# Patient Record
Sex: FEMALE | Race: WHITE | NOT HISPANIC OR LATINO | Employment: UNEMPLOYED | URBAN - METROPOLITAN AREA
[De-identification: names, ages, dates, MRNs, and addresses within clinical notes are randomized per-mention and may not be internally consistent; named-entity substitution may affect disease eponyms.]

---

## 2019-07-05 ENCOUNTER — APPOINTMENT (OUTPATIENT)
Dept: GENERAL RADIOLOGY | Facility: HOSPITAL | Age: 54
End: 2019-07-05

## 2019-07-05 ENCOUNTER — HOSPITAL ENCOUNTER (EMERGENCY)
Facility: HOSPITAL | Age: 54
Discharge: HOME | End: 2019-07-05
Attending: EMERGENCY MEDICINE | Admitting: EMERGENCY MEDICINE

## 2019-07-05 VITALS
RESPIRATION RATE: 16 BRPM | OXYGEN SATURATION: 100 % | TEMPERATURE: 97.5 F | SYSTOLIC BLOOD PRESSURE: 152 MMHG | WEIGHT: 120 LBS | HEIGHT: 65 IN | HEART RATE: 85 BPM | BODY MASS INDEX: 19.99 KG/M2 | DIASTOLIC BLOOD PRESSURE: 117 MMHG

## 2019-07-05 DIAGNOSIS — F41.9 ANXIETY: ICD-10-CM

## 2019-07-05 DIAGNOSIS — R53.83 OTHER FATIGUE: Primary | ICD-10-CM

## 2019-07-05 DIAGNOSIS — Z59.00 HOMELESSNESS: ICD-10-CM

## 2019-07-05 LAB
ALBUMIN SERPL-MCNC: 3.7 G/DL (ref 3.5–5.2)
ALBUMIN/GLOB SERPL: 1.4 G/DL
ALP SERPL-CCNC: 76 U/L (ref 39–117)
ALT SERPL W P-5'-P-CCNC: 16 U/L (ref 1–33)
ANION GAP SERPL CALCULATED.3IONS-SCNC: 8 MMOL/L (ref 5–15)
AST SERPL-CCNC: 18 U/L (ref 1–32)
B-HCG UR QL: NEGATIVE
BASOPHILS # BLD AUTO: 0.03 10*3/MM3 (ref 0–0.2)
BASOPHILS NFR BLD AUTO: 0.5 % (ref 0–1.5)
BILIRUB SERPL-MCNC: <0.2 MG/DL (ref 0.2–1.2)
BILIRUB UR QL STRIP: NEGATIVE
BUN BLD-MCNC: 15 MG/DL (ref 6–20)
BUN/CREAT SERPL: 25.4 (ref 7–25)
CALCIUM SPEC-SCNC: 8.9 MG/DL (ref 8.6–10.5)
CHLORIDE SERPL-SCNC: 105 MMOL/L (ref 98–107)
CLARITY UR: CLEAR
CO2 SERPL-SCNC: 29 MMOL/L (ref 22–29)
COLOR UR: YELLOW
CREAT BLD-MCNC: 0.59 MG/DL (ref 0.57–1)
DEPRECATED RDW RBC AUTO: 43.8 FL (ref 37–54)
EOSINOPHIL # BLD AUTO: 0.18 10*3/MM3 (ref 0–0.4)
EOSINOPHIL NFR BLD AUTO: 2.9 % (ref 0.3–6.2)
ERYTHROCYTE [DISTWIDTH] IN BLOOD BY AUTOMATED COUNT: 12.9 % (ref 12.3–15.4)
GFR SERPL CREATININE-BSD FRML MDRD: 107 ML/MIN/1.73
GLOBULIN UR ELPH-MCNC: 2.6 GM/DL
GLUCOSE BLD-MCNC: 85 MG/DL (ref 65–99)
GLUCOSE BLDC GLUCOMTR-MCNC: 78 MG/DL (ref 70–130)
GLUCOSE UR STRIP-MCNC: NEGATIVE MG/DL
HCT VFR BLD AUTO: 42.6 % (ref 34–46.6)
HGB BLD-MCNC: 13.2 G/DL (ref 12–15.9)
HGB UR QL STRIP.AUTO: NEGATIVE
HOLD SPECIMEN: NORMAL
HOLD SPECIMEN: NORMAL
IMM GRANULOCYTES # BLD AUTO: 0.01 10*3/MM3 (ref 0–0.05)
IMM GRANULOCYTES NFR BLD AUTO: 0.2 % (ref 0–0.5)
INTERNAL NEGATIVE CONTROL: NEGATIVE
INTERNAL POSITIVE CONTROL: POSITIVE
KETONES UR QL STRIP: NEGATIVE
LEUKOCYTE ESTERASE UR QL STRIP.AUTO: NEGATIVE
LYMPHOCYTES # BLD AUTO: 1.64 10*3/MM3 (ref 0.7–3.1)
LYMPHOCYTES NFR BLD AUTO: 26 % (ref 19.6–45.3)
Lab: NORMAL
MAGNESIUM SERPL-MCNC: 2.1 MG/DL (ref 1.6–2.6)
MCH RBC QN AUTO: 28.7 PG (ref 26.6–33)
MCHC RBC AUTO-ENTMCNC: 31 G/DL (ref 31.5–35.7)
MCV RBC AUTO: 92.6 FL (ref 79–97)
MONOCYTES # BLD AUTO: 0.38 10*3/MM3 (ref 0.1–0.9)
MONOCYTES NFR BLD AUTO: 6 % (ref 5–12)
NEUTROPHILS # BLD AUTO: 4.06 10*3/MM3 (ref 1.7–7)
NEUTROPHILS NFR BLD AUTO: 64.4 % (ref 42.7–76)
NITRITE UR QL STRIP: NEGATIVE
NRBC BLD AUTO-RTO: 0 /100 WBC (ref 0–0.2)
PH UR STRIP.AUTO: 6 [PH] (ref 5–8)
PLATELET # BLD AUTO: 344 10*3/MM3 (ref 140–450)
PMV BLD AUTO: 9.3 FL (ref 6–12)
POTASSIUM BLD-SCNC: 4.1 MMOL/L (ref 3.5–5.2)
PROT SERPL-MCNC: 6.3 G/DL (ref 6–8.5)
PROT UR QL STRIP: NEGATIVE
RBC # BLD AUTO: 4.6 10*6/MM3 (ref 3.77–5.28)
SODIUM BLD-SCNC: 142 MMOL/L (ref 136–145)
SP GR UR STRIP: 1.02 (ref 1–1.03)
TROPONIN T SERPL-MCNC: <0.01 NG/ML (ref 0–0.03)
UROBILINOGEN UR QL STRIP: NORMAL
WBC NRBC COR # BLD: 6.3 10*3/MM3 (ref 3.4–10.8)
WHOLE BLOOD HOLD SPECIMEN: NORMAL
WHOLE BLOOD HOLD SPECIMEN: NORMAL

## 2019-07-05 PROCEDURE — 82962 GLUCOSE BLOOD TEST: CPT

## 2019-07-05 PROCEDURE — 86618 LYME DISEASE ANTIBODY: CPT | Performed by: NURSE PRACTITIONER

## 2019-07-05 PROCEDURE — 93005 ELECTROCARDIOGRAM TRACING: CPT | Performed by: EMERGENCY MEDICINE

## 2019-07-05 PROCEDURE — 81025 URINE PREGNANCY TEST: CPT

## 2019-07-05 PROCEDURE — 86757 RICKETTSIA ANTIBODY: CPT | Performed by: NURSE PRACTITIONER

## 2019-07-05 PROCEDURE — 99283 EMERGENCY DEPT VISIT LOW MDM: CPT

## 2019-07-05 PROCEDURE — 81025 URINE PREGNANCY TEST: CPT | Performed by: EMERGENCY MEDICINE

## 2019-07-05 PROCEDURE — 84484 ASSAY OF TROPONIN QUANT: CPT

## 2019-07-05 PROCEDURE — 80053 COMPREHEN METABOLIC PANEL: CPT

## 2019-07-05 PROCEDURE — 93005 ELECTROCARDIOGRAM TRACING: CPT

## 2019-07-05 PROCEDURE — 81003 URINALYSIS AUTO W/O SCOPE: CPT | Performed by: EMERGENCY MEDICINE

## 2019-07-05 PROCEDURE — 71045 X-RAY EXAM CHEST 1 VIEW: CPT

## 2019-07-05 PROCEDURE — 86753 PROTOZOA ANTIBODY NOS: CPT | Performed by: NURSE PRACTITIONER

## 2019-07-05 PROCEDURE — 86666 EHRLICHIA ANTIBODY: CPT | Performed by: NURSE PRACTITIONER

## 2019-07-05 PROCEDURE — 85025 COMPLETE CBC W/AUTO DIFF WBC: CPT

## 2019-07-05 PROCEDURE — 83735 ASSAY OF MAGNESIUM: CPT

## 2019-07-05 PROCEDURE — 87476 LYME DIS DNA AMP PROBE: CPT | Performed by: NURSE PRACTITIONER

## 2019-07-05 RX ORDER — SODIUM CHLORIDE 0.9 % (FLUSH) 0.9 %
10 SYRINGE (ML) INJECTION AS NEEDED
Status: DISCONTINUED | OUTPATIENT
Start: 2019-07-05 | End: 2019-07-05 | Stop reason: HOSPADM

## 2019-07-05 RX ADMIN — SODIUM CHLORIDE 1000 ML: 9 INJECTION, SOLUTION INTRAVENOUS at 20:27

## 2019-07-05 SDOH — ECONOMIC STABILITY - HOUSING INSECURITY: HOMELESSNESS UNSPECIFIED: Z59.00

## 2019-07-06 NOTE — ED PROVIDER NOTES
"Subjective   Ms. Leah Rushing is a 54 y.o. female who presents to the ED with complaints of fatigue. She reports that for the past 3 days she has had severe weakness and fatigue, which prompted presentation to the ED. She was seen for the same symptoms at the [facility] ED this morning and given 2 liters of fluids before being discharged with a diagnosis of dehydration. She also tells us that she \"knows it's crazy\" but she believes that the [employer] is chloroforming her at night and moving her body. However, she denies any vomiting, rashes, vaginal discharge, or hallucinations. She notes that she pulled 3 ticks off of her earlier this week. Her primary care provider is TEX Jackson. She is currently homeless and meeting with [facility] in 3 days. No other acute complaints at this time.         History provided by:  Patient  Fatigue   Severity:  Moderate  Duration:  3 days  Timing:  Constant  Chronicity:  New  Associated symptoms: fatigue    Associated symptoms: no abdominal pain        Review of Systems   Constitutional: Positive for fatigue.   Gastrointestinal: Negative for abdominal pain.   Neurological: Positive for weakness.   Psychiatric/Behavioral: Negative for hallucinations.   All other systems reviewed and are negative.      History reviewed. No pertinent past medical history.    Allergies   Allergen Reactions   • Percocet [Oxycodone-Acetaminophen] GI Intolerance       History reviewed. No pertinent surgical history.    No family history on file.    Social History     Socioeconomic History   • Marital status:      Spouse name: Not on file   • Number of children: Not on file   • Years of education: Not on file   • Highest education level: Not on file         Objective   Physical Exam   Constitutional: She is oriented to person, place, and time. She appears well-developed and well-nourished. No distress.   HENT:   Head: Normocephalic and atraumatic.   Nose: Nose normal.   Eyes: " Conjunctivae are normal. No scleral icterus.   Neck: Normal range of motion. Neck supple.   Cardiovascular: Normal rate, regular rhythm and normal heart sounds.   No murmur heard.  Pulmonary/Chest: Effort normal and breath sounds normal. No respiratory distress.   Abdominal: Soft. There is no tenderness.   Musculoskeletal: Normal range of motion.   Neurological: She is alert and oriented to person, place, and time.   Skin: Skin is warm and dry. She is not diaphoretic.   Psychiatric: She has a normal mood and affect. Her behavior is normal.   Nursing note and vitals reviewed.      Procedures         ED Course       Recent Results (from the past 24 hour(s))   Comprehensive Metabolic Panel    Collection Time: 07/05/19  6:06 PM   Result Value Ref Range    Glucose 85 65 - 99 mg/dL    BUN 15 6 - 20 mg/dL    Creatinine 0.59 0.57 - 1.00 mg/dL    Sodium 142 136 - 145 mmol/L    Potassium 4.1 3.5 - 5.2 mmol/L    Chloride 105 98 - 107 mmol/L    CO2 29.0 22.0 - 29.0 mmol/L    Calcium 8.9 8.6 - 10.5 mg/dL    Total Protein 6.3 6.0 - 8.5 g/dL    Albumin 3.70 3.50 - 5.20 g/dL    ALT (SGPT) 16 1 - 33 U/L    AST (SGOT) 18 1 - 32 U/L    Alkaline Phosphatase 76 39 - 117 U/L    Total Bilirubin <0.2 (L) 0.2 - 1.2 mg/dL    eGFR Non African Amer 107 >60 mL/min/1.73    Globulin 2.6 gm/dL    A/G Ratio 1.4 g/dL    BUN/Creatinine Ratio 25.4 (H) 7.0 - 25.0    Anion Gap 8.0 5.0 - 15.0 mmol/L   Troponin    Collection Time: 07/05/19  6:06 PM   Result Value Ref Range    Troponin T <0.010 0.000 - 0.030 ng/mL   Magnesium    Collection Time: 07/05/19  6:06 PM   Result Value Ref Range    Magnesium 2.1 1.6 - 2.6 mg/dL   Light Blue Top    Collection Time: 07/05/19  6:06 PM   Result Value Ref Range    Extra Tube hold for add-on    Green Top (Gel)    Collection Time: 07/05/19  6:06 PM   Result Value Ref Range    Extra Tube Hold for add-ons.    Lavender Top    Collection Time: 07/05/19  6:06 PM   Result Value Ref Range    Extra Tube hold for add-on    Gold  Top - SST    Collection Time: 07/05/19  6:06 PM   Result Value Ref Range    Extra Tube Hold for add-ons.    CBC Auto Differential    Collection Time: 07/05/19  6:06 PM   Result Value Ref Range    WBC 6.30 3.40 - 10.80 10*3/mm3    RBC 4.60 3.77 - 5.28 10*6/mm3    Hemoglobin 13.2 12.0 - 15.9 g/dL    Hematocrit 42.6 34.0 - 46.6 %    MCV 92.6 79.0 - 97.0 fL    MCH 28.7 26.6 - 33.0 pg    MCHC 31.0 (L) 31.5 - 35.7 g/dL    RDW 12.9 12.3 - 15.4 %    RDW-SD 43.8 37.0 - 54.0 fl    MPV 9.3 6.0 - 12.0 fL    Platelets 344 140 - 450 10*3/mm3    Neutrophil % 64.4 42.7 - 76.0 %    Lymphocyte % 26.0 19.6 - 45.3 %    Monocyte % 6.0 5.0 - 12.0 %    Eosinophil % 2.9 0.3 - 6.2 %    Basophil % 0.5 0.0 - 1.5 %    Immature Grans % 0.2 0.0 - 0.5 %    Neutrophils, Absolute 4.06 1.70 - 7.00 10*3/mm3    Lymphocytes, Absolute 1.64 0.70 - 3.10 10*3/mm3    Monocytes, Absolute 0.38 0.10 - 0.90 10*3/mm3    Eosinophils, Absolute 0.18 0.00 - 0.40 10*3/mm3    Basophils, Absolute 0.03 0.00 - 0.20 10*3/mm3    Immature Grans, Absolute 0.01 0.00 - 0.05 10*3/mm3    nRBC 0.0 0.0 - 0.2 /100 WBC   Urinalysis With Microscopic If Indicated (No Culture) - Urine, Clean Catch    Collection Time: 07/05/19  7:01 PM   Result Value Ref Range    Color, UA Yellow Yellow, Straw    Appearance, UA Clear Clear    pH, UA 6.0 5.0 - 8.0    Specific Gravity, UA 1.016 1.001 - 1.030    Glucose, UA Negative Negative    Ketones, UA Negative Negative    Bilirubin, UA Negative Negative    Blood, UA Negative Negative    Protein, UA Negative Negative    Leuk Esterase, UA Negative Negative    Nitrite, UA Negative Negative    Urobilinogen, UA 0.2 E.U./dL 0.2 - 1.0 E.U./dL   POCT pregnancy, urine    Collection Time: 07/05/19  7:03 PM   Result Value Ref Range    HCG, Urine, QL Negative Negative    Lot Number [id number]     Internal Positive Control Positive     Internal Negative Control Negative    POC Glucose Once    Collection Time: 07/05/19  8:04 PM   Result Value Ref Range    Glucose  "78 70 - 130 mg/dL     Note: In addition to lab results from this visit, the labs listed above may include labs taken at another facility or during a different encounter within the last 24 hours. Please correlate lab times with ED admission and discharge times for further clarification of the services performed during this visit.    XR Chest 1 View   Final Result   Nodular density seen overlying the right lower lung field   suggesting a  nipple shadow. No acute parenchymal disease.       DICTATED:   07/05/2019   EDITED/ls :   07/05/2019        This report was finalized on 7/5/2019 6:53 PM by Dr. Allison Carranza MD.            Vitals:    07/05/19 1643   BP: 157/88   BP Location: Left arm   Patient Position: Sitting   Pulse: 78   Resp: 16   Temp: 97.5 °F (36.4 °C)   TempSrc: Oral   SpO2: 98%   Weight: 54.4 kg (120 lb)   Height: 165.1 cm (65\")     Medications   sodium chloride 0.9 % flush 10 mL (not administered)   sodium chloride 0.9 % bolus 1,000 mL (1,000 mL Intravenous New Bag 7/5/19 2027)     ECG/EMG Results (last 24 hours)     Procedure Component Value Units Date/Time    ECG 12 Lead [[id number]] Collected:  07/05/19 1800     Updated:  07/05/19 1800        ECG 12 Lead                           MDM    Final diagnoses:   Other fatigue   Homelessness   Anxiety       Documentation assistance provided by logan Frias.  Information recorded by the logan was done at my direction and has been verified and validated by me.     Pauline Frias  07/05/19 2022       Pauline Frias  07/05/19 2132       Lela Fragoso APRN  07/05/19 2137    "

## 2019-07-06 NOTE — DISCHARGE INSTRUCTIONS
Follow up with your primary care provider at next available.  Please drink ample clear fluids while you are outdoors and in this heat.      Return to the ER if you develop any acute or worsening symptoms.

## 2019-07-08 LAB
B BURGDOR IGG+IGM SER-ACNC: <0.91 ISR (ref 0–0.9)
B BURGDOR IGM SER-ACNC: <0.8 INDEX (ref 0–0.79)

## 2019-07-09 LAB
A PHAGOCYTOPH IGM TITR SER IF: NEGATIVE {TITER}
B MICROTI IGG TITR SER: NORMAL {TITER}
B MICROTI IGM TITR SER: NORMAL {TITER}
CONV HGE IGG TITER: NEGATIVE
E CHAFFEENSIS IGG TITR SER IF: NEGATIVE {TITER}
E. CHAFFEENSIS (HME) IGM TITER: NEGATIVE
R RICKETTSI IGG SER QL IA: NEGATIVE
R RICKETTSI IGM TITR SER: 0.43 INDEX (ref 0–0.89)

## 2019-07-10 LAB — B BURGDOR DNA SPEC QL NAA+PROBE: NEGATIVE

## 2019-10-30 ENCOUNTER — APPOINTMENT (OUTPATIENT)
Dept: GENERAL RADIOLOGY | Facility: HOSPITAL | Age: 54
End: 2019-10-30

## 2019-10-30 ENCOUNTER — HOSPITAL ENCOUNTER (EMERGENCY)
Facility: HOSPITAL | Age: 54
Discharge: HOME | End: 2019-10-30
Attending: EMERGENCY MEDICINE | Admitting: EMERGENCY MEDICINE

## 2019-10-30 VITALS
HEIGHT: 65 IN | HEART RATE: 80 BPM | DIASTOLIC BLOOD PRESSURE: 109 MMHG | WEIGHT: 120 LBS | TEMPERATURE: 97.6 F | BODY MASS INDEX: 19.99 KG/M2 | SYSTOLIC BLOOD PRESSURE: 142 MMHG | RESPIRATION RATE: 18 BRPM | OXYGEN SATURATION: 98 %

## 2019-10-30 DIAGNOSIS — R53.1 GENERALIZED WEAKNESS: ICD-10-CM

## 2019-10-30 DIAGNOSIS — E87.6 HYPOKALEMIA: ICD-10-CM

## 2019-10-30 DIAGNOSIS — Z59.00 HOMELESSNESS: ICD-10-CM

## 2019-10-30 DIAGNOSIS — F19.10 POLYSUBSTANCE ABUSE (HCC): ICD-10-CM

## 2019-10-30 DIAGNOSIS — T07.XXXA MULTIPLE ABRASIONS: Primary | ICD-10-CM

## 2019-10-30 LAB
ALBUMIN SERPL-MCNC: 3.9 G/DL (ref 3.5–5.2)
ALBUMIN/GLOB SERPL: 1.4 G/DL
ALP SERPL-CCNC: 80 U/L (ref 39–117)
ALT SERPL W P-5'-P-CCNC: 13 U/L (ref 1–33)
AMPHET+METHAMPHET UR QL: POSITIVE
AMPHETAMINES UR QL: POSITIVE
ANION GAP SERPL CALCULATED.3IONS-SCNC: 10 MMOL/L (ref 5–15)
APAP SERPL-MCNC: <5 MCG/ML (ref 10–30)
AST SERPL-CCNC: 15 U/L (ref 1–32)
B-HCG UR QL: NEGATIVE
BACTERIA UR QL AUTO: ABNORMAL /HPF
BARBITURATES UR QL SCN: NEGATIVE
BASOPHILS # BLD AUTO: 0.04 10*3/MM3 (ref 0–0.2)
BASOPHILS NFR BLD AUTO: 0.7 % (ref 0–1.5)
BENZODIAZ UR QL SCN: POSITIVE
BILIRUB SERPL-MCNC: 0.2 MG/DL (ref 0.2–1.2)
BILIRUB UR QL STRIP: NEGATIVE
BUN BLD-MCNC: 16 MG/DL (ref 6–20)
BUN/CREAT SERPL: 32 (ref 7–25)
BUPRENORPHINE SERPL-MCNC: NEGATIVE NG/ML
CALCIUM SPEC-SCNC: 8.7 MG/DL (ref 8.6–10.5)
CANNABINOIDS SERPL QL: NEGATIVE
CHLORIDE SERPL-SCNC: 103 MMOL/L (ref 98–107)
CLARITY UR: ABNORMAL
CO2 SERPL-SCNC: 26 MMOL/L (ref 22–29)
COCAINE UR QL: NEGATIVE
COLOR UR: YELLOW
CREAT BLD-MCNC: 0.5 MG/DL (ref 0.57–1)
DEPRECATED RDW RBC AUTO: 43.4 FL (ref 37–54)
EOSINOPHIL # BLD AUTO: 0.22 10*3/MM3 (ref 0–0.4)
EOSINOPHIL NFR BLD AUTO: 3.9 % (ref 0.3–6.2)
ERYTHROCYTE [DISTWIDTH] IN BLOOD BY AUTOMATED COUNT: 13.1 % (ref 12.3–15.4)
ETHANOL BLD-MCNC: <10 MG/DL (ref 0–10)
FLUAV AG NPH QL: NEGATIVE
FLUBV AG NPH QL IA: NEGATIVE
GFR SERPL CREATININE-BSD FRML MDRD: 129 ML/MIN/1.73
GLOBULIN UR ELPH-MCNC: 2.8 GM/DL
GLUCOSE BLD-MCNC: 89 MG/DL (ref 65–99)
GLUCOSE UR STRIP-MCNC: NEGATIVE MG/DL
HCT VFR BLD AUTO: 42.5 % (ref 34–46.6)
HETEROPH AB SER QL LA: NEGATIVE
HGB BLD-MCNC: 13.5 G/DL (ref 12–15.9)
HGB UR QL STRIP.AUTO: NEGATIVE
HYALINE CASTS UR QL AUTO: ABNORMAL /LPF
IMM GRANULOCYTES # BLD AUTO: 0.01 10*3/MM3 (ref 0–0.05)
IMM GRANULOCYTES NFR BLD AUTO: 0.2 % (ref 0–0.5)
KETONES UR QL STRIP: NEGATIVE
LEUKOCYTE ESTERASE UR QL STRIP.AUTO: NEGATIVE
LIPASE SERPL-CCNC: 19 U/L (ref 13–60)
LYMPHOCYTES # BLD AUTO: 2.12 10*3/MM3 (ref 0.7–3.1)
LYMPHOCYTES NFR BLD AUTO: 38 % (ref 19.6–45.3)
MAGNESIUM SERPL-MCNC: 2.1 MG/DL (ref 1.6–2.6)
MCH RBC QN AUTO: 28.5 PG (ref 26.6–33)
MCHC RBC AUTO-ENTMCNC: 31.8 G/DL (ref 31.5–35.7)
MCV RBC AUTO: 89.7 FL (ref 79–97)
METHADONE UR QL SCN: NEGATIVE
MONOCYTES # BLD AUTO: 0.43 10*3/MM3 (ref 0.1–0.9)
MONOCYTES NFR BLD AUTO: 7.7 % (ref 5–12)
NEUTROPHILS # BLD AUTO: 2.76 10*3/MM3 (ref 1.7–7)
NEUTROPHILS NFR BLD AUTO: 49.5 % (ref 42.7–76)
NITRITE UR QL STRIP: NEGATIVE
NRBC BLD AUTO-RTO: 0 /100 WBC (ref 0–0.2)
NT-PROBNP SERPL-MCNC: 112.6 PG/ML (ref 5–900)
OPIATES UR QL: NEGATIVE
OXYCODONE UR QL SCN: NEGATIVE
PCP UR QL SCN: NEGATIVE
PH UR STRIP.AUTO: 6.5 [PH] (ref 5–8)
PLATELET # BLD AUTO: 368 10*3/MM3 (ref 140–450)
PMV BLD AUTO: 9.4 FL (ref 6–12)
POTASSIUM BLD-SCNC: 3.1 MMOL/L (ref 3.5–5.2)
PROPOXYPH UR QL: NEGATIVE
PROT SERPL-MCNC: 6.7 G/DL (ref 6–8.5)
PROT UR QL STRIP: NEGATIVE
RBC # BLD AUTO: 4.74 10*6/MM3 (ref 3.77–5.28)
RBC # UR: ABNORMAL /HPF
REF LAB TEST METHOD: ABNORMAL
SALICYLATES SERPL-MCNC: <0.3 MG/DL
SODIUM BLD-SCNC: 139 MMOL/L (ref 136–145)
SP GR UR STRIP: 1.02 (ref 1–1.03)
SQUAMOUS #/AREA URNS HPF: ABNORMAL /HPF
T4 FREE SERPL-MCNC: 1.21 NG/DL (ref 0.93–1.7)
TRICYCLICS UR QL SCN: NEGATIVE
TROPONIN T SERPL-MCNC: <0.01 NG/ML (ref 0–0.03)
TSH SERPL DL<=0.05 MIU/L-ACNC: 4.03 UIU/ML (ref 0.27–4.2)
UROBILINOGEN UR QL STRIP: ABNORMAL
WBC NRBC COR # BLD: 5.58 10*3/MM3 (ref 3.4–10.8)
WBC UR QL AUTO: ABNORMAL /HPF

## 2019-10-30 PROCEDURE — 99284 EMERGENCY DEPT VISIT MOD MDM: CPT

## 2019-10-30 PROCEDURE — 83880 ASSAY OF NATRIURETIC PEPTIDE: CPT | Performed by: EMERGENCY MEDICINE

## 2019-10-30 PROCEDURE — 81025 URINE PREGNANCY TEST: CPT | Performed by: EMERGENCY MEDICINE

## 2019-10-30 PROCEDURE — 83690 ASSAY OF LIPASE: CPT | Performed by: EMERGENCY MEDICINE

## 2019-10-30 PROCEDURE — 81001 URINALYSIS AUTO W/SCOPE: CPT | Performed by: EMERGENCY MEDICINE

## 2019-10-30 PROCEDURE — 80307 DRUG TEST PRSMV CHEM ANLYZR: CPT | Performed by: EMERGENCY MEDICINE

## 2019-10-30 PROCEDURE — 84439 ASSAY OF FREE THYROXINE: CPT | Performed by: EMERGENCY MEDICINE

## 2019-10-30 PROCEDURE — 87804 INFLUENZA ASSAY W/OPTIC: CPT | Performed by: EMERGENCY MEDICINE

## 2019-10-30 PROCEDURE — 83735 ASSAY OF MAGNESIUM: CPT | Performed by: EMERGENCY MEDICINE

## 2019-10-30 PROCEDURE — 86308 HETEROPHILE ANTIBODY SCREEN: CPT | Performed by: EMERGENCY MEDICINE

## 2019-10-30 PROCEDURE — 85025 COMPLETE CBC W/AUTO DIFF WBC: CPT | Performed by: EMERGENCY MEDICINE

## 2019-10-30 PROCEDURE — 84484 ASSAY OF TROPONIN QUANT: CPT | Performed by: EMERGENCY MEDICINE

## 2019-10-30 PROCEDURE — 84443 ASSAY THYROID STIM HORMONE: CPT | Performed by: EMERGENCY MEDICINE

## 2019-10-30 PROCEDURE — 80053 COMPREHEN METABOLIC PANEL: CPT | Performed by: EMERGENCY MEDICINE

## 2019-10-30 PROCEDURE — 71045 X-RAY EXAM CHEST 1 VIEW: CPT

## 2019-10-30 PROCEDURE — 93005 ELECTROCARDIOGRAM TRACING: CPT | Performed by: EMERGENCY MEDICINE

## 2019-10-30 RX ORDER — LOSARTAN POTASSIUM 50 MG/1
50 TABLET ORAL DAILY
COMMUNITY

## 2019-10-30 RX ORDER — POTASSIUM CHLORIDE 750 MG/1
20 CAPSULE, EXTENDED RELEASE ORAL ONCE
Status: COMPLETED | OUTPATIENT
Start: 2019-10-30 | End: 2019-10-30

## 2019-10-30 RX ORDER — POTASSIUM CHLORIDE 20 MEQ/1
20 TABLET, EXTENDED RELEASE ORAL DAILY
Qty: 4 TABLET | Refills: 0 | Status: SHIPPED | OUTPATIENT
Start: 2019-10-30

## 2019-10-30 RX ORDER — SPIRONOLACTONE 50 MG/1
50 TABLET, FILM COATED ORAL DAILY
COMMUNITY

## 2019-10-30 RX ORDER — HYDROCHLOROTHIAZIDE 12.5 MG/1
12.5 TABLET ORAL DAILY
COMMUNITY

## 2019-10-30 RX ORDER — SODIUM CHLORIDE 0.9 % (FLUSH) 0.9 %
10 SYRINGE (ML) INJECTION AS NEEDED
Status: DISCONTINUED | OUTPATIENT
Start: 2019-10-30 | End: 2019-10-30 | Stop reason: HOSPADM

## 2019-10-30 RX ADMIN — SODIUM CHLORIDE 1000 ML: 9 INJECTION, SOLUTION INTRAVENOUS at 07:04

## 2019-10-30 RX ADMIN — POTASSIUM CHLORIDE 20 MEQ: 750 CAPSULE, EXTENDED RELEASE ORAL at 08:19

## 2019-10-30 SDOH — ECONOMIC STABILITY - HOUSING INSECURITY: HOMELESSNESS UNSPECIFIED: Z59.00

## 2019-10-31 ENCOUNTER — HOSPITAL ENCOUNTER (EMERGENCY)
Facility: HOSPITAL | Age: 54
Discharge: LEFT BEFORE DISCHARGE | End: 2019-10-31

## 2019-10-31 VITALS
HEART RATE: 87 BPM | OXYGEN SATURATION: 98 % | WEIGHT: 124 LBS | BODY MASS INDEX: 20.66 KG/M2 | SYSTOLIC BLOOD PRESSURE: 200 MMHG | DIASTOLIC BLOOD PRESSURE: 91 MMHG | RESPIRATION RATE: 18 BRPM | TEMPERATURE: 97.7 F | HEIGHT: 65 IN

## 2022-11-24 ENCOUNTER — HOSPITAL ENCOUNTER (EMERGENCY)
Facility: HOSPITAL | Age: 57
Discharge: HOME | End: 2022-11-24
Attending: EMERGENCY MEDICINE | Admitting: EMERGENCY MEDICINE

## 2022-11-24 VITALS
SYSTOLIC BLOOD PRESSURE: 170 MMHG | BODY MASS INDEX: 20.83 KG/M2 | TEMPERATURE: 100.2 F | DIASTOLIC BLOOD PRESSURE: 100 MMHG | HEART RATE: 99 BPM | WEIGHT: 125 LBS | OXYGEN SATURATION: 98 % | RESPIRATION RATE: 22 BRPM | HEIGHT: 65 IN

## 2022-11-24 DIAGNOSIS — J10.1 INFLUENZA A: Primary | ICD-10-CM

## 2022-11-24 PROCEDURE — 99283 EMERGENCY DEPT VISIT LOW MDM: CPT

## 2022-11-24 PROCEDURE — 94640 AIRWAY INHALATION TREATMENT: CPT

## 2022-11-24 RX ORDER — IBUPROFEN 800 MG/1
800 TABLET ORAL EVERY 8 HOURS PRN
Qty: 30 TABLET | Refills: 0 | Status: SHIPPED | OUTPATIENT
Start: 2022-11-24

## 2022-11-24 RX ORDER — ACETAMINOPHEN 500 MG
1000 TABLET ORAL 3 TIMES DAILY PRN
Qty: 30 TABLET | Refills: 0 | Status: SHIPPED | OUTPATIENT
Start: 2022-11-24

## 2022-11-24 RX ORDER — ALBUTEROL SULFATE 90 UG/1
2 AEROSOL, METERED RESPIRATORY (INHALATION) ONCE
Status: COMPLETED | OUTPATIENT
Start: 2022-11-24 | End: 2022-11-24

## 2022-11-24 RX ORDER — IBUPROFEN 800 MG/1
800 TABLET ORAL ONCE
Status: COMPLETED | OUTPATIENT
Start: 2022-11-24 | End: 2022-11-24

## 2022-11-24 RX ORDER — BENZONATATE 100 MG/1
100 CAPSULE ORAL ONCE
Status: COMPLETED | OUTPATIENT
Start: 2022-11-24 | End: 2022-11-24

## 2022-11-24 RX ADMIN — BENZONATATE 100 MG: 100 CAPSULE ORAL at 18:37

## 2022-11-24 RX ADMIN — ALBUTEROL SULFATE 2 PUFF: 90 AEROSOL, METERED RESPIRATORY (INHALATION) at 18:35

## 2022-11-24 RX ADMIN — IBUPROFEN 800 MG: 800 TABLET, FILM COATED ORAL at 18:37

## 2022-11-24 NOTE — ED PROVIDER NOTES
"Subjective   History of Present Illness  Pt is a 58 yo female presenting to ED with complaints of the Flu. PMHx significant for HTN. Pt reports developing symptoms of body aches, fevers, congestion and cough yesterday. She went to  ED and was diagnosed Flu A. She reports last dose of Tylenol and Motrin was at 0430. She has not take any other medications today. She is not on Tamiflu. She is unvaccinated for the Flu. She denies confusion, dizziness, vision changes, neck pain / stiffness, CP, SOB, N/V/D, abdominal pain or urinary sx. She is homeless but currently staying with a friend. She denies tobacco, drug or ETOH use.     History provided by:  Patient and friend      Review of Systems   Constitutional: Positive for appetite change, chills, fatigue and fever.   HENT: Positive for congestion and sore throat. Negative for ear pain, trouble swallowing and voice change.    Eyes: Negative for visual disturbance.   Respiratory: Positive for cough. Negative for shortness of breath.    Cardiovascular: Negative for chest pain.   Gastrointestinal: Negative for abdominal pain, diarrhea, nausea and vomiting.   Genitourinary: Negative for difficulty urinating, dysuria and flank pain.   Musculoskeletal: Positive for myalgias. Negative for arthralgias, back pain, neck pain and neck stiffness.   Skin: Negative for rash.   Neurological: Positive for headaches. Negative for dizziness, syncope, speech difficulty, weakness and numbness.   Psychiatric/Behavioral: Negative for confusion.       Past Medical History:   Diagnosis Date   • Hypertension        Allergies   Allergen Reactions   • Other Nausea And Vomiting     Pt states \"all opiods make me throw up instantly\"   • Percocet [Oxycodone-Acetaminophen] GI Intolerance   • Sulfa Antibiotics Rash       Past Surgical History:   Procedure Laterality Date   • KNEE SURGERY     • RHINOPLASTY         No family history on file.    Social History     Socioeconomic History   • Marital status: "    Tobacco Use   • Smoking status: Never   • Smokeless tobacco: Never   Substance and Sexual Activity   • Alcohol use: No   • Drug use: No   • Sexual activity: Defer           Objective   Physical Exam  Vitals and nursing note reviewed.   Constitutional:       Appearance: She is well-developed.   HENT:      Head: Atraumatic.      Nose: Nose normal.   Eyes:      General: Lids are normal.      Conjunctiva/sclera: Conjunctivae normal.      Pupils: Pupils are equal, round, and reactive to light.   Cardiovascular:      Rate and Rhythm: Normal rate and regular rhythm.      Heart sounds: Normal heart sounds.   Pulmonary:      Effort: Pulmonary effort is normal.      Breath sounds: Normal breath sounds. No wheezing.   Abdominal:      General: There is no distension.      Palpations: Abdomen is soft.      Tenderness: There is no abdominal tenderness. There is no guarding or rebound.   Musculoskeletal:         General: No tenderness. Normal range of motion.      Cervical back: Normal range of motion and neck supple.   Skin:     General: Skin is warm and dry.      Findings: No erythema or rash.   Neurological:      Mental Status: She is alert and oriented to person, place, and time.      Sensory: No sensory deficit.   Psychiatric:         Speech: Speech normal.         Behavior: Behavior normal.         Procedures           ED Course  ED Course as of 11/24/22 1821   Thu Nov 24, 2022   1801 SpO2: 98 %  RA [RT]      ED Course User Index  [RT] Cecille Jacobson PA      Discussed symptomatic treatment for Flu symptoms. Patient given Motrin in ED as well as Albuterol inhaler and tessalon pearles for cough. Rx's for Tylenol and Motrin sent to pharmacy. Patient's friend in ED reports he can help her fill Rx's. Went over her vitals signs in ED and to return to ED if new / worse sx.     No results found for this or any previous visit (from the past 24 hour(s)).  Note: In addition to lab results from this visit, the labs listed  "above may include labs taken at another facility or during a different encounter within the last 24 hours. Please correlate lab times with ED admission and discharge times for further clarification of the services performed during this visit.    No orders to display     Vitals:    11/24/22 1714   BP: (!) 167/109   BP Location: Left arm   Patient Position: Sitting   Pulse: 102   Resp: 20   Temp: 100.2 °F (37.9 °C)   TempSrc: Oral   SpO2: 98%   Weight: 56.7 kg (125 lb)   Height: 165.1 cm (65\")     Medications   ibuprofen (ADVIL,MOTRIN) tablet 800 mg (has no administration in time range)   albuterol sulfate HFA (PROVENTIL HFA;VENTOLIN HFA;PROAIR HFA) inhaler 2 puff (has no administration in time range)   benzonatate (TESSALON) capsule 100 mg (has no administration in time range)     ECG/EMG Results (last 24 hours)     ** No results found for the last 24 hours. **        No orders to display       DISCHARGE    Patient discharged in stable condition.    Reviewed implications of results, diagnosis, meds, responsibility to follow up, warning signs and symptoms of possible worsening, potential complications and reasons to return to ER.    Patient/Family voiced understanding of above instructions.    Discussed plan for discharge, as there is no emergent indication for admission.  Pt/family is agreeable and understands need for follow up and possible repeat testing.  Pt/family is aware that discharge does not mean that nothing is wrong but that it indicates no emergency is currently present that requires admission and they must continue care with follow-up as given below or with a physician of their choice.     FOLLOW-UP  Sigrid Byers PA  [address]   [address]  [place] [address]  [phone]    Schedule an appointment as soon as possible for a visit       [facility] Emergency Department  [address] [address] [phone]    If symptoms worsen       "   Medication List      New Prescriptions    acetaminophen 500 MG tablet  Commonly known as: TYLENOL  Take 2 tablets by mouth 3 (Three) Times a Day As Needed for Mild Pain.     ibuprofen 800 MG tablet  Commonly known as: ADVIL,MOTRIN  Take 1 tablet by mouth Every 8 (Eight) Hours As Needed for Mild Pain or Moderate Pain for up to 30 doses.           Where to Get Your Medications      These medications were sent to [facility]/pharmacy #[id number] - [place], [place] - [address] - [phone] [place] [phone]   [address] [place] [address]    Hours: 24-hours Phone: [phone]   · acetaminophen 500 MG tablet  · ibuprofen 800 MG tablet                                            MDM    Final diagnoses:   Influenza A       ED Disposition  ED Disposition     ED Disposition   Discharge    Condition   Stable    Comment   --             Sigrid Byers PA  [address]  [address]  [place] [id number] [phone]    Schedule an appointment as soon as possible for a visit       [facility] Emergency Department  [address] [place] [id number] [phone]    If symptoms worsen         Medication List      New Prescriptions    acetaminophen 500 MG tablet  Commonly known as: TYLENOL  Take 2 tablets by mouth 3 (Three) Times a Day As Needed for Mild Pain.     ibuprofen 800 MG tablet  Commonly known as: ADVIL,MOTRIN  Take 1 tablet by mouth Every 8 (Eight) Hours As Needed for Mild Pain or Moderate Pain for up to 30 doses.           Where to Get Your Medications      These medications were sent to [facility]/pharmacy #[id number] - [place], [place] - [address] - [phone] [place] [phone]  [address] [place] [address]    Hours: 24-hours Phone: [phone]   · acetaminophen 500 MG tablet  · ibuprofen 800 MG tablet          Cecille Jacobson PA  11/24/22 4529

## 2023-02-05 ENCOUNTER — HOSPITAL ENCOUNTER (EMERGENCY)
Facility: HOSPITAL | Age: 58
Discharge: HOME | End: 2023-02-05
Attending: EMERGENCY MEDICINE | Admitting: EMERGENCY MEDICINE
Payer: MEDICAID

## 2023-02-05 VITALS
HEIGHT: 65 IN | DIASTOLIC BLOOD PRESSURE: 95 MMHG | HEART RATE: 92 BPM | RESPIRATION RATE: 14 BRPM | SYSTOLIC BLOOD PRESSURE: 147 MMHG | TEMPERATURE: 97.7 F | OXYGEN SATURATION: 96 % | BODY MASS INDEX: 20.83 KG/M2 | WEIGHT: 125 LBS

## 2023-02-05 DIAGNOSIS — F41.1 ANXIETY REACTION: ICD-10-CM

## 2023-02-05 DIAGNOSIS — M79.605 ACUTE LEG PAIN, LEFT: Primary | ICD-10-CM

## 2023-02-05 DIAGNOSIS — I82.4Z2 DVT, LOWER EXTREMITY, DISTAL, ACUTE, LEFT: ICD-10-CM

## 2023-02-05 PROCEDURE — 25010000002 LORAZEPAM PER 2 MG: Performed by: EMERGENCY MEDICINE

## 2023-02-05 PROCEDURE — 96374 THER/PROPH/DIAG INJ IV PUSH: CPT

## 2023-02-05 PROCEDURE — 99283 EMERGENCY DEPT VISIT LOW MDM: CPT

## 2023-02-05 RX ORDER — HYDROCODONE BITARTRATE AND ACETAMINOPHEN 5; 325 MG/1; MG/1
1 TABLET ORAL
COMMUNITY
Start: 2023-02-03 | End: 2023-02-07

## 2023-02-05 RX ORDER — METHOCARBAMOL 500 MG/1
TABLET, FILM COATED ORAL
COMMUNITY
Start: 2023-02-02

## 2023-02-05 RX ORDER — LIDOCAINE 50 MG/G
1 PATCH TOPICAL
Status: DISCONTINUED | OUTPATIENT
Start: 2023-02-05 | End: 2023-02-05 | Stop reason: HOSPADM

## 2023-02-05 RX ORDER — LORAZEPAM 2 MG/ML
1 INJECTION INTRAMUSCULAR ONCE
Status: COMPLETED | OUTPATIENT
Start: 2023-02-05 | End: 2023-02-05

## 2023-02-05 RX ORDER — APIXABAN 5 MG (74)
KIT ORAL
COMMUNITY
Start: 2023-02-02

## 2023-02-05 RX ORDER — HYDROXYZINE PAMOATE 50 MG/1
50 CAPSULE ORAL 3 TIMES DAILY PRN
Qty: 20 CAPSULE | Refills: 0 | Status: SHIPPED | OUTPATIENT
Start: 2023-02-05

## 2023-02-05 RX ORDER — HYDROCODONE BITARTRATE AND ACETAMINOPHEN 5; 325 MG/1; MG/1
1 TABLET ORAL ONCE
Status: COMPLETED | OUTPATIENT
Start: 2023-02-05 | End: 2023-02-05

## 2023-02-05 RX ADMIN — LIDOCAINE 1 PATCH: 50 PATCH TOPICAL at 17:58

## 2023-02-05 RX ADMIN — HYDROCODONE BITARTRATE AND ACETAMINOPHEN 1 TABLET: 5; 325 TABLET ORAL at 21:40

## 2023-02-05 RX ADMIN — LORAZEPAM 1 MG: 2 INJECTION INTRAMUSCULAR; INTRAVENOUS at 17:54

## 2023-02-05 NOTE — ED PROVIDER NOTES
EMERGENCY DEPARTMENT ENCOUNTER    Pt Name: Jill M Paladino  MRN: [id number]  Pt :   1965  Room Number:  HAL1/HA  Date of encounter:  2023  PCP: Sigrid Byers PA  ED Provider: Davion Ramirez MD    Historian: Patient      HPI:  Chief Complaint: Left leg pain        Context: Jill M Paladino is a 57 y.o. female who presents to the ED c/o left leg pain which the patient states is secondary to DVT.  The patient has been complaining of pain for the last several days.  She was seen both at [facility] as well as [facility] per her report.  She was diagnosed with a DVT in her left leg and was started on Eliquis which she reports having been taking as prescribed.  She complains of severe pain all the way from her left buttock/low back through her anterior left leg.      PAST MEDICAL HISTORY  Past Medical History:   Diagnosis Date   • Hypertension          PAST SURGICAL HISTORY  Past Surgical History:   Procedure Laterality Date   • KNEE SURGERY     • RHINOPLASTY           FAMILY HISTORY  History reviewed. No pertinent family history.      SOCIAL HISTORY  Social History     Socioeconomic History   • Marital status:    Tobacco Use   • Smoking status: Never   • Smokeless tobacco: Never   Substance and Sexual Activity   • Alcohol use: No   • Drug use: No   • Sexual activity: Defer         ALLERGIES  Other, Percocet [oxycodone-acetaminophen], and Sulfa antibiotics        REVIEW OF SYSTEMS  Review of Systems       All systems reviewed and negative except for those discussed in HPI.       PHYSICAL EXAM    I have reviewed the triage vital signs and nursing notes.    ED Triage Vitals [23 1709]   Temp Heart Rate Resp BP SpO2   97.7 °F (36.5 °C) 90 14 176/97 100 %      Temp src Heart Rate Source Patient Position BP Location FiO2 (%)   Oral Monitor Sitting Right arm --       Physical Exam  GENERAL:   Appears in significant distress as she screams and moves about the hospital bed.  She  moves her leg and holds her low back/left hip.  She yells about the fact that her mother was in the special forces and how she could have been in the special forces and the fact that her mother had similar pain when she had a DVT.  HENT: Nares patent.  EYES: No scleral icterus.  CV: Regular rhythm, regular rate.  No murmurs gallops rubs.  2+ dorsalis pedis pulses.  Both feet are warm and of equal temperature.  RESPIRATORY: Normal effort.  No audible wheezes, rales or rhonchi.  Clear to auscultation  ABDOMEN: Soft, nontender to deep palpation  MUSCULOSKELETAL: No deformities.  I do not appreciate lower extremity asymmetry.  The patient has some tenderness with palpation of the left sciatic notch as well as diffusely throughout the left thigh.  NEURO: Alert, moves all extremities, follows commands.  SKIN: Warm, dry, no rash visualized.      LAB RESULTS  No results found for this or any previous visit (from the past 24 hour(s)).    If labs were ordered, I independently reviewed the results and considered them in treating the patient.        RADIOLOGY  No Radiology Exams Resulted Within Past 24 Hours          PROCEDURES    Procedures    No orders to display       MEDICATIONS GIVEN IN ER    Medications   lidocaine (LIDODERM) 5 % 1 patch (1 patch Transdermal Medication Applied 2/5/23 1758)   LORazepam (ATIVAN) injection 1 mg (1 mg Intravenous Given 2/5/23 1754)   HYDROcodone-acetaminophen (NORCO) 5-325 MG per tablet 1 tablet (1 tablet Oral Given 2/5/23 2140)         MEDICAL DECISION MAKING, PROGRESS, and CONSULTS    All labs have been independently reviewed by me.  All radiology studies have been reviewed by me and the radiologist dictating the report.  All EKG's have been independently viewed and interpreted by me.      Discussion below represents my analysis of pertinent findings related to patient's condition, differential diagnosis, treatment plan and final disposition.      Differential diagnosis:    DVT versus  arterial occlusion of a vessel in the left lower extremity.  No arterial occlusion to be more likely to cause this type of severe pain that would lead the patient to screaming.  Psychiatric disorders will be considered as well.  Anxiety especially.  Sciatica is another consideration.      Additional sources:    - Discussed/ obtained information from independent historians: Paramedics    - External (non-ED) record review: Outside hospital records reviewed showing left lower extremity tibial vein DVT.    - Chronic or social conditions impacting care: The patient currently lives in a hotel.    - Shared decision making: Patient is in complete agreement with current plan for treatment.      Orders placed during this visit:  No orders of the defined types were placed in this encounter.        Additional orders considered but not ordered:  Lower extremity ultrasound not indicated at this stage because arterial thrombus is not apparent given the color and pulses present    ED Course:    Consultants:      ED Course as of 02/05/23 2156   Sun Feb 05, 2023   1733 Records review from outside hospitals.  2/3/2023 emergency department visit    Assessment & Plan   Clinical Impression   Diagnosis Comment Added By Time Added   Left leg pain Davion Foster MD 2/3/2023 8:38 AM   Acute deep vein thrombosis (DVT) of tibial vein of left lower extremity (HCC) Davion Foster MD 2/3/2023 8:38 AM     [MS]   1759 The patient was very anxious and intermittently crying out.  She points mostly to her left buttock when she complains of pain.  Her left low back is causing her some discomfort as well.  She tells me that she is quite anxious and wants something for anxiety.  She used to be on Xanax but has not been on that prescription medication for some time.  I have ordered a dose of Ativan as well as a Lidoderm patch.  She is happy with this plan. [MS]   2112 The patient is doing much better at this point.  Her anxiety is completely  relieved.  She is no longer yelling out or complaining of any pain whatsoever.  The Lidoderm patch seems to be helping.  She is currently living in a hotel, red roof and.  We will get her and Uber ride back down there.  She is comfortable with this plan.  I will prescribe her hydroxyzine for anxiety. [MS]   2135 Patient does have a prescription for Norco.  She has been here for over 4 hours.  She apparently did not take any Norco prior to coming to the emergency department.  I will give her a single dose here prior to discharge. [MS]      ED Course User Index  [MS] Davion Ramirez MD                  AS OF 21:56 EST VITALS:    BP - 147/95  HR - 92  TEMP - 97.7 °F (36.5 °C) (Oral)  O2 SATS - 96%                  DIAGNOSIS  Final diagnoses:   Acute leg pain, left   DVT, lower extremity, distal, acute, left (HCC)   Anxiety reaction         DISPOSITION  DISCHARGE    Patient discharged in stable condition.    Reviewed implications of results, diagnosis, meds, responsibility to follow up, warning signs and symptoms of possible worsening, potential complications and reasons to return to ER.    Patient/Family voiced understanding of above instructions.    Discussed plan for discharge, as there is no emergent indication for admission.  Pt/family is agreeable and understands need for follow up and possible repeat testing.  Pt/family is aware that discharge does not mean that nothing is wrong but that it indicates no emergency is currently present that requires admission and they must continue care with follow-up as given below or with a physician of their choice.     FOLLOW-UP  Sigrid Byers, PA  [address] [facility]  [address]  [place] [address]  [phone]      NEXT AVAILABLE APPOINTMENT - RECHECK OF CONDITION     Emergency Department  [address] [address] [phone]    IF YOU HAVE ANY CONCERN OF WORSENING CONDITION         Medication List      New  Prescriptions    hydrOXYzine pamoate 50 MG capsule  Commonly known as: VISTARIL  Take 1 capsule by mouth 3 (Three) Times a Day As Needed for Anxiety.           Where to Get Your Medications      These medications were sent to [facility]/pharmacy #[id number] - [place], [place] - [address] - [phone]  - [phone]   [address] [facility] [address] [facility] [address]    Hours: 24-hours Phone: [phone]   · hydrOXYzine pamoate 50 MG capsule             Please note that portions of this document were completed with voice recognition software.      Davion Ramirez MD  02/05/23 4584

## 2023-02-06 NOTE — DISCHARGE INSTRUCTIONS
Please review the medications you are supposed to be taking according to prior physician recommendations. I have not changed your home medications during this visit. If your discharge instructions indicate that I have changed your home medications, this is not the case, and you should disregard. If you have any questions about the medication you should be taking at home, please call your physician.     I have sent in a prescription for hydroxyzine, and anxiety medication to be used on an as-needed basis.

## 2023-12-14 ENCOUNTER — APPOINTMENT (OUTPATIENT)
Dept: CT IMAGING | Facility: HOSPITAL | Age: 58
End: 2023-12-14
Payer: MEDICAID

## 2023-12-14 ENCOUNTER — HOSPITAL ENCOUNTER (OUTPATIENT)
Facility: HOSPITAL | Age: 58
Setting detail: OBSERVATION
Discharge: HOME | End: 2023-12-18
Attending: STUDENT IN AN ORGANIZED HEALTH CARE EDUCATION/TRAINING PROGRAM | Admitting: INTERNAL MEDICINE
Payer: MEDICAID

## 2023-12-14 ENCOUNTER — APPOINTMENT (OUTPATIENT)
Dept: GENERAL RADIOLOGY | Facility: HOSPITAL | Age: 58
End: 2023-12-14
Payer: MEDICAID

## 2023-12-14 DIAGNOSIS — J90 PLEURAL EFFUSION ON RIGHT: ICD-10-CM

## 2023-12-14 DIAGNOSIS — Z86.79 HISTORY OF HYPERTENSION: ICD-10-CM

## 2023-12-14 DIAGNOSIS — I26.99 OTHER ACUTE PULMONARY EMBOLISM WITHOUT ACUTE COR PULMONALE: ICD-10-CM

## 2023-12-14 DIAGNOSIS — Z86.79 HISTORY OF CHF (CONGESTIVE HEART FAILURE): ICD-10-CM

## 2023-12-14 DIAGNOSIS — I51.3 MURAL THROMBUS OF HEART: ICD-10-CM

## 2023-12-14 DIAGNOSIS — R06.02 SHORTNESS OF BREATH: ICD-10-CM

## 2023-12-14 DIAGNOSIS — Z86.39 HISTORY OF HYPERLIPIDEMIA: ICD-10-CM

## 2023-12-14 DIAGNOSIS — Z86.79 HISTORY OF CORONARY ARTERY DISEASE: ICD-10-CM

## 2023-12-14 DIAGNOSIS — Z79.01 CHRONIC ANTICOAGULATION: ICD-10-CM

## 2023-12-14 DIAGNOSIS — F41.9 ANXIETY: ICD-10-CM

## 2023-12-14 DIAGNOSIS — J18.9 PNEUMONIA OF RIGHT LOWER LOBE DUE TO INFECTIOUS ORGANISM: Primary | ICD-10-CM

## 2023-12-14 LAB
ALBUMIN SERPL-MCNC: 3.2 G/DL (ref 3.5–5.2)
ALBUMIN/GLOB SERPL: 0.9 G/DL
ALP SERPL-CCNC: 186 U/L (ref 39–117)
ALT SERPL W P-5'-P-CCNC: 42 U/L (ref 1–33)
AMPHET+METHAMPHET UR QL: NEGATIVE
AMPHETAMINES UR QL: NEGATIVE
ANION GAP SERPL CALCULATED.3IONS-SCNC: 13 MMOL/L (ref 5–15)
AST SERPL-CCNC: 19 U/L (ref 1–32)
BACTERIA UR QL AUTO: ABNORMAL /HPF
BARBITURATES UR QL SCN: NEGATIVE
BASOPHILS # BLD AUTO: 0.06 10*3/MM3 (ref 0–0.2)
BASOPHILS NFR BLD AUTO: 0.5 % (ref 0–1.5)
BENZODIAZ UR QL SCN: POSITIVE
BILIRUB SERPL-MCNC: 0.2 MG/DL (ref 0–1.2)
BILIRUB UR QL STRIP: NEGATIVE
BUN SERPL-MCNC: 14 MG/DL (ref 6–20)
BUN/CREAT SERPL: 20 (ref 7–25)
BUPRENORPHINE SERPL-MCNC: NEGATIVE NG/ML
CALCIUM SPEC-SCNC: 9 MG/DL (ref 8.6–10.5)
CANNABINOIDS SERPL QL: NEGATIVE
CHLORIDE SERPL-SCNC: 103 MMOL/L (ref 98–107)
CLARITY UR: CLEAR
CO2 SERPL-SCNC: 21 MMOL/L (ref 22–29)
COCAINE UR QL: NEGATIVE
COLOR UR: YELLOW
CREAT SERPL-MCNC: 0.7 MG/DL (ref 0.57–1)
D-LACTATE SERPL-SCNC: 1.9 MMOL/L (ref 0.5–2)
D-LACTATE SERPL-SCNC: 2.7 MMOL/L (ref 0.5–2)
DEPRECATED RDW RBC AUTO: 49.4 FL (ref 37–54)
EGFRCR SERPLBLD CKD-EPI 2021: 101 ML/MIN/1.73
EOSINOPHIL # BLD AUTO: 0.19 10*3/MM3 (ref 0–0.4)
EOSINOPHIL NFR BLD AUTO: 1.5 % (ref 0.3–6.2)
ERYTHROCYTE [DISTWIDTH] IN BLOOD BY AUTOMATED COUNT: 14.4 % (ref 12.3–15.4)
FENTANYL UR-MCNC: NEGATIVE NG/ML
GLOBULIN UR ELPH-MCNC: 3.5 GM/DL
GLUCOSE SERPL-MCNC: 116 MG/DL (ref 65–99)
GLUCOSE UR STRIP-MCNC: ABNORMAL MG/DL
HCT VFR BLD AUTO: 37.9 % (ref 34–46.6)
HGB BLD-MCNC: 11.8 G/DL (ref 12–15.9)
HGB UR QL STRIP.AUTO: ABNORMAL
HYALINE CASTS UR QL AUTO: ABNORMAL /LPF
IMM GRANULOCYTES # BLD AUTO: 0.41 10*3/MM3 (ref 0–0.05)
IMM GRANULOCYTES NFR BLD AUTO: 3.2 % (ref 0–0.5)
KETONES UR QL STRIP: NEGATIVE
LEUKOCYTE ESTERASE UR QL STRIP.AUTO: ABNORMAL
LYMPHOCYTES # BLD AUTO: 2 10*3/MM3 (ref 0.7–3.1)
LYMPHOCYTES NFR BLD AUTO: 15.7 % (ref 19.6–45.3)
MAGNESIUM SERPL-MCNC: 1.8 MG/DL (ref 1.6–2.6)
MCH RBC QN AUTO: 28.7 PG (ref 26.6–33)
MCHC RBC AUTO-ENTMCNC: 31.1 G/DL (ref 31.5–35.7)
MCV RBC AUTO: 92.2 FL (ref 79–97)
METHADONE UR QL SCN: NEGATIVE
MONOCYTES # BLD AUTO: 0.57 10*3/MM3 (ref 0.1–0.9)
MONOCYTES NFR BLD AUTO: 4.5 % (ref 5–12)
NEUTROPHILS NFR BLD AUTO: 74.6 % (ref 42.7–76)
NEUTROPHILS NFR BLD AUTO: 9.49 10*3/MM3 (ref 1.7–7)
NITRITE UR QL STRIP: NEGATIVE
NRBC BLD AUTO-RTO: 0 /100 WBC (ref 0–0.2)
NT-PROBNP SERPL-MCNC: 3489 PG/ML (ref 0–900)
OPIATES UR QL: POSITIVE
OXYCODONE UR QL SCN: NEGATIVE
PCP UR QL SCN: NEGATIVE
PH UR STRIP.AUTO: <=5 [PH] (ref 5–8)
PLATELET # BLD AUTO: 849 10*3/MM3 (ref 140–450)
PMV BLD AUTO: 8.9 FL (ref 6–12)
POTASSIUM SERPL-SCNC: 4.1 MMOL/L (ref 3.5–5.2)
PROCALCITONIN SERPL-MCNC: 0.04 NG/ML (ref 0–0.25)
PROT SERPL-MCNC: 6.7 G/DL (ref 6–8.5)
PROT UR QL STRIP: NEGATIVE
RBC # BLD AUTO: 4.11 10*6/MM3 (ref 3.77–5.28)
RBC # UR STRIP: ABNORMAL /HPF
REF LAB TEST METHOD: ABNORMAL
SODIUM SERPL-SCNC: 137 MMOL/L (ref 136–145)
SP GR UR STRIP: 1.02 (ref 1–1.03)
SQUAMOUS #/AREA URNS HPF: ABNORMAL /HPF
TRICYCLICS UR QL SCN: NEGATIVE
TROPONIN T SERPL HS-MCNC: 28 NG/L
TROPONIN T SERPL HS-MCNC: 28 NG/L
UROBILINOGEN UR QL STRIP: ABNORMAL
WBC # UR STRIP: ABNORMAL /HPF
WBC NRBC COR # BLD AUTO: 12.72 10*3/MM3 (ref 3.4–10.8)

## 2023-12-14 PROCEDURE — 83735 ASSAY OF MAGNESIUM: CPT | Performed by: PHYSICIAN ASSISTANT

## 2023-12-14 PROCEDURE — 85025 COMPLETE CBC W/AUTO DIFF WBC: CPT | Performed by: PHYSICIAN ASSISTANT

## 2023-12-14 PROCEDURE — 25510000001 IOPAMIDOL PER 1 ML: Performed by: STUDENT IN AN ORGANIZED HEALTH CARE EDUCATION/TRAINING PROGRAM

## 2023-12-14 PROCEDURE — 80053 COMPREHEN METABOLIC PANEL: CPT | Performed by: PHYSICIAN ASSISTANT

## 2023-12-14 PROCEDURE — 83880 ASSAY OF NATRIURETIC PEPTIDE: CPT | Performed by: PHYSICIAN ASSISTANT

## 2023-12-14 PROCEDURE — 36415 COLL VENOUS BLD VENIPUNCTURE: CPT

## 2023-12-14 PROCEDURE — 83605 ASSAY OF LACTIC ACID: CPT | Performed by: PHYSICIAN ASSISTANT

## 2023-12-14 PROCEDURE — 93005 ELECTROCARDIOGRAM TRACING: CPT | Performed by: PHYSICIAN ASSISTANT

## 2023-12-14 PROCEDURE — 84484 ASSAY OF TROPONIN QUANT: CPT | Performed by: PHYSICIAN ASSISTANT

## 2023-12-14 PROCEDURE — 71045 X-RAY EXAM CHEST 1 VIEW: CPT

## 2023-12-14 PROCEDURE — 80307 DRUG TEST PRSMV CHEM ANLYZR: CPT | Performed by: PHYSICIAN ASSISTANT

## 2023-12-14 PROCEDURE — 81001 URINALYSIS AUTO W/SCOPE: CPT | Performed by: PHYSICIAN ASSISTANT

## 2023-12-14 PROCEDURE — 84145 PROCALCITONIN (PCT): CPT | Performed by: PHYSICIAN ASSISTANT

## 2023-12-14 PROCEDURE — 71275 CT ANGIOGRAPHY CHEST: CPT

## 2023-12-14 PROCEDURE — 99285 EMERGENCY DEPT VISIT HI MDM: CPT

## 2023-12-14 RX ORDER — SODIUM CHLORIDE 0.9 % (FLUSH) 0.9 %
10 SYRINGE (ML) INJECTION AS NEEDED
Status: DISCONTINUED | OUTPATIENT
Start: 2023-12-14 | End: 2023-12-18 | Stop reason: HOSPADM

## 2023-12-14 RX ORDER — ALPRAZOLAM 0.25 MG/1
0.5 TABLET ORAL ONCE
Status: COMPLETED | OUTPATIENT
Start: 2023-12-14 | End: 2023-12-14

## 2023-12-14 RX ADMIN — IOPAMIDOL 80 ML: 755 INJECTION, SOLUTION INTRAVENOUS at 23:43

## 2023-12-14 RX ADMIN — ALPRAZOLAM 0.5 MG: 0.25 TABLET ORAL at 20:49

## 2023-12-14 NOTE — Clinical Note
Level of Care: Telemetry [5]   Diagnosis: Pneumonia [177774]   Admitting Physician: ABBY FLAHERTY [[id number]]   Attending Physician: ABBY FLAHERTY [[id number]]   Bed Request Comments: tele

## 2023-12-15 PROBLEM — I10 ESSENTIAL HYPERTENSION: Status: ACTIVE | Noted: 2023-12-15

## 2023-12-15 PROBLEM — I50.22 CHRONIC SYSTOLIC CHF (CONGESTIVE HEART FAILURE): Status: ACTIVE | Noted: 2023-12-15

## 2023-12-15 PROBLEM — I25.118 CORONARY ARTERY DISEASE OF NATIVE ARTERY OF NATIVE HEART WITH STABLE ANGINA PECTORIS: Status: ACTIVE | Noted: 2023-12-15

## 2023-12-15 PROBLEM — I26.99 ACUTE PULMONARY EMBOLISM WITHOUT ACUTE COR PULMONALE: Status: ACTIVE | Noted: 2023-12-15

## 2023-12-15 PROBLEM — J18.9 PNEUMONIA: Status: ACTIVE | Noted: 2023-12-15

## 2023-12-15 LAB
ANION GAP SERPL CALCULATED.3IONS-SCNC: 12 MMOL/L (ref 5–15)
APTT PPP: 33.1 SECONDS (ref 60–90)
B PARAPERT DNA SPEC QL NAA+PROBE: NOT DETECTED
B PERT DNA SPEC QL NAA+PROBE: NOT DETECTED
BASOPHILS # BLD AUTO: 0.07 10*3/MM3 (ref 0–0.2)
BASOPHILS NFR BLD AUTO: 0.6 % (ref 0–1.5)
BUN SERPL-MCNC: 12 MG/DL (ref 6–20)
BUN/CREAT SERPL: 19.4 (ref 7–25)
C PNEUM DNA NPH QL NAA+NON-PROBE: NOT DETECTED
CALCIUM SPEC-SCNC: 8.9 MG/DL (ref 8.6–10.5)
CHLORIDE SERPL-SCNC: 104 MMOL/L (ref 98–107)
CHOLEST SERPL-MCNC: 130 MG/DL (ref 0–200)
CO2 SERPL-SCNC: 23 MMOL/L (ref 22–29)
CREAT SERPL-MCNC: 0.62 MG/DL (ref 0.57–1)
DEPRECATED RDW RBC AUTO: 46.4 FL (ref 37–54)
EGFRCR SERPLBLD CKD-EPI 2021: 104 ML/MIN/1.73
EOSINOPHIL # BLD AUTO: 0.15 10*3/MM3 (ref 0–0.4)
EOSINOPHIL NFR BLD AUTO: 1.3 % (ref 0.3–6.2)
ERYTHROCYTE [DISTWIDTH] IN BLOOD BY AUTOMATED COUNT: 14.4 % (ref 12.3–15.4)
FLUAV SUBTYP SPEC NAA+PROBE: NOT DETECTED
FLUBV RNA ISLT QL NAA+PROBE: NOT DETECTED
GLUCOSE SERPL-MCNC: 92 MG/DL (ref 65–99)
HADV DNA SPEC NAA+PROBE: NOT DETECTED
HCOV 229E RNA SPEC QL NAA+PROBE: NOT DETECTED
HCOV HKU1 RNA SPEC QL NAA+PROBE: NOT DETECTED
HCOV NL63 RNA SPEC QL NAA+PROBE: NOT DETECTED
HCOV OC43 RNA SPEC QL NAA+PROBE: NOT DETECTED
HCT VFR BLD AUTO: 36.3 % (ref 34–46.6)
HDLC SERPL-MCNC: 41 MG/DL (ref 40–60)
HGB BLD-MCNC: 11.6 G/DL (ref 12–15.9)
HMPV RNA NPH QL NAA+NON-PROBE: NOT DETECTED
HPIV1 RNA ISLT QL NAA+PROBE: NOT DETECTED
HPIV2 RNA SPEC QL NAA+PROBE: NOT DETECTED
HPIV3 RNA NPH QL NAA+PROBE: NOT DETECTED
HPIV4 P GENE NPH QL NAA+PROBE: NOT DETECTED
IMM GRANULOCYTES # BLD AUTO: 0.23 10*3/MM3 (ref 0–0.05)
IMM GRANULOCYTES NFR BLD AUTO: 2 % (ref 0–0.5)
INR PPP: 1.28 (ref 0.89–1.12)
L PNEUMO1 AG UR QL IA: NEGATIVE
LDLC SERPL CALC-MCNC: 74 MG/DL (ref 0–100)
LDLC/HDLC SERPL: 1.8 {RATIO}
LYMPHOCYTES # BLD AUTO: 1.84 10*3/MM3 (ref 0.7–3.1)
LYMPHOCYTES NFR BLD AUTO: 16.2 % (ref 19.6–45.3)
M PNEUMO IGG SER IA-ACNC: NOT DETECTED
MCH RBC QN AUTO: 28.2 PG (ref 26.6–33)
MCHC RBC AUTO-ENTMCNC: 32 G/DL (ref 31.5–35.7)
MCV RBC AUTO: 88.3 FL (ref 79–97)
MONOCYTES # BLD AUTO: 0.65 10*3/MM3 (ref 0.1–0.9)
MONOCYTES NFR BLD AUTO: 5.7 % (ref 5–12)
MRSA DNA SPEC QL NAA+PROBE: NEGATIVE
NEUTROPHILS NFR BLD AUTO: 74.2 % (ref 42.7–76)
NEUTROPHILS NFR BLD AUTO: 8.41 10*3/MM3 (ref 1.7–7)
NRBC BLD AUTO-RTO: 0 /100 WBC (ref 0–0.2)
PA ADP PRP-ACNC: 173 PRU
PLATELET # BLD AUTO: 825 10*3/MM3 (ref 140–450)
PMV BLD AUTO: 8.7 FL (ref 6–12)
POTASSIUM SERPL-SCNC: 4.9 MMOL/L (ref 3.5–5.2)
PROTHROMBIN TIME: 16.1 SECONDS (ref 12.2–14.5)
QT INTERVAL: 392 MS
QT INTERVAL: 396 MS
QTC INTERVAL: 489 MS
QTC INTERVAL: 492 MS
RBC # BLD AUTO: 4.11 10*6/MM3 (ref 3.77–5.28)
RHINOVIRUS RNA SPEC NAA+PROBE: NOT DETECTED
RSV RNA NPH QL NAA+NON-PROBE: NOT DETECTED
S PNEUM AG SPEC QL LA: NEGATIVE
SARS-COV-2 RNA NPH QL NAA+NON-PROBE: NOT DETECTED
SODIUM SERPL-SCNC: 139 MMOL/L (ref 136–145)
TRIGL SERPL-MCNC: 76 MG/DL (ref 0–150)
UFH PPP CHRO-ACNC: >1.1 IU/ML (ref 0.3–0.7)
VLDLC SERPL-MCNC: 15 MG/DL (ref 5–40)
WBC NRBC COR # BLD AUTO: 11.35 10*3/MM3 (ref 3.4–10.8)

## 2023-12-15 PROCEDURE — 25010000002 VANCOMYCIN 10 G RECONSTITUTED SOLUTION: Performed by: PHYSICIAN ASSISTANT

## 2023-12-15 PROCEDURE — 94799 UNLISTED PULMONARY SVC/PX: CPT

## 2023-12-15 PROCEDURE — G0378 HOSPITAL OBSERVATION PER HR: HCPCS

## 2023-12-15 PROCEDURE — 99223 1ST HOSP IP/OBS HIGH 75: CPT | Performed by: INTERNAL MEDICINE

## 2023-12-15 PROCEDURE — 96365 THER/PROPH/DIAG IV INF INIT: CPT

## 2023-12-15 PROCEDURE — 94664 DEMO&/EVAL PT USE INHALER: CPT

## 2023-12-15 PROCEDURE — 80061 LIPID PANEL: CPT | Performed by: PHYSICIAN ASSISTANT

## 2023-12-15 PROCEDURE — 85730 THROMBOPLASTIN TIME PARTIAL: CPT | Performed by: PHYSICIAN ASSISTANT

## 2023-12-15 PROCEDURE — 0202U NFCT DS 22 TRGT SARS-COV-2: CPT | Performed by: PHYSICIAN ASSISTANT

## 2023-12-15 PROCEDURE — 80048 BASIC METABOLIC PNL TOTAL CA: CPT | Performed by: PHYSICIAN ASSISTANT

## 2023-12-15 PROCEDURE — 87449 NOS EACH ORGANISM AG IA: CPT | Performed by: PHYSICIAN ASSISTANT

## 2023-12-15 PROCEDURE — 25010000002 VANCOMYCIN PER 500 MG

## 2023-12-15 PROCEDURE — 87070 CULTURE OTHR SPECIMN AEROBIC: CPT | Performed by: PHYSICIAN ASSISTANT

## 2023-12-15 PROCEDURE — 25010000002 HEPARIN (PORCINE) 25000-0.45 UT/250ML-% SOLUTION: Performed by: PHYSICIAN ASSISTANT

## 2023-12-15 PROCEDURE — 94761 N-INVAS EAR/PLS OXIMETRY MLT: CPT

## 2023-12-15 PROCEDURE — 85520 HEPARIN ASSAY: CPT | Performed by: PHYSICIAN ASSISTANT

## 2023-12-15 PROCEDURE — 87641 MR-STAPH DNA AMP PROBE: CPT | Performed by: PHYSICIAN ASSISTANT

## 2023-12-15 PROCEDURE — 94640 AIRWAY INHALATION TREATMENT: CPT

## 2023-12-15 PROCEDURE — 25810000003 SODIUM CHLORIDE 0.9 % SOLUTION: Performed by: PHYSICIAN ASSISTANT

## 2023-12-15 PROCEDURE — 85025 COMPLETE CBC W/AUTO DIFF WBC: CPT | Performed by: PHYSICIAN ASSISTANT

## 2023-12-15 PROCEDURE — 85576 BLOOD PLATELET AGGREGATION: CPT | Performed by: PHYSICIAN ASSISTANT

## 2023-12-15 PROCEDURE — 25010000002 PIPERACILLIN SOD-TAZOBACTAM PER 1 G

## 2023-12-15 PROCEDURE — 87205 SMEAR GRAM STAIN: CPT | Performed by: PHYSICIAN ASSISTANT

## 2023-12-15 PROCEDURE — 87040 BLOOD CULTURE FOR BACTERIA: CPT | Performed by: PHYSICIAN ASSISTANT

## 2023-12-15 PROCEDURE — 85610 PROTHROMBIN TIME: CPT | Performed by: PHYSICIAN ASSISTANT

## 2023-12-15 PROCEDURE — 25010000002 PIPERACILLIN SOD-TAZOBACTAM PER 1 G: Performed by: PHYSICIAN ASSISTANT

## 2023-12-15 RX ORDER — ISOSORBIDE MONONITRATE 30 MG/1
30 TABLET, EXTENDED RELEASE ORAL DAILY
COMMUNITY

## 2023-12-15 RX ORDER — ISOSORBIDE MONONITRATE 30 MG/1
30 TABLET, EXTENDED RELEASE ORAL DAILY
Status: DISCONTINUED | OUTPATIENT
Start: 2023-12-15 | End: 2023-12-18 | Stop reason: HOSPADM

## 2023-12-15 RX ORDER — PANTOPRAZOLE SODIUM 40 MG/1
40 TABLET, DELAYED RELEASE ORAL DAILY
Status: DISCONTINUED | OUTPATIENT
Start: 2023-12-15 | End: 2023-12-18 | Stop reason: HOSPADM

## 2023-12-15 RX ORDER — BUMETANIDE 1 MG/1
1 TABLET ORAL DAILY
COMMUNITY

## 2023-12-15 RX ORDER — SODIUM CHLORIDE 0.9 % (FLUSH) 0.9 %
10 SYRINGE (ML) INJECTION EVERY 12 HOURS SCHEDULED
Status: DISCONTINUED | OUTPATIENT
Start: 2023-12-15 | End: 2023-12-18 | Stop reason: HOSPADM

## 2023-12-15 RX ORDER — ONDANSETRON 2 MG/ML
4 INJECTION INTRAMUSCULAR; INTRAVENOUS EVERY 6 HOURS PRN
Status: DISCONTINUED | OUTPATIENT
Start: 2023-12-15 | End: 2023-12-18 | Stop reason: HOSPADM

## 2023-12-15 RX ORDER — AMOXICILLIN 250 MG
2 CAPSULE ORAL 2 TIMES DAILY
Status: DISCONTINUED | OUTPATIENT
Start: 2023-12-15 | End: 2023-12-18 | Stop reason: HOSPADM

## 2023-12-15 RX ORDER — BISACODYL 10 MG
10 SUPPOSITORY, RECTAL RECTAL DAILY PRN
Status: DISCONTINUED | OUTPATIENT
Start: 2023-12-15 | End: 2023-12-18 | Stop reason: HOSPADM

## 2023-12-15 RX ORDER — CHOLECALCIFEROL (VITAMIN D3) 125 MCG
5 CAPSULE ORAL NIGHTLY PRN
Status: DISCONTINUED | OUTPATIENT
Start: 2023-12-15 | End: 2023-12-16

## 2023-12-15 RX ORDER — NITROGLYCERIN 0.4 MG/1
0.4 TABLET SUBLINGUAL
COMMUNITY

## 2023-12-15 RX ORDER — IPRATROPIUM BROMIDE AND ALBUTEROL SULFATE 2.5; .5 MG/3ML; MG/3ML
3 SOLUTION RESPIRATORY (INHALATION)
Status: DISCONTINUED | OUTPATIENT
Start: 2023-12-15 | End: 2023-12-18 | Stop reason: HOSPADM

## 2023-12-15 RX ORDER — BUPROPION HYDROCHLORIDE 150 MG/1
300 TABLET ORAL DAILY
Status: DISCONTINUED | OUTPATIENT
Start: 2023-12-15 | End: 2023-12-18 | Stop reason: HOSPADM

## 2023-12-15 RX ORDER — CLOPIDOGREL BISULFATE 75 MG/1
75 TABLET ORAL DAILY
COMMUNITY

## 2023-12-15 RX ORDER — ATORVASTATIN CALCIUM 80 MG/1
80 TABLET, FILM COATED ORAL NIGHTLY
COMMUNITY

## 2023-12-15 RX ORDER — BUPROPION HYDROCHLORIDE 150 MG/1
300 TABLET ORAL DAILY
Status: ON HOLD | COMMUNITY
End: 2023-12-18 | Stop reason: SDUPTHER

## 2023-12-15 RX ORDER — ATORVASTATIN CALCIUM 40 MG/1
80 TABLET, FILM COATED ORAL NIGHTLY
Status: DISCONTINUED | OUTPATIENT
Start: 2023-12-15 | End: 2023-12-18 | Stop reason: HOSPADM

## 2023-12-15 RX ORDER — SODIUM CHLORIDE 9 MG/ML
40 INJECTION, SOLUTION INTRAVENOUS AS NEEDED
Status: DISCONTINUED | OUTPATIENT
Start: 2023-12-15 | End: 2023-12-18 | Stop reason: HOSPADM

## 2023-12-15 RX ORDER — METOPROLOL SUCCINATE 25 MG/1
75 TABLET, EXTENDED RELEASE ORAL DAILY
COMMUNITY

## 2023-12-15 RX ORDER — PANTOPRAZOLE SODIUM 40 MG/1
40 TABLET, DELAYED RELEASE ORAL DAILY
COMMUNITY

## 2023-12-15 RX ORDER — ONDANSETRON 4 MG/1
4 TABLET, FILM COATED ORAL EVERY 6 HOURS PRN
Status: DISCONTINUED | OUTPATIENT
Start: 2023-12-15 | End: 2023-12-18 | Stop reason: HOSPADM

## 2023-12-15 RX ORDER — RISPERIDONE 0.5 MG/1
2.5 TABLET ORAL 2 TIMES DAILY
COMMUNITY

## 2023-12-15 RX ORDER — SODIUM CHLORIDE 0.9 % (FLUSH) 0.9 %
10 SYRINGE (ML) INJECTION AS NEEDED
Status: DISCONTINUED | OUTPATIENT
Start: 2023-12-15 | End: 2023-12-18 | Stop reason: HOSPADM

## 2023-12-15 RX ORDER — BISACODYL 5 MG/1
5 TABLET, DELAYED RELEASE ORAL DAILY PRN
Status: DISCONTINUED | OUTPATIENT
Start: 2023-12-15 | End: 2023-12-18 | Stop reason: HOSPADM

## 2023-12-15 RX ORDER — CLOPIDOGREL BISULFATE 75 MG/1
75 TABLET ORAL DAILY
Status: DISCONTINUED | OUTPATIENT
Start: 2023-12-15 | End: 2023-12-18 | Stop reason: HOSPADM

## 2023-12-15 RX ORDER — ACETAMINOPHEN 325 MG/1
650 TABLET ORAL EVERY 4 HOURS PRN
Status: DISCONTINUED | OUTPATIENT
Start: 2023-12-15 | End: 2023-12-18 | Stop reason: HOSPADM

## 2023-12-15 RX ORDER — POLYETHYLENE GLYCOL 3350 17 G/17G
17 POWDER, FOR SOLUTION ORAL DAILY PRN
Status: DISCONTINUED | OUTPATIENT
Start: 2023-12-15 | End: 2023-12-18 | Stop reason: HOSPADM

## 2023-12-15 RX ORDER — BUMETANIDE 1 MG/1
1 TABLET ORAL DAILY
Status: DISCONTINUED | OUTPATIENT
Start: 2023-12-15 | End: 2023-12-18 | Stop reason: HOSPADM

## 2023-12-15 RX ORDER — HEPARIN SODIUM 10000 [USP'U]/100ML
18 INJECTION, SOLUTION INTRAVENOUS
Status: DISCONTINUED | OUTPATIENT
Start: 2023-12-15 | End: 2023-12-15

## 2023-12-15 RX ADMIN — BUPROPION HYDROCHLORIDE 300 MG: 150 TABLET, EXTENDED RELEASE ORAL at 08:46

## 2023-12-15 RX ADMIN — RISPERIDONE 2.5 MG: 0.25 TABLET ORAL at 08:46

## 2023-12-15 RX ADMIN — IPRATROPIUM BROMIDE AND ALBUTEROL SULFATE 3 ML: 2.5; .5 SOLUTION RESPIRATORY (INHALATION) at 11:02

## 2023-12-15 RX ADMIN — METOPROLOL SUCCINATE 75 MG: 50 TABLET, EXTENDED RELEASE ORAL at 08:46

## 2023-12-15 RX ADMIN — EMPAGLIFLOZIN 10 MG: 10 TABLET, FILM COATED ORAL at 08:46

## 2023-12-15 RX ADMIN — PIPERACILLIN SODIUM AND TAZOBACTAM SODIUM 3.38 G: 3; .375 INJECTION, SOLUTION INTRAVENOUS at 01:05

## 2023-12-15 RX ADMIN — APIXABAN 10 MG: 5 TABLET, FILM COATED ORAL at 20:53

## 2023-12-15 RX ADMIN — PIPERACILLIN SODIUM AND TAZOBACTAM SODIUM 3.38 G: 3; .375 INJECTION, SOLUTION INTRAVENOUS at 08:45

## 2023-12-15 RX ADMIN — Medication 10 ML: at 20:54

## 2023-12-15 RX ADMIN — SENNOSIDES AND DOCUSATE SODIUM 2 TABLET: 8.6; 5 TABLET ORAL at 08:45

## 2023-12-15 RX ADMIN — CLOPIDOGREL BISULFATE 75 MG: 75 TABLET ORAL at 08:46

## 2023-12-15 RX ADMIN — PIPERACILLIN SODIUM AND TAZOBACTAM SODIUM 3.38 G: 3; .375 INJECTION, SOLUTION INTRAVENOUS at 23:36

## 2023-12-15 RX ADMIN — BUMETANIDE 1 MG: 1 TABLET ORAL at 08:46

## 2023-12-15 RX ADMIN — IPRATROPIUM BROMIDE AND ALBUTEROL SULFATE 3 ML: 2.5; .5 SOLUTION RESPIRATORY (INHALATION) at 06:42

## 2023-12-15 RX ADMIN — VANCOMYCIN HYDROCHLORIDE 1250 MG: 10 INJECTION, POWDER, LYOPHILIZED, FOR SOLUTION INTRAVENOUS at 02:05

## 2023-12-15 RX ADMIN — SENNOSIDES AND DOCUSATE SODIUM 2 TABLET: 8.6; 5 TABLET ORAL at 20:53

## 2023-12-15 RX ADMIN — IPRATROPIUM BROMIDE AND ALBUTEROL SULFATE 3 ML: 2.5; .5 SOLUTION RESPIRATORY (INHALATION) at 22:55

## 2023-12-15 RX ADMIN — ISOSORBIDE MONONITRATE 30 MG: 30 TABLET, EXTENDED RELEASE ORAL at 08:45

## 2023-12-15 RX ADMIN — PIPERACILLIN SODIUM AND TAZOBACTAM SODIUM 3.38 G: 3; .375 INJECTION, SOLUTION INTRAVENOUS at 16:37

## 2023-12-15 RX ADMIN — IPRATROPIUM BROMIDE AND ALBUTEROL SULFATE 3 ML: 2.5; .5 SOLUTION RESPIRATORY (INHALATION) at 19:05

## 2023-12-15 RX ADMIN — PANTOPRAZOLE SODIUM 40 MG: 40 TABLET, DELAYED RELEASE ORAL at 05:16

## 2023-12-15 RX ADMIN — APIXABAN 10 MG: 5 TABLET, FILM COATED ORAL at 05:15

## 2023-12-15 RX ADMIN — VANCOMYCIN HYDROCHLORIDE 750 MG: 750 INJECTION, SOLUTION INTRAVENOUS at 16:37

## 2023-12-15 RX ADMIN — HEPARIN SODIUM 18 UNITS/KG/HR: 10000 INJECTION, SOLUTION INTRAVENOUS at 02:26

## 2023-12-15 RX ADMIN — IPRATROPIUM BROMIDE AND ALBUTEROL SULFATE 3 ML: 2.5; .5 SOLUTION RESPIRATORY (INHALATION) at 15:59

## 2023-12-15 NOTE — PLAN OF CARE
Goal Outcome Evaluation:  Plan of Care Reviewed With: patient        Progress: improving  Outcome Evaluation: VSS on RA. A&O x4. No complaints of pain. Sputum, urine, respiratory panel, and MRSA samples collected. Hep gtt stopped. Vancomycin infused. Fall precautions in place. Pt sleeping comfortably. Will continue plan of care.

## 2023-12-15 NOTE — H&P
[facility] Medicine Services  HISTORY AND PHYSICAL    Patient Name: Jill M Paladino  : 1965  MRN: [id number]  Primary Care Physician: Sigrid Byers PA  Date of admission: 2023    Subjective   Subjective     Chief Complaint:  Shortness of breath    HPI:  Jill M Paladino is a 58 y.o. female with a history of homelessness, hypertension, CAD, systolic CHF, hx DVT and anxiety/depression who presents to [facility] ED for complaint of shortness of breath.   Patient was recently admitted to [place] on  for similar complaint and found to have a CHF exacerbation as well as the presence of a LV mural thrombus. CTA chest negative for PE at that time. She apparently underwent a LHC during admission which revealed multivessel CAD, however due to mural thrombus and EF of 25%, vascular surgery did not recommend surgical intervention. They recommended medical management only. She was ultimately discharged to the [facility] yesterday () with Bumex, Plavix, and Eliquis.    Patient reports that she continued to feel short of breath at discharge yesterday, which seemed to worsen into today. She also reports chest pain when taking a deep breath, and feeling of severe anxiety. Fearing a panic attack, she called EMS and was brought here for further evaluation. She denies fever, chills, abdominal pain, nausea or vomiting. She reports that she is a non-smoker, denies alcohol abuse or illicit drug use.         Review of Systems   Constitutional:  Negative for chills, fatigue, fever and unexpected weight change.   HENT:  Negative for nosebleeds, sore throat and trouble swallowing.    Eyes:  Negative for photophobia and visual disturbance.   Respiratory:  Positive for cough and shortness of breath. Negative for wheezing.    Cardiovascular:  Positive for chest pain. Negative for palpitations and leg swelling.   Gastrointestinal:  Negative for abdominal pain, diarrhea, nausea and  "vomiting.   Genitourinary:  Negative for dysuria and hematuria.   Musculoskeletal:  Negative for arthralgias and myalgias.   Skin: Negative.    Neurological:  Negative for tremors, syncope and weakness.   Psychiatric/Behavioral:  Negative for confusion. The patient is nervous/anxious.               Personal History     Past Medical History:   Diagnosis Date    Hypertension              Past Surgical History:   Procedure Laterality Date    KNEE SURGERY      RHINOPLASTY         Family History:  family history includes No Known Problems in her father and mother.     Social History:  reports that she has never smoked. She has never used smokeless tobacco. She reports that she does not drink alcohol and does not use drugs.  Social History     Social History Narrative    Not on file       Medications:  Apixaban Starter Pack, acetaminophen, hydrOXYzine pamoate, hydroCHLOROthiazide, ibuprofen, losartan, methocarbamol, potassium chloride, and spironolactone    Allergies   Allergen Reactions    Other Nausea And Vomiting     Pt states \"all opiods make me throw up instantly\"    Percocet [Oxycodone-Acetaminophen] GI Intolerance    Sulfa Antibiotics Rash       Objective   Objective     Vital Signs:   Temp:  [98.1 °F (36.7 °C)] 98.1 °F (36.7 °C)  Heart Rate:  [] 101  Resp:  [16] 16  BP: (141-153)/() 153/98    Physical Exam  Constitutional:       General: She is not in acute distress.     Appearance: Normal appearance.   HENT:      Head: Atraumatic.      Right Ear: External ear normal.      Left Ear: External ear normal.      Nose: Nose normal.   Eyes:      Extraocular Movements: Extraocular movements intact.      Conjunctiva/sclera: Conjunctivae normal.      Pupils: Pupils are equal, round, and reactive to light.   Cardiovascular:      Rate and Rhythm: Normal rate and regular rhythm.      Pulses: Normal pulses.      Heart sounds: Normal heart sounds. No murmur heard.  Pulmonary:      Effort: Pulmonary effort is normal. " No respiratory distress.      Breath sounds: Normal breath sounds. No wheezing, rhonchi or rales.      Comments: On room air.   Abdominal:      General: Bowel sounds are normal. There is no distension.      Tenderness: There is no abdominal tenderness. There is no guarding or rebound.   Musculoskeletal:         General: Normal range of motion.      Cervical back: No rigidity.   Skin:     General: Skin is warm and dry.      Coloration: Skin is not jaundiced.      Findings: No lesion or rash.   Neurological:      General: No focal deficit present.      Mental Status: She is alert and oriented to person, place, and time.   Psychiatric:         Attention and Perception: Attention normal.         Mood and Affect: Mood normal.         Behavior: Behavior normal.         Thought Content: Thought content normal.            Result Review:  I have personally reviewed the results from the time of this admission to 12/15/2023 01:34 EST and agree with these findings:  [x]  Laboratory list / accordion  []  Microbiology  [x]  Radiology  [x]  EKG/Telemetry   []  Cardiology/Vascular   []  Pathology  [x]  Old records  []  Other:      LAB RESULTS:      Lab 12/14/23 2321 12/14/23 1932   WBC  --  12.72*   HEMOGLOBIN  --  11.8*   HEMATOCRIT  --  37.9   PLATELETS  --  849*   NEUTROS ABS  --  9.49*   IMMATURE GRANS (ABS)  --  0.41*   LYMPHS ABS  --  2.00   MONOS ABS  --  0.57   EOS ABS  --  0.19   MCV  --  92.2   PROCALCITONIN  --  0.04   LACTATE 1.9 2.7*         Lab 12/14/23 1932   SODIUM 137   POTASSIUM 4.1   CHLORIDE 103   CO2 21.0*   ANION GAP 13.0   BUN 14   CREATININE 0.70   EGFR 101.0   GLUCOSE 116*   CALCIUM 9.0   MAGNESIUM 1.8         Lab 12/14/23 1932   TOTAL PROTEIN 6.7   ALBUMIN 3.2*   GLOBULIN 3.5   ALT (SGPT) 42*   AST (SGOT) 19   BILIRUBIN 0.2   ALK PHOS 186*         Lab 12/14/23 2321 12/14/23 1932   PROBNP  --  3,489.0*   HSTROP T 28* 28*                 Brief Urine Lab Results  (Last result in the past 365 days)         Color   Clarity   Blood   Leuk Est   Nitrite   Protein   CREAT   Urine HCG        12/14/23 2044 Yellow   Clear   Trace   Small (1+)   Negative   Negative                 Microbiology Results (last 10 days)       ** No results found for the last 240 hours. **            CT Angiogram Chest    Result Date: 12/14/2023  CT ANGIOGRAM CHEST Date of Exam: 12/14/2023 11:36 PM EST Indication: Shortness of breath, recent CHF exacerbation, new developing right lower lobe opacity. Comparison: None available. Technique: CTA of the chest was performed after the uneventful intravenous administration of 80 mL Isovue-370. Reconstructed coronal and sagittal images were also obtained. In addition, a 3-D volume rendered image was created for interpretation. Automated exposure control and iterative reconstruction methods were used. Findings: There is right lower lobe airspace disease. There is a small to moderate size right-sided pleural effusion with overlying atelectasis. The left lung is clear with the exception of minor basilar atelectasis. Airways are patent. No suspicious lung nodules. The thyroid, trachea and esophagus appear within normal limits. The heart is mildly enlarged. There is aneurysmal dilatation of the ascending aorta up to 43 mm. No pericardial effusion. There is slight reflux of contrast into the hepatic veins suggesting  some degree of right heart dysfunction. There is mild coronary artery calcification. There is a solitary pulmonary embolism within the segmental artery to the posterior basal segment of the right lower lobe, which is best seen on axial image 62, sagittal image 41. There is no evidence of left-sided pulmonary embolism. No acute findings in the superficial soft tissues. No acute findings in the upper abdomen. No acute osseous abnormality or significant degenerative change.     Impression: Impression: 1.A solitary pulmonary embolism within the segmental artery to the posterior basal segment of the  right lower lobe. 2.A small to moderate right-sided pleural effusion with overlying atelectasis. 3.Right lower lobe pneumonia. 4.Mild cardiomegaly and mild coronary artery calcification. 5.There is aneurysmal dilatation of the ascending aorta up to 43 mm. 6.There is slight reflux of contrast into the hepatic veins suggesting some degree of right heart dysfunction. Electronically Signed: Gerard Colby MD  12/14/2023 11:56 PM EST  Workstation ID: [id number]    XR Chest 1 View    Result Date: 12/14/2023  XR CHEST 1 VW Date of Exam: 12/14/2023 7:23 PM EST Indication: SOA Comparison: 10/30/2019 Findings: Lines: None Lungs: New focal opacity within the lateral aspect of the right lower lung. Otherwise the lungs are clear. Pleura: No pleural effusion or pneumothorax. Cardiomediastinum: The cardiomediastinal silhouette is normal Soft Tissues: Unremarkable. Bones: No acute osseous abnormality.     Impression: Impression: New small focal opacity within the periphery of the right lower lung may represent an infectious/inflammatory process. A discrete pulmonary lesion is not completely excluded. Please consider follow-up with nonemergent chest CT to exclude an underlying pulmonary lesion/mass Electronically Signed: Jun Mccarty DO  12/14/2023 7:50 PM EST  Workstation ID: [id number]         Assessment & Plan   Assessment & Plan       Pneumonia    Acute pulmonary embolism without acute cor pulmonale    Chronic systolic CHF (congestive heart failure)    Coronary artery disease of native artery of native heart with stable angina pectoris    Essential hypertension      Jill M Paladino is a 58 y.o. female with a history of homelessness, hypertension, CAD, hx DVT (on Eliquis) and systolic CHF, and anxiety/depression who presents to [facility] ED for complaint of shortness of breath.    Pulmonary Embolism  -CTA tonight shows presence of a solitary pulmonary embolism within the segmental artery to the posterior basal segment of the  right lower lobe.   -Appears to have been prescribed Eliquis in February 2023, none since then.  Patient denies ever being prescribed the medication.  Noncompliance?  Confusion?.  -Discharged on Eliquis on 12/14, continue.  Small PE seen on imaging tonight, not seen on imaging obtained on 11/29/2023.  Unsure if it was missed or if this is a new thrombus while on anticoagulation.  Patient will need to be monitored closely for worsening or new clot burden/Eliquis failure.  -Consult case management for assistance with medications  -Currently on room air maintaining good O2 sats.     RLL Pneumonia  -CTA chest tonight also shows pneumonia of the RLL  -Started on Vanc + Zosyn in the ED. Continue for now.   -Resp panel PCR  -Sputum cultures.   -PNA urine cultures.   -Duo nebs q4  -Incentive Spirometer    Systolic CHF  -Echo [facility] showed EF of 25%  -Consider Cardiology consult if needed.   -Daily weight. Strict I&Os  -Continue with home meds    Severe multivessel CAD  HTN  HLD  -Chest pain free at this time.   -Initial HS troponin 28, with repeat unchanging.   -Recent LHC at [facility] showed extensive multivessel CAD. Reportedly was seen by vascular surgery there and was deemed not a candidate for surgical intervention, choosing to manage medically.   -Continue Plavix. Check P2Y12  -Was prescribed Metoprolol 25mg, Imdur 30mg, and Bumex 1mg at discharge from [facility] yesterday. Continue.       DVT prophylaxis: Eliquis    CODE STATUS:  Full Code       Expected DischargeTBD       This note has been completed as part of a split-shared workflow.     Signature: Electronically signed by Ingrid Parish PA-C, 12/15/23, 2:01 AM EST        Attending   Admission Attestation       I have performed an independent face-to-face diagnostic evaluation including performing an independent physical examination.  The documented plan of care above was reviewed and developed with the advanced practice clinician (APC).  I have  updated the HPI as appropriate.    Brief HPI    This is a 58-year-old female patient who is homeless with a PMH significant for DVT, systolic CHF with an EF of 25%, CAD, HTN, anxiety, depression who comes to the ED due to shortness of breath.  Patient was hospitalized at [facility] with discharge a few hours prior to arrival where she was treated for CHF exacerbation with a left ventricular mural thrombus.  CTA was obtained on 11/29/2023 which was negative for PE.  Patient says that she continued to have difficulty breathing, was discharged earlier today.  She continued to have cough and shortness of breath, complaining of severe dyspnea on exertion.  She came in to [facility] for further evaluation.  Patient says that she has not been taking Eliquis, does not remember being prescribed Eliquis.  However pharmacy records indicate the patient had a 30-day supply of Eliquis filled in February, none since then.    Attending Physical Exam:  Temp:  [97.7 °F (36.5 °C)-98.1 °F (36.7 °C)] 97.7 °F (36.5 °C)  Heart Rate:  [] 90  Resp:  [16-23] 23  BP: (133-153)/() 133/86    Constitutional: Awake, alert  Eyes: PERRLA, sclerae anicteric, no conjunctival injection  HENT: NCAT, mucous membranes moist  Neck: Supple, no thyromegaly, no lymphadenopathy, trachea midline  Respiratory: Clear to auscultation bilaterally, nonlabored respirations   Cardiovascular: RRR, no murmurs, rubs, or gallops, palpable pedal pulses bilaterally  Gastrointestinal: Positive bowel sounds, soft, nontender, nondistended  Musculoskeletal: No bilateral ankle edema, no clubbing or cyanosis to extremities  Psychiatric: Appropriate affect, cooperative  Neurologic: Oriented x 3, strength symmetric in all extremities, Cranial Nerves grossly intact to confrontation, speech clear  Skin: No rashes      Assessment and Plan:    See assessment and plan documented by APC above and updated/edited by me as appropriate.    Jillian Blake,   12/15/23

## 2023-12-15 NOTE — PROGRESS NOTES
"Pharmacy Consult-Vancomycin Dosing  Jill M Paladino is a  58 y.o. female receiving vancomycin therapy.     Indication: pneumonia  Consulting Provider: hospitalist  ID Consult: No    Goal AUC: 400 - 600 mg/L*hr    Current Antimicrobial Therapy  Anti-Infectives (From admission, onward)      Ordered     Dose/Rate Route Frequency Start Stop    12/15/23 0228  vancomycin in dextrose 5% 150 mL (VANCOCIN) IVPB 750 mg        Ordering Provider: Eliecer Nova, PharmD   See [facility] for full Linked Orders Report.    12.7 mg/kg × 59 kg  over 60 Minutes Intravenous Every 12 Hours Scheduled 12/15/23 1800 12/22/23 0859    12/15/23 0222  piperacillin-tazobactam (ZOSYN) 3.375 g in iso-osmotic dextrose 50 ml (premix)        Ordering Provider: Eliecer Nova PharmD    3.375 g  over 4 Hours Intravenous Every 8 Hours 12/15/23 0800 12/21/23 2359    12/15/23 0007  vancomycin 1250 mg/250 mL 0.9% NS IVPB ([facility])        Ordering Provider: Sandy Looney PA-C    20 mg/kg × 59 kg  over 75 Minutes Intravenous Once 12/15/23 0023      12/15/23 0007  piperacillin-tazobactam (ZOSYN) 3.375 g in iso-osmotic dextrose 50 ml (premix)        Ordering Provider: Sandy Looney PA-C    3.375 g  over 30 Minutes Intravenous Once 12/15/23 0023 12/15/23 0135            Allergies  Allergies as of 12/14/2023 - Reviewed 12/14/2023   Allergen Reaction Noted    Other Nausea And Vomiting 10/30/2019    Percocet [oxycodone-acetaminophen] GI Intolerance 07/05/2019    Sulfa antibiotics Rash 10/30/2019       Labs  Results from last 7 days   Lab Units 12/14/23  1932   BUN mg/dL 14   CREATININE mg/dL 0.70     Results from last 7 days   Lab Units 12/14/23  1932   WBC 10*3/mm3 12.72*       Evaluation of Dosing  Last Dose Received in the ED/Outside Facility:               Vancomycin 1250 mg IV 12/15 at 02:05  Is Patient on Dialysis or Renal Replacement:               No    Ht - 165.1 cm (65\")  Wt - 59 kg (130 lb)    Estimated Creatinine Clearance: 82.6 mL/min (by " C-G formula based on SCr of 0.7 mg/dL).  Intake & Output (last 3 days)       None            Microbiology and Radiology  Microbiology Results (last 10 days)       ** No results found for the last 240 hours. **          Reported Vancomycin Levels                 InsightRX AUC Calculation:  Current AUC:   -- mg/L*hr    Predicted Steady State AUC on Current Dose: -- mg/L*hr  _________________________________  Predicted Steady State AUC on New Dose:   445 mg/L*hr    Assessment/Plan:  1. Will give vancomycin 1250 mg IV once (given in ED 12/15 at 02:05)                               followed by      Vancomycin 750 mg IV q 12 hours    2. Vancomycin trough is scheduled 12/17 at 06:00 prior to the 5th dose. Predicted Steady State AUC at current dose: 445 mg/L*hr.      Eliecer Nova, PharmD, BCPS  12/15/2023  02:31 EST

## 2023-12-15 NOTE — PLAN OF CARE
Goal Outcome Evaluation:           Progress: improving  Outcome Evaluation: Pt VSS, No complaints of pain this shift. No complaints of SOB or anxiety. IV antibiotics infused. Appetite good. Pt needed to be instructed on not using bleach wipes on her skin. Bleach wipes removed from room. Pt alert and oriented throughout the shift. Up to bathroom. Will cont to monitor.

## 2023-12-15 NOTE — CASE MANAGEMENT/SOCIAL WORK
Discharge Planning Assessment  [facility]     Patient Name: Jill M Paladino  MRN: [id number]  Today's Date: 12/15/2023    Admit Date: 12/14/2023    Plan: [facility] via friend.   Discharge Needs Assessment       Row Name 12/15/23 1207       Living Environment    People in Home alone;other (see comments)    Current Living Arrangements --  Hotel    Duration at Residence [facility] currently staying at the [facility] at [address] [place] [address]    Primary Care Provided by self    Provides Primary Care For no one    Family Caregiver if Needed friend(s)    Quality of Family Relationships helpful;involved    Able to Return to Prior Arrangements yes    Living Arrangement Comments [facility] at [address] [place] [address]       Transition Planning    Transportation Anticipated family or friend will provide       Discharge Needs Assessment    Readmission Within the Last 30 Days no previous admission in last 30 days    Equipment Currently Used at Home none                   Discharge Plan       Row Name 12/15/23 1208       Plan    Plan [facility] via friend.    Plan Comments Nick spoke with patient who explains she has friends (Heath and Rachel Moore) that assist her. Patient lives in [facility] currently staying at the [facility] at [address] [place] [address]. Reports functioning Independently with ADL's, no DME or HH. Patients PCP is [facility]. Patient was screened by [employer] for Medicaid ([place] MEDICAID WILL BE REACTIVATED EFFECTIVE 9/1/23 ONGOING, MCAID# [id number]). Nick discussed with patient about the [employer] program; patient agreeable to be added. MAGDA'er created patient a profile and made referrals for emergency Housing ([facility] Patient explains she plans to return to [facility]; explains her friend can transport. Plan is [facility] via friend.    Final Discharge Disposition Code  01 - home or self-care                  Continued Care and Services - Admitted Since 12/14/2023    Coordination has not been started for this encounter.          Demographic Summary       Row Name 12/15/23 1206       General Information    Admission Type observation    Referral Source admission list    Reason for Consult discharge planning                   Functional Status       Row Name 12/15/23 1207       Functional Status    Usual Activity Tolerance good    Current Activity Tolerance good       Functional Status, IADL    Medications independent    Meal Preparation independent    Housekeeping independent    Laundry independent    Shopping independent    IADL Comments Reports functioning Independently with ADL's, no DME or HH       Employment/    Employment/ Comments [facility] MEDICAID WILL BE REACTIVATED EFFECTIVE 9/1/23 ONGOING, MCAID# [id number]                   Psychosocial    No documentation.                  Abuse/Neglect    No documentation.                  Legal    No documentation.                  Substance Abuse    No documentation.                  Patient Forms    No documentation.                     ANJEL Mckinnon (Kay)

## 2023-12-15 NOTE — PROGRESS NOTES
[facility] Medicine Services  ADMISSION FOLLOW-UP NOTE          Patient admitted after midnight, H&P by my partner performed earlier on today's date reviewed.  Interim findings, labs, and charting also reviewed.        [facility] Hospital Problem List has been managed and updated to include any new diagnoses:  Active Hospital Problems    Diagnosis  POA    **Pneumonia [J18.9]  Yes    Acute pulmonary embolism without acute cor pulmonale [I26.99]  Yes    Chronic systolic CHF (congestive heart failure) [I50.22]  Yes    Coronary artery disease of native artery of native heart with stable angina pectoris [I25.118]  Yes    Essential hypertension [I10]  Yes      Resolved Hospital Problems   No resolved problems to display.         ADDITIONAL PLAN:  - detailed assessment and plan from admission reviewed  Jill M Paladino is a 58 y.o. female with a history of homelessness, hypertension, CAD, hx DVT (on Eliquis) and systolic CHF, and anxiety/depression who presents to [facility] ED for complaint of shortness of breath.     Pulmonary Embolism  -CTA tonight shows presence of a solitary pulmonary embolism within the segmental artery to the posterior basal segment of the right lower lobe.   -Appears to have been prescribed Eliquis in February 2023, none since then.  -Discharged on Eliquis on 12/14 -- will increase to loading dose with new PE   - has supply of eliquis 5 mg BID at home -- will need new Rx for high dose on DC      RLL Pneumonia  -CTA chest pneumonia of the RLL  - Resp PCR negative; strep/legionella negative; MRSA negative  - Will continue vanc/zosyn for now as patient was just hospitalized for 16 days   - Mobilize. IS   - sputum culture pending      Chronic HFrEF   -[place] showed EF of 25%  - Continue bumex, jardiance, imdur  -Daily weight. Strict I&Os  - has follow up 12/28 at [facility] per patient      Severe multivessel CAD  HTN  HLD  -Initial HS troponin 28, flat  -Recent LHC at  [facility] showed extensive multivessel CAD. Reportedly was seen by vascular surgery there and was deemed not a candidate for surgical intervention, choosing to manage medically.   -Continue Plavix, eliquis   -Was prescribed Metoprolol 25mg, Imdur 30mg, and Bumex 1mg at discharge from [facility] . Continue.     Patient was evaluated by [facility]. She has active medicaid. Plan is discharge to [facility].      Expected Discharge Location and Transportation: home  Expected Discharge   Expected Discharge Date: 12/17/2023; Expected Discharge Time:       Robyn Dan DO  12/15/23

## 2023-12-15 NOTE — PROGRESS NOTES
Pharmacokinetic Dose Consult - Zosyn Extended Infusion Dosing  Pharmacy has been consulted to dose Zosyn for Jill M Paladino to match new extended infusion (4 hour infusion) protocol dosing (see chart below for dosing recommendations).    Diagnosis: pneumonia    Estimated Creatinine Clearance: 82.6 mL/min (by C-G formula based on SCr of 0.7 mg/dL).  59 kg (130 lb)    Piperacillin/Tazobactam (4-hour infusion)  Ordered dose (30 min infusion) Dose to interchange   4.5 gm IV every 6 hours   (BMI > 40, Wt. > 120kg , MDRO**, septic shock)  CrCl > 20 mL/min: 4.5 gm IV q8hr   CrCl ? 20 mL/min or HD: 4.5 gm IV q12hr    3.375 gm IV every 6 hours    CrCl > 20 mL/min: 3.375 gm IV q8hr   CrCl ? 20 mL/min or HD: 3.375 gm IV q12hr    2.25 gm IV every 6 hours  CrCl > 20 mL/min: 3.375 gm IV q8hr   CrCl ? 20 mL/min or HD: 3.375 gm IV q12hr      Assessment/Plan  1. Zosyn 3.375 gm q8h (first dose infused over 30 minutes and all other doses infused over 4 hours)  2. Pharmacy will continue to monitor patient's renal function for further potential dose adjustments.    Eliecer Nova, PharmD, BCPS  12/15/2023  02:21 EST

## 2023-12-15 NOTE — ED NOTES
" Jill M Paladino    Nursing Report ED to Floor:  Mental status: A&O x4  Ambulatory status: ad rajiv  Oxygen Therapy:  n/a  Cardiac Rhythm: NSR w/ PVC  Admitted from: ED  Safety Concerns:  n/a  Social Issues: unknown  ED Room #:  27    ED Nurse Phone Extension - [phone] or may call [phone].      HPI:   Chief Complaint   Patient presents with    Anxiety       Past Medical History:  Past Medical History:   Diagnosis Date    Hypertension         Past Surgical History:  Past Surgical History:   Procedure Laterality Date    KNEE SURGERY      RHINOPLASTY          Admitting Doctor:   Jillian Blake DO    Consulting Provider(s):  Consults       No orders found for last 30 day(s).             Admitting Diagnosis:   There were no encounter diagnoses.    Most Recent Vitals:   Vitals:    12/14/23 1836 12/14/23 2045   BP: 141/100 153/98   BP Location: Right arm    Patient Position: Sitting    Pulse: 92 101   Resp: 16    Temp: 98.1 °F (36.7 °C)    TempSrc: Oral    SpO2: 98% 98%   Weight: 59 kg (130 lb)    Height: 165.1 cm (65\")        Active LDAs/IV Access:   Lines, Drains & Airways       Active LDAs       Name Placement date Placement time Site Days    Peripheral IV 12/14/23 2322 Left Antecubital 12/14/23 2322  Antecubital  less than 1                    Labs (abnormal labs have a star):   Labs Reviewed   COMPREHENSIVE METABOLIC PANEL - Abnormal; Notable for the following components:       Result Value    Glucose 116 (*)     CO2 21.0 (*)     Albumin 3.2 (*)     ALT (SGPT) 42 (*)     Alkaline Phosphatase 186 (*)     All other components within normal limits    Narrative:     GFR Normal >60  Chronic Kidney Disease <60  Kidney Failure <15     URINALYSIS W/ MICROSCOPIC IF INDICATED (NO CULTURE) - Abnormal; Notable for the following components:    Glucose, UA >=1000 mg/dL (3+) (*)     Blood, UA Trace (*)     Leuk Esterase, UA Small (1+) (*)     All other components within normal limits   BNP (IN-HOUSE) - Abnormal; Notable for the following " components:    proBNP 3,489.0 (*)     All other components within normal limits    Narrative:     This assay is used as an aid in the diagnosis of individuals suspected of having heart failure. It can be used as an aid in the diagnosis of acute decompensated heart failure (ADHF) in patients presenting with signs and symptoms of ADHF to the emergency department (ED). In addition, NT-proBNP of <300 pg/mL indicates ADHF is not likely.    Age Range Result Interpretation  NT-proBNP Concentration (pg/mL:      <50             Positive            >450                   Gray                 300-450                    Negative             <300    50-75           Positive            >900                  Gray                300-900                  Negative            <300      >75             Positive            >1800                  Gray                300-1800                  Negative            <300   SINGLE HSTROPONIN T - Abnormal; Notable for the following components:    HS Troponin T 28 (*)     All other components within normal limits    Narrative:     High Sensitive Troponin T Reference Range:  <14.0 ng/L- Negative Female for AMI  <22.0 ng/L- Negative Male for AMI  >=14 - Abnormal Female indicating possible myocardial injury.  >=22 - Abnormal Male indicating possible myocardial injury.   Clinicians would have to utilize clinical acumen, EKG, Troponin, and serial changes to determine if it is an Acute Myocardial Infarction or myocardial injury due to an underlying chronic condition.        LACTIC ACID, PLASMA - Abnormal; Notable for the following components:    Lactate 2.7 (*)     All other components within normal limits   URINE DRUG SCREEN - Abnormal; Notable for the following components:    Opiate Screen Positive (*)     Benzodiazepine Screen, Urine Positive (*)     All other components within normal limits    Narrative:     Cutoff For Drugs Screened:    Amphetamines               500 ng/ml  Barbiturates                200 ng/ml  Benzodiazepines            150 ng/ml  Cocaine                    150 ng/ml  Methadone                  200 ng/ml  Opiates                    100 ng/ml  Phencyclidine               25 ng/ml  THC                         50 ng/ml  Methamphetamine            500 ng/ml  Tricyclic Antidepressants  300 ng/ml  Oxycodone                  100 ng/ml  Buprenorphine               10 ng/ml    The normal value for all drugs tested is negative. This report includes unconfirmed screening results, with the cutoff values listed, to be used for medical treatment purposes only.  Unconfirmed results must not be used for non-medical purposes such as employment or legal testing.  Clinical consideration should be applied to any drug of abuse test, particularly when unconfirmed results are used.     CBC WITH AUTO DIFFERENTIAL - Abnormal; Notable for the following components:    WBC 12.72 (*)     Hemoglobin 11.8 (*)     MCHC 31.1 (*)     Platelets 849 (*)     Lymphocyte % 15.7 (*)     Monocyte % 4.5 (*)     Immature Grans % 3.2 (*)     Neutrophils, Absolute 9.49 (*)     Immature Grans, Absolute 0.41 (*)     All other components within normal limits   URINALYSIS, MICROSCOPIC ONLY - Abnormal; Notable for the following components:    WBC, UA 11-20 (*)     All other components within normal limits   SINGLE HSTROPONIN T - Abnormal; Notable for the following components:    HS Troponin T 28 (*)     All other components within normal limits    Narrative:     High Sensitive Troponin T Reference Range:  <14.0 ng/L- Negative Female for AMI  <22.0 ng/L- Negative Male for AMI  >=14 - Abnormal Female indicating possible myocardial injury.  >=22 - Abnormal Male indicating possible myocardial injury.   Clinicians would have to utilize clinical acumen, EKG, Troponin, and serial changes to determine if it is an Acute Myocardial Infarction or myocardial injury due to an underlying chronic condition.        PROCALCITONIN - Normal    Narrative:      "As a Marker for Sepsis (Non-Neonates):    1. <0.5 ng/mL represents a low risk of severe sepsis and/or septic shock.  2. >2 ng/mL represents a high risk of severe sepsis and/or septic shock.    As a Marker for Lower Respiratory Tract Infections that require antibiotic therapy:    PCT on Admission    Antibiotic Therapy       6-12 Hrs later    >0.5                Strongly Recommended  >0.25 - <0.5        Recommended   0.1 - 0.25          Discouraged              Remeasure/reassess PCT  <0.1                Strongly Discouraged     Remeasure/reassess PCT    As 28 day mortality risk marker: \"Change in Procalcitonin Result\" (>80% or <=80%) if Day 0 (or Day 1) and Day 4 values are available. Refer to http://[email or web address] [email or web address] – [email or web address]-[email or web address].com    Change in PCT <=80%  A decrease of PCT levels below or equal to 80% defines a positive change in PCT test result representing a higher risk for 28-day all-cause mortality of patients diagnosed with severe sepsis for septic shock.    Change in PCT >80%  A decrease of PCT levels of more than 80% defines a negative change in PCT result representing a lower risk for 28-day all-cause mortality of patients diagnosed with severe sepsis or septic shock.      MAGNESIUM - Normal   LACTIC ACID, REFLEX - Normal   FENTANYL, URINE - Normal    Narrative:     Negative Threshold:      Fentanyl 5 ng/mL     The normal value for the drug tested is negative. This report includes final unconfirmed screening results to be used for medical treatment purposes only. Unconfirmed results must not be used for non-medical purposes such as employment or legal testing. Clinical consideration should be applied to any drug of abuse test, particularly when unconfirmed results are used.          BLOOD CULTURE   BLOOD CULTURE   CBC AND DIFFERENTIAL    Narrative:     The following orders were created for panel order CBC & Differential.  Procedure                               Abnormality         Status                   "   ---------                               -----------         ------                     CBC Auto Differential[[id number]]        Abnormal            Final result                 Please view results for these tests on the individual orders.       Meds Given in ED:   Medications   sodium chloride 0.9 % flush 10 mL (has no administration in time range)   vancomycin 1250 mg/250 mL 0.9% NS IVPB ([facility]) (has no administration in time range)   piperacillin-tazobactam (ZOSYN) 3.375 g in iso-osmotic dextrose 50 ml (premix) (has no administration in time range)   ALPRAZolam (XANAX) tablet 0.5 mg (0.5 mg Oral Given 12/14/23 2049)   iopamidol (ISOVUE-370) 76 % injection 100 mL (80 mL Intravenous Given 12/14/23 2453)

## 2023-12-15 NOTE — ED PROVIDER NOTES
Subjective   History of Present Illness  This is a 58-year-old female that presents the ER with increased anxiety and shortness of breath with recent discharge from [facility] earlier this same day with extensive admission there since 11/29/2023.  Discharge diagnoses were acute on chronic HFrEF, LV thrombus, moderate mitral regurgitation, moderate tricuspid regurgitation, multivessel CAD.  Patient was admitted to their facility on 11/29/2023 with shortness of breath.  After reviewing the extensive hospital course, patient has history of CAD, anxiety/depression, hypertension, previous DVT on Eliquis, and homelessness.  She was diagnosed with LV mural thrombus and CHF exacerbation.  Her hospital course was fairly complicated.  She initially had a left heart catheterization which demonstrated 100% stenosis of the LAD with right to left collaterals, 50 to 60% left circumflex stenosis with 70% proximal PDA stenosis.  Echo showed LV apical thrombus measuring 2.07 cm x 1.34 cm.  EF was 25%.  Patient had apical tethering and mitral valve with moderate mitral regurgitation and moderate tricuspid regurgitation.  CTA of the chest was negative for PE but did reveal findings consistent with cardiogenic pulmonary edema and CHF with small bilateral pleural effusions.  CT surgery deemed her not to be a candidate for surgical revascularization and decision was made to treat her medically.  She was in the hospital for nearly 2 weeks and had hospital-acquired delirium and at one point guardianship was being pursued but her confusion improved and after psychiatry evaluated, patient was felt to be stable and had decision-making capacity again.  Patient was discharged on Eliquis and Plavix and plan was for medical management and close cardiology follow-up with Dr. Hyde in the outpatient setting.  Patient was also started on Lipitor, Farxiga, Imdur, and a beta blocker.  Patient told me that she was discharged and had a room at the  "[facility] due to homelessness.  Patient has been on longstanding Xanax due to chronic anxiety.  She says that her Xanax was discontinued during her extensive hospital course and she was not discharged with her Xanax.  She was in her hotel room and started having significant anxiety and shortness of breath.  She was scared of being alone in her hotel room and feeling poorly.  She says that someone mentioned at [facility] during her hospital course that she might need to follow-up with our cardiothoracic team at [facility], and when she started feeling poorly, she decided to come to our ER for assessment.  She denies any cough or congestion or fever/chills.  She denies any drug or alcohol use.  No other concerns at this time.  Patient later tells me that her mother had frequent blood clots.  Patient also says that she has personal history of blood clots and says that she takes \"Wellbutrin to prevent blood clots\".    History provided by:  Patient  Anxiety  Symptoms include shortness of breath. Patient reports no chest pain, dizziness, nausea or palpitations.       Shortness of Breath  Onset quality:  Sudden  Duration:  2 hours  Timing:  Constant  Progression:  Unchanged  Chronicity:  Recurrent (History of chronic CHF)  Context comment:  Recent admission to [facility] for \"heart failure\".  Relieved by:  Nothing  Exacerbated by: Anxiety and being alone after recent extensive hospital discharge.  Ineffective treatments:  None tried  Associated symptoms: no abdominal pain, no chest pain, no cough, no diaphoresis, no ear pain, no fever, no headaches, no sore throat, no vomiting and no wheezing    Risk factors comment:  History of homelessness. Recent extensive hospitalization at [facility] from 11/29/23 with discharge today.      Review of Systems   Constitutional: Negative.  Negative for activity change, appetite change, chills, diaphoresis, fatigue and fever.   HENT: Negative.  Negative for congestion, ear pain, " "postnasal drip, rhinorrhea, sinus pressure, sinus pain, sneezing and sore throat.    Respiratory:  Positive for shortness of breath. Negative for cough, chest tightness and wheezing.         Non-smoker.  Patient denies history of COPD or asthma.   Cardiovascular:  Negative for chest pain, palpitations and leg swelling.        History of multi-vessel CAD with recent LHC at [facility]. Thoracic surgery at [place] did not recommend surgical intervention and medical therapy only. History of moderate TR and MR. History of CHF. Pt on Bumex 1 mg daily.   Gastrointestinal: Negative.  Negative for abdominal pain, constipation, diarrhea, nausea and vomiting.   Genitourinary: Negative.  Negative for dysuria, flank pain, frequency and urgency.   Musculoskeletal: Negative.  Negative for back pain.   Neurological: Negative.  Negative for dizziness, syncope, light-headedness and headaches.   Psychiatric/Behavioral:          History of homelessness. Patient discharged from [facility] to [facility] earlier today.   All other systems reviewed and are negative.      Past Medical History:   Diagnosis Date    Hypertension        Allergies   Allergen Reactions    Other Nausea And Vomiting     Pt states \"all opiods make me throw up instantly\"    Percocet [Oxycodone-Acetaminophen] GI Intolerance    Sulfa Antibiotics Rash       Past Surgical History:   Procedure Laterality Date    KNEE SURGERY      RHINOPLASTY         Family History   Problem Relation Age of Onset    No Known Problems Mother     No Known Problems Father        Social History     Socioeconomic History    Marital status:    Tobacco Use    Smoking status: Never    Smokeless tobacco: Never   Vaping Use    Vaping Use: Never used   Substance and Sexual Activity    Alcohol use: No    Drug use: No    Sexual activity: Defer           Objective   Physical Exam  Vitals and nursing note reviewed.   Constitutional:       General: She is not in acute distress.     Appearance: Normal " appearance. She is not ill-appearing, toxic-appearing or diaphoretic.      Comments: Anxious on exam. Non-toxic. No acute distress.   HENT:      Head: Normocephalic and atraumatic.      Nose: Nose normal.      Mouth/Throat:      Mouth: Mucous membranes are moist.      Pharynx: Oropharynx is clear.   Eyes:      Extraocular Movements: Extraocular movements intact.      Conjunctiva/sclera: Conjunctivae normal.      Pupils: Pupils are equal, round, and reactive to light.   Neck:      Vascular: No JVD.   Cardiovascular:      Rate and Rhythm: Normal rate and regular rhythm. Tachycardia present. No extrasystoles are present.     Pulses: Normal pulses.      Heart sounds: Normal heart sounds.      Comments: Mild tachycardia. No ectopy. No pedal edema to BLE.  Pulmonary:      Effort: Pulmonary effort is normal. No tachypnea, accessory muscle usage, respiratory distress or retractions.      Breath sounds: Normal breath sounds. No decreased breath sounds, wheezing, rhonchi or rales.      Comments: Regular respiratory effort.  Good air exchange to bilateral lung fields.  No wheezes, rhonchi, or rales.  No respiratory distress.  Abdominal:      General: Bowel sounds are normal.      Palpations: Abdomen is soft.   Musculoskeletal:         General: Normal range of motion.      Cervical back: Normal range of motion and neck supple.      Right lower leg: No edema.      Left lower leg: No edema.   Skin:     General: Skin is warm and dry.   Neurological:      General: No focal deficit present.      Mental Status: She is alert and oriented to person, place, and time.      Cranial Nerves: Cranial nerves 2-12 are intact.      Sensory: Sensation is intact.      Motor: Motor function is intact.      Coordination: Coordination is intact.      Comments: Neuro intact and nonfocal   Psychiatric:         Mood and Affect: Affect normal. Mood is anxious.         Speech: Speech normal.         Behavior: Behavior normal. Behavior is cooperative.          Thought Content: Thought content normal.         Cognition and Memory: Cognition normal.         Judgment: Judgment normal.      Comments: Anxious on exam.         Procedures           ED Course  ED Course as of 12/15/23 0320   Fri Dec 15, 2023   0310 I personally reviewed EKGs x 2 and interpreted initial EKG is sinus rhythm.  No acute ST-T wave changes consistent with ischemia.  Repeat 2-hour EKG showed sinus rhythm with occasional PVC.  I also personally reviewed chest x-ray which showed nodular opacity in the right lower lobe periphery.  CBC showed white blood cell count of 12.72.  Differential  was within normal limits.  Chemistries were essentially normal.  Lactic acid was initially 2.7 and repeat lactic acid was 1.9.  Urinalysis revealed glucosuria, 1+ leukocytes, negative nitrite, 11-20 white blood cells and no bacteria.  BNP was 3489.  Procalcitonin was within normal limits.  High-sensitivity troponins x 2 sets were 28.  UDS is positive for opiates and benzodiazepines.  O2 sat was stable at 98% on room air.  Patient was afebrile.  Discussed the case in detail with Dr. Clement and reviewed extensive recent hospitalization at [facility] from 11/29/2023 through 12/14/2023.  Due to new developing right lower lobe opacity, we proceeded with CTA of the chest with contrast which showed a solitary PE within the segmental artery to the posterior basal segment of the right lower lobe.  A small to moderate right-sided pleural effusion with overlying atelectasis.  Right lower lobe pneumonia.  Mild cardiomegaly and coronary artery calcification.  There is aneurysmal dilatation of the ascending aorta up to 4.3 cm.  There is slight reflux of contrast into the hepatic veins suggesting some degree of right heart dysfunction.  Patient was recently diagnosed with a mural thrombus at [facility] on CTA of the chest, but there was no evidence of PE at that time.  Patient was discharged on Plavix and Eliquis.   After discussion with Dr. Blake, hospitalist, she is agreeable to admission on telemetry.  We initiated vancomycin and Zosyn for coverage of hospital associated pneumonia.  I updated patient on all results and need for admission.  Vital signs are stable and she is agreeable to admission. [FC]      ED Course User Index  [FC] Sandy Looney PA-C                                 Recent Results (from the past 24 hour(s))   Comprehensive Metabolic Panel    Collection Time: 12/14/23  7:32 PM    Specimen: Blood   Result Value Ref Range    Glucose 116 (H) 65 - 99 mg/dL    BUN 14 6 - 20 mg/dL    Creatinine 0.70 0.57 - 1.00 mg/dL    Sodium 137 136 - 145 mmol/L    Potassium 4.1 3.5 - 5.2 mmol/L    Chloride 103 98 - 107 mmol/L    CO2 21.0 (L) 22.0 - 29.0 mmol/L    Calcium 9.0 8.6 - 10.5 mg/dL    Total Protein 6.7 6.0 - 8.5 g/dL    Albumin 3.2 (L) 3.5 - 5.2 g/dL    ALT (SGPT) 42 (H) 1 - 33 U/L    AST (SGOT) 19 1 - 32 U/L    Alkaline Phosphatase 186 (H) 39 - 117 U/L    Total Bilirubin 0.2 0.0 - 1.2 mg/dL    Globulin 3.5 gm/dL    A/G Ratio 0.9 g/dL    BUN/Creatinine Ratio 20.0 7.0 - 25.0    Anion Gap 13.0 5.0 - 15.0 mmol/L    eGFR 101.0 >60.0 mL/min/1.73   BNP    Collection Time: 12/14/23  7:32 PM    Specimen: Blood   Result Value Ref Range    proBNP 3,489.0 (H) 0.0 - 900.0 pg/mL   Single High Sensitivity Troponin T    Collection Time: 12/14/23  7:32 PM    Specimen: Blood   Result Value Ref Range    HS Troponin T 28 (H) <14 ng/L   Procalcitonin    Collection Time: 12/14/23  7:32 PM    Specimen: Blood   Result Value Ref Range    Procalcitonin 0.04 0.00 - 0.25 ng/mL   Lactic Acid, Plasma    Collection Time: 12/14/23  7:32 PM    Specimen: Blood   Result Value Ref Range    Lactate 2.7 (C) 0.5 - 2.0 mmol/L   Magnesium    Collection Time: 12/14/23  7:32 PM    Specimen: Blood   Result Value Ref Range    Magnesium 1.8 1.6 - 2.6 mg/dL   CBC Auto Differential    Collection Time: 12/14/23  7:32 PM    Specimen: Blood   Result Value Ref Range     WBC 12.72 (H) 3.40 - 10.80 10*3/mm3    RBC 4.11 3.77 - 5.28 10*6/mm3    Hemoglobin 11.8 (L) 12.0 - 15.9 g/dL    Hematocrit 37.9 34.0 - 46.6 %    MCV 92.2 79.0 - 97.0 fL    MCH 28.7 26.6 - 33.0 pg    MCHC 31.1 (L) 31.5 - 35.7 g/dL    RDW 14.4 12.3 - 15.4 %    RDW-SD 49.4 37.0 - 54.0 fl    MPV 8.9 6.0 - 12.0 fL    Platelets 849 (H) 140 - 450 10*3/mm3    Neutrophil % 74.6 42.7 - 76.0 %    Lymphocyte % 15.7 (L) 19.6 - 45.3 %    Monocyte % 4.5 (L) 5.0 - 12.0 %    Eosinophil % 1.5 0.3 - 6.2 %    Basophil % 0.5 0.0 - 1.5 %    Immature Grans % 3.2 (H) 0.0 - 0.5 %    Neutrophils, Absolute 9.49 (H) 1.70 - 7.00 10*3/mm3    Lymphocytes, Absolute 2.00 0.70 - 3.10 10*3/mm3    Monocytes, Absolute 0.57 0.10 - 0.90 10*3/mm3    Eosinophils, Absolute 0.19 0.00 - 0.40 10*3/mm3    Basophils, Absolute 0.06 0.00 - 0.20 10*3/mm3    Immature Grans, Absolute 0.41 (H) 0.00 - 0.05 10*3/mm3    nRBC 0.0 0.0 - 0.2 /100 WBC   ECG 12 Lead Dyspnea    Collection Time: 12/14/23  7:44 PM   Result Value Ref Range    QT Interval 396 ms    QTC Interval 489 ms   Urinalysis With Microscopic If Indicated (No Culture) - Urine, Clean Catch    Collection Time: 12/14/23  8:44 PM    Specimen: Urine, Clean Catch   Result Value Ref Range    Color, UA Yellow Yellow, Straw    Appearance, UA Clear Clear    pH, UA <=5.0 5.0 - 8.0    Specific Gravity, UA 1.017 1.001 - 1.030    Glucose, UA >=1000 mg/dL (3+) (A) Negative    Ketones, UA Negative Negative    Bilirubin, UA Negative Negative    Blood, UA Trace (A) Negative    Protein, UA Negative Negative    Leuk Esterase, UA Small (1+) (A) Negative    Nitrite, UA Negative Negative    Urobilinogen, UA 0.2 E.U./dL 0.2 - 1.0 E.U./dL   Urine Drug Screen - Urine, Clean Catch    Collection Time: 12/14/23  8:44 PM    Specimen: Urine, Clean Catch   Result Value Ref Range    THC, Screen, Urine Negative Negative    Phencyclidine (PCP), Urine Negative Negative    Cocaine Screen, Urine Negative Negative    Methamphetamine, Ur  Negative Negative    Opiate Screen Positive (A) Negative    Amphetamine Screen, Urine Negative Negative    Benzodiazepine Screen, Urine Positive (A) Negative    Tricyclic Antidepressants Screen Negative Negative    Methadone Screen, Urine Negative Negative    Barbiturates Screen, Urine Negative Negative    Oxycodone Screen, Urine Negative Negative    Buprenorphine, Screen, Urine Negative Negative   Fentanyl, Urine - Urine, Clean Catch    Collection Time: 12/14/23  8:44 PM    Specimen: Urine, Clean Catch   Result Value Ref Range    Fentanyl, Urine Negative Negative   Urinalysis, Microscopic Only - Urine, Clean Catch    Collection Time: 12/14/23  8:44 PM    Specimen: Urine, Clean Catch   Result Value Ref Range    RBC, UA 0-2 None Seen, 0-2 /HPF    WBC, UA 11-20 (A) None Seen, 0-2 /HPF    Bacteria, UA None Seen None Seen, Trace /HPF    Squamous Epithelial Cells, UA 0-2 None Seen, 0-2 /HPF    Hyaline Casts, UA None Seen 0 - 6 /LPF    Methodology Automated Microscopy    ECG 12 Lead Dyspnea    Collection Time: 12/14/23 11:15 PM   Result Value Ref Range    QT Interval 392 ms    QTC Interval 492 ms   STAT Lactic Acid, Reflex    Collection Time: 12/14/23 11:21 PM    Specimen: Blood   Result Value Ref Range    Lactate 1.9 0.5 - 2.0 mmol/L   Single High Sensitivity Troponin T    Collection Time: 12/14/23 11:21 PM    Specimen: Blood   Result Value Ref Range    HS Troponin T 28 (H) <14 ng/L   Heparin Anti-Xa    Collection Time: 12/15/23  2:11 AM    Specimen: Blood   Result Value Ref Range    Heparin Anti-Xa (UFH) >1.10 (C) 0.30 - 0.70 IU/ml   Protime-INR    Collection Time: 12/15/23  2:11 AM    Specimen: Blood   Result Value Ref Range    Protime 16.1 (H) 12.2 - 14.5 Seconds    INR 1.28 (H) 0.89 - 1.12   aPTT    Collection Time: 12/15/23  2:11 AM    Specimen: Blood   Result Value Ref Range    PTT 33.1 (L) 60.0 - 90.0 seconds     Note: In addition to lab results from this visit, the labs listed above may include labs taken at  another facility or during a different encounter within the last 24 hours. Please correlate lab times with ED admission and discharge times for further clarification of the services performed during this visit.    CT Angiogram Chest   Final Result   Impression:   1.A solitary pulmonary embolism within the segmental artery to the posterior basal segment of the right lower lobe.   2.A small to moderate right-sided pleural effusion with overlying atelectasis.   3.Right lower lobe pneumonia.   4.Mild cardiomegaly and mild coronary artery calcification.   5.There is aneurysmal dilatation of the ascending aorta up to 43 mm.   6.There is slight reflux of contrast into the hepatic veins suggesting some degree of right heart dysfunction.            Electronically Signed: Gerard Colby MD     12/14/2023 11:56 PM EST     Workstation ID: [id number]      XR Chest 1 View   Final Result   Impression:   New small focal opacity within the periphery of the right lower lung may represent an infectious/inflammatory process. A discrete pulmonary lesion is not completely excluded. Please consider follow-up with nonemergent chest CT to exclude an underlying    pulmonary lesion/mass         Electronically Signed: Jun Mccarty DO     12/14/2023 7:50 PM EST     Workstation ID: [id number]        Vitals:    12/14/23 2045 12/15/23 0131 12/15/23 0142 12/15/23 0200   BP: 153/98 133/86     BP Location:  Right arm     Patient Position:  Lying     Pulse: 101 96 90 94   Resp:  18 23    Temp:  97.7 °F (36.5 °C)     TempSrc:  Oral     SpO2: 98% 100% 98% 98%   Weight:  59 kg (130 lb)     Height:         Medications   sodium chloride 0.9 % flush 10 mL (has no administration in time range)   sodium chloride 0.9 % flush 10 mL (has no administration in time range)   sodium chloride 0.9 % flush 10 mL (has no administration in time range)   sodium chloride 0.9 % infusion 40 mL (has no administration in time range)   sennosides-docusate (PERICOLACE) 8.6-50  MG per tablet 2 tablet (has no administration in time range)     And   polyethylene glycol (MIRALAX) packet 17 g (has no administration in time range)     And   bisacodyl (DULCOLAX) EC tablet 5 mg (has no administration in time range)     And   bisacodyl (DULCOLAX) suppository 10 mg (has no administration in time range)   acetaminophen (TYLENOL) tablet 650 mg (has no administration in time range)   melatonin tablet 5 mg (has no administration in time range)   ondansetron (ZOFRAN) tablet 4 mg (has no administration in time range)     Or   ondansetron (ZOFRAN) injection 4 mg (has no administration in time range)   Pharmacy to dose vancomycin (has no administration in time range)   Pharmacy to dose Zosyn (has no administration in time range)   ipratropium-albuterol (DUO-NEB) nebulizer solution 3 mL ( Nebulization Canceled Entry 12/15/23 0330)   piperacillin-tazobactam (ZOSYN) 3.375 g in iso-osmotic dextrose 50 ml (premix) (has no administration in time range)   vancomycin in dextrose 5% 150 mL (VANCOCIN) IVPB 750 mg (has no administration in time range)   apixaban (ELIQUIS) tablet 10 mg (has no administration in time range)     Followed by   apixaban (ELIQUIS) tablet 5 mg (has no administration in time range)   atorvastatin (LIPITOR) tablet 80 mg (has no administration in time range)   bumetanide (BUMEX) tablet 1 mg (has no administration in time range)   buPROPion XL (WELLBUTRIN XL) 24 hr tablet 300 mg (has no administration in time range)   clopidogrel (PLAVIX) tablet 75 mg (has no administration in time range)   empagliflozin (JARDIANCE) tablet 10 mg (has no administration in time range)   isosorbide mononitrate (IMDUR) 24 hr tablet 30 mg (has no administration in time range)   metoprolol succinate XL (TOPROL-XL) 24 hr tablet 75 mg (has no administration in time range)   pantoprazole (PROTONIX) EC tablet 40 mg (has no administration in time range)   risperiDONE (risperDAL) tablet 2.5 mg (has no administration in  time range)   ALPRAZolam (XANAX) tablet 0.5 mg (0.5 mg Oral Given 12/14/23 2049)   iopamidol (ISOVUE-370) 76 % injection 100 mL (80 mL Intravenous Given 12/14/23 2343)   vancomycin 1250 mg/250 mL 0.9% NS IVPB ([facility]) (1,250 mg Intravenous New Bag 12/15/23 0205)   piperacillin-tazobactam (ZOSYN) 3.375 g in iso-osmotic dextrose 50 ml (premix) (3.375 g Intravenous New Bag 12/15/23 0105)     ECG/EMG Results (last 24 hours)       Procedure Component Value Units Date/Time    ECG 12 Lead Dyspnea [[id number]] Collected: 12/14/23 1944     Updated: 12/14/23 1942     QT Interval 396 ms      QTC Interval 489 ms     Narrative:      Test Reason : Dyspnea  Blood Pressure :   */*   mmHG  Vent. Rate :  92 BPM     Atrial Rate :  92 BPM     P-R Int : 152 ms          QRS Dur :  78 ms      QT Int : 396 ms       P-R-T Axes :  25 -39  36 degrees     QTc Int : 489 ms    Normal sinus rhythm  Left axis deviation  Moderate voltage criteria for LVH, may be normal variant  Anteroseptal infarct , age undetermined  Abnormal ECG  When compared with ECG of 05-JUL-2019 18:00,  QRS axis shifted left  Anteroseptal infarct is now present    Referred By: ULISSES           Confirmed By:           ECG 12 Lead Dyspnea   Preliminary Result   Test Reason : Dyspnea   Blood Pressure :   */*   mmHG   Vent. Rate :  95 BPM     Atrial Rate :  95 BPM      P-R Int : 146 ms          QRS Dur :  84 ms       QT Int : 392 ms       P-R-T Axes :  29 -41  60 degrees      QTc Int : 492 ms      Sinus rhythm with occasional premature ventricular complexes   Possible Left atrial enlargement   Left axis deviation   Left ventricular hypertrophy   Anteroseptal infarct (cited on or before 14-DEC-2023)   Abnormal ECG   When compared with ECG of 14-DEC-2023 19:44, (Unconfirmed)   premature ventricular complexes are now present      Referred By: ULISSES           Confirmed By:       ECG 12 Lead Dyspnea   Preliminary Result   Test Reason : Dyspnea   Blood Pressure :   */*   mmHG   Vent. Rate :   92 BPM     Atrial Rate :  92 BPM      P-R Int : 152 ms          QRS Dur :  78 ms       QT Int : 396 ms       P-R-T Axes :  25 -39  36 degrees      QTc Int : 489 ms      Normal sinus rhythm   Left axis deviation   Moderate voltage criteria for LVH, may be normal variant   Anteroseptal infarct , age undetermined   Abnormal ECG   When compared with ECG of 05-JUL-2019 18:00,   QRS axis shifted left   Anteroseptal infarct is now present      Referred By: EDMD           Confirmed By:              JESUS reviewed by Jillian Blake DO, Creech, Christopher T, MD       Medical Decision Making  Problems Addressed:  Mural thrombus of heart: complicated acute illness or injury  Other acute pulmonary embolism without acute cor pulmonale: complicated acute illness or injury  Pleural effusion on right: complicated acute illness or injury  Pneumonia of right lower lobe due to infectious organism: complicated acute illness or injury  Shortness of breath: complicated acute illness or injury    Amount and/or Complexity of Data Reviewed  Labs: ordered.  Radiology: ordered.  ECG/medicine tests: ordered.    Risk  Prescription drug management.  Decision regarding hospitalization.        Final diagnoses:   Pneumonia of right lower lobe due to infectious organism   Other acute pulmonary embolism without acute cor pulmonale   Shortness of breath   Mural thrombus of heart   Pleural effusion on right   History of coronary artery disease   History of hypertension   Anxiety   History of CHF (congestive heart failure)   Chronic anticoagulation   History of hyperlipidemia       ED Disposition  ED Disposition       ED Disposition   Decision to Admit    Condition   --    Comment   Level of Care: Telemetry [5]   Diagnosis: Pneumonia [434170]   Admitting Physician: JILLIAN BLAKE [[id number]]   Attending Physician: JILLIAN BLAKE [[id number]]   Bed Request Comments: tele                 No follow-up provider specified.       Medication List      No changes  were made to your prescriptions during this visit.            Sandy Looney PA-C  12/15/23 0321

## 2023-12-16 ENCOUNTER — APPOINTMENT (OUTPATIENT)
Dept: GENERAL RADIOLOGY | Facility: HOSPITAL | Age: 58
End: 2023-12-16
Payer: MEDICAID

## 2023-12-16 LAB
ANION GAP SERPL CALCULATED.3IONS-SCNC: 13 MMOL/L (ref 5–15)
BASOPHILS # BLD AUTO: 0.08 10*3/MM3 (ref 0–0.2)
BASOPHILS NFR BLD AUTO: 0.7 % (ref 0–1.5)
BUN SERPL-MCNC: 17 MG/DL (ref 6–20)
BUN/CREAT SERPL: 23 (ref 7–25)
CALCIUM SPEC-SCNC: 9.2 MG/DL (ref 8.6–10.5)
CHLORIDE SERPL-SCNC: 105 MMOL/L (ref 98–107)
CO2 SERPL-SCNC: 24 MMOL/L (ref 22–29)
CREAT SERPL-MCNC: 0.74 MG/DL (ref 0.57–1)
DEPRECATED RDW RBC AUTO: 46 FL (ref 37–54)
EGFRCR SERPLBLD CKD-EPI 2021: 94.5 ML/MIN/1.73
EOSINOPHIL # BLD AUTO: 0.17 10*3/MM3 (ref 0–0.4)
EOSINOPHIL NFR BLD AUTO: 1.5 % (ref 0.3–6.2)
ERYTHROCYTE [DISTWIDTH] IN BLOOD BY AUTOMATED COUNT: 14.2 % (ref 12.3–15.4)
GLUCOSE SERPL-MCNC: 99 MG/DL (ref 65–99)
HCT VFR BLD AUTO: 36.1 % (ref 34–46.6)
HGB BLD-MCNC: 11.5 G/DL (ref 12–15.9)
IMM GRANULOCYTES # BLD AUTO: 0.2 10*3/MM3 (ref 0–0.05)
IMM GRANULOCYTES NFR BLD AUTO: 1.7 % (ref 0–0.5)
LYMPHOCYTES # BLD AUTO: 1.86 10*3/MM3 (ref 0.7–3.1)
LYMPHOCYTES NFR BLD AUTO: 16.1 % (ref 19.6–45.3)
MAGNESIUM SERPL-MCNC: 2 MG/DL (ref 1.6–2.6)
MCH RBC QN AUTO: 28.1 PG (ref 26.6–33)
MCHC RBC AUTO-ENTMCNC: 31.9 G/DL (ref 31.5–35.7)
MCV RBC AUTO: 88.3 FL (ref 79–97)
MONOCYTES # BLD AUTO: 0.62 10*3/MM3 (ref 0.1–0.9)
MONOCYTES NFR BLD AUTO: 5.4 % (ref 5–12)
NEUTROPHILS NFR BLD AUTO: 74.6 % (ref 42.7–76)
NEUTROPHILS NFR BLD AUTO: 8.6 10*3/MM3 (ref 1.7–7)
NRBC BLD AUTO-RTO: 0 /100 WBC (ref 0–0.2)
PLATELET # BLD AUTO: 785 10*3/MM3 (ref 140–450)
PMV BLD AUTO: 8.9 FL (ref 6–12)
POTASSIUM SERPL-SCNC: 4 MMOL/L (ref 3.5–5.2)
RBC # BLD AUTO: 4.09 10*6/MM3 (ref 3.77–5.28)
SODIUM SERPL-SCNC: 142 MMOL/L (ref 136–145)
WBC NRBC COR # BLD AUTO: 11.53 10*3/MM3 (ref 3.4–10.8)

## 2023-12-16 PROCEDURE — G0378 HOSPITAL OBSERVATION PER HR: HCPCS

## 2023-12-16 PROCEDURE — 25010000002 VANCOMYCIN PER 500 MG

## 2023-12-16 PROCEDURE — 94799 UNLISTED PULMONARY SVC/PX: CPT

## 2023-12-16 PROCEDURE — 25010000002 PIPERACILLIN SOD-TAZOBACTAM PER 1 G

## 2023-12-16 PROCEDURE — 94664 DEMO&/EVAL PT USE INHALER: CPT

## 2023-12-16 PROCEDURE — 97161 PT EVAL LOW COMPLEX 20 MIN: CPT

## 2023-12-16 PROCEDURE — 94761 N-INVAS EAR/PLS OXIMETRY MLT: CPT

## 2023-12-16 PROCEDURE — 83735 ASSAY OF MAGNESIUM: CPT | Performed by: INTERNAL MEDICINE

## 2023-12-16 PROCEDURE — 85025 COMPLETE CBC W/AUTO DIFF WBC: CPT | Performed by: PHYSICIAN ASSISTANT

## 2023-12-16 PROCEDURE — 80048 BASIC METABOLIC PNL TOTAL CA: CPT | Performed by: PHYSICIAN ASSISTANT

## 2023-12-16 PROCEDURE — 97116 GAIT TRAINING THERAPY: CPT

## 2023-12-16 PROCEDURE — 99232 SBSQ HOSP IP/OBS MODERATE 35: CPT | Performed by: INTERNAL MEDICINE

## 2023-12-16 PROCEDURE — 71045 X-RAY EXAM CHEST 1 VIEW: CPT

## 2023-12-16 RX ORDER — SPIRONOLACTONE 100 MG/1
100 TABLET, FILM COATED ORAL DAILY
COMMUNITY

## 2023-12-16 RX ORDER — SPIRONOLACTONE 25 MG/1
100 TABLET ORAL DAILY
Status: DISCONTINUED | OUTPATIENT
Start: 2023-12-16 | End: 2023-12-18 | Stop reason: HOSPADM

## 2023-12-16 RX ORDER — ALPRAZOLAM 0.25 MG/1
0.25 TABLET ORAL 2 TIMES DAILY PRN
COMMUNITY

## 2023-12-16 RX ORDER — HYDROXYZINE HYDROCHLORIDE 10 MG/1
10 TABLET, FILM COATED ORAL NIGHTLY PRN
Status: DISCONTINUED | OUTPATIENT
Start: 2023-12-16 | End: 2023-12-17

## 2023-12-16 RX ADMIN — BUPROPION HYDROCHLORIDE 300 MG: 150 TABLET, EXTENDED RELEASE ORAL at 08:48

## 2023-12-16 RX ADMIN — Medication 10 ML: at 08:48

## 2023-12-16 RX ADMIN — APIXABAN 10 MG: 5 TABLET, FILM COATED ORAL at 08:48

## 2023-12-16 RX ADMIN — EMPAGLIFLOZIN 10 MG: 10 TABLET, FILM COATED ORAL at 08:49

## 2023-12-16 RX ADMIN — ISOSORBIDE MONONITRATE 30 MG: 30 TABLET, EXTENDED RELEASE ORAL at 08:48

## 2023-12-16 RX ADMIN — CLOPIDOGREL BISULFATE 75 MG: 75 TABLET ORAL at 08:49

## 2023-12-16 RX ADMIN — PIPERACILLIN SODIUM AND TAZOBACTAM SODIUM 3.38 G: 3; .375 INJECTION, SOLUTION INTRAVENOUS at 08:49

## 2023-12-16 RX ADMIN — BUMETANIDE 1 MG: 1 TABLET ORAL at 08:48

## 2023-12-16 RX ADMIN — VANCOMYCIN HYDROCHLORIDE 750 MG: 750 INJECTION, SOLUTION INTRAVENOUS at 10:26

## 2023-12-16 RX ADMIN — PANTOPRAZOLE SODIUM 40 MG: 40 TABLET, DELAYED RELEASE ORAL at 06:21

## 2023-12-16 RX ADMIN — SPIRONOLACTONE 100 MG: 25 TABLET, FILM COATED ORAL at 10:25

## 2023-12-16 RX ADMIN — IPRATROPIUM BROMIDE AND ALBUTEROL SULFATE 3 ML: 2.5; .5 SOLUTION RESPIRATORY (INHALATION) at 11:10

## 2023-12-16 RX ADMIN — PIPERACILLIN SODIUM AND TAZOBACTAM SODIUM 3.38 G: 3; .375 INJECTION, SOLUTION INTRAVENOUS at 23:45

## 2023-12-16 RX ADMIN — METOPROLOL SUCCINATE 75 MG: 50 TABLET, EXTENDED RELEASE ORAL at 08:48

## 2023-12-16 RX ADMIN — PIPERACILLIN SODIUM AND TAZOBACTAM SODIUM 3.38 G: 3; .375 INJECTION, SOLUTION INTRAVENOUS at 17:24

## 2023-12-16 RX ADMIN — IPRATROPIUM BROMIDE AND ALBUTEROL SULFATE 3 ML: 2.5; .5 SOLUTION RESPIRATORY (INHALATION) at 22:34

## 2023-12-16 RX ADMIN — VANCOMYCIN HYDROCHLORIDE 750 MG: 750 INJECTION, SOLUTION INTRAVENOUS at 21:49

## 2023-12-16 RX ADMIN — APIXABAN 10 MG: 5 TABLET, FILM COATED ORAL at 21:49

## 2023-12-16 RX ADMIN — HYDROXYZINE HYDROCHLORIDE 10 MG: 10 TABLET ORAL at 23:40

## 2023-12-16 NOTE — PLAN OF CARE
Goal Outcome Evaluation:  Plan of Care Reviewed With: patient        Progress: no change  Outcome Evaluation: PT evaluation completed. Pt independent with transfers, issued SPC for extra support with gait. Pt able to ambulate mod I with use of cane. No further skilled needs at this time. Recommend home upon DC.      Anticipated Discharge Disposition (PT): home

## 2023-12-16 NOTE — PROGRESS NOTES
[facility] Medicine Services  PROGRESS NOTE    Patient Name: Jill M Paladino  : 1965  MRN: [id number]    Date of Admission: 2023  Primary Care Physician: Sigrid Byers PA    Subjective   Subjective     CC:  F/u pneumonia, Right side chest pain    HPI:  Patient resting in bed. No issues overnight, states her right sided pleuritic pain has improved and her shortness of breath also has improved. No chest pain currently, she is coughing some.      Objective   Objective     Vital Signs:   Temp:  [97.6 °F (36.4 °C)-98.4 °F (36.9 °C)] 98 °F (36.7 °C)  Heart Rate:  [] 90  Resp:  [16-18] 16  BP: (116-137)/(82-93) 131/88     Physical Exam:  Constitutional: No acute distress, awake, alert  HENT: NCAT, mucous membranes moist  Respiratory: clear bilaterally besides some crackles in RLL, respiratory effort normal   Cardiovascular: RRR, no murmurs, rubs, or gallops  Gastrointestinal: soft, nontender, nondistended  Musculoskeletal: No bilateral ankle edema  Psychiatric: Appropriate affect, cooperative  Neurologic: Oriented x 3, speech clear, no focal deficits  Skin: No rashes      Results Reviewed:  LAB RESULTS:      Lab 23  0543 12/15/23  0601 12/15/23  0211 23  2321 23  1932   WBC 11.53* 11.35*  --   --  12.72*   HEMOGLOBIN 11.5* 11.6*  --   --  11.8*   HEMATOCRIT 36.1 36.3  --   --  37.9   PLATELETS 785* 825*  --   --  849*   NEUTROS ABS 8.60* 8.41*  --   --  9.49*   IMMATURE GRANS (ABS) 0.20* 0.23*  --   --  0.41*   LYMPHS ABS 1.86 1.84  --   --  2.00   MONOS ABS 0.62 0.65  --   --  0.57   EOS ABS 0.17 0.15  --   --  0.19   MCV 88.3 88.3  --   --  92.2   PROCALCITONIN  --   --   --   --  0.04   LACTATE  --   --   --  1.9 2.7*   PROTIME  --   --  16.1*  --   --    APTT  --   --  33.1*  --   --    HEPARIN ANTI-XA  --   --  >1.10*  --   --          Lab 23  0543 12/15/23  0601 23  193   SODIUM 142 139 137   POTASSIUM 4.0 4.9 4.1   CHLORIDE 105 104  103   CO2 24.0 23.0 21.0*   ANION GAP 13.0 12.0 13.0   BUN 17 12 14   CREATININE 0.74 0.62 0.70   EGFR 94.5 104.0 101.0   GLUCOSE 99 92 116*   CALCIUM 9.2 8.9 9.0   MAGNESIUM 2.0  --  1.8         Lab 12/14/23 1932   TOTAL PROTEIN 6.7   ALBUMIN 3.2*   GLOBULIN 3.5   ALT (SGPT) 42*   AST (SGOT) 19   BILIRUBIN 0.2   ALK PHOS 186*         Lab 12/15/23  0211 12/14/23  2321 12/14/23 1932   PROBNP  --   --  3,489.0*   HSTROP T  --  28* 28*   PROTIME 16.1*  --   --    INR 1.28*  --   --          Lab 12/15/23  0601   CHOLESTEROL 130   LDL CHOL 74   HDL CHOL 41   TRIGLYCERIDES 76             Brief Urine Lab Results  (Last result in the past 365 days)        Color   Clarity   Blood   Leuk Est   Nitrite   Protein   CREAT   Urine HCG        12/14/23 2044 Yellow   Clear   Trace   Small (1+)   Negative   Negative                   Microbiology Results Abnormal       Procedure Component Value - Date/Time    Blood Culture - Blood, Arm, Right [[id number]]  (Normal) Collected: 12/15/23 0104    Lab Status: Preliminary result Specimen: Blood from Arm, Right Updated: 12/16/23 0131     Blood Culture No growth at 24 hours    Blood Culture - Blood, Arm, Right [[id number]]  (Normal) Collected: 12/15/23 0104    Lab Status: Preliminary result Specimen: Blood from Arm, Right Updated: 12/16/23 0131     Blood Culture No growth at 24 hours    S. Pneumo Ag Urine or CSF - Urine, Urine, Clean Catch [[id number]]  (Normal) Collected: 12/15/23 0445    Lab Status: Final result Specimen: Urine, Clean Catch Updated: 12/15/23 0958     Strep Pneumo Ag Negative    Legionella Antigen, Urine - Urine, Urine, Clean Catch [[id number]]  (Normal) Collected: 12/15/23 0445    Lab Status: Final result Specimen: Urine, Clean Catch Updated: 12/15/23 0958     LEGIONELLA ANTIGEN, URINE Negative    MRSA Screen, PCR (Inpatient) - Swab, Nares [[id number]]  (Normal) Collected: 12/15/23 0237    Lab Status: Final result Specimen: Swab from Nares Updated: 12/15/23 0798     MRSA PCR  Negative    Narrative:      The negative predictive value of this diagnostic test is high and should only be used to consider de-escalating anti-MRSA therapy. A positive result may indicate colonization with MRSA and must be correlated clinically.  MRSA Negative    Respiratory Culture - Sputum, Cough [[id number]] Collected: 12/15/23 0242    Lab Status: Preliminary result Specimen: Sputum from Cough Updated: 12/15/23 0429     Gram Stain Rare (1+) Epithelial cells per low power field      Many (4+) WBCs per low power field      Few (2+) Gram positive cocci in pairs and chains    Respiratory Panel PCR w/COVID-19(SARS-CoV-2) [facility]/SWAPNA In-House, NP Swab in UTM/VTM, 2 HR TAT - Swab, Nasopharynx [[id number]]  (Normal) Collected: 12/15/23 0237    Lab Status: Final result Specimen: Swab from Nasopharynx Updated: 12/15/23 0344     ADENOVIRUS, PCR Not Detected     Coronavirus 229E Not Detected     Coronavirus HKU1 Not Detected     Coronavirus NL63 Not Detected     Coronavirus OC43 Not Detected     COVID19 Not Detected     Human Metapneumovirus Not Detected     Human Rhinovirus/Enterovirus Not Detected     Influenza A PCR Not Detected     Influenza B PCR Not Detected     Parainfluenza Virus 1 Not Detected     Parainfluenza Virus 2 Not Detected     Parainfluenza Virus 3 Not Detected     Parainfluenza Virus 4 Not Detected     RSV, PCR Not Detected     Bordetella pertussis pcr Not Detected     Bordetella parapertussis PCR Not Detected     Chlamydophila pneumoniae PCR Not Detected     Mycoplasma pneumo by PCR Not Detected    Narrative:      In the setting of a positive respiratory panel with a viral infection PLUS a negative procalcitonin without other underlying concern for bacterial infection, consider observing off antibiotics or discontinuation of antibiotics and continue supportive care. If the respiratory panel is positive for atypical bacterial infection (Bordetella pertussis, Chlamydophila pneumoniae, or  Mycoplasma pneumoniae), consider antibiotic de-escalation to target atypical bacterial infection.            CT Angiogram Chest    Result Date: 12/14/2023  CT ANGIOGRAM CHEST Date of Exam: 12/14/2023 11:36 PM EST Indication: Shortness of breath, recent CHF exacerbation, new developing right lower lobe opacity. Comparison: None available. Technique: CTA of the chest was performed after the uneventful intravenous administration of 80 mL Isovue-370. Reconstructed coronal and sagittal images were also obtained. In addition, a 3-D volume rendered image was created for interpretation. Automated exposure control and iterative reconstruction methods were used. Findings: There is right lower lobe airspace disease. There is a small to moderate size right-sided pleural effusion with overlying atelectasis. The left lung is clear with the exception of minor basilar atelectasis. Airways are patent. No suspicious lung nodules. The thyroid, trachea and esophagus appear within normal limits. The heart is mildly enlarged. There is aneurysmal dilatation of the ascending aorta up to 43 mm. No pericardial effusion. There is slight reflux of contrast into the hepatic veins suggesting  some degree of right heart dysfunction. There is mild coronary artery calcification. There is a solitary pulmonary embolism within the segmental artery to the posterior basal segment of the right lower lobe, which is best seen on axial image 62, sagittal image 41. There is no evidence of left-sided pulmonary embolism. No acute findings in the superficial soft tissues. No acute findings in the upper abdomen. No acute osseous abnormality or significant degenerative change.     Impression: Impression: 1.A solitary pulmonary embolism within the segmental artery to the posterior basal segment of the right lower lobe. 2.A small to moderate right-sided pleural effusion with overlying atelectasis. 3.Right lower lobe pneumonia. 4.Mild cardiomegaly and mild coronary  artery calcification. 5.There is aneurysmal dilatation of the ascending aorta up to 43 mm. 6.There is slight reflux of contrast into the hepatic veins suggesting some degree of right heart dysfunction. Electronically Signed: Gerard Colby MD  12/14/2023 11:56 PM EST  Workstation ID: [id number]    XR Chest 1 View    Result Date: 12/14/2023  XR CHEST 1 VW Date of Exam: 12/14/2023 7:23 PM EST Indication: SOA Comparison: 10/30/2019 Findings: Lines: None Lungs: New focal opacity within the lateral aspect of the right lower lung. Otherwise the lungs are clear. Pleura: No pleural effusion or pneumothorax. Cardiomediastinum: The cardiomediastinal silhouette is normal Soft Tissues: Unremarkable. Bones: No acute osseous abnormality.     Impression: Impression: New small focal opacity within the periphery of the right lower lung may represent an infectious/inflammatory process. A discrete pulmonary lesion is not completely excluded. Please consider follow-up with nonemergent chest CT to exclude an underlying pulmonary lesion/mass Electronically Signed: Jun Mccarty DO  12/14/2023 7:50 PM EST  Workstation ID: [id number]         Current medications:  Scheduled Meds:apixaban, 10 mg, Oral, Q12H   Followed by  [START ON 12/22/2023] apixaban, 5 mg, Oral, Q12H  atorvastatin, 80 mg, Oral, Nightly  bumetanide, 1 mg, Oral, Daily  buPROPion XL, 300 mg, Oral, Daily  clopidogrel, 75 mg, Oral, Daily  empagliflozin, 10 mg, Oral, Daily  ipratropium-albuterol, 3 mL, Nebulization, Q4H - RT  isosorbide mononitrate, 30 mg, Oral, Daily  metoprolol succinate XL, 75 mg, Oral, Daily  pantoprazole, 40 mg, Oral, Daily  piperacillin-tazobactam, 3.375 g, Intravenous, Q8H  senna-docusate sodium, 2 tablet, Oral, BID  sodium chloride, 10 mL, Intravenous, Q12H  spironolactone, 100 mg, Oral, Daily  vancomycin, 12.7 mg/kg, Intravenous, Q12H      Continuous Infusions:Pharmacy Consult - Pharmacy to dose,   Pharmacy Consult - Pharmacy to dose,       PRN Meds:.   acetaminophen    senna-docusate sodium **AND** polyethylene glycol **AND** bisacodyl **AND** bisacodyl    [START ON 12/17/2023] influenza vaccine    melatonin    ondansetron **OR** ondansetron    Pharmacy Consult - Pharmacy to dose    Pharmacy Consult - Pharmacy to dose    [COMPLETED] Insert Peripheral IV **AND** sodium chloride    sodium chloride    sodium chloride    Assessment & Plan   Assessment & Plan     Active Hospital Problems    Diagnosis  POA    **Pneumonia [J18.9]  Yes    Acute pulmonary embolism without acute cor pulmonale [I26.99]  Yes    Chronic systolic CHF (congestive heart failure) [I50.22]  Yes    Coronary artery disease of native artery of native heart with stable angina pectoris [I25.118]  Yes    Essential hypertension [I10]  Yes      Resolved Hospital Problems   No resolved problems to display.        Brief Hospital Course to date:    Jill M Paladino is a 58 y.o. female with a history of homelessness, hypertension, CAD, hx DVT (on Eliquis) and systolic CHF, and anxiety/depression who presents to [facility] ED for complaint of shortness of breat after recent prolonged stay at [facility].     RLL PE  -CTA reviewed  -Discharged on Eliquis on 12/14 -- will increase to loading dose with new PE   - has supply of eliquis 5 mg BID at home -- will need new Rx for high dose on DC      RLL Pneumonia  - CTA chest pneumonia of the RLL  - Resp PCR negative; strep/legionella negative; MRSA negative  - Will continue vanc/zosyn for now as patient was just hospitalized for 16 days   - Mobilize. IS   - sputum culture pending   - repeat CXR today     Chronic HFrEF   - Echo [facility] showed EF of 25%  - Continue bumex, jardiance, imdur, aldactone  - Daily weight. Strict I&Os  - has follow up 12/28 at [facility] per patient      Severe multivessel CAD  HTN  HLD  -Initial HS troponin 28, flat  -Recent LHC at [facility] showed extensive multivessel CAD. Reportedly was seen by vascular surgery there and was deemed not a  candidate for surgical intervention, choosing to manage medically.   -Continue Plavix, eliquis   -Was prescribed Metoprolol 25mg, Imdur 30mg, and Bumex 1mg at discharge from [place] . Continue.      Patient was evaluated by [facility]. She has active medicaid. Plan is discharge to [facility].     Expected Discharge Location and Transportation: Home  Expected Discharge   Expected Discharge Date: 12/17/2023; Expected Discharge Time:      DVT prophylaxis:  Medical and mechanical DVT prophylaxis orders are present.     AM-PAC 6 Clicks Score (PT): 24 (12/16/23 0824)    CODE STATUS:   Code Status and Medical Interventions:   Ordered at: 12/15/23 0206     Code Status (Patient has no pulse and is not breathing):    CPR (Attempt to Resuscitate)     Medical Interventions (Patient has pulse or is breathing):    Full Support       Leila Leroy, DO  12/16/23

## 2023-12-16 NOTE — PLAN OF CARE
Goal Outcome Evaluation:  Plan of Care Reviewed With: patient        Progress: improving  Outcome Evaluation: VSS on RA. No complaints of pain or nausea. Tolerating diet. IV abx infusing. Ambulating and voiding independently. Discussed plan of care with patient who verbalizes understnading.

## 2023-12-16 NOTE — THERAPY DISCHARGE NOTE
Patient Name: Jill M Paladino  : 1965    MRN: [id number]                              Today's Date: 2023       Admit Date: 2023    Visit Dx:     ICD-10-CM ICD-9-CM   1. Pneumonia of right lower lobe due to infectious organism  J18.9 486   2. Other acute pulmonary embolism without acute cor pulmonale  I26.99 415.19   3. Shortness of breath  R06.02 786.05   4. Mural thrombus of heart  I51.3 410.90   5. Pleural effusion on right  J90 511.9   6. History of coronary artery disease  Z86.79 V12.59   7. History of hypertension  Z86.79 V12.59   8. Anxiety  F41.9 300.00   9. History of CHF (congestive heart failure)  Z86.79 V12.59   10. Chronic anticoagulation  Z79.01 V58.61   11. History of hyperlipidemia  Z86.39 V12.29     Patient Active Problem List   Diagnosis    Pneumonia    Acute pulmonary embolism without acute cor pulmonale    Chronic systolic CHF (congestive heart failure)    Coronary artery disease of native artery of native heart with stable angina pectoris    Essential hypertension     Past Medical History:   Diagnosis Date    Hypertension      Past Surgical History:   Procedure Laterality Date    KNEE SURGERY      RHINOPLASTY        General Information       Row Name 23 1530          Physical Therapy Time and Intention    Document Type discharge evaluation/summary  -STEVE     Mode of Treatment physical therapy  -STEVE       Row Name 23 1530          General Information    Patient Profile Reviewed yes  -STEVE     Prior Level of Function independent:;all household mobility;community mobility;using stairs  -STEVE     Existing Precautions/Restrictions no known precautions/restrictions  -STEVE     Barriers to Rehab environmental barriers  hx homelessness  -STEVE       Row Name 23 153          Living Environment    People in Home alone  pt currently living in hotel  -STEVE       Row Name 23 1530          Home Main Entrance    Number of Stairs, Main Entrance none  -STEVE       Row Name 23 1530           Stairs Within Home, Primary    Number of Stairs, Within Home, Primary none  -STEVE       Row Name 12/16/23 1530          Cognition    Orientation Status (Cognition) oriented x 4  -STEVE       Row Name 12/16/23 1530          Safety Issues, Functional Mobility    Impairments Affecting Function (Mobility) endurance/activity tolerance  -STEVE               User Key  (r) = Recorded By, (t) = Taken By, (c) = Cosigned By      Initials Name Provider Type    Ibis Falcon PT Physical Therapist                   Mobility       Row Name 12/16/23 1531          Bed Mobility    Bed Mobility rolling left;rolling right;scooting/bridging;supine-sit;sit-supine  -STEVE     Rolling Left [place] (Bed Mobility) independent  -STEVE     Rolling Right [place] (Bed Mobility) independent  -STEVE     Scooting/Bridging [place] (Bed Mobility) independent  -STEVE     Supine-Sit [place] (Bed Mobility) independent  -STEVE     Sit-Supine [place] (Bed Mobility) independent  -STEVE     Assistive Device (Bed Mobility) head of bed elevated  -STEVE     Comment, (Bed Mobility) No assist needed  -STEVE       Row Name 12/16/23 1531          Transfers    Comment, (Transfers) Pt completed mod I, has been using IV pole for support. Issued SPC.  -STEVE       Row Name 12/16/23 1531          Sit-Stand Transfer    Sit-Stand [place] (Transfers) modified independence  -STEVE       Row Name 12/16/23 1531          Gait/Stairs (Locomotion)    [place] Level (Gait) modified independence  -STEVE     Assistive Device (Gait) cane, straight  -STEVE     Patient was able to Ambulate yes  -STEVE     Distance in Feet (Gait) 50  -STEVE     Comment, (Gait/Stairs) No significant deviations. Pt up ad rajiv in room, using IV pole for support. Issued SPC and provided gait training with use of cane, pt demo and verbalize understanding.  -STEVE               User Key  (r) = Recorded By, (t) = Taken By, (c) = Cosigned By      Initials Name Provider Type    Ibis Falcon PT Physical  Therapist                   Obj/Interventions       Row Name 12/16/23 1533          Range of Motion Comprehensive    General Range of Motion bilateral lower extremity ROM WFL  -STEVE       Row Name 12/16/23 1533          Strength Comprehensive (MMT)    General Manual Muscle Testing (MMT) Assessment lower extremity strength deficits identified  -STEVE     Comment, General Manual Muscle Testing (MMT) Assessment LEs grossly 4+/5  -STEVE       Row Name 12/16/23 1533          Balance    Balance Assessment sitting static balance;sitting dynamic balance;sit to stand dynamic balance;standing static balance;standing dynamic balance  -STEVE     Static Sitting Balance independent  -STEVE     Dynamic Sitting Balance independent  -STEVE     Position, Sitting Balance unsupported;sitting edge of bed  -STEVE     Sit to Stand Dynamic Balance modified independence  -STEVE     Static Standing Balance independent  -STEVE     Dynamic Standing Balance independent  -STEVE     Position/Device Used, Standing Balance cane, straight;supported  -STEVE     Balance Interventions sitting;standing;sit to stand  -STEVE     Comment, Balance no LOB with use of cane/ IV pole  -STEVE       Row Name 12/16/23 1533          Sensory Assessment (Somatosensory)    Sensory Assessment (Somatosensory) LE sensation intact  -STEVE               User Key  (r) = Recorded By, (t) = Taken By, (c) = Cosigned By      Initials Name Provider Type    Ibis Falcon, PT Physical Therapist                   Goals/Plan    No documentation.                  Clinical Impression       Row Name 12/16/23 1534          Pain    Pretreatment Pain Rating 0/10 - no pain  -STEVE     Posttreatment Pain Rating 0/10 - no pain  -STEVE       Row Name 12/16/23 1534          Plan of Care Review    Plan of Care Reviewed With patient  -STEVE     Progress no change  -STEVE     Outcome Evaluation PT evaluation completed. Pt independent with transfers, issued SPC for extra support with gait. Pt able to ambulate mod I with use of cane. No further  skilled needs at this time. Recommend home upon DC.  -STEVE       Row Name 12/16/23 1534          Therapy Assessment/Plan (PT)    Patient/Family Therapy Goals Statement (PT) not stated  -STEVE     Criteria for Skilled Interventions Met (PT) no;no problems identified which require skilled intervention  -STEVE     Therapy Frequency (PT) evaluation only  -STEVE       Row Name 12/16/23 1534          Vital Signs    Pre Systolic BP Rehab --  Not recorded as patient was up in room when therapist entered.  -STEVE     O2 Delivery Pre Treatment room air  -STEVE     O2 Delivery Intra Treatment room air  -STEVE     O2 Delivery Post Treatment room air  -STEVE     Pre Patient Position Standing  -STEVE     Intra Patient Position Standing  -STEVE     Post Patient Position Supine  -STEVE       Row Name 12/16/23 1534          Positioning and Restraints    Pre-Treatment Position standing in room  -STEVE     Post Treatment Position bed  -STEVE     In Bed call light within reach  -STEVE               User Key  (r) = Recorded By, (t) = Taken By, (c) = Cosigned By      Initials Name Provider Type    Ibis Falcon, PT Physical Therapist                   Outcome Measures       Row Name 12/16/23 1536 12/16/23 0847       How much help from another person do you currently need...    Turning from your back to your side while in flat bed without using bedrails? 4  -STEVE 4  -CG    Moving from lying on back to sitting on the side of a flat bed without bedrails? 4  -STEVE 4  -CG    Moving to and from a bed to a chair (including a wheelchair)? 4  -STEVE 4  -CG    Standing up from a chair using your arms (e.g., wheelchair, bedside chair)? 4  -STEVE 4  -CG    Climbing 3-5 steps with a railing? 3  -STEVE 4  -CG    To walk in hospital room? 4  -STEVE 4  -CG    AM-PAC 6 Clicks Score (PT) 23  -STEVE 24  -CG    Highest Level of Mobility Goal 7 --> Walk 25 feet or more  -STEVE 8 --> Walked 250 feet or more  -CG      Row Name 12/16/23 1536          Functional Assessment    Outcome Measure Options AM-PAC 6 Clicks  Basic Mobility (PT)  -               User Key  (r) = Recorded By, (t) = Taken By, (c) = Cosigned By      Initials Name Provider Type     Toribio Ang, RN Registered Nurse    Ibis Falcon PT Physical Therapist                  Physical Therapy Education       Title: PT OT SLP Therapies (In Progress)       Topic: Physical Therapy (In Progress)       Point: Mobility training (Done)       Learning Progress Summary             Patient Acceptance, E, VU,DU by  at 12/16/2023 1536                         Point: Home exercise program (Not Started)       Learner Progress:  Not documented in this visit.              Point: Body mechanics (Done)       Learning Progress Summary             Patient Acceptance, E, VU,DU by STEVE at 12/16/2023 1536                         Point: Precautions (Done)       Learning Progress Summary             Patient Acceptance, E, VU,DU by  at 12/16/2023 1536                                         User Key       Initials Effective Dates Name Provider Type [facility] 06/16/21 -  Ibis Soto PT Physical Therapist PT                  PT Recommendation and Plan     Plan of Care Reviewed With: patient  Progress: no change  Outcome Evaluation: PT evaluation completed. Pt independent with transfers, issued SPC for extra support with gait. Pt able to ambulate mod I with use of cane. No further skilled needs at this time. Recommend home upon DC.     Time Calculation:   PT Evaluation Complexity  History, PT Evaluation Complexity: 1-2 personal factors and/or comorbidities  Examination of Body Systems (PT Eval Complexity): 1-2 elements  Clinical Presentation (PT Evaluation Complexity): stable  Clinical Decision Making (PT Evaluation Complexity): low complexity  Overall Complexity (PT Evaluation Complexity): low complexity     PT Charges       Row Name 12/16/23 1537             Time Calculation    Start Time 1350  -STEVE      PT Received On 12/16/23  -         Time Calculation- PT     Total Timed Code Minutes- PT 11 minute(s)  -STEVE         Timed Charges    41204 - Gait Training Minutes  11  -STEVE         Untimed Charges    PT Eval/Re-eval Minutes 46  -STEVE         Total Minutes    Timed Charges Total Minutes 11  -STEVE      Untimed Charges Total Minutes 46  -STEVE       Total Minutes 57  -STEVE                User Key  (r) = Recorded By, (t) = Taken By, (c) = Cosigned By      Initials Name Provider Type    Ibis Falcon, PT Physical Therapist                  Therapy Charges for Today       Code Description Service Date Service Provider Modifiers Qty    16973765689 HC GAIT TRAINING EA 15 MIN 12/16/2023 Ibis Soto, PT GP 1    44657359637 HC PT EVAL LOW COMPLEXITY 4 12/16/2023 Ibis Soto, PT GP 1            PT G-Codes  Outcome Measure Options: AM-PAC 6 Clicks Basic Mobility (PT)  AM-PAC 6 Clicks Score (PT): 23    PT Discharge Summary  Anticipated Discharge Disposition (PT): home    Ibis Soto PT  12/16/2023

## 2023-12-17 LAB
ANION GAP SERPL CALCULATED.3IONS-SCNC: 16 MMOL/L (ref 5–15)
BACTERIA SPEC RESP CULT: NORMAL
BASOPHILS # BLD AUTO: 0.1 10*3/MM3 (ref 0–0.2)
BASOPHILS NFR BLD AUTO: 0.8 % (ref 0–1.5)
BUN SERPL-MCNC: 21 MG/DL (ref 6–20)
BUN/CREAT SERPL: 23.1 (ref 7–25)
CALCIUM SPEC-SCNC: 9.7 MG/DL (ref 8.6–10.5)
CHLORIDE SERPL-SCNC: 102 MMOL/L (ref 98–107)
CO2 SERPL-SCNC: 21 MMOL/L (ref 22–29)
CREAT SERPL-MCNC: 0.91 MG/DL (ref 0.57–1)
DEPRECATED RDW RBC AUTO: 46.4 FL (ref 37–54)
EGFRCR SERPLBLD CKD-EPI 2021: 73.7 ML/MIN/1.73
EOSINOPHIL # BLD AUTO: 0.19 10*3/MM3 (ref 0–0.4)
EOSINOPHIL NFR BLD AUTO: 1.4 % (ref 0.3–6.2)
ERYTHROCYTE [DISTWIDTH] IN BLOOD BY AUTOMATED COUNT: 14.4 % (ref 12.3–15.4)
GLUCOSE SERPL-MCNC: 84 MG/DL (ref 65–99)
GRAM STN SPEC: NORMAL
HCT VFR BLD AUTO: 41.5 % (ref 34–46.6)
HGB BLD-MCNC: 12.9 G/DL (ref 12–15.9)
IMM GRANULOCYTES # BLD AUTO: 0.2 10*3/MM3 (ref 0–0.05)
IMM GRANULOCYTES NFR BLD AUTO: 1.5 % (ref 0–0.5)
LYMPHOCYTES # BLD AUTO: 2.04 10*3/MM3 (ref 0.7–3.1)
LYMPHOCYTES NFR BLD AUTO: 15.4 % (ref 19.6–45.3)
MCH RBC QN AUTO: 27.7 PG (ref 26.6–33)
MCHC RBC AUTO-ENTMCNC: 31.1 G/DL (ref 31.5–35.7)
MCV RBC AUTO: 89.2 FL (ref 79–97)
MONOCYTES # BLD AUTO: 0.61 10*3/MM3 (ref 0.1–0.9)
MONOCYTES NFR BLD AUTO: 4.6 % (ref 5–12)
NEUTROPHILS NFR BLD AUTO: 10.1 10*3/MM3 (ref 1.7–7)
NEUTROPHILS NFR BLD AUTO: 76.3 % (ref 42.7–76)
NRBC BLD AUTO-RTO: 0 /100 WBC (ref 0–0.2)
PLATELET # BLD AUTO: 950 10*3/MM3 (ref 140–450)
PMV BLD AUTO: 8.7 FL (ref 6–12)
POTASSIUM SERPL-SCNC: 5.1 MMOL/L (ref 3.5–5.2)
RBC # BLD AUTO: 4.65 10*6/MM3 (ref 3.77–5.28)
SODIUM SERPL-SCNC: 139 MMOL/L (ref 136–145)
VANCOMYCIN SERPL-MCNC: 12.5 MCG/ML (ref 5–40)
WBC NRBC COR # BLD AUTO: 13.24 10*3/MM3 (ref 3.4–10.8)

## 2023-12-17 PROCEDURE — G0378 HOSPITAL OBSERVATION PER HR: HCPCS

## 2023-12-17 PROCEDURE — 80048 BASIC METABOLIC PNL TOTAL CA: CPT | Performed by: PHYSICIAN ASSISTANT

## 2023-12-17 PROCEDURE — 63710000001 DIPHENHYDRAMINE PER 50 MG: Performed by: NURSE PRACTITIONER

## 2023-12-17 PROCEDURE — 94799 UNLISTED PULMONARY SVC/PX: CPT

## 2023-12-17 PROCEDURE — 80202 ASSAY OF VANCOMYCIN: CPT

## 2023-12-17 PROCEDURE — 25010000002 VANCOMYCIN PER 500 MG

## 2023-12-17 PROCEDURE — 85025 COMPLETE CBC W/AUTO DIFF WBC: CPT | Performed by: PHYSICIAN ASSISTANT

## 2023-12-17 PROCEDURE — 94761 N-INVAS EAR/PLS OXIMETRY MLT: CPT

## 2023-12-17 PROCEDURE — 99232 SBSQ HOSP IP/OBS MODERATE 35: CPT | Performed by: INTERNAL MEDICINE

## 2023-12-17 PROCEDURE — 25010000002 PIPERACILLIN SOD-TAZOBACTAM PER 1 G

## 2023-12-17 RX ORDER — DIPHENHYDRAMINE HCL 25 MG
25 CAPSULE ORAL NIGHTLY PRN
Status: DISCONTINUED | OUTPATIENT
Start: 2023-12-17 | End: 2023-12-18 | Stop reason: HOSPADM

## 2023-12-17 RX ADMIN — ACETAMINOPHEN 650 MG: 325 TABLET ORAL at 06:03

## 2023-12-17 RX ADMIN — APIXABAN 10 MG: 5 TABLET, FILM COATED ORAL at 08:24

## 2023-12-17 RX ADMIN — ISOSORBIDE MONONITRATE 30 MG: 30 TABLET, EXTENDED RELEASE ORAL at 08:17

## 2023-12-17 RX ADMIN — PIPERACILLIN SODIUM AND TAZOBACTAM SODIUM 3.38 G: 3; .375 INJECTION, SOLUTION INTRAVENOUS at 09:34

## 2023-12-17 RX ADMIN — VANCOMYCIN HYDROCHLORIDE 750 MG: 750 INJECTION, SOLUTION INTRAVENOUS at 08:18

## 2023-12-17 RX ADMIN — DIPHENHYDRAMINE HYDROCHLORIDE 25 MG: 25 CAPSULE ORAL at 21:23

## 2023-12-17 RX ADMIN — BUPROPION HYDROCHLORIDE 300 MG: 150 TABLET, EXTENDED RELEASE ORAL at 08:17

## 2023-12-17 RX ADMIN — Medication 10 ML: at 08:18

## 2023-12-17 RX ADMIN — VANCOMYCIN HYDROCHLORIDE 750 MG: 750 INJECTION, SOLUTION INTRAVENOUS at 20:35

## 2023-12-17 RX ADMIN — METOPROLOL SUCCINATE 75 MG: 50 TABLET, EXTENDED RELEASE ORAL at 08:17

## 2023-12-17 RX ADMIN — IPRATROPIUM BROMIDE AND ALBUTEROL SULFATE 3 ML: 2.5; .5 SOLUTION RESPIRATORY (INHALATION) at 19:28

## 2023-12-17 RX ADMIN — IPRATROPIUM BROMIDE AND ALBUTEROL SULFATE 3 ML: 2.5; .5 SOLUTION RESPIRATORY (INHALATION) at 14:33

## 2023-12-17 RX ADMIN — SPIRONOLACTONE 100 MG: 25 TABLET, FILM COATED ORAL at 08:17

## 2023-12-17 RX ADMIN — PANTOPRAZOLE SODIUM 40 MG: 40 TABLET, DELAYED RELEASE ORAL at 05:52

## 2023-12-17 RX ADMIN — IPRATROPIUM BROMIDE AND ALBUTEROL SULFATE 3 ML: 2.5; .5 SOLUTION RESPIRATORY (INHALATION) at 10:15

## 2023-12-17 RX ADMIN — EMPAGLIFLOZIN 10 MG: 10 TABLET, FILM COATED ORAL at 08:17

## 2023-12-17 RX ADMIN — PIPERACILLIN SODIUM AND TAZOBACTAM SODIUM 3.38 G: 3; .375 INJECTION, SOLUTION INTRAVENOUS at 16:33

## 2023-12-17 RX ADMIN — BUMETANIDE 1 MG: 1 TABLET ORAL at 08:17

## 2023-12-17 RX ADMIN — CLOPIDOGREL BISULFATE 75 MG: 75 TABLET ORAL at 08:17

## 2023-12-17 RX ADMIN — APIXABAN 10 MG: 5 TABLET, FILM COATED ORAL at 20:35

## 2023-12-17 NOTE — PROGRESS NOTES
[facility] Medicine Services  PROGRESS NOTE    Patient Name: Jill M Paladino  : 1965  MRN: [id number]    Date of Admission: 2023  Primary Care Physician: Sigrid Byers PA    Subjective   Subjective     CC:  F/u pneumonia, Right side chest pain    HPI:  Patient resting in bed. Feels about the same. Cough is better.    Objective   Objective     Vital Signs:   Temp:  [98.3 °F (36.8 °C)-98.6 °F (37 °C)] 98.6 °F (37 °C)  Heart Rate:  [] 80  Resp:  [14-18] 18  BP: (103-129)/(75-96) 103/75     Physical Exam:  Constitutional: No acute distress, awake, alert  HENT: NCAT, mucous membranes moist  Respiratory: clear bilaterally besides some ongoing crackles in RLL, respiratory effort normal   Cardiovascular: RRR, no murmurs, rubs, or gallops  Gastrointestinal: soft, nontender, nondistended  Musculoskeletal: No bilateral ankle edema  Psychiatric: Appropriate affect, cooperative  Neurologic: Oriented x 3, speech clear, no focal deficits  Skin: No rashes      Results Reviewed:  LAB RESULTS:      Lab 23  0604 23  0543 12/15/23  0601 12/15/23  0211 23  2321 23  193   WBC 13.24* 11.53* 11.35*  --   --  12.72*   HEMOGLOBIN 12.9 11.5* 11.6*  --   --  11.8*   HEMATOCRIT 41.5 36.1 36.3  --   --  37.9   PLATELETS 950* 785* 825*  --   --  849*   NEUTROS ABS 10.10* 8.60* 8.41*  --   --  9.49*   IMMATURE GRANS (ABS) 0.20* 0.20* 0.23*  --   --  0.41*   LYMPHS ABS 2.04 1.86 1.84  --   --  2.00   MONOS ABS 0.61 0.62 0.65  --   --  0.57   EOS ABS 0.19 0.17 0.15  --   --  0.19   MCV 89.2 88.3 88.3  --   --  92.2   PROCALCITONIN  --   --   --   --   --  0.04   LACTATE  --   --   --   --  1.9 2.7*   PROTIME  --   --   --  16.1*  --   --    APTT  --   --   --  33.1*  --   --    HEPARIN ANTI-XA  --   --   --  >1.10*  --   --          Lab 23  0604 23  0543 12/15/23  0601 23  1932   SODIUM 139 142 139 137   POTASSIUM 5.1 4.0 4.9 4.1   CHLORIDE 102 105 104  103   CO2 21.0* 24.0 23.0 21.0*   ANION GAP 16.0* 13.0 12.0 13.0   BUN 21* 17 12 14   CREATININE 0.91 0.74 0.62 0.70   EGFR 73.7 94.5 104.0 101.0   GLUCOSE 84 99 92 116*   CALCIUM 9.7 9.2 8.9 9.0   MAGNESIUM  --  2.0  --  1.8         Lab 12/14/23  1932   TOTAL PROTEIN 6.7   ALBUMIN 3.2*   GLOBULIN 3.5   ALT (SGPT) 42*   AST (SGOT) 19   BILIRUBIN 0.2   ALK PHOS 186*         Lab 12/15/23  0211 12/14/23  2321 12/14/23  1932   PROBNP  --   --  3,489.0*   HSTROP T  --  28* 28*   PROTIME 16.1*  --   --    INR 1.28*  --   --          Lab 12/15/23  0601   CHOLESTEROL 130   LDL CHOL 74   HDL CHOL 41   TRIGLYCERIDES 76             Brief Urine Lab Results  (Last result in the past 365 days)        Color   Clarity   Blood   Leuk Est   Nitrite   Protein   CREAT   Urine HCG        12/14/23 2044 Yellow   Clear   Trace   Small (1+)   Negative   Negative                   Microbiology Results Abnormal       Procedure Component Value - Date/Time    Blood Culture - Blood, Arm, Right [[id number]]  (Normal) Collected: 12/15/23 0104    Lab Status: Preliminary result Specimen: Blood from Arm, Right Updated: 12/17/23 0131     Blood Culture No growth at 2 days    Blood Culture - Blood, Arm, Right [[id number]]  (Normal) Collected: 12/15/23 0104    Lab Status: Preliminary result Specimen: Blood from Arm, Right Updated: 12/17/23 0131     Blood Culture No growth at 2 days    Respiratory Culture - Sputum, Cough [[id number]] Collected: 12/15/23 0242    Lab Status: Preliminary result Specimen: Sputum from Cough Updated: 12/16/23 1205     Gram Stain Rare (1+) Epithelial cells per low power field      Many (4+) WBCs per low power field      Few (2+) Gram positive cocci in pairs and chains    Narrative:      Organism under investigation.      S. Pneumo Ag Urine or CSF - Urine, Urine, Clean Catch [[id number]]  (Normal) Collected: 12/15/23 0445    Lab Status: Final result Specimen: Urine, Clean Catch Updated: 12/15/23 0958     Strep Pneumo Ag Negative     Legionella Antigen, Urine - Urine, Urine, Clean Catch [[id number]]  (Normal) Collected: 12/15/23 0445    Lab Status: Final result Specimen: Urine, Clean Catch Updated: 12/15/23 0958     LEGIONELLA ANTIGEN, URINE Negative    MRSA Screen, PCR (Inpatient) - Swab, Nares [[id number]]  (Normal) Collected: 12/15/23 0237    Lab Status: Final result Specimen: Swab from Nares Updated: 12/15/23 0728     MRSA PCR Negative    Narrative:      The negative predictive value of this diagnostic test is high and should only be used to consider de-escalating anti-MRSA therapy. A positive result may indicate colonization with MRSA and must be correlated clinically.  MRSA Negative    Respiratory Panel PCR w/COVID-19(SARS-CoV-2) [facility]/VONDA/VICTOR MANUEL/PAD/COR/SWAPNA In-House, NP Swab in UTM/VTM, 2 HR TAT - Swab, Nasopharynx [[id number]]  (Normal) Collected: 12/15/23 0237    Lab Status: Final result Specimen: Swab from Nasopharynx Updated: 12/15/23 0344     ADENOVIRUS, PCR Not Detected     Coronavirus 229E Not Detected     Coronavirus HKU1 Not Detected     Coronavirus NL63 Not Detected     Coronavirus OC43 Not Detected     COVID19 Not Detected     Human Metapneumovirus Not Detected     Human Rhinovirus/Enterovirus Not Detected     Influenza A PCR Not Detected     Influenza B PCR Not Detected     Parainfluenza Virus 1 Not Detected     Parainfluenza Virus 2 Not Detected     Parainfluenza Virus 3 Not Detected     Parainfluenza Virus 4 Not Detected     RSV, PCR Not Detected     Bordetella pertussis pcr Not Detected     Bordetella parapertussis PCR Not Detected     Chlamydophila pneumoniae PCR Not Detected     Mycoplasma pneumo by PCR Not Detected    Narrative:      In the setting of a positive respiratory panel with a viral infection PLUS a negative procalcitonin without other underlying concern for bacterial infection, consider observing off antibiotics or discontinuation of antibiotics and continue supportive care. If the respiratory panel is positive for  atypical bacterial infection (Bordetella pertussis, Chlamydophila pneumoniae, or Mycoplasma pneumoniae), consider antibiotic de-escalation to target atypical bacterial infection.            XR Chest 1 View    Result Date: 12/16/2023  XR CHEST 1 VW Date of Exam: 12/16/2023 10:43 AM EST Indication: f/u rll pneumonia Comparison: CTA chest and chest radiograph 12/14/2023 Findings: Cardiac size is similar to prior exam. Again seen is patchy opacity within the right lower lung, slightly more prominent from prior examination. Trace right pleural effusion. No pneumothorax. No evidence of acute osseous abnormalities. Visualized upper abdomen is unremarkable.     Impression: Impression: Right lower lung opacity appears slightly more conspicuous on today's examination and is consistent with pneumonia. Trace right pleural effusion. Electronically Signed: Kehinde Hart MD  12/16/2023 1:14 PM EST  Workstation ID: [id number]         Current medications:  Scheduled Meds:apixaban, 10 mg, Oral, Q12H   Followed by  [START ON 12/22/2023] apixaban, 5 mg, Oral, Q12H  atorvastatin, 80 mg, Oral, Nightly  bumetanide, 1 mg, Oral, Daily  buPROPion XL, 300 mg, Oral, Daily  clopidogrel, 75 mg, Oral, Daily  empagliflozin, 10 mg, Oral, Daily  ipratropium-albuterol, 3 mL, Nebulization, Q4H - RT  isosorbide mononitrate, 30 mg, Oral, Daily  metoprolol succinate XL, 75 mg, Oral, Daily  pantoprazole, 40 mg, Oral, Daily  piperacillin-tazobactam, 3.375 g, Intravenous, Q8H  senna-docusate sodium, 2 tablet, Oral, BID  sodium chloride, 10 mL, Intravenous, Q12H  spironolactone, 100 mg, Oral, Daily  vancomycin, 12.7 mg/kg, Intravenous, Q12H      Continuous Infusions:Pharmacy Consult - Pharmacy to dose,   Pharmacy Consult - Pharmacy to dose,       PRN Meds:.  acetaminophen    senna-docusate sodium **AND** polyethylene glycol **AND** bisacodyl **AND** bisacodyl    hydrOXYzine    influenza vaccine    ondansetron **OR** ondansetron    Pharmacy Consult -  Pharmacy to dose    Pharmacy Consult - Pharmacy to dose    [COMPLETED] Insert Peripheral IV **AND** sodium chloride    sodium chloride    sodium chloride    Assessment & Plan   Assessment & Plan     Active Hospital Problems    Diagnosis  POA    **Pneumonia [J18.9]  Yes    Acute pulmonary embolism without acute cor pulmonale [I26.99]  Yes    Chronic systolic CHF (congestive heart failure) [I50.22]  Yes    Coronary artery disease of native artery of native heart with stable angina pectoris [I25.118]  Yes    Essential hypertension [I10]  Yes      Resolved Hospital Problems   No resolved problems to display.        Brief Hospital Course to date:    Jill M Paladino is a 58 y.o. female with a history of homelessness, hypertension, CAD, hx DVT (on Eliquis) and systolic CHF, and anxiety/depression who presents to [facility] ED for complaint of shortness of breat after recent prolonged stay at [place].     RLL PE  -CTA reviewed  -Discharged on Eliquis on 12/14 -- increased to loading dose with new PE   - has supply of eliquis 5 mg BID at home -- will need new Rx for high dose on DC      RLL Pneumonia  - CTA chest pneumonia of the RLL  - Resp PCR negative; strep/legionella negative; MRSA negative  - Will continue vanc/zosyn for now as patient was just hospitalized for 16 days   - Mobilize. IS   - sputum culture still pending   - repeat CXR with what appears to be slight worsening, will monitor closely    Chronic HFrEF   - Echo [place] showed EF of 25%  - Continue bumex, jardiance, imdur, aldactone  - Daily weight. Strict I&Os  - has follow up 12/28 at [place] per patient      Severe multivessel CAD  HTN  HLD  -Initial HS troponin 28, flat  -Recent LHC at [place] showed extensive multivessel CAD. Reportedly was seen by vascular surgery there and was deemed not a candidate for surgical intervention, choosing to manage medically.   -Continue Plavix, eliquis   -Was prescribed Metoprolol 25mg, Imdur 30mg, and Bumex 1mg at  discharge from [place] . Continue.      Patient was evaluated by [facility]. She has active medicaid. Plan is discharge to [facility].     Expected Discharge Location and Transportation: Home  Expected Discharge   Expected Discharge Date: 12/18/2023; Expected Discharge Time:      DVT prophylaxis:  Medical and mechanical DVT prophylaxis orders are present.     AM-PAC 6 Clicks Score (PT): 23 (12/16/23 2000)    CODE STATUS:   Code Status and Medical Interventions:   Ordered at: 12/15/23 0206     Code Status (Patient has no pulse and is not breathing):    CPR (Attempt to Resuscitate)     Medical Interventions (Patient has pulse or is breathing):    Full Support       Leila Leroy, DO  12/17/23

## 2023-12-17 NOTE — PLAN OF CARE
Goal Outcome Evaluation:  Plan of Care Reviewed With: patient        Progress: improving  Outcome Evaluation: VSS on RA. A&O x4. No complaints of pain or nausea. Atarax ordered for anxiety. IV abx infusing. Up ad rajiv. Voiding without issue. Anticipate d/c later today. No other concerns at this time. POC ongoing.

## 2023-12-17 NOTE — PROGRESS NOTES
Pharmacy Consult-Vancomycin Dosing  Jill M Paladino is a  58 y.o. female receiving vancomycin therapy.     Indication: pneumonia  Consulting Provider: hospitalist  ID Consult: No  Goal AUC: 400 - 600 mg/L*hr    Current Antimicrobial Therapy  Anti-Infectives (From admission, onward)      Ordered     Dose/Rate Route Frequency Start Stop    12/15/23 0228  vancomycin in dextrose 5% 150 mL (VANCOCIN) IVPB 750 mg        Ordering Provider: Eliecer Nova, PharmD   See [facility] for full Linked Orders Report.    12.7 mg/kg × 59 kg  over 60 Minutes Intravenous Every 12 Hours Scheduled 12/15/23 1800 12/22/23 0859    12/15/23 0222  piperacillin-tazobactam (ZOSYN) 3.375 g in iso-osmotic dextrose 50 ml (premix)        Ordering Provider: Eliecer Nova PharmD    3.375 g  over 4 Hours Intravenous Every 8 Hours 12/15/23 0800 12/21/23 2359    12/15/23 0007  vancomycin 1250 mg/250 mL 0.9% NS IVPB ([facility])        Ordering Provider: Sadny Looney PA-C    20 mg/kg × 59 kg  over 75 Minutes Intravenous Once 12/15/23 0023 12/15/23 0320    12/15/23 0007  piperacillin-tazobactam (ZOSYN) 3.375 g in iso-osmotic dextrose 50 ml (premix)        Ordering Provider: Sandy Looney PA-C    3.375 g  over 30 Minutes Intravenous Once 12/15/23 0023 12/15/23 0135          Allergies  Allergies as of 12/14/2023 - Reviewed 12/14/2023   Allergen Reaction Noted    Other Nausea And Vomiting 10/30/2019    Percocet [oxycodone-acetaminophen] GI Intolerance 07/05/2019    Sulfa antibiotics Rash 10/30/2019     Labs  Results from last 7 days   Lab Units 12/17/23  0604 12/16/23  0543 12/15/23  0601   BUN mg/dL 21* 17 12   CREATININE mg/dL 0.91 0.74 0.62     Results from last 7 days   Lab Units 12/17/23  0604 12/16/23  0543 12/15/23  0601   WBC 10*3/mm3 13.24* 11.53* 11.35*     Evaluation of Dosing  Last Dose Received in the ED/Outside Facility:               Vancomycin 1250 mg IV 12/15 at 02:05  Is Patient on Dialysis or Renal Replacement:                "No    Ht - 165.1 cm (65\")  Wt - 57.9 kg (127 lb 9.6 oz)    Estimated Creatinine Clearance: 62.3 mL/min (by C-G formula based on SCr of 0.91 mg/dL).  Intake & Output (last 3 days)         12/14 0701  12/15 0700 12/15 0701  12/16 0700 12/16 0701  12/17 0700 12/17 0701  12/18 0700    P.O.   640     Total Intake(mL/kg)   640 (11.1)     Urine (mL/kg/hr) 1000 700 (0.5)      Stool  0      Total Output 1000 700      Net -1000 -700 +640             Urine Unmeasured Occurrence 1 x 6 x 7 x     Stool Unmeasured Occurrence  1 x 1 x           Microbiology and Radiology  Microbiology Results (last 10 days)       Procedure Component Value - Date/Time    Legionella Antigen, Urine - Urine, Urine, Clean Catch [[id number]]  (Normal) Collected: 12/15/23 0445    Lab Status: Final result Specimen: Urine, Clean Catch Updated: 12/15/23 0958     LEGIONELLA ANTIGEN, URINE Negative    S. Pneumo Ag Urine or CSF - Urine, Urine, Clean Catch [[id number]]  (Normal) Collected: 12/15/23 0445    Lab Status: Final result Specimen: Urine, Clean Catch Updated: 12/15/23 0958     Strep Pneumo Ag Negative    Respiratory Culture - Sputum, Cough [[id number]] Collected: 12/15/23 0242    Lab Status: Preliminary result Specimen: Sputum from Cough Updated: 12/16/23 1205     Gram Stain Rare (1+) Epithelial cells per low power field      Many (4+) WBCs per low power field      Few (2+) Gram positive cocci in pairs and chains    Narrative:      Organism under investigation.      Respiratory Panel PCR w/COVID-19(SARS-CoV-2) [facility]/VONDA/[facility]UEL/[facility]/COR/SWAPNA In-House, NP Swab in UTM/VTM, 2 HR TAT - Swab, Nasopharynx [[id number]]  (Normal) Collected: 12/15/23 0237    Lab Status: Final result Specimen: Swab from Nasopharynx Updated: 12/15/23 0344     ADENOVIRUS, PCR Not Detected     Coronavirus 229E Not Detected     Coronavirus HKU1 Not Detected     Coronavirus NL63 Not Detected     Coronavirus OC43 Not Detected     COVID19 Not Detected     Human Metapneumovirus Not Detected     " Human Rhinovirus/Enterovirus Not Detected     Influenza A PCR Not Detected     Influenza B PCR Not Detected     Parainfluenza Virus 1 Not Detected     Parainfluenza Virus 2 Not Detected     Parainfluenza Virus 3 Not Detected     Parainfluenza Virus 4 Not Detected     RSV, PCR Not Detected     Bordetella pertussis pcr Not Detected     Bordetella parapertussis PCR Not Detected     Chlamydophila pneumoniae PCR Not Detected     Mycoplasma pneumo by PCR Not Detected    Narrative:      In the setting of a positive respiratory panel with a viral infection PLUS a negative procalcitonin without other underlying concern for bacterial infection, consider observing off antibiotics or discontinuation of antibiotics and continue supportive care. If the respiratory panel is positive for atypical bacterial infection (Bordetella pertussis, Chlamydophila pneumoniae, or Mycoplasma pneumoniae), consider antibiotic de-escalation to target atypical bacterial infection.    MRSA Screen, PCR (Inpatient) - Swab, Nares [[id number]]  (Normal) Collected: 12/15/23 0237    Lab Status: Final result Specimen: Swab from Nares Updated: 12/15/23 0728     MRSA PCR Negative    Narrative:      The negative predictive value of this diagnostic test is high and should only be used to consider de-escalating anti-MRSA therapy. A positive result may indicate colonization with MRSA and must be correlated clinically.  MRSA Negative    Blood Culture - Blood, Arm, Right [[id number]]  (Normal) Collected: 12/15/23 0104    Lab Status: Preliminary result Specimen: Blood from Arm, Right Updated: 12/17/23 0131     Blood Culture No growth at 2 days    Blood Culture - Blood, Arm, Right [[id number]]  (Normal) Collected: 12/15/23 0104    Lab Status: Preliminary result Specimen: Blood from Arm, Right Updated: 12/17/23 0131     Blood Culture No growth at 2 days          Reported Vancomycin Levels    Results from last 7 days   Lab Units 12/17/23  0604   VANCOMYCIN RM mcg/mL 12.50        [employer] AUC Calculation:  Current AUC:   356 mg/L*hr    Predicted Steady State AUC on Current Dose: 512 mg/L*hr    Predicted Steady State AUC on New Dose:   445 mg/L*hr    Assessment/Plan:    1. Pharmacy to dose vancomycin for pneumonia.  Goal  to 600 mg/L*hr  2. Patient received a loading dose of vancomycin 1250 mg IV x1 on 12/15 at 0205.  3. Current maintenance dose of vancomycin 750 mg IV q12h (~ 13 mg/kg).         Vancomycin continued despite MRSA PCR negative due to recent prolonged hospitalization.  4. Vancomycin random level ~ 7.5 hrs post dose is 12.5mcg/mL, correlating with predicted AUCss 512 mg/L*hr.  5. Vancomycin currently ordered through 12/21.  No change to current dose        Patient  may be discharged later this afternoon.  UOP unmeasured, afebrile; wbc 13.24  6. Monitor renal function, cultures and sensitivities, and clinical status, and adjust regimen as necessary.  Pharmacy will continue to follow.      Alize Zapata, PharmD  12/17/2023  07:33 EST

## 2023-12-17 NOTE — PLAN OF CARE
Goal Outcome Evaluation:  Plan of Care Reviewed With: patient   VSS, room air, A&Ox4  Patient ambulating in [place] independently  IV abx infusing  Plan is hopeful d/c tomorrow pending labs  Plan of care discussed with the patient      Progress: no change

## 2023-12-18 ENCOUNTER — READMISSION MANAGEMENT (OUTPATIENT)
Dept: CALL CENTER | Facility: HOSPITAL | Age: 58
End: 2023-12-18
Payer: MEDICAID

## 2023-12-18 VITALS
HEART RATE: 84 BPM | SYSTOLIC BLOOD PRESSURE: 121 MMHG | RESPIRATION RATE: 18 BRPM | WEIGHT: 127.6 LBS | DIASTOLIC BLOOD PRESSURE: 88 MMHG | OXYGEN SATURATION: 95 % | TEMPERATURE: 97.6 F | HEIGHT: 65 IN | BODY MASS INDEX: 21.26 KG/M2

## 2023-12-18 PROBLEM — I50.22 CHRONIC HFREF (HEART FAILURE WITH REDUCED EJECTION FRACTION): Status: ACTIVE | Noted: 2023-12-18

## 2023-12-18 PROBLEM — J18.9 PNEUMONIA, UNSPECIFIED ORGANISM: Status: ACTIVE | Noted: 2023-12-18

## 2023-12-18 LAB
ANION GAP SERPL CALCULATED.3IONS-SCNC: 13 MMOL/L (ref 5–15)
BASOPHILS # BLD AUTO: 0.1 10*3/MM3 (ref 0–0.2)
BASOPHILS NFR BLD AUTO: 0.7 % (ref 0–1.5)
BUN SERPL-MCNC: 23 MG/DL (ref 6–20)
BUN/CREAT SERPL: 27.7 (ref 7–25)
CALCIUM SPEC-SCNC: 9.6 MG/DL (ref 8.6–10.5)
CHLORIDE SERPL-SCNC: 102 MMOL/L (ref 98–107)
CO2 SERPL-SCNC: 24 MMOL/L (ref 22–29)
CREAT SERPL-MCNC: 0.83 MG/DL (ref 0.57–1)
DEPRECATED RDW RBC AUTO: 45.8 FL (ref 37–54)
EGFRCR SERPLBLD CKD-EPI 2021: 82.3 ML/MIN/1.73
EOSINOPHIL # BLD AUTO: 0.28 10*3/MM3 (ref 0–0.4)
EOSINOPHIL NFR BLD AUTO: 2 % (ref 0.3–6.2)
ERYTHROCYTE [DISTWIDTH] IN BLOOD BY AUTOMATED COUNT: 14.3 % (ref 12.3–15.4)
GLUCOSE SERPL-MCNC: 92 MG/DL (ref 65–99)
HCT VFR BLD AUTO: 42.1 % (ref 34–46.6)
HGB BLD-MCNC: 13.3 G/DL (ref 12–15.9)
IMM GRANULOCYTES # BLD AUTO: 0.16 10*3/MM3 (ref 0–0.05)
IMM GRANULOCYTES NFR BLD AUTO: 1.2 % (ref 0–0.5)
LYMPHOCYTES # BLD AUTO: 2.19 10*3/MM3 (ref 0.7–3.1)
LYMPHOCYTES NFR BLD AUTO: 16 % (ref 19.6–45.3)
MCH RBC QN AUTO: 27.8 PG (ref 26.6–33)
MCHC RBC AUTO-ENTMCNC: 31.6 G/DL (ref 31.5–35.7)
MCV RBC AUTO: 87.9 FL (ref 79–97)
MONOCYTES # BLD AUTO: 0.68 10*3/MM3 (ref 0.1–0.9)
MONOCYTES NFR BLD AUTO: 5 % (ref 5–12)
NEUTROPHILS NFR BLD AUTO: 10.25 10*3/MM3 (ref 1.7–7)
NEUTROPHILS NFR BLD AUTO: 75.1 % (ref 42.7–76)
NRBC BLD AUTO-RTO: 0 /100 WBC (ref 0–0.2)
PLATELET # BLD AUTO: 875 10*3/MM3 (ref 140–450)
PMV BLD AUTO: 8.6 FL (ref 6–12)
POTASSIUM SERPL-SCNC: 4.7 MMOL/L (ref 3.5–5.2)
RBC # BLD AUTO: 4.79 10*6/MM3 (ref 3.77–5.28)
SODIUM SERPL-SCNC: 139 MMOL/L (ref 136–145)
WBC NRBC COR # BLD AUTO: 13.66 10*3/MM3 (ref 3.4–10.8)

## 2023-12-18 PROCEDURE — 25010000002 VANCOMYCIN PER 500 MG

## 2023-12-18 PROCEDURE — 25010000002 PIPERACILLIN SOD-TAZOBACTAM PER 1 G

## 2023-12-18 PROCEDURE — G0378 HOSPITAL OBSERVATION PER HR: HCPCS

## 2023-12-18 PROCEDURE — 99239 HOSP IP/OBS DSCHRG MGMT >30: CPT | Performed by: NURSE PRACTITIONER

## 2023-12-18 PROCEDURE — 80048 BASIC METABOLIC PNL TOTAL CA: CPT | Performed by: INTERNAL MEDICINE

## 2023-12-18 PROCEDURE — 85025 COMPLETE CBC W/AUTO DIFF WBC: CPT | Performed by: INTERNAL MEDICINE

## 2023-12-18 RX ORDER — BUPROPION HYDROCHLORIDE 150 MG/1
300 TABLET ORAL DAILY
Qty: 60 TABLET | Refills: 0 | Status: SHIPPED | OUTPATIENT
Start: 2023-12-18

## 2023-12-18 RX ORDER — DOXYCYCLINE HYCLATE 100 MG/1
100 CAPSULE ORAL 2 TIMES DAILY
Qty: 4 CAPSULE | Refills: 0 | Status: SHIPPED | OUTPATIENT
Start: 2023-12-18 | End: 2023-12-20

## 2023-12-18 RX ORDER — CEFDINIR 300 MG/1
300 CAPSULE ORAL 2 TIMES DAILY
Qty: 4 CAPSULE | Refills: 0 | Status: SHIPPED | OUTPATIENT
Start: 2023-12-18 | End: 2023-12-20

## 2023-12-18 RX ORDER — BUPROPION HYDROCHLORIDE 150 MG/1
300 TABLET ORAL DAILY
Qty: 60 TABLET | Refills: 0 | Status: SHIPPED | OUTPATIENT
Start: 2023-12-18 | End: 2023-12-18 | Stop reason: SDUPTHER

## 2023-12-18 RX ADMIN — BUMETANIDE 1 MG: 1 TABLET ORAL at 07:57

## 2023-12-18 RX ADMIN — SPIRONOLACTONE 100 MG: 25 TABLET, FILM COATED ORAL at 07:59

## 2023-12-18 RX ADMIN — METOPROLOL SUCCINATE 75 MG: 50 TABLET, EXTENDED RELEASE ORAL at 07:57

## 2023-12-18 RX ADMIN — VANCOMYCIN HYDROCHLORIDE 750 MG: 750 INJECTION, SOLUTION INTRAVENOUS at 09:21

## 2023-12-18 RX ADMIN — BUPROPION HYDROCHLORIDE 300 MG: 150 TABLET, EXTENDED RELEASE ORAL at 07:59

## 2023-12-18 RX ADMIN — EMPAGLIFLOZIN 10 MG: 10 TABLET, FILM COATED ORAL at 07:57

## 2023-12-18 RX ADMIN — PIPERACILLIN SODIUM AND TAZOBACTAM SODIUM 3.38 G: 3; .375 INJECTION, SOLUTION INTRAVENOUS at 00:09

## 2023-12-18 RX ADMIN — CLOPIDOGREL BISULFATE 75 MG: 75 TABLET ORAL at 07:58

## 2023-12-18 RX ADMIN — APIXABAN 10 MG: 5 TABLET, FILM COATED ORAL at 07:58

## 2023-12-18 RX ADMIN — PANTOPRAZOLE SODIUM 40 MG: 40 TABLET, DELAYED RELEASE ORAL at 05:22

## 2023-12-18 NOTE — DISCHARGE SUMMARY
[facility] Medicine Services  DISCHARGE SUMMARY    Patient Name: Jill M Paladino  : 1965  MRN: [id number]    Date of Admission: 2023  8:16 PM  Date of Discharge:  2023  Primary Care Physician: Sigrid Byers PA    Consults       No orders found from 11/15/2023 to 12/15/2023.            Hospital Course     Presenting Problem: SOA    Active Hospital Problems    Diagnosis  POA    **Pneumonia [J18.9]  Yes    Acute pulmonary embolism without acute cor pulmonale [I26.99]  Yes    Chronic systolic CHF (congestive heart failure) [I50.22]  Yes    Coronary artery disease of native artery of native heart with stable angina pectoris [I25.118]  Yes    Essential hypertension [I10]  Yes      Resolved Hospital Problems   No resolved problems to display.          Hospital Course:  Jill M Paladino is a 58 y.o. female  with a history of homelessness, hypertension, CAD, hx DVT (on Eliquis) and systolic CHF, and anxiety/depression who presents to [facility] ED for complaint of shortness of breat after recent prolonged stay at [place].     RLL PE  -CTA reviewed  -Discharged on Eliquis on  for new DVT-- increased to loading dose with new PE this admit, continue through   - has supply of eliquis 5 mg BID at home -- Rx for 10mg bid x 4 days at DC     RLL Pneumonia  - CTA chest pneumonia of the RLL  - Resp PCR negative; strep/legionella negative; MRSA negative  - received vanc/zosyn inpatient as patient was just hospitalized for 16 days - dc with omnicef/ doxy for remainder of therapy at [place]  - sputum culture still pending   - repeat CXR with what appears to be slight worsening - but improving and mobilizing well on RA     Chronic HFrEF   - Echo [place] showed EF of 25%  - Continue bumex, jardiance, imdur, aldactone per previous recs  - has follow up  at [place] per patient      Severe multivessel CAD  HTN  HLD  -Initial HS troponin 28, flat  -Recent LHC at [facility]  [place] showed extensive multivessel CAD. Reportedly was seen by vascular surgery there and was deemed not a candidate for surgical intervention, choosing to manage medically.   -Continue Plavix, eliquis   -Was prescribed Metoprolol 25mg, Imdur 30mg, and Bumex 1mg at discharge from [place] . Continue.      Patient was evaluated by [facility]. She has active medicaid. Plan is discharge to [facility] today. Meds sent to [facility] pharmacy for DC.      Discharge Follow Up Recommendations for outpatient labs/diagnostics:   PCP follow up one week  Continue Eliquis 10mg bid through 12/21/23 and resume home 5mg bid on 12/22/23    Day of Discharge     HPI:   Feeling well. No soa or pain. Wants to leave today    Review of Systems  Gen- No fevers, chills  CV- No chest pain, palpitations  Resp- No cough, dyspnea  GI- No N/V/D, abd pain      Vital Signs:   Temp:  [97.4 °F (36.3 °C)-98.5 °F (36.9 °C)] 97.6 °F (36.4 °C)  Heart Rate:  [] 85  Resp:  [16-18] 18  BP: (111-131)/(76-92) 121/88      Physical Exam:  Constitutional: No acute distress, awake, alert  HENT: NCAT, mucous membranes moist  Respiratory: Clear to auscultation bilaterally, respiratory effort normal   Cardiovascular: RRR, no murmurs, rubs, or gallops  Gastrointestinal: Positive bowel sounds, soft, nontender, nondistended  Musculoskeletal: No bilateral ankle edema  Psychiatric: Appropriate affect, cooperative  Neurologic: Oriented x 3, strength symmetric in all extremities, Cranial Nerves grossly intact to confrontation, speech clear  Skin: No rashes      Pertinent  and/or Most Recent Results     LAB RESULTS:      Lab 12/18/23  0503 12/17/23  0604 12/16/23  0543 12/15/23  0601 12/15/23  0211 12/14/23  2321 12/14/23  1932   WBC 13.66* 13.24* 11.53* 11.35*  --   --  12.72*   HEMOGLOBIN 13.3 12.9 11.5* 11.6*  --   --  11.8*   HEMATOCRIT 42.1 41.5 36.1 36.3  --   --  37.9   PLATELETS 875* 950* 785* 825*  --   --  849*   NEUTROS ABS 10.25* 10.10* 8.60* 8.41*  --   --   9.49*   IMMATURE GRANS (ABS) 0.16* 0.20* 0.20* 0.23*  --   --  0.41*   LYMPHS ABS 2.19 2.04 1.86 1.84  --   --  2.00   MONOS ABS 0.68 0.61 0.62 0.65  --   --  0.57   EOS ABS 0.28 0.19 0.17 0.15  --   --  0.19   MCV 87.9 89.2 88.3 88.3  --   --  92.2   PROCALCITONIN  --   --   --   --   --   --  0.04   LACTATE  --   --   --   --   --  1.9 2.7*   PROTIME  --   --   --   --  16.1*  --   --    APTT  --   --   --   --  33.1*  --   --    HEPARIN ANTI-XA  --   --   --   --  >1.10*  --   --          Lab 12/18/23  0503 12/17/23  0604 12/16/23  0543 12/15/23  0601 12/14/23 1932   SODIUM 139 139 142 139 137   POTASSIUM 4.7 5.1 4.0 4.9 4.1   CHLORIDE 102 102 105 104 103   CO2 24.0 21.0* 24.0 23.0 21.0*   ANION GAP 13.0 16.0* 13.0 12.0 13.0   BUN 23* 21* 17 12 14   CREATININE 0.83 0.91 0.74 0.62 0.70   EGFR 82.3 73.7 94.5 104.0 101.0   GLUCOSE 92 84 99 92 116*   CALCIUM 9.6 9.7 9.2 8.9 9.0   MAGNESIUM  --   --  2.0  --  1.8         Lab 12/14/23 1932   TOTAL PROTEIN 6.7   ALBUMIN 3.2*   GLOBULIN 3.5   ALT (SGPT) 42*   AST (SGOT) 19   BILIRUBIN 0.2   ALK PHOS 186*         Lab 12/15/23  0211 12/14/23  2321 12/14/23 1932   PROBNP  --   --  3,489.0*   HSTROP T  --  28* 28*   PROTIME 16.1*  --   --    INR 1.28*  --   --          Lab 12/15/23  0601   CHOLESTEROL 130   LDL CHOL 74   HDL CHOL 41   TRIGLYCERIDES 76             Brief Urine Lab Results  (Last result in the past 365 days)        Color   Clarity   Blood   Leuk Est   Nitrite   Protein   CREAT   Urine HCG        12/14/23 2044 Yellow   Clear   Trace   Small (1+)   Negative   Negative                 Microbiology Results (last 10 days)       Procedure Component Value - Date/Time    Legionella Antigen, Urine - Urine, Urine, Clean Catch [[id number]]  (Normal) Collected: 12/15/23 0445    Lab Status: Final result Specimen: Urine, Clean Catch Updated: 12/15/23 0958     LEGIONELLA ANTIGEN, URINE Negative    S. Pneumo Ag Urine or CSF - Urine, Urine, Clean Catch [[id number]]  (Normal)  Collected: 12/15/23 0445    Lab Status: Final result Specimen: Urine, Clean Catch Updated: 12/15/23 0958     Strep Pneumo Ag Negative    Respiratory Culture - Sputum, Cough [[id number]] Collected: 12/15/23 0242    Lab Status: Final result Specimen: Sputum from Cough Updated: 12/17/23 1029     Respiratory Culture Moderate growth (3+) Normal respiratory guera. No S. aureus or Pseudomonas aeruginosa detected. Final report.     Gram Stain Rare (1+) Epithelial cells per low power field      Many (4+) WBCs per low power field      Few (2+) Gram positive cocci in pairs and chains    Narrative:            Respiratory Panel PCR w/COVID-19(SARS-CoV-2) [facility]/VONDA/VICTOR MANUEL/PAD/COR/SWAPNA In-House, NP Swab in UTM/VTM, 2 HR TAT - Swab, Nasopharynx [[id number]]  (Normal) Collected: 12/15/23 0237    Lab Status: Final result Specimen: Swab from Nasopharynx Updated: 12/15/23 0344     ADENOVIRUS, PCR Not Detected     Coronavirus 229E Not Detected     Coronavirus HKU1 Not Detected     Coronavirus NL63 Not Detected     Coronavirus OC43 Not Detected     COVID19 Not Detected     Human Metapneumovirus Not Detected     Human Rhinovirus/Enterovirus Not Detected     Influenza A PCR Not Detected     Influenza B PCR Not Detected     Parainfluenza Virus 1 Not Detected     Parainfluenza Virus 2 Not Detected     Parainfluenza Virus 3 Not Detected     Parainfluenza Virus 4 Not Detected     RSV, PCR Not Detected     Bordetella pertussis pcr Not Detected     Bordetella parapertussis PCR Not Detected     Chlamydophila pneumoniae PCR Not Detected     Mycoplasma pneumo by PCR Not Detected    Narrative:      In the setting of a positive respiratory panel with a viral infection PLUS a negative procalcitonin without other underlying concern for bacterial infection, consider observing off antibiotics or discontinuation of antibiotics and continue supportive care. If the respiratory panel is positive for atypical bacterial infection (Bordetella pertussis, Chlamydophila  pneumoniae, or Mycoplasma pneumoniae), consider antibiotic de-escalation to target atypical bacterial infection.    MRSA Screen, PCR (Inpatient) - Swab, Nares [[id number]]  (Normal) Collected: 12/15/23 0237    Lab Status: Final result Specimen: Swab from Nares Updated: 12/15/23 0728     MRSA PCR Negative    Narrative:      The negative predictive value of this diagnostic test is high and should only be used to consider de-escalating anti-MRSA therapy. A positive result may indicate colonization with MRSA and must be correlated clinically.  MRSA Negative    Blood Culture - Blood, Arm, Right [[id number]]  (Normal) Collected: 12/15/23 0104    Lab Status: Preliminary result Specimen: Blood from Arm, Right Updated: 12/18/23 0130     Blood Culture No growth at 3 days    Blood Culture - Blood, Arm, Right [[id number]]  (Normal) Collected: 12/15/23 0104    Lab Status: Preliminary result Specimen: Blood from Arm, Right Updated: 12/18/23 0130     Blood Culture No growth at 3 days            XR Chest 1 View    Result Date: 12/16/2023  XR CHEST 1 VW Date of Exam: 12/16/2023 10:43 AM EST Indication: f/u rll pneumonia Comparison: CTA chest and chest radiograph 12/14/2023 Findings: Cardiac size is similar to prior exam. Again seen is patchy opacity within the right lower lung, slightly more prominent from prior examination. Trace right pleural effusion. No pneumothorax. No evidence of acute osseous abnormalities. Visualized upper abdomen is unremarkable.     Impression: Right lower lung opacity appears slightly more conspicuous on today's examination and is consistent with pneumonia. Trace right pleural effusion. Electronically Signed: Kehinde Hart MD  12/16/2023 1:14 PM EST  Workstation ID: [id number]    CT Angiogram Chest    Result Date: 12/14/2023  CT ANGIOGRAM CHEST Date of Exam: 12/14/2023 11:36 PM EST Indication: Shortness of breath, recent CHF exacerbation, new developing right lower lobe opacity. Comparison: None available.  Technique: CTA of the chest was performed after the uneventful intravenous administration of 80 mL Isovue-370. Reconstructed coronal and sagittal images were also obtained. In addition, a 3-D volume rendered image was created for interpretation. Automated exposure control and iterative reconstruction methods were used. Findings: There is right lower lobe airspace disease. There is a small to moderate size right-sided pleural effusion with overlying atelectasis. The left lung is clear with the exception of minor basilar atelectasis. Airways are patent. No suspicious lung nodules. The thyroid, trachea and esophagus appear within normal limits. The heart is mildly enlarged. There is aneurysmal dilatation of the ascending aorta up to 43 mm. No pericardial effusion. There is slight reflux of contrast into the hepatic veins suggesting  some degree of right heart dysfunction. There is mild coronary artery calcification. There is a solitary pulmonary embolism within the segmental artery to the posterior basal segment of the right lower lobe, which is best seen on axial image 62, sagittal image 41. There is no evidence of left-sided pulmonary embolism. No acute findings in the superficial soft tissues. No acute findings in the upper abdomen. No acute osseous abnormality or significant degenerative change.     Impression: 1.A solitary pulmonary embolism within the segmental artery to the posterior basal segment of the right lower lobe. 2.A small to moderate right-sided pleural effusion with overlying atelectasis. 3.Right lower lobe pneumonia. 4.Mild cardiomegaly and mild coronary artery calcification. 5.There is aneurysmal dilatation of the ascending aorta up to 43 mm. 6.There is slight reflux of contrast into the hepatic veins suggesting some degree of right heart dysfunction. Electronically Signed: Gerard Colby MD  12/14/2023 11:56 PM EST  Workstation ID: [id number]    XR Chest 1 View    Result Date: 12/14/2023  XR CHEST 1  VW Date of Exam: 12/14/2023 7:23 PM EST Indication: SOA Comparison: 10/30/2019 Findings: Lines: None Lungs: New focal opacity within the lateral aspect of the right lower lung. Otherwise the lungs are clear. Pleura: No pleural effusion or pneumothorax. Cardiomediastinum: The cardiomediastinal silhouette is normal Soft Tissues: Unremarkable. Bones: No acute osseous abnormality.     Impression: New small focal opacity within the periphery of the right lower lung may represent an infectious/inflammatory process. A discrete pulmonary lesion is not completely excluded. Please consider follow-up with nonemergent chest CT to exclude an underlying pulmonary lesion/mass Electronically Signed: Jun Mccarty DO  12/14/2023 7:50 PM EST  Workstation ID: [id number]    XR chest AP portable    Result Date: 12/8/2023  PORTABLE CHEST     HISTORY: Pulmonary edema or pneumonia. COMPARISON: One day prior. FINDINGS: The heart is stable in size.  The mediastinum is unremarkable . There are improving bibasilar opacities and pleural effusions. There is no pneumothorax.    Improving bibasilar opacities and pleural effusions. Images reviewed, interpreted, and dictated by Dr. Gauri Douglas. Transcribed by Omer Bain PA-C.                 Plan for Follow-up of Pending Labs/Results:   Pending Labs       Order Current Status    Blood Culture - Blood, Arm, Right Preliminary result    Blood Culture - Blood, Arm, Right Preliminary result          Discharge Details        Discharge Medications        New Medications        Instructions Start Date   cefdinir 300 MG capsule  Commonly known as: OMNICEF   300 mg, Oral, 2 Times Daily      doxycycline 100 MG capsule  Commonly known as: VIBRAMYCIN   100 mg, Oral, 2 Times Daily             Changes to Medications        Instructions Start Date   apixaban 5 MG tablet tablet  Commonly known as: ELIQUIS  What changed:   how much to take  how to take this  when to take this  additional instructions   Resume  "home 5mg bid 12/22/23 after loading dose completed      apixaban 5 MG tablet tablet  Commonly known as: CRISELDA  What changed: You were already taking a medication with the same name, and this prescription was added. Make sure you understand how and when to take each.   10 mg, Oral, 2 Times Daily             Continue These Medications        Instructions Start Date   ALPRAZolam 0.25 MG tablet  Commonly known as: XANAX   0.25 mg, Oral, 2 Times Daily PRN      atorvastatin 80 MG tablet  Commonly known as: LIPITOR   80 mg, Oral, Nightly      bumetanide 1 MG tablet  Commonly known as: BUMEX   1 mg, Oral, Daily      buPROPion  MG 24 hr tablet  Commonly known as: WELLBUTRIN XL   300 mg, Oral, Daily      clopidogrel 75 MG tablet  Commonly known as: PLAVIX   75 mg, Oral, Daily      dapagliflozin 5 MG tablet tablet  Commonly known as: FARXIGA   10 mg, Oral, Daily      isosorbide mononitrate 30 MG 24 hr tablet  Commonly known as: IMDUR   30 mg, Oral, Daily      metoprolol succinate XL 25 MG 24 hr tablet  Commonly known as: TOPROL-XL   75 mg, Oral, Daily      nitroglycerin 0.4 MG SL tablet  Commonly known as: NITROSTAT   0.4 mg, Sublingual, Every 5 Minutes PRN, Take no more than 3 doses in 15 minutes.      pantoprazole 40 MG EC tablet  Commonly known as: PROTONIX   40 mg, Oral, Daily      risperiDONE 0.5 MG tablet  Commonly known as: risperDAL   2.5 mg, Oral, 2 Times Daily      spironolactone 100 MG tablet  Commonly known as: ALDACTONE   100 mg, Oral, Daily               Allergies   Allergen Reactions    Other Nausea And Vomiting     Pt states \"all opiods make me throw up instantly\"    Percocet [Oxycodone-Acetaminophen] GI Intolerance    Sulfa Antibiotics Rash         Discharge Disposition:  Home or Self Care    Diet:  Hospital:  Diet Order   Procedures    Diet: Cardiac Diets; Healthy Heart (2-3 Na+); Texture: Regular Texture (IDDSI 7); Fluid Consistency: Thin (IDDSI 0)       Diet Instructions       Diet: Regular/House " Diet, Cardiac Diets; Healthy Heart (2-3 Na+); Thin (IDDSI 0)      Discharge Diet:  Regular/House Diet  Cardiac Diets       Cardiac Diet: Healthy Heart (2-3 Na+)    Fluid Consistency: Thin (IDDSI 0)             Activity:  Activity Instructions       Activity as Tolerated              Restrictions or Other Recommendations:         CODE STATUS:    Code Status and Medical Interventions:   Ordered at: 12/15/23 0206     Code Status (Patient has no pulse and is not breathing):    CPR (Attempt to Resuscitate)     Medical Interventions (Patient has pulse or is breathing):    Full Support       No future appointments.    Additional Instructions for the Follow-ups that You Need to Schedule       Discharge Follow-up with PCP   As directed       Currently Documented PCP:    Sigrid Byers PA    PCP Phone Number:    [phone]     Follow Up Details: one week                      KAYLA Chong  12/18/23      Time Spent on Discharge:  I spent  35  minutes on this discharge activity which included: face-to-face encounter with the patient, reviewing the data in the system, coordination of the care with the nursing staff as well as consultants, documentation, and entering orders.        Electronically signed by KAYLA Chong, 12/18/23, 9:40 AM EST.

## 2023-12-18 NOTE — PLAN OF CARE
Goal Outcome Evaluation:  Plan of Care Reviewed With: patient        Progress: no change  Outcome Evaluation: VSS on RA. No complaints of pain or nausea. IV abx infusing. Voiding without issue. Up ad rajiv. D/c pending labs. No other concerns at this time. POC ongoing.

## 2023-12-18 NOTE — CASE MANAGEMENT/SOCIAL WORK
Case Management Discharge Note      Final Note: Plan is back to [facility]. Friend will transport. Patient denies any discharge needs.         Selected Continued Care - Admitted Since 12/14/2023       Destination    No services have been selected for the patient.                Durable Medical Equipment    No services have been selected for the patient.                Dialysis/Infusion    No services have been selected for the patient.                Home Medical Care    No services have been selected for the patient.                Therapy    No services have been selected for the patient.                Community Resources    No services have been selected for the patient.                Community & DME    No services have been selected for the patient.                         Final Discharge Disposition Code: 01 - home or self-care

## 2023-12-19 NOTE — OUTREACH NOTE
Prep Survey      Flowsheet Row Responses   [facility] facility patient discharged from? [place]   Is LACE score < 7 ? No   Eligibility Readm Mgmt   Discharge diagnosis PNA, acute pulmonary embolism   Does the patient have one of the following disease processes/diagnoses(primary or secondary)? Pneumonia   Does the patient have Home health ordered? No   Is there a DME ordered? No   Prep survey completed? Yes            Dominique SYKES - Registered Nurse

## 2023-12-20 LAB
BACTERIA SPEC AEROBE CULT: NORMAL
BACTERIA SPEC AEROBE CULT: NORMAL

## 2023-12-21 ENCOUNTER — READMISSION MANAGEMENT (OUTPATIENT)
Dept: CALL CENTER | Facility: HOSPITAL | Age: 58
End: 2023-12-21
Payer: MEDICAID

## 2023-12-21 NOTE — OUTREACH NOTE
COPD/PN Week 1 Survey      Flowsheet Row Responses   [facility] facility patient discharged from? [place]   Does the patient have one of the following disease processes/diagnoses(primary or secondary)? Pneumonia   Week 1 attempt successful? No   Unsuccessful attempts Attempt 1            Pebbles MAYER - Registered Nurse

## 2023-12-26 ENCOUNTER — READMISSION MANAGEMENT (OUTPATIENT)
Dept: CALL CENTER | Facility: HOSPITAL | Age: 58
End: 2023-12-26
Payer: MEDICAID

## 2023-12-27 NOTE — OUTREACH NOTE
COPD/PN Week 1 Survey      Flowsheet Row Responses   [facility] facility patient discharged from? [place]   Does the patient have one of the following disease processes/diagnoses(primary or secondary)? Pneumonia   Week 1 attempt successful? No   Unsuccessful attempts Attempt 2            Christal WHEELER - Licensed Nurse

## 2024-01-05 ENCOUNTER — READMISSION MANAGEMENT (OUTPATIENT)
Dept: CALL CENTER | Facility: HOSPITAL | Age: 59
End: 2024-01-05
Payer: MEDICAID

## 2024-01-05 NOTE — OUTREACH NOTE
"COPD/PN Week 3 Survey      Flowsheet Row Responses   [facility] facility patient discharged from? [place]   Does the patient have one of the following disease processes/diagnoses(primary or secondary)? Pneumonia   Week 3 attempt successful? No  [Reached friend, Mariza, who has not seen patient \"in a few days\", but will have her call if she has any questions/concerns.]   Unsuccessful attempts Attempt 1   Revoke Decline to participate            Herlinda POPE - Registered Nurse  "

## 2024-01-26 ENCOUNTER — HOSPITAL ENCOUNTER (EMERGENCY)
Facility: HOSPITAL | Age: 59
Discharge: HOME | End: 2024-01-26
Attending: EMERGENCY MEDICINE
Payer: MEDICAID

## 2024-01-26 ENCOUNTER — APPOINTMENT (OUTPATIENT)
Dept: CARDIOLOGY | Facility: HOSPITAL | Age: 59
End: 2024-01-26
Payer: MEDICAID

## 2024-01-26 VITALS
SYSTOLIC BLOOD PRESSURE: 125 MMHG | DIASTOLIC BLOOD PRESSURE: 87 MMHG | RESPIRATION RATE: 16 BRPM | TEMPERATURE: 98.7 F | OXYGEN SATURATION: 99 % | BODY MASS INDEX: 21.66 KG/M2 | HEART RATE: 98 BPM | HEIGHT: 65 IN | WEIGHT: 130 LBS

## 2024-01-26 DIAGNOSIS — M79.604 BILATERAL LEG PAIN: Primary | ICD-10-CM

## 2024-01-26 DIAGNOSIS — R53.83 OTHER FATIGUE: ICD-10-CM

## 2024-01-26 DIAGNOSIS — M79.605 BILATERAL LEG PAIN: Primary | ICD-10-CM

## 2024-01-26 LAB
ANION GAP SERPL CALCULATED.3IONS-SCNC: 12 MMOL/L (ref 5–15)
BASOPHILS # BLD AUTO: 0.06 10*3/MM3 (ref 0–0.2)
BASOPHILS NFR BLD AUTO: 0.6 % (ref 0–1.5)
BH CV LOWER VASCULAR LEFT COMMON FEMORAL AUGMENT: NORMAL
BH CV LOWER VASCULAR LEFT COMMON FEMORAL COMPRESS: NORMAL
BH CV LOWER VASCULAR LEFT COMMON FEMORAL PHASIC: NORMAL
BH CV LOWER VASCULAR LEFT COMMON FEMORAL SPONT: NORMAL
BH CV LOWER VASCULAR LEFT DISTAL FEMORAL COMPRESS: NORMAL
BH CV LOWER VASCULAR LEFT GASTRONEMIUS COMPRESS: NORMAL
BH CV LOWER VASCULAR LEFT GREATER SAPH AK COMPRESS: NORMAL
BH CV LOWER VASCULAR LEFT GREATER SAPH BK COMPRESS: NORMAL
BH CV LOWER VASCULAR LEFT LESSER SAPH COMPRESS: NORMAL
BH CV LOWER VASCULAR LEFT MID FEMORAL AUGMENT: NORMAL
BH CV LOWER VASCULAR LEFT MID FEMORAL COMPRESS: NORMAL
BH CV LOWER VASCULAR LEFT MID FEMORAL PHASIC: NORMAL
BH CV LOWER VASCULAR LEFT MID FEMORAL SPONT: NORMAL
BH CV LOWER VASCULAR LEFT PERONEAL COMPRESS: NORMAL
BH CV LOWER VASCULAR LEFT POPLITEAL AUGMENT: NORMAL
BH CV LOWER VASCULAR LEFT POPLITEAL COMPRESS: NORMAL
BH CV LOWER VASCULAR LEFT POPLITEAL PHASIC: NORMAL
BH CV LOWER VASCULAR LEFT POPLITEAL SPONT: NORMAL
BH CV LOWER VASCULAR LEFT POSTERIOR TIBIAL COMPRESS: NORMAL
BH CV LOWER VASCULAR LEFT PROFUNDA FEMORAL COMPRESS: NORMAL
BH CV LOWER VASCULAR LEFT PROXIMAL FEMORAL COMPRESS: NORMAL
BH CV LOWER VASCULAR LEFT SAPHENOFEMORAL JUNCTION COMPRESS: NORMAL
BH CV LOWER VASCULAR RIGHT COMMON FEMORAL AUGMENT: NORMAL
BH CV LOWER VASCULAR RIGHT COMMON FEMORAL COMPRESS: NORMAL
BH CV LOWER VASCULAR RIGHT COMMON FEMORAL PHASIC: NORMAL
BH CV LOWER VASCULAR RIGHT COMMON FEMORAL SPONT: NORMAL
BH CV LOWER VASCULAR RIGHT DISTAL FEMORAL COMPRESS: NORMAL
BH CV LOWER VASCULAR RIGHT GASTRONEMIUS COMPRESS: NORMAL
BH CV LOWER VASCULAR RIGHT GREATER SAPH AK COMPRESS: NORMAL
BH CV LOWER VASCULAR RIGHT GREATER SAPH BK COMPRESS: NORMAL
BH CV LOWER VASCULAR RIGHT LESSER SAPH COMPRESS: NORMAL
BH CV LOWER VASCULAR RIGHT MID FEMORAL AUGMENT: NORMAL
BH CV LOWER VASCULAR RIGHT MID FEMORAL COMPRESS: NORMAL
BH CV LOWER VASCULAR RIGHT MID FEMORAL PHASIC: NORMAL
BH CV LOWER VASCULAR RIGHT MID FEMORAL SPONT: NORMAL
BH CV LOWER VASCULAR RIGHT PERONEAL COMPRESS: NORMAL
BH CV LOWER VASCULAR RIGHT POPLITEAL AUGMENT: NORMAL
BH CV LOWER VASCULAR RIGHT POPLITEAL COMPRESS: NORMAL
BH CV LOWER VASCULAR RIGHT POPLITEAL PHASIC: NORMAL
BH CV LOWER VASCULAR RIGHT POPLITEAL SPONT: NORMAL
BH CV LOWER VASCULAR RIGHT POSTERIOR TIBIAL COMPRESS: NORMAL
BH CV LOWER VASCULAR RIGHT PROFUNDA FEMORAL COMPRESS: NORMAL
BH CV LOWER VASCULAR RIGHT PROXIMAL FEMORAL COMPRESS: NORMAL
BH CV LOWER VASCULAR RIGHT SAPHENOFEMORAL JUNCTION COMPRESS: NORMAL
BH CV VAS PRELIMINARY FINDINGS SCRIPTING: 1
BUN SERPL-MCNC: 16 MG/DL (ref 6–20)
BUN/CREAT SERPL: 27.1 (ref 7–25)
CALCIUM SPEC-SCNC: 9 MG/DL (ref 8.6–10.5)
CHLORIDE SERPL-SCNC: 99 MMOL/L (ref 98–107)
CK SERPL-CCNC: 62 U/L (ref 20–180)
CO2 SERPL-SCNC: 24 MMOL/L (ref 22–29)
CREAT SERPL-MCNC: 0.59 MG/DL (ref 0.57–1)
DEPRECATED RDW RBC AUTO: 48.1 FL (ref 37–54)
EGFRCR SERPLBLD CKD-EPI 2021: 105.3 ML/MIN/1.73
EOSINOPHIL # BLD AUTO: 0.11 10*3/MM3 (ref 0–0.4)
EOSINOPHIL NFR BLD AUTO: 1.2 % (ref 0.3–6.2)
ERYTHROCYTE [DISTWIDTH] IN BLOOD BY AUTOMATED COUNT: 14.9 % (ref 12.3–15.4)
GLUCOSE SERPL-MCNC: 113 MG/DL (ref 65–99)
HCT VFR BLD AUTO: 38.8 % (ref 34–46.6)
HGB BLD-MCNC: 12.3 G/DL (ref 12–15.9)
IMM GRANULOCYTES # BLD AUTO: 0.03 10*3/MM3 (ref 0–0.05)
IMM GRANULOCYTES NFR BLD AUTO: 0.3 % (ref 0–0.5)
LYMPHOCYTES # BLD AUTO: 0.63 10*3/MM3 (ref 0.7–3.1)
LYMPHOCYTES NFR BLD AUTO: 6.6 % (ref 19.6–45.3)
MAGNESIUM SERPL-MCNC: 1.8 MG/DL (ref 1.6–2.6)
MCH RBC QN AUTO: 28 PG (ref 26.6–33)
MCHC RBC AUTO-ENTMCNC: 31.7 G/DL (ref 31.5–35.7)
MCV RBC AUTO: 88.2 FL (ref 79–97)
MONOCYTES # BLD AUTO: 0.55 10*3/MM3 (ref 0.1–0.9)
MONOCYTES NFR BLD AUTO: 5.8 % (ref 5–12)
MYOGLOBIN SERPL-MCNC: 22.2 NG/ML (ref 25–58)
NEUTROPHILS NFR BLD AUTO: 8.16 10*3/MM3 (ref 1.7–7)
NEUTROPHILS NFR BLD AUTO: 85.5 % (ref 42.7–76)
NRBC BLD AUTO-RTO: 0 /100 WBC (ref 0–0.2)
PLATELET # BLD AUTO: 316 10*3/MM3 (ref 140–450)
PMV BLD AUTO: 10.2 FL (ref 6–12)
POTASSIUM SERPL-SCNC: 3.9 MMOL/L (ref 3.5–5.2)
RBC # BLD AUTO: 4.4 10*6/MM3 (ref 3.77–5.28)
SODIUM SERPL-SCNC: 135 MMOL/L (ref 136–145)
WBC NRBC COR # BLD AUTO: 9.54 10*3/MM3 (ref 3.4–10.8)

## 2024-01-26 PROCEDURE — 80048 BASIC METABOLIC PNL TOTAL CA: CPT | Performed by: EMERGENCY MEDICINE

## 2024-01-26 PROCEDURE — 82550 ASSAY OF CK (CPK): CPT | Performed by: EMERGENCY MEDICINE

## 2024-01-26 PROCEDURE — 83735 ASSAY OF MAGNESIUM: CPT | Performed by: EMERGENCY MEDICINE

## 2024-01-26 PROCEDURE — 99284 EMERGENCY DEPT VISIT MOD MDM: CPT

## 2024-01-26 PROCEDURE — 83874 ASSAY OF MYOGLOBIN: CPT | Performed by: EMERGENCY MEDICINE

## 2024-01-26 PROCEDURE — 85025 COMPLETE CBC W/AUTO DIFF WBC: CPT | Performed by: EMERGENCY MEDICINE

## 2024-01-26 PROCEDURE — 93970 EXTREMITY STUDY: CPT

## 2024-01-26 PROCEDURE — 36415 COLL VENOUS BLD VENIPUNCTURE: CPT

## 2024-01-26 PROCEDURE — 93970 EXTREMITY STUDY: CPT | Performed by: INTERNAL MEDICINE

## 2024-01-26 NOTE — ED PROVIDER NOTES
"Subjective   History of Present Illness    Pt presents with bilateral calf pain since yesterday.  She says she has history of R side DVT and left side Baker cyst and is on Eliquis 10 mg BID for it.  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  She denies chest pain, dyspnea or fever.    History provided by:  Patient      Review of Systems   Constitutional:  Negative for fever.   Respiratory:  Negative for shortness of breath.    Cardiovascular:  Negative for chest pain and leg swelling.   All other systems reviewed and are negative.      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    Other Nausea And Vomiting     Pt states \"all opiods make me throw up instantly\"    Percocet [Oxycodone-Acetaminophen] GI Intolerance    Sulfa Antibiotics Rash       Past Surgical History:   Procedure Laterality Date    KNEE SURGERY      RHINOPLASTY         Family History   Problem Relation Age of Onset    No Known Problems Mother     No Known Problems Father        Social History     Socioeconomic History    Marital status:    Tobacco Use    Smoking status: Never    Smokeless tobacco: Never   Vaping Use    Vaping Use: Never used   Substance and Sexual Activity    Alcohol use: No    Drug use: No    Sexual activity: Defer           Objective   Physical Exam  Vitals and nursing note reviewed.   Constitutional:       General: She is not in acute distress.     Appearance: Normal appearance. She is not ill-appearing.   HENT:      Head: Normocephalic and atraumatic.   Eyes:      General: No scleral icterus.        Right eye: No discharge.         Left eye: No discharge.      Conjunctiva/sclera: " Conjunctivae normal.   Cardiovascular:      Rate and Rhythm: Normal rate and regular rhythm.   Pulmonary:      Effort: Pulmonary effort is normal. No respiratory distress.   Musculoskeletal:         General: No swelling or deformity.      Cervical back: Normal range of motion and neck supple.      Comments: Bilateral calf tenderness without swelling or palpable cords.  Both feet pink, warm, dry with 2+ PT/DP pulses and normal sensation and ROM.  No open wounds.   Skin:     General: Skin is dry.      Findings: No rash.   Neurological:      General: No focal deficit present.      Mental Status: She is alert and oriented to person, place, and time. Mental status is at baseline.   Psychiatric:         Mood and Affect: Mood normal.         Behavior: Behavior normal.         Thought Content: Thought content normal.         Procedures           ED Course    I reviewed multiple records.   ED visit 2/3/23 dx nonocclusive L pos tib DVT.  ED visit [facility] 12/14/3 with RLL PE. She was admitted that visit and discharged 12/18/23 with instructions to resume Eliquis.  There is a discharge summary 12/14/23 from [facility] with dx LV mural thrombus and CHF and she had C with extensive disease but CT surgery deemed her not a surgical candidate.  That chart also describes some delirium and psych eval for possible guardianship but she was determined to have capacity.     Duplex negative. Labs benign.    Patient stable on serial rechecks.  Discussed findings, concerns, plan of care, expected course, reasons to return and followup.  Provided the opportunity to ask questions.    Discussed proper dose of Eliquis.                                         Medical Decision Making  Problems Addressed:  Bilateral leg pain: complicated acute illness or injury  Other fatigue: complicated acute illness or injury    Amount and/or Complexity of Data Reviewed  Labs: ordered. Decision-making details documented in ED Course.  Radiology: ordered.  Decision-making details documented in ED Course.     Details: Duplex negative    Risk  Prescription drug management.        Final diagnoses:   Bilateral leg pain   Other fatigue       ED Disposition  ED Disposition       ED Disposition   Discharge    Condition   Stable    Comment   --               Sigrid Byesr PA  [address]  [place] [address]  [phone]    In 1 week           Medication List      No changes were made to your prescriptions during this visit.            Juan Carlos Canales MD  01/26/24 1957

## 2024-03-08 ENCOUNTER — APPOINTMENT (OUTPATIENT)
Dept: GENERAL RADIOLOGY | Facility: HOSPITAL | Age: 59
End: 2024-03-08
Payer: MEDICAID

## 2024-03-08 ENCOUNTER — HOSPITAL ENCOUNTER (OUTPATIENT)
Facility: HOSPITAL | Age: 59
Setting detail: OBSERVATION
Discharge: HOME | End: 2024-03-11
Attending: EMERGENCY MEDICINE | Admitting: FAMILY MEDICINE
Payer: MEDICAID

## 2024-03-08 ENCOUNTER — APPOINTMENT (OUTPATIENT)
Dept: CT IMAGING | Facility: HOSPITAL | Age: 59
End: 2024-03-08
Payer: MEDICAID

## 2024-03-08 DIAGNOSIS — I50.9 ACUTE ON CHRONIC CONGESTIVE HEART FAILURE, UNSPECIFIED HEART FAILURE TYPE: ICD-10-CM

## 2024-03-08 DIAGNOSIS — Z87.01 HISTORY OF PNEUMONIA: ICD-10-CM

## 2024-03-08 DIAGNOSIS — Z91.148 H/O MEDICATION NONCOMPLIANCE: ICD-10-CM

## 2024-03-08 DIAGNOSIS — Z86.711 HISTORY OF PULMONARY EMBOLISM: ICD-10-CM

## 2024-03-08 DIAGNOSIS — R06.02 SHORTNESS OF BREATH: Primary | ICD-10-CM

## 2024-03-08 LAB
ALBUMIN SERPL-MCNC: 3.9 G/DL (ref 3.5–5.2)
ALBUMIN/GLOB SERPL: 1.3 G/DL
ALP SERPL-CCNC: 126 U/L (ref 39–117)
ALT SERPL W P-5'-P-CCNC: 70 U/L (ref 1–33)
ANION GAP SERPL CALCULATED.3IONS-SCNC: 10 MMOL/L (ref 5–15)
AST SERPL-CCNC: 44 U/L (ref 1–32)
B PARAPERT DNA SPEC QL NAA+PROBE: NOT DETECTED
B PERT DNA SPEC QL NAA+PROBE: NOT DETECTED
BASOPHILS # BLD AUTO: 0.07 10*3/MM3 (ref 0–0.2)
BASOPHILS NFR BLD AUTO: 0.8 % (ref 0–1.5)
BILIRUB SERPL-MCNC: 0.7 MG/DL (ref 0–1.2)
BUN SERPL-MCNC: 26 MG/DL (ref 6–20)
BUN/CREAT SERPL: 31.3 (ref 7–25)
C PNEUM DNA NPH QL NAA+NON-PROBE: NOT DETECTED
CALCIUM SPEC-SCNC: 9.2 MG/DL (ref 8.6–10.5)
CHLORIDE SERPL-SCNC: 109 MMOL/L (ref 98–107)
CO2 SERPL-SCNC: 25 MMOL/L (ref 22–29)
CREAT SERPL-MCNC: 0.83 MG/DL (ref 0.57–1)
D DIMER PPP FEU-MCNC: 1.13 MCGFEU/ML (ref 0–0.57)
DEPRECATED RDW RBC AUTO: 56.9 FL (ref 37–54)
EGFRCR SERPLBLD CKD-EPI 2021: 82.3 ML/MIN/1.73
EOSINOPHIL # BLD AUTO: 0.23 10*3/MM3 (ref 0–0.4)
EOSINOPHIL NFR BLD AUTO: 2.6 % (ref 0.3–6.2)
ERYTHROCYTE [DISTWIDTH] IN BLOOD BY AUTOMATED COUNT: 16.9 % (ref 12.3–15.4)
FLUAV SUBTYP SPEC NAA+PROBE: NOT DETECTED
FLUBV RNA ISLT QL NAA+PROBE: NOT DETECTED
GLOBULIN UR ELPH-MCNC: 3 GM/DL
GLUCOSE SERPL-MCNC: 90 MG/DL (ref 65–99)
HADV DNA SPEC NAA+PROBE: NOT DETECTED
HCOV 229E RNA SPEC QL NAA+PROBE: NOT DETECTED
HCOV HKU1 RNA SPEC QL NAA+PROBE: NOT DETECTED
HCOV NL63 RNA SPEC QL NAA+PROBE: NOT DETECTED
HCOV OC43 RNA SPEC QL NAA+PROBE: NOT DETECTED
HCT VFR BLD AUTO: 40.4 % (ref 34–46.6)
HGB BLD-MCNC: 12.4 G/DL (ref 12–15.9)
HMPV RNA NPH QL NAA+NON-PROBE: NOT DETECTED
HOLD SPECIMEN: NORMAL
HPIV1 RNA ISLT QL NAA+PROBE: NOT DETECTED
HPIV2 RNA SPEC QL NAA+PROBE: NOT DETECTED
HPIV3 RNA NPH QL NAA+PROBE: NOT DETECTED
HPIV4 P GENE NPH QL NAA+PROBE: NOT DETECTED
IMM GRANULOCYTES # BLD AUTO: 0.08 10*3/MM3 (ref 0–0.05)
IMM GRANULOCYTES NFR BLD AUTO: 0.9 % (ref 0–0.5)
LYMPHOCYTES # BLD AUTO: 2.51 10*3/MM3 (ref 0.7–3.1)
LYMPHOCYTES NFR BLD AUTO: 28.8 % (ref 19.6–45.3)
M PNEUMO IGG SER IA-ACNC: NOT DETECTED
MCH RBC QN AUTO: 28.3 PG (ref 26.6–33)
MCHC RBC AUTO-ENTMCNC: 30.7 G/DL (ref 31.5–35.7)
MCV RBC AUTO: 92.2 FL (ref 79–97)
MONOCYTES # BLD AUTO: 0.36 10*3/MM3 (ref 0.1–0.9)
MONOCYTES NFR BLD AUTO: 4.1 % (ref 5–12)
NEUTROPHILS NFR BLD AUTO: 5.46 10*3/MM3 (ref 1.7–7)
NEUTROPHILS NFR BLD AUTO: 62.8 % (ref 42.7–76)
NRBC BLD AUTO-RTO: 0 /100 WBC (ref 0–0.2)
NT-PROBNP SERPL-MCNC: ABNORMAL PG/ML (ref 0–900)
PLATELET # BLD AUTO: 327 10*3/MM3 (ref 140–450)
PMV BLD AUTO: 10.2 FL (ref 6–12)
POTASSIUM SERPL-SCNC: 4.2 MMOL/L (ref 3.5–5.2)
PROCALCITONIN SERPL-MCNC: 0.05 NG/ML (ref 0–0.25)
PROT SERPL-MCNC: 6.9 G/DL (ref 6–8.5)
RBC # BLD AUTO: 4.38 10*6/MM3 (ref 3.77–5.28)
RHINOVIRUS RNA SPEC NAA+PROBE: NOT DETECTED
RSV RNA NPH QL NAA+NON-PROBE: NOT DETECTED
SARS-COV-2 RNA NPH QL NAA+NON-PROBE: NOT DETECTED
SODIUM SERPL-SCNC: 144 MMOL/L (ref 136–145)
TROPONIN T SERPL HS-MCNC: 31 NG/L
TROPONIN T SERPL HS-MCNC: 36 NG/L
WBC NRBC COR # BLD AUTO: 8.71 10*3/MM3 (ref 3.4–10.8)
WHOLE BLOOD HOLD COAG: NORMAL
WHOLE BLOOD HOLD SPECIMEN: NORMAL

## 2024-03-08 PROCEDURE — 83880 ASSAY OF NATRIURETIC PEPTIDE: CPT | Performed by: EMERGENCY MEDICINE

## 2024-03-08 PROCEDURE — 85379 FIBRIN DEGRADATION QUANT: CPT | Performed by: EMERGENCY MEDICINE

## 2024-03-08 PROCEDURE — 84484 ASSAY OF TROPONIN QUANT: CPT | Performed by: EMERGENCY MEDICINE

## 2024-03-08 PROCEDURE — 93005 ELECTROCARDIOGRAM TRACING: CPT

## 2024-03-08 PROCEDURE — 25010000002 DIAZEPAM PER 5 MG: Performed by: EMERGENCY MEDICINE

## 2024-03-08 PROCEDURE — 71045 X-RAY EXAM CHEST 1 VIEW: CPT

## 2024-03-08 PROCEDURE — 36415 COLL VENOUS BLD VENIPUNCTURE: CPT

## 2024-03-08 PROCEDURE — 84145 PROCALCITONIN (PCT): CPT | Performed by: EMERGENCY MEDICINE

## 2024-03-08 PROCEDURE — 96374 THER/PROPH/DIAG INJ IV PUSH: CPT

## 2024-03-08 PROCEDURE — G0378 HOSPITAL OBSERVATION PER HR: HCPCS

## 2024-03-08 PROCEDURE — 99285 EMERGENCY DEPT VISIT HI MDM: CPT

## 2024-03-08 PROCEDURE — 85025 COMPLETE CBC W/AUTO DIFF WBC: CPT | Performed by: EMERGENCY MEDICINE

## 2024-03-08 PROCEDURE — 93005 ELECTROCARDIOGRAM TRACING: CPT | Performed by: EMERGENCY MEDICINE

## 2024-03-08 PROCEDURE — 25510000001 IOPAMIDOL PER 1 ML: Performed by: EMERGENCY MEDICINE

## 2024-03-08 PROCEDURE — 80053 COMPREHEN METABOLIC PANEL: CPT | Performed by: EMERGENCY MEDICINE

## 2024-03-08 PROCEDURE — 0202U NFCT DS 22 TRGT SARS-COV-2: CPT | Performed by: EMERGENCY MEDICINE

## 2024-03-08 PROCEDURE — 71275 CT ANGIOGRAPHY CHEST: CPT

## 2024-03-08 RX ORDER — VALSARTAN 80 MG/1
80 TABLET ORAL DAILY
Status: ON HOLD | COMMUNITY
End: 2024-03-11

## 2024-03-08 RX ORDER — DIAZEPAM 5 MG/ML
2.5 INJECTION, SOLUTION INTRAMUSCULAR; INTRAVENOUS ONCE
Status: COMPLETED | OUTPATIENT
Start: 2024-03-08 | End: 2024-03-08

## 2024-03-08 RX ORDER — SODIUM CHLORIDE 0.9 % (FLUSH) 0.9 %
10 SYRINGE (ML) INJECTION AS NEEDED
Status: DISCONTINUED | OUTPATIENT
Start: 2024-03-08 | End: 2024-03-11 | Stop reason: HOSPADM

## 2024-03-08 RX ADMIN — DIAZEPAM 2.5 MG: 10 INJECTION, SOLUTION INTRAMUSCULAR; INTRAVENOUS at 23:03

## 2024-03-08 RX ADMIN — IOPAMIDOL 80 ML: 755 INJECTION, SOLUTION INTRAVENOUS at 22:50

## 2024-03-08 NOTE — Clinical Note
Level of Care: Telemetry [5]   Diagnosis: Shortness of breath [786.05.ICD-9-CM]   Admitting Physician: ABBY FLAHERTY [[id number]]   Attending Physician: ABBY FLAHERTY [[id number]]   Bed Request Comments: tele

## 2024-03-09 ENCOUNTER — APPOINTMENT (OUTPATIENT)
Dept: CARDIOLOGY | Facility: HOSPITAL | Age: 59
End: 2024-03-09
Payer: MEDICAID

## 2024-03-09 PROBLEM — R06.02 SHORTNESS OF BREATH: Status: ACTIVE | Noted: 2024-03-09

## 2024-03-09 LAB
AMPHET+METHAMPHET UR QL: NEGATIVE
AMPHETAMINES UR QL: POSITIVE
BARBITURATES UR QL SCN: NEGATIVE
BENZODIAZ UR QL SCN: NEGATIVE
BH CV ECHO MEAS - AO MAX PG: 5.7 MMHG
BH CV ECHO MEAS - AO MEAN PG: 3 MMHG
BH CV ECHO MEAS - AO ROOT DIAM: 3.2 CM
BH CV ECHO MEAS - AO V2 MAX: 119 CM/SEC
BH CV ECHO MEAS - AO V2 VTI: 19.2 CM
BH CV ECHO MEAS - AVA(I,D): 1.62 CM2
BH CV ECHO MEAS - EDV(CUBED): 166.4 ML
BH CV ECHO MEAS - EDV(MOD-SP2): 110 ML
BH CV ECHO MEAS - EDV(MOD-SP4): 117 ML
BH CV ECHO MEAS - EF(MOD-BP): 16.8 %
BH CV ECHO MEAS - EF(MOD-SP2): 14 %
BH CV ECHO MEAS - EF(MOD-SP4): 20.6 %
BH CV ECHO MEAS - ESV(CUBED): 117.6 ML
BH CV ECHO MEAS - ESV(MOD-SP2): 94.6 ML
BH CV ECHO MEAS - ESV(MOD-SP4): 92.9 ML
BH CV ECHO MEAS - FS: 10.9 %
BH CV ECHO MEAS - IVS/LVPW: 1 CM
BH CV ECHO MEAS - IVSD: 1.2 CM
BH CV ECHO MEAS - LA DIMENSION: 4.2 CM
BH CV ECHO MEAS - LAT PEAK E' VEL: 5.1 CM/SEC
BH CV ECHO MEAS - LV MASS(C)D: 272.4 GRAMS
BH CV ECHO MEAS - LV MAX PG: 1.74 MMHG
BH CV ECHO MEAS - LV MEAN PG: 1 MMHG
BH CV ECHO MEAS - LV V1 MAX: 65.9 CM/SEC
BH CV ECHO MEAS - LV V1 VTI: 9.9 CM
BH CV ECHO MEAS - LVIDD: 5.5 CM
BH CV ECHO MEAS - LVIDS: 4.9 CM
BH CV ECHO MEAS - LVOT AREA: 3.1 CM2
BH CV ECHO MEAS - LVOT DIAM: 2 CM
BH CV ECHO MEAS - LVPWD: 1.2 CM
BH CV ECHO MEAS - MED PEAK E' VEL: 4.9 CM/SEC
BH CV ECHO MEAS - MR MAX PG: 110.4 MMHG
BH CV ECHO MEAS - MR MAX VEL: 525.3 CM/SEC
BH CV ECHO MEAS - MR MEAN PG: 71 MMHG
BH CV ECHO MEAS - MR MEAN VEL: 396 CM/SEC
BH CV ECHO MEAS - MR VTI: 173.7 CM
BH CV ECHO MEAS - MV A MAX VEL: 41.3 CM/SEC
BH CV ECHO MEAS - MV DEC SLOPE: 567 CM/SEC2
BH CV ECHO MEAS - MV DEC TIME: 0.15 SEC
BH CV ECHO MEAS - MV E MAX VEL: 109 CM/SEC
BH CV ECHO MEAS - MV E/A: 2.6
BH CV ECHO MEAS - MV MAX PG: 7 MMHG
BH CV ECHO MEAS - MV MEAN PG: 3 MMHG
BH CV ECHO MEAS - MV P1/2T: 71.3 MSEC
BH CV ECHO MEAS - MV V2 VTI: 29.3 CM
BH CV ECHO MEAS - MVA(P1/2T): 3.1 CM2
BH CV ECHO MEAS - MVA(VTI): 1.06 CM2
BH CV ECHO MEAS - PA ACC TIME: 0.11 SEC
BH CV ECHO MEAS - PI END-D VEL: 130 CM/SEC
BH CV ECHO MEAS - RAP SYSTOLE: 15 MMHG
BH CV ECHO MEAS - RVSP: 49.3 MMHG
BH CV ECHO MEAS - SV(LVOT): 31.1 ML
BH CV ECHO MEAS - SV(MOD-SP2): 15.4 ML
BH CV ECHO MEAS - SV(MOD-SP4): 24.1 ML
BH CV ECHO MEAS - TAPSE (>1.6): 1.18 CM
BH CV ECHO MEAS - TR MAX PG: 34.3 MMHG
BH CV ECHO MEAS - TR MAX VEL: 291.3 CM/SEC
BH CV ECHO MEASUREMENTS AVERAGE E/E' RATIO: 21.8
BH CV VAS BP RIGHT ARM: NORMAL MMHG
BH CV XLRA - RV BASE: 3.8 CM
BH CV XLRA - RV LENGTH: 7.9 CM
BH CV XLRA - RV MID: 2.4 CM
BH CV XLRA - TDI S': 7.9 CM/SEC
BUPRENORPHINE SERPL-MCNC: NEGATIVE NG/ML
CANNABINOIDS SERPL QL: NEGATIVE
COCAINE UR QL: NEGATIVE
FENTANYL UR-MCNC: NEGATIVE NG/ML
IVRT: 120 MS
LEFT ATRIUM VOLUME INDEX: 41.5 ML/M2
LEFT ATRIUM VOLUME: 67.6 ML
METHADONE UR QL SCN: NEGATIVE
OPIATES UR QL: NEGATIVE
OXYCODONE UR QL SCN: NEGATIVE
PCP UR QL SCN: NEGATIVE
TRICYCLICS UR QL SCN: NEGATIVE

## 2024-03-09 PROCEDURE — G0378 HOSPITAL OBSERVATION PER HR: HCPCS

## 2024-03-09 PROCEDURE — 94660 CPAP INITIATION&MGMT: CPT

## 2024-03-09 PROCEDURE — 93005 ELECTROCARDIOGRAM TRACING: CPT | Performed by: EMERGENCY MEDICINE

## 2024-03-09 PROCEDURE — 99223 1ST HOSP IP/OBS HIGH 75: CPT | Performed by: HOSPITALIST

## 2024-03-09 PROCEDURE — 93306 TTE W/DOPPLER COMPLETE: CPT | Performed by: INTERNAL MEDICINE

## 2024-03-09 PROCEDURE — 93306 TTE W/DOPPLER COMPLETE: CPT

## 2024-03-09 PROCEDURE — 25010000002 FUROSEMIDE PER 20 MG: Performed by: EMERGENCY MEDICINE

## 2024-03-09 PROCEDURE — 96375 TX/PRO/DX INJ NEW DRUG ADDON: CPT

## 2024-03-09 PROCEDURE — 80307 DRUG TEST PRSMV CHEM ANLYZR: CPT | Performed by: EMERGENCY MEDICINE

## 2024-03-09 PROCEDURE — 99204 OFFICE O/P NEW MOD 45 MIN: CPT | Performed by: INTERNAL MEDICINE

## 2024-03-09 RX ORDER — ATORVASTATIN CALCIUM 40 MG/1
80 TABLET, FILM COATED ORAL NIGHTLY
Status: DISCONTINUED | OUTPATIENT
Start: 2024-03-09 | End: 2024-03-09

## 2024-03-09 RX ORDER — SODIUM CHLORIDE 9 MG/ML
40 INJECTION, SOLUTION INTRAVENOUS AS NEEDED
Status: DISCONTINUED | OUTPATIENT
Start: 2024-03-09 | End: 2024-03-11 | Stop reason: HOSPADM

## 2024-03-09 RX ORDER — SODIUM CHLORIDE 0.9 % (FLUSH) 0.9 %
10 SYRINGE (ML) INJECTION AS NEEDED
Status: DISCONTINUED | OUTPATIENT
Start: 2024-03-09 | End: 2024-03-11 | Stop reason: HOSPADM

## 2024-03-09 RX ORDER — ISOSORBIDE MONONITRATE 30 MG/1
30 TABLET, EXTENDED RELEASE ORAL DAILY
Status: DISCONTINUED | OUTPATIENT
Start: 2024-03-09 | End: 2024-03-09

## 2024-03-09 RX ORDER — RISPERIDONE 1 MG/1
2.5 TABLET ORAL 2 TIMES DAILY
Status: DISCONTINUED | OUTPATIENT
Start: 2024-03-09 | End: 2024-03-09

## 2024-03-09 RX ORDER — SODIUM CHLORIDE 0.9 % (FLUSH) 0.9 %
10 SYRINGE (ML) INJECTION EVERY 12 HOURS SCHEDULED
Status: DISCONTINUED | OUTPATIENT
Start: 2024-03-09 | End: 2024-03-11 | Stop reason: HOSPADM

## 2024-03-09 RX ORDER — CLOPIDOGREL BISULFATE 75 MG/1
75 TABLET ORAL DAILY
Status: DISCONTINUED | OUTPATIENT
Start: 2024-03-09 | End: 2024-03-11 | Stop reason: HOSPADM

## 2024-03-09 RX ORDER — ACETAMINOPHEN 650 MG/1
650 SUPPOSITORY RECTAL EVERY 4 HOURS PRN
Status: DISCONTINUED | OUTPATIENT
Start: 2024-03-09 | End: 2024-03-11 | Stop reason: HOSPADM

## 2024-03-09 RX ORDER — POLYETHYLENE GLYCOL 3350 17 G/17G
17 POWDER, FOR SOLUTION ORAL DAILY PRN
Status: DISCONTINUED | OUTPATIENT
Start: 2024-03-09 | End: 2024-03-11 | Stop reason: HOSPADM

## 2024-03-09 RX ORDER — SPIRONOLACTONE 25 MG/1
100 TABLET ORAL DAILY
Status: DISCONTINUED | OUTPATIENT
Start: 2024-03-09 | End: 2024-03-11 | Stop reason: HOSPADM

## 2024-03-09 RX ORDER — BISACODYL 5 MG/1
5 TABLET, DELAYED RELEASE ORAL DAILY PRN
Status: DISCONTINUED | OUTPATIENT
Start: 2024-03-09 | End: 2024-03-11 | Stop reason: HOSPADM

## 2024-03-09 RX ORDER — CALCIUM CARBONATE 500 MG/1
2 TABLET, CHEWABLE ORAL 2 TIMES DAILY PRN
Status: DISCONTINUED | OUTPATIENT
Start: 2024-03-09 | End: 2024-03-11 | Stop reason: HOSPADM

## 2024-03-09 RX ORDER — AMOXICILLIN 250 MG
2 CAPSULE ORAL 2 TIMES DAILY PRN
Status: DISCONTINUED | OUTPATIENT
Start: 2024-03-09 | End: 2024-03-11 | Stop reason: HOSPADM

## 2024-03-09 RX ORDER — FUROSEMIDE 10 MG/ML
80 INJECTION INTRAMUSCULAR; INTRAVENOUS ONCE
Status: COMPLETED | OUTPATIENT
Start: 2024-03-09 | End: 2024-03-09

## 2024-03-09 RX ORDER — BISACODYL 10 MG
10 SUPPOSITORY, RECTAL RECTAL DAILY PRN
Status: DISCONTINUED | OUTPATIENT
Start: 2024-03-09 | End: 2024-03-11 | Stop reason: HOSPADM

## 2024-03-09 RX ORDER — VALSARTAN 80 MG/1
40 TABLET ORAL DAILY
Status: DISCONTINUED | OUTPATIENT
Start: 2024-03-09 | End: 2024-03-11 | Stop reason: HOSPADM

## 2024-03-09 RX ORDER — ALPRAZOLAM 0.5 MG/1
0.5 TABLET ORAL EVERY 8 HOURS PRN
Status: DISCONTINUED | OUTPATIENT
Start: 2024-03-09 | End: 2024-03-11 | Stop reason: HOSPADM

## 2024-03-09 RX ORDER — BUPROPION HYDROCHLORIDE 150 MG/1
300 TABLET ORAL DAILY
Status: DISCONTINUED | OUTPATIENT
Start: 2024-03-09 | End: 2024-03-11 | Stop reason: HOSPADM

## 2024-03-09 RX ORDER — ACETAMINOPHEN 325 MG/1
650 TABLET ORAL EVERY 4 HOURS PRN
Status: DISCONTINUED | OUTPATIENT
Start: 2024-03-09 | End: 2024-03-11 | Stop reason: HOSPADM

## 2024-03-09 RX ORDER — ACETAMINOPHEN 160 MG/5ML
650 SOLUTION ORAL EVERY 4 HOURS PRN
Status: DISCONTINUED | OUTPATIENT
Start: 2024-03-09 | End: 2024-03-11 | Stop reason: HOSPADM

## 2024-03-09 RX ORDER — PANTOPRAZOLE SODIUM 40 MG/1
40 TABLET, DELAYED RELEASE ORAL DAILY
Status: DISCONTINUED | OUTPATIENT
Start: 2024-03-09 | End: 2024-03-11 | Stop reason: HOSPADM

## 2024-03-09 RX ADMIN — PANTOPRAZOLE SODIUM 40 MG: 40 TABLET, DELAYED RELEASE ORAL at 09:06

## 2024-03-09 RX ADMIN — CLOPIDOGREL BISULFATE 75 MG: 75 TABLET ORAL at 09:06

## 2024-03-09 RX ADMIN — EMPAGLIFLOZIN 10 MG: 10 TABLET, FILM COATED ORAL at 09:05

## 2024-03-09 RX ADMIN — METOPROLOL SUCCINATE 75 MG: 50 TABLET, EXTENDED RELEASE ORAL at 09:05

## 2024-03-09 RX ADMIN — BUPROPION HYDROCHLORIDE 300 MG: 150 TABLET, EXTENDED RELEASE ORAL at 09:06

## 2024-03-09 RX ADMIN — Medication 10 ML: at 09:07

## 2024-03-09 RX ADMIN — SPIRONOLACTONE 100 MG: 25 TABLET ORAL at 09:06

## 2024-03-09 RX ADMIN — APIXABAN 5 MG: 5 TABLET, FILM COATED ORAL at 19:54

## 2024-03-09 RX ADMIN — VALSARTAN 40 MG: 80 TABLET, FILM COATED ORAL at 10:28

## 2024-03-09 RX ADMIN — ALPRAZOLAM 0.5 MG: 0.5 TABLET ORAL at 19:54

## 2024-03-09 RX ADMIN — FUROSEMIDE 80 MG: 10 INJECTION, SOLUTION INTRAMUSCULAR; INTRAVENOUS at 01:19

## 2024-03-09 RX ADMIN — APIXABAN 5 MG: 5 TABLET, FILM COATED ORAL at 09:06

## 2024-03-09 NOTE — H&P
[facility] Medicine Services  HISTORY AND PHYSICAL    Patient Name: Jill M Paladino  : 1965  MRN: [id number]  Primary Care Physician: Sigrid Byers PA  Date of admission: 3/8/2024    Subjective   Subjective     Chief Complaint: Shortness of breath.    HPI:  Jill M Paladino is a 58 y.o. female with a past medical history of heart failure with reduced ejection fraction, severe multivessel's coronary artery disease, hyperlipidemia, hypertension, DVT on Eliquis, anxiety/depression and medication noncompliance. Patient was admitted for pneumonia .  Patient presented today with worsening shortness of breath associated with productive cough.  She denied any fever or chills.  She reported abdominal bloating and gaining few pounds over the last couple of days.  Patient denied any orthopnea but reports worsening dyspnea on exertion.  No chest pain or abdominal pain.  In ED patient was hemodynamically stable.  Labs were remarkable for elevated BNP at 14,000.  AST 44.  ALT 70.  UDS positive for meth.  Troponin mildly elevated at 31.  Dimer elevated at 1.3.  CTA was negative for PE but was concerning for cardiomegaly/pulmonary edema.  Patient received 80 mg of IV Lasix.  Symptoms improved.  [facility] was asked to admit the patient for further eval.    Review of Systems   Constitutional:  Negative for chills and fever.   Respiratory:  Positive for cough, chest tightness and shortness of breath.    Cardiovascular:  Negative for chest pain.          radha History     Past Medical History:   Diagnosis Date    Hypertension              Past Surgical History:   Procedure Laterality Date    KNEE SURGERY      RHINOPLASTY         Family History:  family history includes No Known Problems in her father and mother.     Social History:  reports that she has never smoked. She has never used smokeless tobacco. She reports that she does not drink alcohol and does not use drugs.  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    Other Nausea And Vomiting     Pt states \"all opiods make me throw up instantly\"    Percocet [Oxycodone-Acetaminophen] GI Intolerance    Sulfa Antibiotics Rash       Objective   Objective     Vital Signs:   Temp:  [97.5 °F (36.4 °C)-98.1 °F (36.7 °C)] 98.1 °F (36.7 °C)  Heart Rate:  [81-97] 84  Resp:  [16-18] 16  BP: (130-157)/(100-121) 153/102    Physical Exam  Constitutional:       Appearance: Normal appearance.   HENT:      Head: Normocephalic and atraumatic.      Mouth/Throat:      Mouth: Mucous membranes are moist.   Eyes:      Pupils: Pupils are equal, round, and reactive to light.   Cardiovascular:      Rate and Rhythm: Normal rate.   Pulmonary:      Effort: Pulmonary effort is normal.      Breath sounds: Rales present.   Abdominal:      General: Abdomen is flat. Bowel sounds are normal. There is distension.      Tenderness: There is abdominal tenderness.   Musculoskeletal:         General: No swelling.   Skin:     General: Skin is warm.   Neurological:      General: No focal deficit present.      Mental Status: She is alert. Mental status is at baseline.          Result Review:  I have personally reviewed the results from the time of this admission to 3/9/2024 10:18 EST and agree with these findings:  [x]  Laboratory list / accordion  [x]  Microbiology  [x]  Radiology  [x]  EKG/Telemetry   [x]  Cardiology/Vascular   []  Pathology  [x]  Old records  []  Other:  Most notable findings include:   LAB RESULTS:      Lab 03/08/24  1858   WBC 8.71   HEMOGLOBIN 12.4   HEMATOCRIT 40.4   PLATELETS 327   NEUTROS ABS 5.46   IMMATURE GRANS (ABS) 0.08* "   LYMPHS ABS 2.51   MONOS ABS 0.36   EOS ABS 0.23   MCV 92.2   PROCALCITONIN 0.05   D DIMER QUANT 1.13*         Lab 03/08/24  1858   SODIUM 144   POTASSIUM 4.2   CHLORIDE 109*   CO2 25.0   ANION GAP 10.0   BUN 26*   CREATININE 0.83   EGFR 82.3   GLUCOSE 90   CALCIUM 9.2         Lab 03/08/24  1858   TOTAL PROTEIN 6.9   ALBUMIN 3.9   GLOBULIN 3.0   ALT (SGPT) 70*   AST (SGOT) 44*   BILIRUBIN 0.7   ALK PHOS 126*         Lab 03/08/24  2120 03/08/24  1858   PROBNP  --  14,881.0*   HSTROP T 31* 36*                 Brief Urine Lab Results  (Last result in the past 365 days)        Color   Clarity   Blood   Leuk Est   Nitrite   Protein   CREAT   Urine HCG        12/14/23 2044 Yellow   Clear   Trace   Small (1+)   Negative   Negative                 Microbiology Results (last 10 days)       Procedure Component Value - Date/Time    COVID PRE-OP / PRE-PROCEDURE SCREENING ORDER (NO ISOLATION) - Swab, Nasopharynx [[id number]]  (Normal) Collected: 03/08/24 2017    Lab Status: Final result Specimen: Swab from Nasopharynx Updated: 03/08/24 2116    Narrative:      The following orders were created for panel order COVID PRE-OP / PRE-PROCEDURE SCREENING ORDER (NO ISOLATION) - Swab, Nasopharynx.  Procedure                               Abnormality         Status                     ---------                               -----------         ------                     Respiratory Panel PCR w/...[[id number]]  Normal              Final result                 Please view results for these tests on the individual orders.    Respiratory Panel PCR w/COVID-19(SARS-CoV-2) [facility]ER/VONDA/VICTOR MANUEL/PAD/COR/SWAPNA In-House, NP Swab in UTM/VTM, 2 HR TAT - Swab, Nasopharynx [[id number]]  (Normal) Collected: 03/08/24 2017    Lab Status: Final result Specimen: Swab from Nasopharynx Updated: 03/08/24 2116     ADENOVIRUS, PCR Not Detected     Coronavirus 229E Not Detected     Coronavirus HKU1 Not Detected     Coronavirus NL63 Not Detected     Coronavirus OC43 Not Detected      COVID19 Not Detected     Human Metapneumovirus Not Detected     Human Rhinovirus/Enterovirus Not Detected     Influenza A PCR Not Detected     Influenza B PCR Not Detected     Parainfluenza Virus 1 Not Detected     Parainfluenza Virus 2 Not Detected     Parainfluenza Virus 3 Not Detected     Parainfluenza Virus 4 Not Detected     RSV, PCR Not Detected     Bordetella pertussis pcr Not Detected     Bordetella parapertussis PCR Not Detected     Chlamydophila pneumoniae PCR Not Detected     Mycoplasma pneumo by PCR Not Detected    Narrative:      In the setting of a positive respiratory panel with a viral infection PLUS a negative procalcitonin without other underlying concern for bacterial infection, consider observing off antibiotics or discontinuation of antibiotics and continue supportive care. If the respiratory panel is positive for atypical bacterial infection (Bordetella pertussis, Chlamydophila pneumoniae, or Mycoplasma pneumoniae), consider antibiotic de-escalation to target atypical bacterial infection.            CT Angiogram Chest    Result Date: 3/8/2024  CT ANGIOGRAM CHEST Date of Exam: 3/8/2024 10:39 PM EST Indication: Shortness of breath, elevated d-dimer. Comparison: CT chest dated 12/14/2023 Technique: CTA of the chest was performed after the uneventful intravenous administration of 80 mL Isovue 370. Reconstructed coronal and sagittal images were also obtained. In addition, a 3-D volume rendered image was created for interpretation. Automated exposure control and iterative reconstruction methods were used. Findings: The visualized soft tissue structures at the base the neck including the thyroid appear within normal limits. There is no lower cervical or axillary adenopathy. The heart is enlarged. There is reflux of contrast into the IVC and hepatic veins which can be seen with right-sided heart dysfunction. There is mild aneurysmal dilatation of the ascending thoracic aorta at the level of the  main pulmonary artery measuring up to 4.4 cm. There is coronary and aortic atherosclerotic calcification. The main pulmonary artery appears normal in caliber. There is near complete resolution of the previously noted right lower lobe pulmonary embolism with a minimal residual  subsegmental branch filling defect best seen on axial image 57-58. There is no evidence of new pulmonary embolism. There is no mediastinal or hilar lymphadenopathy by size criteria. The tracheobronchial tree is patent. There are small bilateral layering pleural effusions which appears improved on the right and increased on the left when compared to the prior examination. There is a small amount of residual airspace opacity within the right lower lobe representing residual atelectasis. The majority of the prior right lower lobe pneumonia appears resolved. There is bandlike atelectasis within the lingula. There is minimal smooth interlobular septal thickening. The esophagus is normal in course and caliber. Visualized portions of the upper abdomen demonstrate no acute findings. There are no acute osseous findings of the chest.     Impression: Impression: 1. Near complete resolution of the previously noted right lower lobe pulmonary embolism. Small residual nonocclusive thrombus is present within a subsegmental branch of the right lower lobe pulmonary artery within the same distribution of the prior exam.  No evidence of new pulmonary embolism. 2. Cardiomegaly with minimal smooth interlobular septal thickening and small bilateral pleural effusions. Findings suggest congestive heart failure. 3. Near complete resolution of the previously seen right lower lobe pneumonia. Mild bandlike parenchymal opacity within the right lower lobe suggesting atelectasis. Electronically Signed: Jose Zepeda  3/8/2024 11:02 PM EST  Workstation ID: [id number]    XR Chest 1 View    Result Date: 3/8/2024  XR CHEST 1 VW Date of Exam: 3/8/2024 7:12 PM EST Indication: SOA  triage protocol Comparison: Chest radiograph dated 12/16/2023 Findings: There is cardiomegaly. Pulmonary vascularity appears normal. There is minimal linear atelectasis within the left lung base. There is no significant pleural effusion. No evidence of pneumothorax. There are degenerative changes of the thoracic spine.     Impression: Impression: 1. Cardiomegaly with minimal linear left basilar atelectasis. Electronically Signed: Jose Lehmanelor  3/8/2024 7:36 PM EST  Workstation ID: [id number]         Assessment & Plan   Assessment & Plan       Shortness of breath    Acute pulmonary embolism without acute cor pulmonale    Chronic systolic CHF (congestive heart failure)    Coronary artery disease of native artery of native heart with stable angina pectoris    Essential hypertension    Chronic HFrEF (heart failure with reduced ejection fraction)        Jill M Paladino is a 58 y.o. female with a past medical history of heart failure with reduced ejection fraction, severe multivessel's coronary artery disease, hyperlipidemia, hypertension, DVT on Eliquis, anxiety/depression and medication noncompliance. Patient was admitted for pneumonia December 23.  Patient presented today with worsening shortness of breath associated with productive cough.    Echo on 01/29/2024:  Mildly dilated left ventricle.    Normal left ventricular wall thickness.    Visually estimated ejection fraction 20-25%    Severe left ventricular systolic dysfunction with severely abnormal    systolic strain pattern.    Increased left atrial pressure (Grade II diastolic dysfunction).       ##Acute systolic heart failure:  ##Heart failure with reduced ejection fraction 20 to 25%  ##Grade 2 diastolic dysfunction  ##Medication noncompliance:  Patient presented today with worsening shortness of breath associated with productive cough.  She denied any fever or chills.   - She reported abdominal bloating and gaining few pounds over the last couple of days.   -  Patient denied any orthopnea but reports worsening dyspnea on exertion.  No chest pain or abdominal pain.    - In ED patient was hemodynamically stable.  On room air on my eval.  - Labs were remarkable for elevated BNP at 14,000.  AST 44.  ALT 70.  UDS positive for meth.  Troponin mildly elevated at 31.   - Dimer elevated at 1.3.  CTA was negative for PE but was concerning for cardiomegaly/pulmonary edema.    - Patient received 80 mg of IV Lasix.  Symptoms improved.    -White blood count within normal.  Afebrile.  Pro-Albino negative.  Respiratory panel negative.  -No concern for bacterial pneumonia.  Monitor off antibiotic.  -Patient was not taken Bumex, Aldactone and beta-blockers.  -She was taking her valsartan and statin.  -Cardiology consulted    History of DVT:  -Patient ran out for Eliquis for the last few days  -Resume home Eliquis.    ## Medication noncompliance  - consult to help with resources.    ## UDS positive for meth:  -Patient denied any alcohol abuse.  -Watch for withdrawal symptoms.    DVT prophylaxis: Eliquis    CODE STATUS: Full code       Expected Discharge  Expected discharge date/ time has not been documented.      This note has been completed as part of a split-shared workflow.     Signature: Electronically signed by Chris Moeller MD, 03/09/24, 10:27 AM EST

## 2024-03-09 NOTE — ED PROVIDER NOTES
"Subjective   History of Present Illness  Admitted for pneumonia December 23, approximately 3 months ago.  4 days ago started to have cough for the and worsening shortness of breath.  Nonproductive cough.  Denies chest pain.  Denies fever, chills, chest pain, vomiting, diarrhea abdominal pain, or other acute complaints.      Review of Systems   Constitutional:  Negative for chills, diaphoresis and fever.   Respiratory:  Negative for choking, shortness of breath, wheezing and stridor.    Cardiovascular:  Negative for palpitations and leg swelling.   Gastrointestinal:  Negative for vomiting.   Neurological:  Negative for dizziness and syncope.   All other systems reviewed and are negative.      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    Other Nausea And Vomiting     Pt states \"all opiods make me throw up instantly\"    Percocet [Oxycodone-Acetaminophen] GI Intolerance    Sulfa Antibiotics Rash       Past Surgical History:   Procedure Laterality Date    KNEE SURGERY      RHINOPLASTY         Family History   Problem Relation Age of Onset    No Known Problems Mother     No Known Problems Father        Social History     Socioeconomic History    Marital status:    Tobacco Use    Smoking status: Never    Smokeless tobacco: Never   Vaping Use    Vaping status: Never Used   Substance and Sexual Activity    Alcohol use: No    Drug use: No    Sexual activity: Defer           Objective   Physical Exam  Vitals and nursing note reviewed.   Constitutional:       Appearance: She is well-developed.   HENT:      Head: Normocephalic and atraumatic.      Mouth/Throat:      Mouth: Mucous membranes are moist.   Eyes:      Extraocular Movements: Extraocular movements intact.      Pupils: Pupils are equal, round, and reactive to light.   Cardiovascular:      " Rate and Rhythm: Normal rate and regular rhythm.   Pulmonary:      Effort: Pulmonary effort is normal.      Breath sounds: Normal breath sounds.   Chest:      Chest wall: No tenderness.   Abdominal:      General: Bowel sounds are normal.      Palpations: Abdomen is soft.   Musculoskeletal:         General: Normal range of motion.      Cervical back: Normal range of motion.   Skin:     General: Skin is warm and dry.      Capillary Refill: Capillary refill takes less than 2 seconds.   Neurological:      General: No focal deficit present.      Mental Status: She is alert.   Psychiatric:         Mood and Affect: Mood normal.         Behavior: Behavior normal.         Procedures           ED Course    ED Course as of 03/11/24 0042   Sat Mar 09, 2024   0202 Pt has now said that she has not taken any meds for the past 2 weeks, including eliquis [CP]      ED Course User Index  [CP] Librado Mar DO                                               Medical Decision Making  Problems Addressed:  Acute on chronic congestive heart failure, unspecified heart failure type: complicated acute illness or injury  H/O medication noncompliance: complicated acute illness or injury  History of pneumonia: complicated acute illness or injury  History of pulmonary embolism: complicated acute illness or injury  Shortness of breath: complicated acute illness or injury    Amount and/or Complexity of Data Reviewed  Labs: ordered.  Radiology: ordered.  ECG/medicine tests: ordered.    Risk  Prescription drug management.  Decision regarding hospitalization.      Recent Results (from the past 24 hour(s))   Comprehensive Metabolic Panel    Collection Time: 03/10/24  5:29 AM    Specimen: Blood   Result Value Ref Range    Glucose 117 (H) 65 - 99 mg/dL    BUN 26 (H) 6 - 20 mg/dL    Creatinine 0.79 0.57 - 1.00 mg/dL    Sodium 139 136 - 145 mmol/L    Potassium 4.2 3.5 - 5.2 mmol/L    Chloride 102 98 - 107 mmol/L    CO2 25.0 22.0 - 29.0 mmol/L    Calcium 9.0  8.6 - 10.5 mg/dL    Total Protein 6.5 6.0 - 8.5 g/dL    Albumin 3.7 3.5 - 5.2 g/dL    ALT (SGPT) 75 (H) 1 - 33 U/L    AST (SGOT) 38 (H) 1 - 32 U/L    Alkaline Phosphatase 141 (H) 39 - 117 U/L    Total Bilirubin 0.5 0.0 - 1.2 mg/dL    Globulin 2.8 gm/dL    A/G Ratio 1.3 g/dL    BUN/Creatinine Ratio 32.9 (H) 7.0 - 25.0    Anion Gap 12.0 5.0 - 15.0 mmol/L    eGFR 87.4 >60.0 mL/min/1.73   Magnesium    Collection Time: 03/10/24  5:29 AM    Specimen: Blood   Result Value Ref Range    Magnesium 2.1 1.6 - 2.6 mg/dL   Phosphorus    Collection Time: 03/10/24  5:29 AM    Specimen: Blood   Result Value Ref Range    Phosphorus 4.9 (H) 2.5 - 4.5 mg/dL   TSH    Collection Time: 03/10/24  5:29 AM    Specimen: Blood   Result Value Ref Range    TSH 3.830 0.270 - 4.200 uIU/mL   Manual Differential    Collection Time: 03/10/24  5:29 AM    Specimen: Blood   Result Value Ref Range    Neutrophil % 75.0 42.7 - 76.0 %    Lymphocyte % 22.0 19.6 - 45.3 %    Monocyte % 2.0 (L) 5.0 - 12.0 %    Eosinophil % 1.0 0.3 - 6.2 %    Basophil % 0.0 0.0 - 1.5 %    Neutrophils Absolute 8.55 (H) 1.70 - 7.00 10*3/mm3    Lymphocytes Absolute 2.51 0.70 - 3.10 10*3/mm3    Monocytes Absolute 0.23 0.10 - 0.90 10*3/mm3    Eosinophils Absolute 0.11 0.00 - 0.40 10*3/mm3    Basophils Absolute 0.00 0.00 - 0.20 10*3/mm3    nRBC 0.0 0.0 - 0.2 /100 WBC    Anisocytosis Slight/1+ None Seen    WBC Morphology Normal Normal    Platelet Morphology Normal Normal   CBC Auto Differential    Collection Time: 03/10/24  5:29 AM    Specimen: Blood   Result Value Ref Range    WBC 11.40 (H) 3.40 - 10.80 10*3/mm3    RBC 4.58 3.77 - 5.28 10*6/mm3    Hemoglobin 13.1 12.0 - 15.9 g/dL    Hematocrit 41.7 34.0 - 46.6 %    MCV 91.0 79.0 - 97.0 fL    MCH 28.6 26.6 - 33.0 pg    MCHC 31.4 (L) 31.5 - 35.7 g/dL    RDW 17.0 (H) 12.3 - 15.4 %    RDW-SD 56.1 (H) 37.0 - 54.0 fl    MPV 10.4 6.0 - 12.0 fL    Platelets 344 140 - 450 10*3/mm3   ECG 12 Lead QT Measurement    Collection Time: 03/10/24  8:51  PM   Result Value Ref Range    QT Interval 494 ms    QTC Interval 501 ms     Note: In addition to lab results from this visit, the labs listed above may include labs taken at another facility or during a different encounter within the last 24 hours. Please correlate lab times with ED admission and discharge times for further clarification of the services performed during this visit.    CT Angiogram Chest   Final Result   Impression:   1. Near complete resolution of the previously noted right lower lobe pulmonary embolism. Small residual nonocclusive thrombus is present within a subsegmental branch of the right lower lobe pulmonary artery within the same distribution of the prior exam.    No evidence of new pulmonary embolism.   2. Cardiomegaly with minimal smooth interlobular septal thickening and small bilateral pleural effusions. Findings suggest congestive heart failure.   3. Near complete resolution of the previously seen right lower lobe pneumonia. Mild bandlike parenchymal opacity within the right lower lobe suggesting atelectasis.         Electronically Signed: Jose Lehmanelor     3/8/2024 11:02 PM EST     Workstation ID: [id number]      XR Chest 1 View   Final Result   Impression:   1. Cardiomegaly with minimal linear left basilar atelectasis.         Electronically Signed: Jose Zepeda     3/8/2024 7:36 PM EST     Workstation ID: [id number]        Vitals:    03/10/24 1122 03/10/24 1545 03/10/24 1937 03/10/24 2048   BP: 106/83 105/84 (!) 86/68 (!) 89/70   BP Location: Right arm Right arm Right arm    Patient Position: Lying Lying Lying    Pulse:   63 61   Resp: 17 18 16    Temp: 97.6 °F (36.4 °C) 98.1 °F (36.7 °C) 98.2 °F (36.8 °C)    TempSrc: Oral Oral Oral    SpO2:   96% 97%   Weight:       Height:         Medications   sodium chloride 0.9 % flush 10 mL (has no administration in time range)   apixaban (ELIQUIS) tablet 5 mg ( Oral Canceled Entry 3/10/24 2100)   buPROPion XL (WELLBUTRIN XL) 24 hr tablet 300  mg (300 mg Oral Given 3/10/24 0906)   clopidogrel (PLAVIX) tablet 75 mg (75 mg Oral Not Given 3/10/24 0906)   empagliflozin (JARDIANCE) tablet 10 mg (10 mg Oral Given 3/10/24 0905)   metoprolol succinate XL (TOPROL-XL) 24 hr tablet 75 mg (75 mg Oral Given 3/10/24 0905)   pantoprazole (PROTONIX) EC tablet 40 mg (40 mg Oral Given 3/10/24 0906)   spironolactone (ALDACTONE) tablet 100 mg (100 mg Oral Given 3/10/24 0905)   valsartan (DIOVAN) tablet 40 mg (40 mg Oral Given 3/10/24 0906)   sodium chloride 0.9 % flush 10 mL (10 mL Intravenous Given 3/10/24 1947)   sodium chloride 0.9 % flush 10 mL (has no administration in time range)   sodium chloride 0.9 % infusion 40 mL (has no administration in time range)   Potassium Replacement - Follow Nurse / BPA Driven Protocol (has no administration in time range)   Magnesium Standard Dose Replacement - Follow Nurse / BPA Driven Protocol (has no administration in time range)   Phosphorus Replacement - Follow Nurse / BPA Driven Protocol (has no administration in time range)   Calcium Replacement - Follow Nurse / BPA Driven Protocol (has no administration in time range)   sennosides-docusate (PERICOLACE) 8.6-50 MG per tablet 2 tablet (has no administration in time range)     And   polyethylene glycol (MIRALAX) packet 17 g (has no administration in time range)     And   bisacodyl (DULCOLAX) EC tablet 5 mg (has no administration in time range)     And   bisacodyl (DULCOLAX) suppository 10 mg (has no administration in time range)   acetaminophen (TYLENOL) tablet 650 mg (650 mg Oral Given 3/10/24 1807)     Or   acetaminophen (TYLENOL) 160 MG/5ML oral solution 650 mg ( Oral Not Given:  See Alt 3/10/24 1807)     Or   acetaminophen (TYLENOL) suppository 650 mg ( Rectal Not Given:  See Alt 3/10/24 1807)   ALPRAZolam (XANAX) tablet 0.5 mg (0.5 mg Oral Given 3/10/24 1947)   calcium carbonate (TUMS) chewable tablet 500 mg (200 mg elemental) (has no administration in time range)   Pharmacy  Consult - MTM ( Does not apply Canceled Entry 3/10/24 0907)   influenza vac split quad (FLUZONE,FLUARIX,AFLURIA,FLULAVAL) injection 0.5 mL (has no administration in time range)   furosemide (LASIX) tablet 40 mg (40 mg Oral Given 3/10/24 0905)   diazePAM (VALIUM) injection 2.5 mg (2.5 mg Intravenous Given 3/8/24 2303)   iopamidol (ISOVUE-370) 76 % injection 100 mL (80 mL Intravenous Given 3/8/24 2250)   furosemide (LASIX) injection 80 mg (80 mg Intravenous Given 3/9/24 0119)   prochlorperazine (COMPAZINE) injection 2.5 mg (2.5 mg Intravenous Given 3/10/24 2047)     ECG/EMG Results (last 24 hours)       Procedure Component Value Units Date/Time    ECG 12 Lead QT Measurement [[id number]] Collected: 03/10/24 2051     Updated: 03/10/24 2051     QT Interval 494 ms      QTC Interval 501 ms     Narrative:      Test Reason : QT Measurement  Blood Pressure :   */*   mmHG  Vent. Rate :  62 BPM     Atrial Rate :  62 BPM     P-R Int : 154 ms          QRS Dur :  82 ms      QT Int : 494 ms       P-R-T Axes :  33 -46 -76 degrees     QTc Int : 501 ms    Normal sinus rhythm  Possible Left atrial enlargement  Left anterior fascicular block  Left ventricular hypertrophy  Cannot rule out Inferior infarct (masked by fascicular block?) , age undetermined  Anteroseptal infarct (cited on or before 14-DEC-2023)  T wave abnormality, consider lateral ischemia  Prolonged QT  Abnormal ECG  When compared with ECG of 09-MAR-2024 01:43, (Unconfirmed)  Nonspecific T wave abnormality now evident in Inferior leads    Referred By: ALIVIA LEWIS           Confirmed By:           ECG 12 Lead QT Measurement   Preliminary Result   Test Reason : QT Measurement   Blood Pressure :   */*   mmHG   Vent. Rate :  62 BPM     Atrial Rate :  62 BPM      P-R Int : 154 ms          QRS Dur :  82 ms       QT Int : 494 ms       P-R-T Axes :  33 -46 -76 degrees      QTc Int : 501 ms      Normal sinus rhythm   Possible Left atrial enlargement   Left anterior fascicular block    Left ventricular hypertrophy   Cannot rule out Inferior infarct (masked by fascicular block?) , age    undetermined   Anteroseptal infarct (cited on or before 14-DEC-2023)   T wave abnormality, consider lateral ischemia   Prolonged QT   Abnormal ECG   When compared with ECG of 09-MAR-2024 01:43, (Unconfirmed)   Nonspecific T wave abnormality now evident in Inferior leads      Referred By: ALIVIA LEWIS           Confirmed By:       ECG 12 Lead Dyspnea   Preliminary Result   Test Reason : Dyspnea   Blood Pressure :   */*   mmHG   Vent. Rate :  91 BPM     Atrial Rate :  91 BPM      P-R Int : 136 ms          QRS Dur :  86 ms       QT Int : 416 ms       P-R-T Axes :  38 -40  74 degrees      QTc Int : 511 ms      Normal sinus rhythm   Possible Left atrial enlargement   Left axis deviation   Left ventricular hypertrophy   Anteroseptal infarct (cited on or before 14-DEC-2023)   Prolonged QT   Abnormal ECG   When compared with ECG of 08-MAR-2024 18:28, (Unconfirmed)   No significant change was found      Referred By: EDMD           Confirmed By:       ECG 12 Lead ED Triage Standing Order; SOA   Preliminary Result   Test Reason : ED Triage Standing Order~   Blood Pressure :   */*   mmHG   Vent. Rate :  98 BPM     Atrial Rate :  98 BPM      P-R Int : 134 ms          QRS Dur :  82 ms       QT Int : 402 ms       P-R-T Axes :  37 -48  93 degrees      QTc Int : 513 ms      ** Critical Test Result: Long QTc   Normal sinus rhythm   Possible Left atrial enlargement   Left anterior fascicular block   Left ventricular hypertrophy ( R in aVL , Kev product , Romhilt-Fagan    )   Anteroseptal infarct (cited on or before 14-DEC-2023)   T wave abnormality, consider lateral ischemia   Prolonged QT   Abnormal ECG   When compared with ECG of 14-DEC-2023 23:15,   premature ventricular complexes are no longer present   T wave inversion now evident in Lateral leads      Referred By:            Confirmed By:               Final diagnoses:    Shortness of breath   Acute on chronic congestive heart failure, unspecified heart failure type   H/O medication noncompliance   History of pulmonary embolism   History of pneumonia       ED Disposition  ED Disposition       ED Disposition   Decision to Admit    Condition   --    Comment   Level of Care: Telemetry [5]   Diagnosis: Shortness of breath [786.05.ICD-9-CM]   Admitting Physician: ABBY FLAHERTY [[id number]]   Attending Physician: ABBY FLAHERTY [[id number]]   Bed Request Comments: Librado Russell DO  03/11/24 0044

## 2024-03-09 NOTE — ED NOTES
Jill M Paladino    Nursing Report ED to Floor:  Mental status: Aox4   Ambulatory status: x1 assist   Oxygen Therapy:  RA  Cardiac Rhythm: NSR  Admitted from: Home  Safety Concerns:  NA  Social Issues: NA  ED Room #:  02    ED Nurse Phone Extension - [phone] or may call [phone].      HPI:   Chief Complaint   Patient presents with    Shortness of Breath       Past Medical History:  Past Medical History:   Diagnosis Date    Hypertension         Past Surgical History:  Past Surgical History:   Procedure Laterality Date    KNEE SURGERY      RHINOPLASTY          Admitting Doctor:   Chris Moeller MD    Consulting Provider(s):  Consults       No orders found for last 30 day(s).             Admitting Diagnosis:   The primary encounter diagnosis was Shortness of breath. Diagnoses of Acute on chronic congestive heart failure, unspecified heart failure type, H/O medication noncompliance, History of pulmonary embolism, and History of pneumonia were also pertinent to this visit.    Most Recent Vitals:   Vitals:    03/09/24 0430 03/09/24 0458 03/09/24 0523 03/09/24 0530   BP: (!) 149/111 (!) 150/121  (!) 147/111   Pulse: 82 92 89 84   Resp:       Temp:       TempSrc:       SpO2: 98% 100% 98% 98%   Weight:       Height:           Active LDAs/IV Access:   Lines, Drains & Airways       Active LDAs       Name Placement date Placement time Site Days    Peripheral IV 03/08/24 1900 Left Antecubital 03/08/24  1900  Antecubital  less than 1                    Labs (abnormal labs have a star):   Labs Reviewed   COMPREHENSIVE METABOLIC PANEL - Abnormal; Notable for the following components:       Result Value    BUN 26 (*)     Chloride 109 (*)     ALT (SGPT) 70 (*)     AST (SGOT) 44 (*)     Alkaline Phosphatase 126 (*)     BUN/Creatinine Ratio 31.3 (*)     All other components within normal limits    Narrative:     GFR Normal >60  Chronic Kidney Disease <60  Kidney Failure <15     BNP (IN-HOUSE) - Abnormal; Notable for the following components:     proBNP 14,881.0 (*)     All other components within normal limits    Narrative:     This assay is used as an aid in the diagnosis of individuals suspected of having heart failure. It can be used as an aid in the diagnosis of acute decompensated heart failure (ADHF) in patients presenting with signs and symptoms of ADHF to the emergency department (ED). In addition, NT-proBNP of <300 pg/mL indicates ADHF is not likely.    Age Range Result Interpretation  NT-proBNP Concentration (pg/mL:      <50             Positive            >450                   Gray                 300-450                    Negative             <300    50-75           Positive            >900                  Gray                300-900                  Negative            <300      >75             Positive            >1800                  Gray                300-1800                  Negative            <300   SINGLE HSTROPONIN T - Abnormal; Notable for the following components:    HS Troponin T 36 (*)     All other components within normal limits    Narrative:     High Sensitive Troponin T Reference Range:  <14.0 ng/L- Negative Female for AMI  <22.0 ng/L- Negative Male for AMI  >=14 - Abnormal Female indicating possible myocardial injury.  >=22 - Abnormal Male indicating possible myocardial injury.   Clinicians would have to utilize clinical acumen, EKG, Troponin, and serial changes to determine if it is an Acute Myocardial Infarction or myocardial injury due to an underlying chronic condition.        CBC WITH AUTO DIFFERENTIAL - Abnormal; Notable for the following components:    MCHC 30.7 (*)     RDW 16.9 (*)     RDW-SD 56.9 (*)     Monocyte % 4.1 (*)     Immature Grans % 0.9 (*)     Immature Grans, Absolute 0.08 (*)     All other components within normal limits   D-DIMER, QUANTITATIVE - Abnormal; Notable for the following components:    D-Dimer, Quantitative 1.13 (*)     All other components within normal limits    Narrative:      "According to the assay 's published package insert, a normal (<0.50 MCGFEU/mL) D-dimer result in conjunction with a non-high clinical probability assessment, excludes deep vein thrombosis (DVT) and pulmonary embolism (PE) with high sensitivity.    D-dimer values increase with age and this can make VTE exclusion of an older population difficult. To address this, the [employer], based on best available evidence and recent guidelines, recommends that clinicians use age-adjusted D-dimer thresholds in patients greater than 50 years of age with: a) a low probability of PE who do not meet all Pulmonary Embolism Rule Out Criteria, or b) in those with intermediate probability of PE.   The formula for an age-adjusted D-dimer cut-off is \"age/100\".  For example, a 60 year old patient would have an age-adjusted cut-off of 0.60 MCGFEU/mL and an 80 year old 0.80 MCGFEU/mL.   SINGLE HSTROPONIN T - Abnormal; Notable for the following components:    HS Troponin T 31 (*)     All other components within normal limits    Narrative:     High Sensitive Troponin T Reference Range:  <14.0 ng/L- Negative Female for AMI  <22.0 ng/L- Negative Male for AMI  >=14 - Abnormal Female indicating possible myocardial injury.  >=22 - Abnormal Male indicating possible myocardial injury.   Clinicians would have to utilize clinical acumen, EKG, Troponin, and serial changes to determine if it is an Acute Myocardial Infarction or myocardial injury due to an underlying chronic condition.        URINE DRUG SCREEN - Abnormal; Notable for the following components:    Methamphetamine, Ur Positive (*)     All other components within normal limits    Narrative:     Cutoff For Drugs Screened:    Amphetamines               500 ng/ml  Barbiturates               200 ng/ml  Benzodiazepines            150 ng/ml  Cocaine                    150 ng/ml  Methadone                  200 ng/ml  Opiates                    100 ng/ml  Phencyclidine  " "             25 ng/ml  THC                         50 ng/ml  Methamphetamine            500 ng/ml  Tricyclic Antidepressants  300 ng/ml  Oxycodone                  100 ng/ml  Buprenorphine               10 ng/ml    The normal value for all drugs tested is negative. This report includes unconfirmed screening results, with the cutoff values listed, to be used for medical treatment purposes only.  Unconfirmed results must not be used for non-medical purposes such as employment or legal testing.  Clinical consideration should be applied to any drug of abuse test, particularly when unconfirmed results are used.     RESPIRATORY PANEL PCR W/ COVID-19 (SARS-COV-2), NP SWAB IN UTM/VTP, 2 HR TAT - Normal    Narrative:     In the setting of a positive respiratory panel with a viral infection PLUS a negative procalcitonin without other underlying concern for bacterial infection, consider observing off antibiotics or discontinuation of antibiotics and continue supportive care. If the respiratory panel is positive for atypical bacterial infection (Bordetella pertussis, Chlamydophila pneumoniae, or Mycoplasma pneumoniae), consider antibiotic de-escalation to target atypical bacterial infection.   PROCALCITONIN - Normal    Narrative:     As a Marker for Sepsis (Non-Neonates):    1. <0.5 ng/mL represents a low risk of severe sepsis and/or septic shock.  2. >2 ng/mL represents a high risk of severe sepsis and/or septic shock.    As a Marker for Lower Respiratory Tract Infections that require antibiotic therapy:    PCT on Admission    Antibiotic Therapy       6-12 Hrs later    >0.5                Strongly Recommended  >0.25 - <0.5        Recommended   0.1 - 0.25          Discouraged              Remeasure/reassess PCT  <0.1                Strongly Discouraged     Remeasure/reassess PCT    As 28 day mortality risk marker: \"Change in Procalcitonin Result\" (>80% or <=80%) if Day 0 (or Day 1) and Day 4 values are available. Refer to " http://[email or web address]-pct-[email or web address]    Change in PCT <=80%  A decrease of PCT levels below or equal to 80% defines a positive change in PCT test result representing a higher risk for 28-day all-cause mortality of patients diagnosed with severe sepsis for septic shock.    Change in PCT >80%  A decrease of PCT levels of more than 80% defines a negative change in PCT result representing a lower risk for 28-day all-cause mortality of patients diagnosed with severe sepsis or septic shock.      FENTANYL, URINE - Normal    Narrative:     Negative Threshold:      Fentanyl 5 ng/mL     The normal value for the drug tested is negative. This report includes final unconfirmed screening results to be used for medical treatment purposes only. Unconfirmed results must not be used for non-medical purposes such as employment or legal testing. Clinical consideration should be applied to any drug of abuse test, particularly when unconfirmed results are used.          COVID PRE-OP / PRE-PROCEDURE SCREENING ORDER (NO ISOLATION)    Narrative:     The following orders were created for panel order COVID PRE-OP / PRE-PROCEDURE SCREENING ORDER (NO ISOLATION) - Swab, Nasopharynx.  Procedure                               Abnormality         Status                     ---------                               -----------         ------                     Respiratory Panel PCR w/...[[id number]]  Normal              Final result                 Please view results for these tests on the individual orders.   RAINBOW DRAW    Narrative:     The following orders were created for panel order Gordon Draw.  Procedure                               Abnormality         Status                     ---------                               -----------         ------                     Green Top (Gel)[[id number]]                                  Final result               Lavender Top[[id number]]                                     Final result               Gold  Top - SST[[id number]]                                   Final result               Garcia Top[[id number]]                                         Final result               Light Blue Top[[id number]]                                   Final result                 Please view results for these tests on the individual orders.   BLOOD GAS, ARTERIAL   CBC AND DIFFERENTIAL    Narrative:     The following orders were created for panel order CBC & Differential.  Procedure                               Abnormality         Status                     ---------                               -----------         ------                     CBC Auto Differential[[id number]]        Abnormal            Final result                 Please view results for these tests on the individual orders.   GREEN TOP   LAVENDER TOP   GOLD TOP - SST   GRAY TOP   LIGHT BLUE TOP       Meds Given in ED:   Medications   sodium chloride 0.9 % flush 10 mL (has no administration in time range)   diazePAM (VALIUM) injection 2.5 mg (2.5 mg Intravenous Given 3/8/24 2303)   iopamidol (ISOVUE-370) 76 % injection 100 mL (80 mL Intravenous Given 3/8/24 2250)   furosemide (LASIX) injection 80 mg (80 mg Intravenous Given 3/9/24 0119)

## 2024-03-09 NOTE — PLAN OF CARE
Problem: Noninvasive Ventilation Acute  Goal: Effective Unassisted Ventilation and Oxygenation  Outcome: Ongoing, Progressing     Problem: Adult Inpatient Plan of Care  Goal: Plan of Care Review  Outcome: Ongoing, Progressing  Goal: Patient-Specific Goal (Individualized)  Outcome: Ongoing, Progressing  Goal: Absence of Hospital-Acquired Illness or Injury  Outcome: Ongoing, Progressing  Intervention: Identify and Manage Fall Risk  Recent Flowsheet Documentation  Taken 3/9/2024 1600 by Roberta Morse RN  Safety Promotion/Fall Prevention:   activity supervised   assistive device/personal items within reach   clutter free environment maintained   toileting scheduled   safety round/check completed   room organization consistent  Taken 3/9/2024 1400 by Roberta Morse RN  Safety Promotion/Fall Prevention:   activity supervised   assistive device/personal items within reach   clutter free environment maintained   toileting scheduled   safety round/check completed   room organization consistent  Taken 3/9/2024 1200 by Roberta Morse RN  Safety Promotion/Fall Prevention:   activity supervised   assistive device/personal items within reach   clutter free environment maintained   toileting scheduled   safety round/check completed   room organization consistent  Taken 3/9/2024 1000 by Roberta Morse RN  Safety Promotion/Fall Prevention:   activity supervised   assistive device/personal items within reach   clutter free environment maintained   toileting scheduled   safety round/check completed   room organization consistent  Intervention: Prevent Skin Injury  Recent Flowsheet Documentation  Taken 3/9/2024 1600 by Roberta Morse RN  Body Position: position changed independently  Skin Protection:   adhesive use limited   tubing/devices free from skin contact   transparent dressing maintained   skin-to-skin areas padded   skin-to-device areas padded  Taken 3/9/2024 1400 by Roberta Morse RN  Body Position:  position changed independently  Skin Protection:   adhesive use limited   tubing/devices free from skin contact   skin-to-skin areas padded   transparent dressing maintained   skin-to-device areas padded  Taken 3/9/2024 1200 by Roberta Morse RN  Body Position: position changed independently  Skin Protection:   adhesive use limited   tubing/devices free from skin contact   transparent dressing maintained   skin-to-skin areas padded   skin-to-device areas padded  Taken 3/9/2024 1000 by Roberta Morse RN  Body Position: position changed independently  Skin Protection:   adhesive use limited   tubing/devices free from skin contact   skin-to-skin areas padded   transparent dressing maintained   skin-to-device areas padded  Intervention: Prevent and Manage VTE (Venous Thromboembolism) Risk  Recent Flowsheet Documentation  Taken 3/9/2024 1600 by Roberta Morse RN  Activity Management: activity encouraged  Taken 3/9/2024 1400 by Roberta Morse RN  Activity Management: activity encouraged  Taken 3/9/2024 1200 by Roberta Morse RN  Activity Management: activity encouraged  Taken 3/9/2024 1000 by Roberta Morse RN  Activity Management: activity encouraged  Range of Motion: active ROM (range of motion) encouraged  Goal: Optimal Comfort and Wellbeing  Outcome: Ongoing, Progressing  Intervention: Monitor Pain and Promote Comfort  Recent Flowsheet Documentation  Taken 3/9/2024 1000 by Roberta Morse RN  Pain Management Interventions: see MAR  Intervention: Provide Person-Centered Care  Recent Flowsheet Documentation  Taken 3/9/2024 1000 by Roberta Morse RN  Trust Relationship/Rapport: care explained  Goal: Readiness for Transition of Care  Outcome: Ongoing, Progressing  Intervention: Mutually Develop Transition Plan  Recent Flowsheet Documentation  Taken 3/9/2024 0835 by Roberta Morse RN  Equipment Currently Used at Home: cane, straight  Transportation Anticipated: family or friend will  provide  Patient/Family Anticipated Services at Transition: none  Patient/Family Anticipates Transition to: home   Goal Outcome Evaluation:

## 2024-03-09 NOTE — CONSULTS
"[facility] at [facility]  CARDIOLOGY CONSULTATION NOTE  Jill M Paladino  [id number]  1965   LOS: 0 days   Patient Care Team:  Sigrid Byers PA as PCP - General (Internal Medicine)    Reason for Consultation: Shortness of breath    Patient Active Problem List    Diagnosis Date Noted    *Shortness of breath 03/09/2024    Pneumonia, unspecified organism 12/18/2023    Chronic HFrEF (heart failure with reduced ejection fraction) 12/18/2023    Pneumonia 12/15/2023    Acute pulmonary embolism without acute cor pulmonale 12/15/2023    Chronic systolic CHF (congestive heart failure) 12/15/2023    Coronary artery disease of native artery of native heart with stable angina pectoris 12/15/2023    Essential hypertension 12/15/2023         History of Present Illness:      Jill M Paladino is a 58 y.o. female past medical history significant for HFrEF, multivessel CAD, hyperlipidemia, hypertension, DVT on Eliquis, anxiety, depression, and medical noncompliance presents to [facility] on 3/9/2024 with shortness of breath and productive cough.  Patient noticed edema in her abdomen over the last couple days.  We have been asked to consult.  BNP elevated on admission greater than 14,000.  UDS positive for meth.  High-sensitivity troponin mildly elevated, flat at 36-31.  EKG shows normal sinus rhythm with     Cardiac risk factors: hypertension.    Allergies   Allergen Reactions    Lisinopril Other (See Comments)     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    Other Nausea And Vomiting     Pt states \"all opiods make me throw up instantly\"    Percocet [Oxycodone-Acetaminophen] GI Intolerance    Sulfa Antibiotics Rash       Past Medical History:   Diagnosis Date    Hypertension       Past Surgical History:   Procedure Laterality Date    KNEE SURGERY      RHINOPLASTY        Social History     Socioeconomic History    Marital status:  "   Tobacco Use    Smoking status: Never    Smokeless tobacco: Never   Vaping Use    Vaping status: Never Used   Substance and Sexual Activity    Alcohol use: No    Drug use: No    Sexual activity: Defer     Family History   Problem Relation Age of Onset    No Known Problems Mother     No Known Problems Father        Medications Prior to Admission   Medication Sig Dispense Refill Last Dose    ALPRAZolam (XANAX) 0.25 MG tablet Take 1 tablet by mouth 2 (Two) Times a Day As Needed for Anxiety.       apixaban (ELIQUIS) 5 MG tablet tablet Resume home 5mg bid 12/22/23 after loading dose completed       apixaban (ELIQUIS) 5 MG tablet tablet Take 2 tablets by mouth 2 (Two) Times a Day. 16 tablet 0     bumetanide (BUMEX) 1 MG tablet Take 1 tablet by mouth Daily.       buPROPion XL (WELLBUTRIN XL) 150 MG 24 hr tablet Take 2 tablets by mouth Daily. 60 tablet 0     clopidogrel (PLAVIX) 75 MG tablet Take 1 tablet by mouth Daily.       metoprolol succinate XL (TOPROL-XL) 25 MG 24 hr tablet Take 3 tablets by mouth Daily.       nitroglycerin (NITROSTAT) 0.4 MG SL tablet Place 1 tablet under the tongue Every 5 (Five) Minutes As Needed for Chest Pain. Take no more than 3 doses in 15 minutes.       pantoprazole (PROTONIX) 40 MG EC tablet Take 1 tablet by mouth Daily.       spironolactone (ALDACTONE) 100 MG tablet Take 1 tablet by mouth Daily.       valsartan (DIOVAN) 40 MG tablet Take 1 tablet by mouth Daily.        Scheduled Meds:apixaban, 5 mg, Oral, Q12H  buPROPion XL, 300 mg, Oral, Daily  clopidogrel, 75 mg, Oral, Daily  empagliflozin, 10 mg, Oral, Daily  metoprolol succinate XL, 75 mg, Oral, Daily  pantoprazole, 40 mg, Oral, Daily  pharmacy consult - [facility], , Does not apply, Daily  sodium chloride, 10 mL, Intravenous, Q12H  spironolactone, 100 mg, Oral, Daily  valsartan, 40 mg, Oral, Daily      Continuous Infusions:   PRN Meds:.  acetaminophen **OR** acetaminophen **OR** acetaminophen    ALPRAZolam    senna-docusate sodium **AND**  "polyethylene glycol **AND** bisacodyl **AND** bisacodyl    calcium carbonate    Calcium Replacement - Follow Nurse / BPA Driven Protocol    influenza vaccine    Magnesium Standard Dose Replacement - Follow Nurse / BPA Driven Protocol    Phosphorus Replacement - Follow Nurse / BPA Driven Protocol    Potassium Replacement - Follow Nurse / BPA Driven Protocol    sodium chloride    sodium chloride    sodium chloride    Review of Systems  All systems have been reviewed and are negative with the exception of those mentioned in the HPI and problem list above.     Objective:       Physical Exam  BP (!) 149/104   Pulse 82   Temp 98.1 °F (36.7 °C) (Oral)   Resp 16   Ht 165.1 cm (65\")   Wt 57.4 kg (126 lb 8.7 oz)   SpO2 98%   BMI 21.06 kg/m²       03/09/24  0700 03/09/24  1430   Weight: 57.4 kg (126 lb 9.6 oz) 57.4 kg (126 lb 8.7 oz)     Body mass index is 21.06 kg/m².    Intake/Output Summary (Last 24 hours) at 3/9/2024 1513  Last data filed at 3/9/2024 1100  Gross per 24 hour   Intake 342 ml   Output 1500 ml   Net -1158 ml       Physical Exam  General Appearance:  Alert, cooperative, no distress, appears stated age   Neck: Supple, symmetrical, trachea midline, no carotid bruit or JVD   Lungs:   Clear to auscultation bilaterally, respirations unlabored   Heart:  Regular rate and rhythm, S1, S2 normal, no murmur, rub or gallop   Extremities: No edema, normal range of motion   Pulses: 2+ and symmetric   Skin: Skin color, texture, turgor normal, no rashes or lesions   Neurologic: Normal     Cardiographics  EKG: NSR with LVH. Qtc 493  ECHO: Results for orders placed during the hospital encounter of 03/08/24    Adult Transthoracic Echo Complete w/ Color, Spectral and Contrast if Necessary Per Protocol    Interpretation Summary    Left ventricular systolic function is severely decreased. Calculated left ventricular EF = 16.8% Left ventricular ejection fraction appears to be less than 20%.    The left ventricular cavity is " mildly dilated.    Left ventricular wall thickness is consistent with mild concentric hypertrophy.    Left ventricular diastolic function is consistent with (grade III w/high LAP) restrictive pattern.    Moderately reduced right ventricular systolic function noted.    The left atrial cavity is moderately dilated.    The right atrial cavity is moderately  dilated.    There are myxomatous changes of the mitral valve apparatus present.    Severe mitral valve regurgitation is present.    Moderate tricuspid valve regurgitation is present.    Estimated right ventricular systolic pressure from tricuspid regurgitation is moderately elevated (45-55 mmHg).    LV apical thrombus, measuring approximately 1 x 1 cm, not mobile.       Imaging  Chest x-ray: Adult Transthoracic Echo Complete w/ Color, Spectral and Contrast if Necessary Per Protocol    Addendum Date: 3/9/2024      Left ventricular systolic function is severely decreased. Calculated left ventricular EF = 16.8% Left ventricular ejection fraction appears to be less than 20%.   The left ventricular cavity is mildly dilated.   Left ventricular wall thickness is consistent with mild concentric hypertrophy.   Left ventricular diastolic function is consistent with (grade III w/high LAP) restrictive pattern.   Moderately reduced right ventricular systolic function noted.   The left atrial cavity is moderately dilated.   The right atrial cavity is moderately  dilated.   There are myxomatous changes of the mitral valve apparatus present.   Severe mitral valve regurgitation is present.   Moderate tricuspid valve regurgitation is present.   Estimated right ventricular systolic pressure from tricuspid regurgitation is moderately elevated (45-55 mmHg).   LV apical thrombus, measuring approximately 1 x 1 cm, not mobile.     CT Angiogram Chest    Result Date: 3/8/2024  Impression: 1. Near complete resolution of the previously noted right lower lobe pulmonary embolism. Small residual  nonocclusive thrombus is present within a subsegmental branch of the right lower lobe pulmonary artery within the same distribution of the prior exam.  No evidence of new pulmonary embolism. 2. Cardiomegaly with minimal smooth interlobular septal thickening and small bilateral pleural effusions. Findings suggest congestive heart failure. 3. Near complete resolution of the previously seen right lower lobe pneumonia. Mild bandlike parenchymal opacity within the right lower lobe suggesting atelectasis. Electronically Signed: Jose Katelyn  3/8/2024 11:02 PM EST  Workstation ID: [id number]    XR Chest 1 View    Result Date: 3/8/2024  Impression: 1. Cardiomegaly with minimal linear left basilar atelectasis. Electronically Signed: Jose Zepeda  3/8/2024 7:36 PM EST  Workstation ID: [id number]      Lab Review   Results from last 7 days   Lab Units 03/08/24  1858   SODIUM mmol/L 144   POTASSIUM mmol/L 4.2   CHLORIDE mmol/L 109*   CO2 mmol/L 25.0   BUN mg/dL 26*   CREATININE mg/dL 0.83   GLUCOSE mg/dL 90   CALCIUM mg/dL 9.2     Results from last 7 days   Lab Units 03/08/24  1858   WBC 10*3/mm3 8.71   HEMOGLOBIN g/dL 12.4   HEMATOCRIT % 40.4   PLATELETS 10*3/mm3 327             Results from last 7 days   Lab Units 03/08/24  2120 03/08/24  1858   HSTROP T ng/L 31* 36*          Shortness of breath    Acute pulmonary embolism without acute cor pulmonale    Chronic systolic CHF (congestive heart failure)    Coronary artery disease of native artery of native heart with stable angina pectoris    Essential hypertension    Chronic HFrEF (heart failure with reduced ejection fraction)        Assessment:     Acute on chronic systolic and diastolic heart failure  Echo 3/9/24: EF = 16.8%, Left ventricular wall thickness is consistent with mild concentric hypertrophy. Left ventricular diastolic function is consistent with (grade III w/high LAP) restrictive pattern. Moderately reduced right ventricular systolic function noted. There are  myxomatous changes of the mitral valve apparatus present. Severe mitral valve regurgitation. Moderate tricuspid valve regurgitation. Estimated RVSP from tricuspid regurgitation is moderately elevated (45-55 mmHg). LV apical thrombus, measuring approximately 1 x 1 cm, not mobile.  LV thrombus  Echo: LV apical thrombus, measuring approximately 1 x 1 cm, not mobile.  History of DVT   Hypertension  Medical noncompliance  Substance abuse     Plan:     Agree with IV Lasix, for goal net -1.5 to 2 L/day, may be able to de-escalate to p.o. 3/10.  Continue Eliquis and plavix due to multivessel CAD and LV thrombus  Continue metoprolol, spirolactone, and valsartan  Add Jardiance 10 mg daily 3 sets 10/24 if renal function and BP are stable      I discussed the importance of medication compliance and illicit drug abstinence due to her severe cardiomyopathy and poor prognosis if she is noncompliant or continues methamphetamine or other illicit drug use.    Scribed for Heath Sargent MD by Susan Luz, KAYLA, 03/09/24, 3:13 PM EST.     I,Heath Sargent M.D., personally performed the services described in this documentation as scribed by the above named individual in my presence, and it is both accurate and complete.    Heath Sargent MD, FACC, [facility]    Please note that portions of this note were dictated utilizing Dragon dictation.

## 2024-03-10 LAB
ALBUMIN SERPL-MCNC: 3.7 G/DL (ref 3.5–5.2)
ALBUMIN/GLOB SERPL: 1.3 G/DL
ALP SERPL-CCNC: 141 U/L (ref 39–117)
ALT SERPL W P-5'-P-CCNC: 75 U/L (ref 1–33)
ANION GAP SERPL CALCULATED.3IONS-SCNC: 12 MMOL/L (ref 5–15)
ANISOCYTOSIS BLD QL: ABNORMAL
AST SERPL-CCNC: 38 U/L (ref 1–32)
BASOPHILS # BLD MANUAL: 0 10*3/MM3 (ref 0–0.2)
BASOPHILS NFR BLD MANUAL: 0 % (ref 0–1.5)
BILIRUB SERPL-MCNC: 0.5 MG/DL (ref 0–1.2)
BUN SERPL-MCNC: 26 MG/DL (ref 6–20)
BUN/CREAT SERPL: 32.9 (ref 7–25)
CALCIUM SPEC-SCNC: 9 MG/DL (ref 8.6–10.5)
CHLORIDE SERPL-SCNC: 102 MMOL/L (ref 98–107)
CO2 SERPL-SCNC: 25 MMOL/L (ref 22–29)
CREAT SERPL-MCNC: 0.79 MG/DL (ref 0.57–1)
DEPRECATED RDW RBC AUTO: 56.1 FL (ref 37–54)
EGFRCR SERPLBLD CKD-EPI 2021: 87.4 ML/MIN/1.73
EOSINOPHIL # BLD MANUAL: 0.11 10*3/MM3 (ref 0–0.4)
EOSINOPHIL NFR BLD MANUAL: 1 % (ref 0.3–6.2)
ERYTHROCYTE [DISTWIDTH] IN BLOOD BY AUTOMATED COUNT: 17 % (ref 12.3–15.4)
GLOBULIN UR ELPH-MCNC: 2.8 GM/DL
GLUCOSE SERPL-MCNC: 117 MG/DL (ref 65–99)
HCT VFR BLD AUTO: 41.7 % (ref 34–46.6)
HGB BLD-MCNC: 13.1 G/DL (ref 12–15.9)
LYMPHOCYTES # BLD MANUAL: 2.51 10*3/MM3 (ref 0.7–3.1)
LYMPHOCYTES NFR BLD MANUAL: 2 % (ref 5–12)
MAGNESIUM SERPL-MCNC: 2.1 MG/DL (ref 1.6–2.6)
MCH RBC QN AUTO: 28.6 PG (ref 26.6–33)
MCHC RBC AUTO-ENTMCNC: 31.4 G/DL (ref 31.5–35.7)
MCV RBC AUTO: 91 FL (ref 79–97)
MONOCYTES # BLD: 0.23 10*3/MM3 (ref 0.1–0.9)
NEUTROPHILS # BLD AUTO: 8.55 10*3/MM3 (ref 1.7–7)
NEUTROPHILS NFR BLD MANUAL: 75 % (ref 42.7–76)
NRBC SPEC MANUAL: 0 /100 WBC (ref 0–0.2)
PHOSPHATE SERPL-MCNC: 4.9 MG/DL (ref 2.5–4.5)
PLAT MORPH BLD: NORMAL
PLATELET # BLD AUTO: 344 10*3/MM3 (ref 140–450)
PMV BLD AUTO: 10.4 FL (ref 6–12)
POTASSIUM SERPL-SCNC: 4.2 MMOL/L (ref 3.5–5.2)
PROT SERPL-MCNC: 6.5 G/DL (ref 6–8.5)
RBC # BLD AUTO: 4.58 10*6/MM3 (ref 3.77–5.28)
SODIUM SERPL-SCNC: 139 MMOL/L (ref 136–145)
TSH SERPL DL<=0.05 MIU/L-ACNC: 3.83 UIU/ML (ref 0.27–4.2)
VARIANT LYMPHS NFR BLD MANUAL: 22 % (ref 19.6–45.3)
WBC MORPH BLD: NORMAL
WBC NRBC COR # BLD AUTO: 11.4 10*3/MM3 (ref 3.4–10.8)

## 2024-03-10 PROCEDURE — 99233 SBSQ HOSP IP/OBS HIGH 50: CPT | Performed by: FAMILY MEDICINE

## 2024-03-10 PROCEDURE — 84100 ASSAY OF PHOSPHORUS: CPT | Performed by: HOSPITALIST

## 2024-03-10 PROCEDURE — G0378 HOSPITAL OBSERVATION PER HR: HCPCS

## 2024-03-10 PROCEDURE — 80050 GENERAL HEALTH PANEL: CPT | Performed by: HOSPITALIST

## 2024-03-10 PROCEDURE — 85007 BL SMEAR W/DIFF WBC COUNT: CPT | Performed by: HOSPITALIST

## 2024-03-10 PROCEDURE — 93010 ELECTROCARDIOGRAM REPORT: CPT | Performed by: INTERNAL MEDICINE

## 2024-03-10 PROCEDURE — 25010000002 PROCHLORPERAZINE 10 MG/2ML SOLUTION: Performed by: NURSE PRACTITIONER

## 2024-03-10 PROCEDURE — 93005 ELECTROCARDIOGRAM TRACING: CPT | Performed by: NURSE PRACTITIONER

## 2024-03-10 PROCEDURE — 83735 ASSAY OF MAGNESIUM: CPT | Performed by: HOSPITALIST

## 2024-03-10 PROCEDURE — 25010000002 ONDANSETRON PER 1 MG: Performed by: FAMILY MEDICINE

## 2024-03-10 PROCEDURE — 99214 OFFICE O/P EST MOD 30 MIN: CPT

## 2024-03-10 PROCEDURE — 97161 PT EVAL LOW COMPLEX 20 MIN: CPT | Performed by: PHYSICAL THERAPIST

## 2024-03-10 PROCEDURE — 96375 TX/PRO/DX INJ NEW DRUG ADDON: CPT

## 2024-03-10 RX ORDER — PROCHLORPERAZINE EDISYLATE 5 MG/ML
2.5 INJECTION INTRAMUSCULAR; INTRAVENOUS ONCE
Status: COMPLETED | OUTPATIENT
Start: 2024-03-10 | End: 2024-03-10

## 2024-03-10 RX ORDER — ONDANSETRON 2 MG/ML
4 INJECTION INTRAMUSCULAR; INTRAVENOUS EVERY 6 HOURS PRN
Status: DISCONTINUED | OUTPATIENT
Start: 2024-03-10 | End: 2024-03-10

## 2024-03-10 RX ORDER — FUROSEMIDE 40 MG/1
40 TABLET ORAL DAILY
Status: DISCONTINUED | OUTPATIENT
Start: 2024-03-10 | End: 2024-03-11 | Stop reason: HOSPADM

## 2024-03-10 RX ADMIN — PROCHLORPERAZINE EDISYLATE 2.5 MG: 5 INJECTION INTRAMUSCULAR; INTRAVENOUS at 20:47

## 2024-03-10 RX ADMIN — ONDANSETRON 4 MG: 2 INJECTION INTRAMUSCULAR; INTRAVENOUS at 16:24

## 2024-03-10 RX ADMIN — APIXABAN 5 MG: 5 TABLET, FILM COATED ORAL at 19:46

## 2024-03-10 RX ADMIN — ALPRAZOLAM 0.5 MG: 0.5 TABLET ORAL at 19:47

## 2024-03-10 RX ADMIN — METOPROLOL SUCCINATE 75 MG: 50 TABLET, EXTENDED RELEASE ORAL at 09:05

## 2024-03-10 RX ADMIN — Medication 10 ML: at 19:47

## 2024-03-10 RX ADMIN — VALSARTAN 40 MG: 80 TABLET, FILM COATED ORAL at 09:06

## 2024-03-10 RX ADMIN — EMPAGLIFLOZIN 10 MG: 10 TABLET, FILM COATED ORAL at 09:05

## 2024-03-10 RX ADMIN — APIXABAN 5 MG: 5 TABLET, FILM COATED ORAL at 09:05

## 2024-03-10 RX ADMIN — BUPROPION HYDROCHLORIDE 300 MG: 150 TABLET, EXTENDED RELEASE ORAL at 09:06

## 2024-03-10 RX ADMIN — SPIRONOLACTONE 100 MG: 25 TABLET ORAL at 09:05

## 2024-03-10 RX ADMIN — Medication 10 ML: at 09:07

## 2024-03-10 RX ADMIN — FUROSEMIDE 40 MG: 40 TABLET ORAL at 09:05

## 2024-03-10 RX ADMIN — PANTOPRAZOLE SODIUM 40 MG: 40 TABLET, DELAYED RELEASE ORAL at 09:06

## 2024-03-10 RX ADMIN — ACETAMINOPHEN 650 MG: 325 TABLET ORAL at 18:07

## 2024-03-10 NOTE — PLAN OF CARE
Goal Outcome Evaluation:  Plan of Care Reviewed With: patient           Outcome Evaluation: PT evaluation completed.  Pt demonstrated independence with all mobility including ambulating 250 feet without AD - no unsteadiness noted.  Pt reports she feels at baseline and has no need for skilled PT at this time.  PT signing off.      Anticipated Discharge Disposition (PT): home

## 2024-03-10 NOTE — PLAN OF CARE
Problem: Adult Inpatient Plan of Care  Goal: Absence of Hospital-Acquired Illness or Injury  Intervention: Prevent and Manage VTE (Venous Thromboembolism) Risk  Recent Flowsheet Documentation  Taken 3/10/2024 0000 by Rosy Ty RN  Activity Management: activity minimized  Taken 3/9/2024 2000 by Rosy Ty RN  Activity Management: activity encouraged  VTE Prevention/Management: (see MAR) other (see comments)  Range of Motion: active ROM (range of motion) encouraged     Problem: Adult Inpatient Plan of Care  Goal: Absence of Hospital-Acquired Illness or Injury  Outcome: Ongoing, Not Progressing  Intervention: Identify and Manage Fall Risk  Recent Flowsheet Documentation  Taken 3/10/2024 0000 by Rosy Ty RN  Safety Promotion/Fall Prevention:   activity supervised   assistive device/personal items within reach   clutter free environment maintained   fall prevention program maintained   nonskid shoes/slippers when out of bed   safety round/check completed  Taken 3/9/2024 2000 by Rosy Ty RN  Safety Promotion/Fall Prevention:   activity supervised   assistive device/personal items within reach   clutter free environment maintained   fall prevention program maintained   nonskid shoes/slippers when out of bed   safety round/check completed  Intervention: Prevent Skin Injury  Recent Flowsheet Documentation  Taken 3/10/2024 0000 by Rosy Ty RN  Body Position: position changed independently  Skin Protection:   adhesive use limited   incontinence pads utilized   transparent dressing maintained   tubing/devices free from skin contact  Taken 3/9/2024 2000 by Rosy Ty RN  Body Position: position changed independently  Skin Protection:   adhesive use limited   incontinence pads utilized   transparent dressing maintained   tubing/devices free from skin contact  Intervention: Prevent and Manage VTE (Venous Thromboembolism) Risk  Recent Flowsheet Documentation  Taken 3/10/2024 0000 by Rosy Ty  RN  Activity Management: activity minimized  Taken 3/9/2024 2000 by Rosy Ty RN  Activity Management: activity encouraged  VTE Prevention/Management: (see MAR) other (see comments)  Range of Motion: active ROM (range of motion) encouraged  Intervention: Prevent Infection  Recent Flowsheet Documentation  Taken 3/10/2024 0000 by Rosy Ty RN  Infection Prevention: rest/sleep promoted  Taken 3/9/2024 2000 by Rosy Ty RN  Infection Prevention:   cohorting utilized   environmental surveillance performed   rest/sleep promoted   Goal Outcome Evaluation:  Plan of Care Reviewed With: patient        Progress: no change

## 2024-03-10 NOTE — PROGRESS NOTES
[place] [facility] [facility] at [facility]  PROGRESS NOTE    Jill M Paladino   [id number]   1965    LOS: 0 days .  Date of Admission: 3/8/2024  Date of Service: 03/10/24    Primary Care Physician: Sigrid Byers PA    Chief Complaint: f/u shortness of breath    Subjective      Patient lying in bed resting. Denies chest pain. States her breathing is comfortable on room air. No other complaints. Asking about when she will be discharged. No family at bedside.     ROS  All systems have been reviewed and are negative with the exception of those mentioned in the HPI and problem list above.     Objective   Vital Sign Min/Max for last 24 hours  Temp  Min: 97.1 °F (36.2 °C)  Max: 98 °F (36.7 °C)   BP  Min: 106/83  Max: 132/95   Pulse  Min: 68  Max: 83   Resp  Min: 16  Max: 18   SpO2  Min: 96 %  Max: 96 %   No data recorded   Weight  Min: 57.4 kg (126 lb 8.7 oz)  Max: 58.5 kg (129 lb)     Physical Exam:  GENERAL: Alert, cooperative, in no acute distress.   HEENT: Normocephalic, no jugular venous distention  HEART: Regular rhythm, normal rate, and no murmurs, gallops, or rubs.   LUNGS: Diminished to auscultation bilaterally. No wheezing, rales or rhonchi. Room air  NEUROLOGIC: No focal abnormalities involving strength or sensation are noted.   EXTREMITIES: No clubbing, cyanosis, or edema noted.     Results:  Results from last 7 days   Lab Units 03/10/24  0529 03/08/24  1858   WBC 10*3/mm3 11.40* 8.71   HEMOGLOBIN g/dL 13.1 12.4   HEMATOCRIT % 41.7 40.4   PLATELETS 10*3/mm3 344 327     Results from last 7 days   Lab Units 03/10/24  0529 03/08/24  1858   SODIUM mmol/L 139 144   POTASSIUM mmol/L 4.2 4.2   CHLORIDE mmol/L 102 109*   CO2 mmol/L 25.0 25.0   BUN mg/dL 26* 26*   CREATININE mg/dL 0.79 0.83   GLUCOSE mg/dL 117* 90      Lab Results   Component Value Date    CHOL 130 12/15/2023    TRIG 76 12/15/2023    HDL 41 12/15/2023    LDL 74 12/15/2023    AST 38 (H) 03/10/2024    ALT 75 (H) 03/10/2024              Results from last 7 days   Lab Units 03/10/24  0529   TSH uIU/mL 3.830             Results from last 7 days   Lab Units 03/08/24  2120 03/08/24  1858   HSTROP T ng/L 31* 36*     Results from last 7 days   Lab Units 03/08/24  1858   PROBNP pg/mL 14,881.0*       Intake/Output Summary (Last 24 hours) at 3/10/2024 1154  Last data filed at 3/10/2024 0907  Gross per 24 hour   Intake 784 ml   Output 1000 ml   Net -216 ml       EKG/TELE: NSR    Radiology Data:   Adult Transthoracic Echo Complete w/ Color, Spectral and Contrast if Necessary Per Protocol    Addendum Date: 3/9/2024      Left ventricular systolic function is severely decreased. Calculated left ventricular EF = 16.8% Left ventricular ejection fraction appears to be less than 20%.   The left ventricular cavity is mildly dilated.   Left ventricular wall thickness is consistent with mild concentric hypertrophy.   Left ventricular diastolic function is consistent with (grade III w/high LAP) restrictive pattern.   Moderately reduced right ventricular systolic function noted.   The left atrial cavity is moderately dilated.   The right atrial cavity is moderately  dilated.   There are myxomatous changes of the mitral valve apparatus present.   Severe mitral valve regurgitation is present.   Moderate tricuspid valve regurgitation is present.   Estimated right ventricular systolic pressure from tricuspid regurgitation is moderately elevated (45-55 mmHg).   LV apical thrombus, measuring approximately 1 x 1 cm, not mobile.     CT Angiogram Chest    Result Date: 3/8/2024  Impression: 1. Near complete resolution of the previously noted right lower lobe pulmonary embolism. Small residual nonocclusive thrombus is present within a subsegmental branch of the right lower lobe pulmonary artery within the same distribution of the prior exam.  No evidence of new pulmonary embolism. 2. Cardiomegaly with minimal smooth interlobular septal thickening and small bilateral pleural  effusions. Findings suggest congestive heart failure. 3. Near complete resolution of the previously seen right lower lobe pneumonia. Mild bandlike parenchymal opacity within the right lower lobe suggesting atelectasis. Electronically Signed: Jose Zepeda  3/8/2024 11:02 PM EST  Workstation ID: [id number]    XR Chest 1 View    Result Date: 3/8/2024  Impression: 1. Cardiomegaly with minimal linear left basilar atelectasis. Electronically Signed: Jose Zepeda  3/8/2024 7:36 PM EST  Workstation ID: [id number]    Results for orders placed during the hospital encounter of 03/08/24    Adult Transthoracic Echo Complete w/ Color, Spectral and Contrast if Necessary Per Protocol    Interpretation Summary    Left ventricular systolic function is severely decreased. Calculated left ventricular EF = 16.8% Left ventricular ejection fraction appears to be less than 20%.    The left ventricular cavity is mildly dilated.    Left ventricular wall thickness is consistent with mild concentric hypertrophy.    Left ventricular diastolic function is consistent with (grade III w/high LAP) restrictive pattern.    Moderately reduced right ventricular systolic function noted.    The left atrial cavity is moderately dilated.    The right atrial cavity is moderately  dilated.    There are myxomatous changes of the mitral valve apparatus present.    Severe mitral valve regurgitation is present.    Moderate tricuspid valve regurgitation is present.    Estimated right ventricular systolic pressure from tricuspid regurgitation is moderately elevated (45-55 mmHg).    LV apical thrombus, measuring approximately 1 x 1 cm, not mobile.     Current Medications:  apixaban, 5 mg, Oral, Q12H  buPROPion XL, 300 mg, Oral, Daily  clopidogrel, 75 mg, Oral, Daily  empagliflozin, 10 mg, Oral, Daily  furosemide, 40 mg, Oral, Daily  metoprolol succinate XL, 75 mg, Oral, Daily  pantoprazole, 40 mg, Oral, Daily  pharmacy consult - [facility], , Does not apply,  Daily  sodium chloride, 10 mL, Intravenous, Q12H  spironolactone, 100 mg, Oral, Daily  valsartan, 40 mg, Oral, Daily         Assessment and Plan:   Acute on chronic systolic and diastolic heart failure  Echo 3/9/24: EF = 16.8%, Left ventricular wall thickness is consistent with mild concentric hypertrophy. Left ventricular diastolic function is consistent with (grade III w/high LAP) restrictive pattern. Moderately reduced right ventricular systolic function noted. There are myxomatous changes of the mitral valve apparatus present. Severe mitral valve regurgitation. Moderate tricuspid valve regurgitation. Estimated RVSP from tricuspid regurgitation is moderately elevated (45-55 mmHg). LV apical thrombus, measuring approximately 1 x 1 cm, not mobile.  LV thrombus  Echo: LV apical thrombus, measuring approximately 1 x 1 cm, not mobile.  History of DVT   Hypertension  Medical noncompliance  Substance abuse    -Continue Eliquis and Plavix due to multivessel CAD and LV thrombus  -Continue GDMT of metoprolol, spironolactone, Jardiance and valsartan. Continue to monitor renal function and blood pressure.  -Continue lasix  -CM helping with medications costs.     Dr. Sargent had a discussion with the patient about importance of medication compliance and abstinence from illicit drug use due to her severe cardiomyopathy.  If she continues to be noncompliant or uses illicit drugs she has a poor prognosis.    Electronically signed by KAYLA Bansal, 03/10/24, 11:54 AM EDT.     Please note that portions of this note were dictated utilizing Dragon dictation.

## 2024-03-10 NOTE — THERAPY DISCHARGE NOTE
Patient Name: Jill M Paladino  : 1965    MRN: [id number]                              Today's Date: 3/10/2024       Admit Date: 3/8/2024    Visit Dx:     ICD-10-CM ICD-9-CM   1. Shortness of breath  R06.02 786.05   2. Acute on chronic congestive heart failure, unspecified heart failure type  I50.9 428.0   3. H/O medication noncompliance  Z91.148 V15.81   4. History of pulmonary embolism  Z86.711 V12.55   5. History of pneumonia  Z87.01 V12.61     Patient Active Problem List   Diagnosis    Pneumonia    Acute pulmonary embolism without acute cor pulmonale    Chronic systolic CHF (congestive heart failure)    Coronary artery disease of native artery of native heart with stable angina pectoris    Essential hypertension    Pneumonia, unspecified organism    Chronic HFrEF (heart failure with reduced ejection fraction)    Shortness of breath     Past Medical History:   Diagnosis Date    Hypertension      Past Surgical History:   Procedure Laterality Date    KNEE SURGERY      RHINOPLASTY        General Information       Row Name 03/10/24 1135          Physical Therapy Time and Intention    Document Type discharge evaluation/summary  -     Mode of Treatment individual therapy;physical therapy  -       Row Name 03/10/24 1135          General Information    Patient Profile Reviewed yes  -LM     Prior Level of Function independent:;all household mobility;gait;ADL's  -     Existing Precautions/Restrictions no known precautions/restrictions  -     Barriers to Rehab none identified  -       Row Name 03/10/24 1135          Living Environment    People in Home alone  States she is currently staying at [facility]  -       Row Name 03/10/24 1135          Home Main Entrance    Number of Stairs, Main Entrance none  -LM       Row Name 03/10/24 1135          Stairs Within Home, Primary    Number of Stairs, Within Home, Primary none  -LM       Row Name 03/10/24 1135          Cognition    Orientation Status (Cognition)  oriented x 4  -LM               User Key  (r) = Recorded By, (t) = Taken By, (c) = Cosigned By      Initials Name Provider Type    LM Tabitha Childress, ELYSIA Physical Therapist                   Mobility       Row Name 03/10/24 1137          Bed Mobility    Bed Mobility supine-sit;sit-supine;scooting/bridging  -LM     Scooting/Bridging Wa[place] (Bed Mobility) independent  -LM     Supine-Sit Wa[place] (Bed Mobility) independent  -LM     Sit-Supine Wa[place] (Bed Mobility) independent  -LM       Row Name 03/10/24 1137          Sit-Stand Transfer    Sit-Stand Wa[place] (Transfers) independent  -LM       Row Name 03/10/24 1137          Gait/Stairs (Locomotion)    Waite Level (Gait) independent  -LM     Distance in Feet (Gait) 250  -LM     Deviations/Abnormal Patterns (Gait) antalgic  -LM     Left Sided Gait Deviations weight shift ability decreased  -LM     Comment, (Gait/Stairs) Pt reports pain/stiffness in the L knee, but this is baseline for her as he had a knee injury/sx years ago.  Pt reports she feels she is at baseline and has no concerns re: mobility/safety at d/c.  -LM               User Key  (r) = Recorded By, (t) = Taken By, (c) = Cosigned By      Initials Name Provider Type    Tabitha Gonzalez PT Physical Therapist                   Obj/Interventions       Row Name 03/10/24 1138          Range of Motion Comprehensive    General Range of Motion bilateral lower extremity ROM WFL  -LM       Row Name 03/10/24 1138          Strength Comprehensive (MMT)    General Manual Muscle Testing (MMT) Assessment no strength deficits identified  BLEs  -LM       Row Name 03/10/24 1138          Balance    Balance Assessment sitting static balance;standing static balance;standing dynamic balance  -LM     Static Sitting Balance independent  -LM     Position, Sitting Balance unsupported  -LM     Static Standing Balance independent  -LM     Dynamic Standing Balance independent  -LM     Position/Device Used, Standing  Balance unsupported  -LM       Row Name 03/10/24 1138          Sensory Assessment (Somatosensory)    Sensory Assessment (Somatosensory) LE sensation intact  -LM               User Key  (r) = Recorded By, (t) = Taken By, (c) = Cosigned By      Initials Name Provider Type    Tabitha Gonzalez, PT Physical Therapist                   Goals/Plan    No documentation.                  Clinical Impression       Row Name 03/10/24 1138          Pain    Pretreatment Pain Rating 0/10 - no pain  -LM     Posttreatment Pain Rating 0/10 - no pain  -LM       Row Name 03/10/24 1138          Plan of Care Review    Plan of Care Reviewed With patient  -LM     Outcome Evaluation PT evaluation completed.  Pt demonstrated independence with all mobility including ambulating 250 feet without AD - no unsteadiness noted.  Pt reports she feels at baseline and has no need for skilled PT at this time.  PT signing off.  -LM       Row Name 03/10/24 1138          Therapy Assessment/Plan (PT)    Criteria for Skilled Interventions Met (PT) no;no problems identified which require skilled intervention  -LM     Therapy Frequency (PT) evaluation only  -LM       Row Name 03/10/24 1138          Vital Signs    Pre Systolic BP Rehab 106  -LM     Pre Treatment Diastolic BP 83  -LM     Pretreatment Heart Rate (beats/min) 68  -LM     Posttreatment Heart Rate (beats/min) 75  -LM     Pre SpO2 (%) 97  -LM     O2 Delivery Pre Treatment room air  -LM     Post SpO2 (%) 95  -LM     O2 Delivery Post Treatment room air  -LM     Pre Patient Position Supine  -LM     Post Patient Position Supine  -LM       Row Name 03/10/24 1138          Positioning and Restraints    Pre-Treatment Position in bed  -LM     Post Treatment Position bed  -LM     In Bed supine;call light within reach;encouraged to call for assist;notified nsg  -LM               User Key  (r) = Recorded By, (t) = Taken By, (c) = Cosigned By      Initials Name Provider Type    Tabitha Gonzalez, ELYSIA Physical  Therapist                   Outcome Measures       Row Name 03/10/24 1139 03/10/24 0800       How much help from another person do you currently need...    Turning from your back to your side while in flat bed without using bedrails? 4  -LM 4  -SW    Moving from lying on back to sitting on the side of a flat bed without bedrails? 4  -LM 4  -SW    Moving to and from a bed to a chair (including a wheelchair)? 4  -LM 4  -SW    Standing up from a chair using your arms (e.g., wheelchair, bedside chair)? 4  -LM 3  -SW    Climbing 3-5 steps with a railing? 4  -LM 3  -SW    To walk in hospital room? 4  -LM 3  -SW    AM-PAC 6 Clicks Score (PT) 24  -LM 21  -SW    Highest Level of Mobility Goal 8 --> Walked 250 feet or more  -LM 6 --> Walk 10 steps or more  -SW      Row Name 03/10/24 1139          Functional Assessment    Outcome Measure Options AM-PAC 6 Clicks Basic Mobility (PT)  -               User Key  (r) = Recorded By, (t) = Taken By, (c) = Cosigned By      Initials Name Provider Type    LM Tabitha Childress, PT Physical Therapist    Roberta Rodriguez RN Registered Nurse                  Physical Therapy Education       Title: PT OT SLP Therapies (Done)       Topic: Physical Therapy (Done)       Point: Mobility training (Done)       Learning Progress Summary             Patient Acceptance, E, VU,DU by  at 3/10/2024 1139                         Point: Precautions (Done)       Learning Progress Summary             Patient Acceptance, E, VU,DU by  at 3/10/2024 1139                                         User Key       Initials Effective Dates Name Provider Type [facility] 07/11/23 -  Tabitha Childress, PT Physical Therapist PT                  PT Recommendation and Plan     Plan of Care Reviewed With: patient  Outcome Evaluation: PT evaluation completed.  Pt demonstrated independence with all mobility including ambulating 250 feet without AD - no unsteadiness noted.  Pt reports she feels at baseline and has no need  for skilled PT at this time.  PT signing off.     Time Calculation:   PT Evaluation Complexity  History, PT Evaluation Complexity: 3 or more personal factors and/or comorbidities  Examination of Body Systems (PT Eval Complexity): total of 3 or more elements  Clinical Presentation (PT Evaluation Complexity): stable  Clinical Decision Making (PT Evaluation Complexity): low complexity  Overall Complexity (PT Evaluation Complexity): low complexity     PT Charges       Row Name 03/10/24 1140             Time Calculation    Start Time 1118  -LM      PT Received On 03/10/24  -LM         Untimed Charges    PT Eval/Re-eval Minutes 33  -LM         Total Minutes    Untimed Charges Total Minutes 33  -LM       Total Minutes 33  -LM                User Key  (r) = Recorded By, (t) = Taken By, (c) = Cosigned By      Initials Name Provider Type    LM Tabitha Childress, PT Physical Therapist                  Therapy Charges for Today       Code Description Service Date Service Provider Modifiers Qty    [id number]  PT EVAL LOW COMPLEXITY 3 3/10/2024 Tabitha Childress, PT GP 1            PT G-Codes  Outcome Measure Options: AM-PAC 6 Clicks Basic Mobility (PT)  AM-PAC 6 Clicks Score (PT): 24    PT Discharge Summary  Anticipated Discharge Disposition (PT): home    Tabitha Childress PT  3/10/2024

## 2024-03-10 NOTE — PROGRESS NOTES
"    [facility] Medicine Services  PROGRESS NOTE    Patient Name: Jill M Paladino  : 1965  MRN: [id number]    Date of Admission: 3/8/2024  Primary Care Physician: Sigrid Byers PA    Subjective   Subjective     CC:  F/U CHF    HPI:  Patient seen and examined. She was having a conversation with herself prior to my entry about \"fluid buildup on her body.\" She ate all of her breakfast. We discussed starting oral lasix today. No family at bedside.     Objective   Objective     Vital Signs:   Temp:  [97.1 °F (36.2 °C)-98 °F (36.7 °C)] 97.5 °F (36.4 °C)  Heart Rate:  [68-83] 68  Resp:  [16-18] 16  BP: (121-132)/(84-95) 122/84     Physical Exam:  Constitutional: No acute distress, awake, alert  HENT: NCAT, mucous membranes moist  Respiratory: Clear to auscultation bilaterally, respiratory effort normal   Cardiovascular: RRR, no murmurs, rubs, or gallops  Gastrointestinal: Positive bowel sounds, soft, nontender, nondistended  Musculoskeletal: No bilateral ankle edema  Psychiatric: Appropriate affect, cooperative  Neurologic: No focal deficits, speech clear  Skin: No rashes     Results Reviewed:  LAB RESULTS:      Lab 03/10/24  0524  185   WBC 11.40* 8.71   HEMOGLOBIN 13.1 12.4   HEMATOCRIT 41.7 40.4   PLATELETS 344 327   NEUTROS ABS 8.55* 5.46   IMMATURE GRANS (ABS)  --  0.08*   LYMPHS ABS  --  2.51   MONOS ABS  --  0.36   EOS ABS 0.11 0.23   MCV 91.0 92.2   PROCALCITONIN  --  0.05   D DIMER QUANT  --  1.13*         Lab 03/10/24  0529 24  1858   SODIUM 139 144   POTASSIUM 4.2 4.2   CHLORIDE 102 109*   CO2 25.0 25.0   ANION GAP 12.0 10.0   BUN 26* 26*   CREATININE 0.79 0.83   EGFR 87.4 82.3   GLUCOSE 117* 90   CALCIUM 9.0 9.2   MAGNESIUM 2.1  --    PHOSPHORUS 4.9*  --    TSH 3.830  --          Lab 03/10/24  0529 24  1858   TOTAL PROTEIN 6.5 6.9   ALBUMIN 3.7 3.9   GLOBULIN 2.8 3.0   ALT (SGPT) 75* 70*   AST (SGOT) 38* 44*   BILIRUBIN 0.5 0.7   ALK PHOS 141* 126*      "    Lab 03/08/24 2120 03/08/24  1858   PROBNP  --  14,881.0*   HSTROP T 31* 36*                 Brief Urine Lab Results  (Last result in the past 365 days)        Color   Clarity   Blood   Leuk Est   Nitrite   Protein   CREAT   Urine HCG        12/14/23 2044 Yellow   Clear   Trace   Small (1+)   Negative   Negative                   Microbiology Results Abnormal       Procedure Component Value - Date/Time    COVID PRE-OP / PRE-PROCEDURE SCREENING ORDER (NO ISOLATION) - Swab, Nasopharynx [[id number]]  (Normal) Collected: 03/08/24 2017    Lab Status: Final result Specimen: Swab from Nasopharynx Updated: 03/08/24 2116    Narrative:      The following orders were created for panel order COVID PRE-OP / PRE-PROCEDURE SCREENING ORDER (NO ISOLATION) - Swab, Nasopharynx.  Procedure                               Abnormality         Status                     ---------                               -----------         ------                     Respiratory Panel PCR w/...[[id number]]  Normal              Final result                 Please view results for these tests on the individual orders.    Respiratory Panel PCR w/COVID-19(SARS-CoV-2) [facility]ER/VONDA/VICTOR MANUEL/[facility]/COR/SWAPNA In-House, NP Swab in UTM/VTM, 2 HR TAT - Swab, Nasopharynx [[id number]]  (Normal) Collected: 03/08/24 2017    Lab Status: Final result Specimen: Swab from Nasopharynx Updated: 03/08/24 2116     ADENOVIRUS, PCR Not Detected     Coronavirus 229E Not Detected     Coronavirus HKU1 Not Detected     Coronavirus NL63 Not Detected     Coronavirus OC43 Not Detected     COVID19 Not Detected     Human Metapneumovirus Not Detected     Human Rhinovirus/Enterovirus Not Detected     Influenza A PCR Not Detected     Influenza B PCR Not Detected     Parainfluenza Virus 1 Not Detected     Parainfluenza Virus 2 Not Detected     Parainfluenza Virus 3 Not Detected     Parainfluenza Virus 4 Not Detected     RSV, PCR Not Detected     Bordetella pertussis pcr Not Detected     Bordetella  parapertussis PCR Not Detected     Chlamydophila pneumoniae PCR Not Detected     Mycoplasma pneumo by PCR Not Detected    Narrative:      In the setting of a positive respiratory panel with a viral infection PLUS a negative procalcitonin without other underlying concern for bacterial infection, consider observing off antibiotics or discontinuation of antibiotics and continue supportive care. If the respiratory panel is positive for atypical bacterial infection (Bordetella pertussis, Chlamydophila pneumoniae, or Mycoplasma pneumoniae), consider antibiotic de-escalation to target atypical bacterial infection.            Adult Transthoracic Echo Complete w/ Color, Spectral and Contrast if Necessary Per Protocol    Addendum Date: 3/9/2024      Left ventricular systolic function is severely decreased. Calculated left ventricular EF = 16.8% Left ventricular ejection fraction appears to be less than 20%.   The left ventricular cavity is mildly dilated.   Left ventricular wall thickness is consistent with mild concentric hypertrophy.   Left ventricular diastolic function is consistent with (grade III w/high LAP) restrictive pattern.   Moderately reduced right ventricular systolic function noted.   The left atrial cavity is moderately dilated.   The right atrial cavity is moderately  dilated.   There are myxomatous changes of the mitral valve apparatus present.   Severe mitral valve regurgitation is present.   Moderate tricuspid valve regurgitation is present.   Estimated right ventricular systolic pressure from tricuspid regurgitation is moderately elevated (45-55 mmHg).   LV apical thrombus, measuring approximately 1 x 1 cm, not mobile.     Result Date: 3/9/2024    Left ventricular systolic function is severely decreased. Calculated left ventricular EF = 16.8% Left ventricular ejection fraction appears to be less than 20%.   The left ventricular cavity is mildly dilated.   Left ventricular wall thickness is consistent with  mild concentric hypertrophy.   Left ventricular diastolic function is consistent with (grade III w/high LAP) restrictive pattern.   Moderately reduced right ventricular systolic function noted.   The left atrial cavity is moderately dilated.   The right atrial cavity is moderately  dilated.   There are myxomatous changes of the mitral valve apparatus present.   Severe mitral valve regurgitation is present.   Moderate tricuspid valve regurgitation is present.   Estimated right ventricular systolic pressure from tricuspid regurgitation is moderately elevated (45-55 mmHg).     CT Angiogram Chest    Result Date: 3/8/2024  CT ANGIOGRAM CHEST Date of Exam: 3/8/2024 10:39 PM EST Indication: Shortness of breath, elevated d-dimer. Comparison: CT chest dated 12/14/2023 Technique: CTA of the chest was performed after the uneventful intravenous administration of 80 mL Isovue 370. Reconstructed coronal and sagittal images were also obtained. In addition, a 3-D volume rendered image was created for interpretation. Automated exposure control and iterative reconstruction methods were used. Findings: The visualized soft tissue structures at the base the neck including the thyroid appear within normal limits. There is no lower cervical or axillary adenopathy. The heart is enlarged. There is reflux of contrast into the IVC and hepatic veins which can be seen with right-sided heart dysfunction. There is mild aneurysmal dilatation of the ascending thoracic aorta at the level of the main pulmonary artery measuring up to 4.4 cm. There is coronary and aortic atherosclerotic calcification. The main pulmonary artery appears normal in caliber. There is near complete resolution of the previously noted right lower lobe pulmonary embolism with a minimal residual  subsegmental branch filling defect best seen on axial image 57-58. There is no evidence of new pulmonary embolism. There is no mediastinal or hilar lymphadenopathy by size criteria. The  tracheobronchial tree is patent. There are small bilateral layering pleural effusions which appears improved on the right and increased on the left when compared to the prior examination. There is a small amount of residual airspace opacity within the right lower lobe representing residual atelectasis. The majority of the prior right lower lobe pneumonia appears resolved. There is bandlike atelectasis within the lingula. There is minimal smooth interlobular septal thickening. The esophagus is normal in course and caliber. Visualized portions of the upper abdomen demonstrate no acute findings. There are no acute osseous findings of the chest.     Impression: Impression: 1. Near complete resolution of the previously noted right lower lobe pulmonary embolism. Small residual nonocclusive thrombus is present within a subsegmental branch of the right lower lobe pulmonary artery within the same distribution of the prior exam.  No evidence of new pulmonary embolism. 2. Cardiomegaly with minimal smooth interlobular septal thickening and small bilateral pleural effusions. Findings suggest congestive heart failure. 3. Near complete resolution of the previously seen right lower lobe pneumonia. Mild bandlike parenchymal opacity within the right lower lobe suggesting atelectasis. Electronically Signed: Jose Zepeda  3/8/2024 11:02 PM EST  Workstation ID: [id number]    XR Chest 1 View    Result Date: 3/8/2024  XR CHEST 1 VW Date of Exam: 3/8/2024 7:12 PM EST Indication: SOA triage protocol Comparison: Chest radiograph dated 12/16/2023 Findings: There is cardiomegaly. Pulmonary vascularity appears normal. There is minimal linear atelectasis within the left lung base. There is no significant pleural effusion. No evidence of pneumothorax. There are degenerative changes of the thoracic spine.     Impression: Impression: 1. Cardiomegaly with minimal linear left basilar atelectasis. Electronically Signed: Jose Zepeda  3/8/2024 7:36  PM EST  Workstation ID: [id number]     Results for orders placed during the hospital encounter of 03/08/24    Adult Transthoracic Echo Complete w/ Color, Spectral and Contrast if Necessary Per Protocol    Interpretation Summary    Left ventricular systolic function is severely decreased. Calculated left ventricular EF = 16.8% Left ventricular ejection fraction appears to be less than 20%.    The left ventricular cavity is mildly dilated.    Left ventricular wall thickness is consistent with mild concentric hypertrophy.    Left ventricular diastolic function is consistent with (grade III w/high LAP) restrictive pattern.    Moderately reduced right ventricular systolic function noted.    The left atrial cavity is moderately dilated.    The right atrial cavity is moderately  dilated.    There are myxomatous changes of the mitral valve apparatus present.    Severe mitral valve regurgitation is present.    Moderate tricuspid valve regurgitation is present.    Estimated right ventricular systolic pressure from tricuspid regurgitation is moderately elevated (45-55 mmHg).    LV apical thrombus, measuring approximately 1 x 1 cm, not mobile.      Current medications:  Scheduled Meds:apixaban, 5 mg, Oral, Q12H  buPROPion XL, 300 mg, Oral, Daily  clopidogrel, 75 mg, Oral, Daily  empagliflozin, 10 mg, Oral, Daily  furosemide, 40 mg, Oral, Daily  metoprolol succinate XL, 75 mg, Oral, Daily  pantoprazole, 40 mg, Oral, Daily  pharmacy consult - MTM, , Does not apply, Daily  sodium chloride, 10 mL, Intravenous, Q12H  spironolactone, 100 mg, Oral, Daily  valsartan, 40 mg, Oral, Daily      Continuous Infusions:   PRN Meds:.  acetaminophen **OR** acetaminophen **OR** acetaminophen    ALPRAZolam    senna-docusate sodium **AND** polyethylene glycol **AND** bisacodyl **AND** bisacodyl    calcium carbonate    Calcium Replacement - Follow Nurse / BPA Driven Protocol    influenza vaccine    Magnesium Standard Dose Replacement - Follow Nurse /  BPA Driven Protocol    Phosphorus Replacement - Follow Nurse / BPA Driven Protocol    Potassium Replacement - Follow Nurse / BPA Driven Protocol    sodium chloride    sodium chloride    sodium chloride    Assessment & Plan   Assessment & Plan     Active Hospital Problems    Diagnosis  POA    **Shortness of breath [R06.02]  Yes    Chronic HFrEF (heart failure with reduced ejection fraction) [I50.22]  Yes    Acute pulmonary embolism without acute cor pulmonale [I26.99]  Yes    Chronic systolic CHF (congestive heart failure) [I50.22]  Yes    Coronary artery disease of native artery of native heart with stable angina pectoris [I25.118]  Yes    Essential hypertension [I10]  Yes      Resolved Hospital Problems   No resolved problems to display.        Brief Hospital Course to date:  Jill M Paladino is a 58 y.o. female  with a past medical history of heart failure with reduced ejection fraction, severe multivessel coronary artery disease, hyperlipidemia, hypertension, DVT on Eliquis, anxiety/depression and medication noncompliance. Patient was admitted for pneumonia December 23.  Patient presented to ED  with worsening shortness of breath associated with productive cough.     This patient's problems and plans were partially entered by my partner and updated as appropriate by me 03/10/24.   All problems are new to me today     A/C Systolic and Diastolic CHF  Valvular heart disease   LV Apical Thrombus   Medication noncompliance  -Cardiology following, appreciate assistance  -Received IV Lasix on admission. Transition to PO today  -Continue Jardiance   -Continue Metoprolol, Spironolactone, Valsartan      History of DVT/PE  -Patient ran out for Eliquis for the last few days  -Continue home Eliquis.     Medication noncompliance  -CM/SW to see to ensure patient can get her meds      UDS positive for meth  -Watch for withdrawal symptoms    Recent PNA  -CTA chest shows near complete resolution    Elevated LFTs   -Trending down,  likely congestive hepatopathy      Expected Discharge Location and Transportation: Home when cleared by Cardiology   Expected Discharge   Expected Discharge Date: 3/11/2024; Expected Discharge Time:      DVT prophylaxis:  Medical DVT prophylaxis orders are present.         AM-PAC 6 Clicks Score (PT): 21 (03/10/24 0800)    CODE STATUS:   Code Status and Medical Interventions:   Ordered at: 03/10/24 1026     Code Status (Patient has no pulse and is not breathing):    CPR (Attempt to Resuscitate)     Medical Interventions (Patient has pulse or is breathing):    Full Support       Skylar Harding,   03/10/24

## 2024-03-11 ENCOUNTER — READMISSION MANAGEMENT (OUTPATIENT)
Dept: CALL CENTER | Facility: HOSPITAL | Age: 59
End: 2024-03-11
Payer: MEDICAID

## 2024-03-11 VITALS
OXYGEN SATURATION: 96 % | TEMPERATURE: 97.5 F | SYSTOLIC BLOOD PRESSURE: 129 MMHG | WEIGHT: 129.5 LBS | RESPIRATION RATE: 18 BRPM | HEART RATE: 66 BPM | DIASTOLIC BLOOD PRESSURE: 94 MMHG | HEIGHT: 65 IN | BODY MASS INDEX: 21.58 KG/M2

## 2024-03-11 LAB
QT INTERVAL: 402 MS
QT INTERVAL: 416 MS
QT INTERVAL: 494 MS
QTC INTERVAL: 501 MS
QTC INTERVAL: 511 MS
QTC INTERVAL: 513 MS

## 2024-03-11 PROCEDURE — G0378 HOSPITAL OBSERVATION PER HR: HCPCS

## 2024-03-11 PROCEDURE — 99239 HOSP IP/OBS DSCHRG MGMT >30: CPT | Performed by: NURSE PRACTITIONER

## 2024-03-11 PROCEDURE — 99214 OFFICE O/P EST MOD 30 MIN: CPT | Performed by: PHYSICIAN ASSISTANT

## 2024-03-11 RX ORDER — BUPROPION HYDROCHLORIDE 300 MG/1
300 TABLET ORAL DAILY
COMMUNITY

## 2024-03-11 RX ORDER — DAPAGLIFLOZIN 10 MG/1
1 TABLET, FILM COATED ORAL DAILY
COMMUNITY
End: 2024-03-11 | Stop reason: HOSPADM

## 2024-03-11 RX ORDER — ATORVASTATIN CALCIUM 40 MG/1
40 TABLET, FILM COATED ORAL NIGHTLY
Qty: 90 TABLET | Refills: 0 | Status: SHIPPED | OUTPATIENT
Start: 2024-03-11

## 2024-03-11 RX ORDER — METOPROLOL SUCCINATE 50 MG/1
75 TABLET, EXTENDED RELEASE ORAL DAILY
Qty: 45 TABLET | Refills: 0 | Status: SHIPPED | OUTPATIENT
Start: 2024-03-11 | End: 2024-04-10

## 2024-03-11 RX ORDER — PANTOPRAZOLE SODIUM 40 MG/1
40 TABLET, DELAYED RELEASE ORAL DAILY
Qty: 30 TABLET | Refills: 0 | Status: SHIPPED | OUTPATIENT
Start: 2024-03-12

## 2024-03-11 RX ORDER — SPIRONOLACTONE 100 MG/1
100 TABLET, FILM COATED ORAL DAILY
Qty: 90 TABLET | Refills: 0 | Status: SHIPPED | OUTPATIENT
Start: 2024-03-11

## 2024-03-11 RX ORDER — CLOPIDOGREL BISULFATE 75 MG/1
75 TABLET ORAL DAILY
Qty: 90 TABLET | Refills: 0 | Status: SHIPPED | OUTPATIENT
Start: 2024-03-11

## 2024-03-11 RX ORDER — VALSARTAN 40 MG/1
40 TABLET ORAL DAILY
Qty: 90 TABLET | Refills: 0 | Status: SHIPPED | OUTPATIENT
Start: 2024-03-11

## 2024-03-11 RX ORDER — ATORVASTATIN CALCIUM 40 MG/1
40 TABLET, FILM COATED ORAL NIGHTLY
Status: DISCONTINUED | OUTPATIENT
Start: 2024-03-11 | End: 2024-03-11 | Stop reason: HOSPADM

## 2024-03-11 RX ORDER — ARIPIPRAZOLE 2 MG/1
2 TABLET ORAL DAILY
COMMUNITY

## 2024-03-11 RX ORDER — FUROSEMIDE 40 MG/1
40 TABLET ORAL DAILY
Qty: 90 TABLET | Refills: 0 | Status: SHIPPED | OUTPATIENT
Start: 2024-03-12

## 2024-03-11 RX ADMIN — Medication 10 ML: at 08:56

## 2024-03-11 RX ADMIN — APIXABAN 5 MG: 5 TABLET, FILM COATED ORAL at 08:55

## 2024-03-11 RX ADMIN — CLOPIDOGREL BISULFATE 75 MG: 75 TABLET ORAL at 08:55

## 2024-03-11 RX ADMIN — EMPAGLIFLOZIN 10 MG: 10 TABLET, FILM COATED ORAL at 08:55

## 2024-03-11 RX ADMIN — METOPROLOL SUCCINATE 75 MG: 50 TABLET, EXTENDED RELEASE ORAL at 08:55

## 2024-03-11 RX ADMIN — BUPROPION HYDROCHLORIDE 300 MG: 150 TABLET, EXTENDED RELEASE ORAL at 08:55

## 2024-03-11 RX ADMIN — VALSARTAN 40 MG: 80 TABLET, FILM COATED ORAL at 08:55

## 2024-03-11 RX ADMIN — PANTOPRAZOLE SODIUM 40 MG: 40 TABLET, DELAYED RELEASE ORAL at 08:55

## 2024-03-11 RX ADMIN — FUROSEMIDE 40 MG: 40 TABLET ORAL at 08:55

## 2024-03-11 RX ADMIN — SPIRONOLACTONE 100 MG: 25 TABLET ORAL at 08:55

## 2024-03-11 NOTE — PLAN OF CARE
Problem: Noninvasive Ventilation Acute  Goal: Effective Unassisted Ventilation and Oxygenation  3/11/2024 0437 by Felix Bueno RN  Outcome: Ongoing, Progressing  3/11/2024 0437 by Felix Bueno RN  Outcome: Ongoing, Progressing  Intervention: Monitor and Manage Noninvasive Ventilation  Recent Flowsheet Documentation  Taken 3/10/2024 1937 by Felix Bueno RN  Airway/Ventilation Management: pulmonary hygiene promoted     Problem: Adult Inpatient Plan of Care  Goal: Plan of Care Review  3/11/2024 0437 by Felix Bueno RN  Outcome: Ongoing, Progressing  Flowsheets  Taken 3/11/2024 0437 by Felix Bueno RN  Progress: improving  Outcome Evaluation: VSS, RA, nausea well controlled with meds, patient has rested very well, no complications assessed or stated.  Taken 3/10/2024 1138 by Tabitha Childress, ELYSIA  Plan of Care Reviewed With: patient  3/11/2024 0437 by Felix Bueno RN  Outcome: Ongoing, Progressing  Flowsheets (Taken 3/11/2024 0437)  Progress: improving  Outcome Evaluation: VSS, RA, nausea well controlled with meds, patient has rested very well, no complications assessed or stated.  Goal: Patient-Specific Goal (Individualized)  3/11/2024 0437 by Felix Bueno RN  Outcome: Ongoing, Progressing  3/11/2024 0437 by Felix Bueno RN  Outcome: Ongoing, Progressing  Goal: Absence of Hospital-Acquired Illness or Injury  3/11/2024 0437 by Felix Bueno RN  Outcome: Ongoing, Progressing  3/11/2024 0437 by Felix Bueno RN  Outcome: Ongoing, Progressing  Intervention: Identify and Manage Fall Risk  Recent Flowsheet Documentation  Taken 3/11/2024 0400 by Felix Bueno RN  Safety Promotion/Fall Prevention:   assistive device/personal items within reach   clutter free environment maintained   fall prevention program maintained   lighting adjusted   nonskid shoes/slippers when out of bed   room organization consistent    safety round/check completed   toileting scheduled  Taken 3/11/2024 0000 by Felix Bueno RN  Safety Promotion/Fall Prevention:   assistive device/personal items within reach   clutter free environment maintained   fall prevention program maintained   lighting adjusted   nonskid shoes/slippers when out of bed   room organization consistent   safety round/check completed   toileting scheduled  Taken 3/10/2024 1937 by Felix Bueno RN  Safety Promotion/Fall Prevention:   assistive device/personal items within reach   clutter free environment maintained   fall prevention program maintained   lighting adjusted   nonskid shoes/slippers when out of bed   room organization consistent   safety round/check completed   toileting scheduled  Intervention: Prevent Skin Injury  Recent Flowsheet Documentation  Taken 3/11/2024 0400 by Felix Bueno RN  Body Position: position changed independently  Taken 3/11/2024 0000 by Felix Bueno RN  Body Position: position changed independently  Taken 3/10/2024 1937 by Felix Bueno RN  Body Position: position changed independently  Skin Protection:   adhesive use limited   incontinence pads utilized   tubing/devices free from skin contact  Intervention: Prevent and Manage VTE (Venous Thromboembolism) Risk  Recent Flowsheet Documentation  Taken 3/11/2024 0400 by Felix Bueno RN  Activity Management: activity encouraged  Taken 3/11/2024 0000 by Felix Bueno RN  Activity Management: activity encouraged  Taken 3/10/2024 2035 by Felix Bueno RN  Activity Management:   ambulated to bathroom   activity encouraged  Taken 3/10/2024 1937 by Felix Bueno RN  Activity Management: activity encouraged  VTE Prevention/Management: (PO eliquis) other (see comments)  Range of Motion: active ROM (range of motion) encouraged  Intervention: Prevent Infection  Recent Flowsheet Documentation  Taken 3/11/2024 0400 by  Felix Bueno, RN  Infection Prevention:   environmental surveillance performed   equipment surfaces disinfected   hand hygiene promoted   rest/sleep promoted   single patient room provided  Taken 3/11/2024 0000 by Felix Bueno RN  Infection Prevention:   environmental surveillance performed   equipment surfaces disinfected   hand hygiene promoted   rest/sleep promoted   single patient room provided  Taken 3/10/2024 1937 by Felix Bueno RN  Infection Prevention:   environmental surveillance performed   equipment surfaces disinfected   hand hygiene promoted   rest/sleep promoted   single patient room provided  Goal: Optimal Comfort and Wellbeing  3/11/2024 0437 by Felix Bueno RN  Outcome: Ongoing, Progressing  3/11/2024 0437 by Felix Bueno RN  Outcome: Ongoing, Progressing  Intervention: Monitor Pain and Promote Comfort  Recent Flowsheet Documentation  Taken 3/10/2024 1937 by Felix Bueno RN  Pain Management Interventions:   care clustered   pain management plan reviewed with patient/caregiver   medication offered but refused   pillow support provided   position adjusted   quiet environment facilitated   see MAR  Intervention: Provide Person-Centered Care  Recent Flowsheet Documentation  Taken 3/10/2024 1937 by Felix Bueno RN  Trust Relationship/Rapport:   care explained   choices provided   questions answered   questions encouraged   empathic listening provided   emotional support provided   reassurance provided   thoughts/feelings acknowledged  Goal: Readiness for Transition of Care  3/11/2024 0437 by Felix Bueno RN  Outcome: Ongoing, Progressing  3/11/2024 0437 by Felix Bueno RN  Outcome: Ongoing, Progressing     Problem: Pain Acute  Goal: Acceptable Pain Control and Functional Ability  3/11/2024 0437 by Felix Bueno RN  Outcome: Ongoing, Progressing  3/11/2024 0437 by Felix Bueno  RN  Outcome: Ongoing, Progressing  Intervention: Prevent or Manage Pain  Recent Flowsheet Documentation  Taken 3/11/2024 0400 by Felix Bueno RN  Medication Review/Management: medications reviewed  Taken 3/11/2024 0000 by Felix Bueno RN  Medication Review/Management: medications reviewed  Taken 3/10/2024 1937 by Felix Bueno RN  Sensory Stimulation Regulation:   auditory stimulation minimized   care clustered   lighting decreased   quiet environment promoted  Bowel Elimination Promotion:   adequate fluid intake promoted   ambulation promoted   privacy promoted  Sleep/Rest Enhancement: awakenings minimized  Medication Review/Management: medications reviewed  Intervention: Develop Pain Management Plan  Recent Flowsheet Documentation  Taken 3/10/2024 1937 by Felix Bueno RN  Pain Management Interventions:   care clustered   pain management plan reviewed with patient/caregiver   medication offered but refused   pillow support provided   position adjusted   quiet environment facilitated   see MAR  Intervention: Optimize Psychosocial Wellbeing  Recent Flowsheet Documentation  Taken 3/10/2024 1937 by Felix Bueno RN  Supportive Measures: active listening utilized  Diversional Activities: television   Goal Outcome Evaluation:         VSS, RA, patient has rested very well, no complications assessed or stated.

## 2024-03-11 NOTE — PROGRESS NOTES
"  [facility]  PROGRESS NOTE    Date of Admission: 3/8/2024  Date of Service: 03/11/24    Primary Care Physician: Sigrid Byers PA    Chief Complaint: f/u HFrEF   Problem List:   Shortness of breath    Acute pulmonary embolism without acute cor pulmonale    Chronic systolic CHF (congestive heart failure)    Coronary artery disease of native artery of native heart with stable angina pectoris    Essential hypertension    Chronic HFrEF (heart failure with reduced ejection fraction)      Subjective      Lying asleep in bed, relatively flat. Denies dyspnea or chest pain.       Objective   Vitals: /94 (BP Location: Right arm, Patient Position: Lying)   Pulse 65   Temp 98.2 °F (36.8 °C) (Oral)   Resp 16   Ht 165.1 cm (65\")   Wt 58.7 kg (129 lb 8 oz)   SpO2 95%   BMI 21.55 kg/m²     Physical Exam:  GENERAL: Alert, cooperative, in no acute distress.   HEENT: Normocephalic, no jugular venous distention  HEART: Regular rhythm, normal rate, and no murmurs, gallops, or rubs.   LUNGS: No wheezing, rales or rhonchi.  NEUROLOGIC: No focal abnormalities involving strength or sensation are noted.   EXTREMITIES: No clubbing, cyanosis, or edema noted.     Results:  Results from last 7 days   Lab Units 03/10/24  0529 03/08/24  1858   WBC 10*3/mm3 11.40* 8.71   HEMOGLOBIN g/dL 13.1 12.4   HEMATOCRIT % 41.7 40.4   PLATELETS 10*3/mm3 344 327     Results from last 7 days   Lab Units 03/10/24  0529 03/08/24  1858   SODIUM mmol/L 139 144   POTASSIUM mmol/L 4.2 4.2   CHLORIDE mmol/L 102 109*   CO2 mmol/L 25.0 25.0   BUN mg/dL 26* 26*   CREATININE mg/dL 0.79 0.83   GLUCOSE mg/dL 117* 90      Lab Results   Component Value Date    CHOL 130 12/15/2023    TRIG 76 12/15/2023    HDL 41 12/15/2023    LDL 74 12/15/2023    AST 38 (H) 03/10/2024    ALT 75 (H) 03/10/2024             Results from last 7 days   Lab Units 03/10/24  0529   TSH uIU/mL 3.830       Results from last 7 days   Lab Units " 03/08/24 2120 03/08/24  1858   HSTROP T ng/L 31* 36*     Results from last 7 days   Lab Units 03/08/24  1858   PROBNP pg/mL 14,881.0*       Intake/Output Summary (Last 24 hours) at 3/11/2024 0852  Last data filed at 3/11/2024 0559  Gross per 24 hour   Intake 1760 ml   Output 1650 ml   Net 110 ml     I personally reviewed the patient's EKG/Telemetry data    Radiology Data:   Adult Transthoracic Echo Complete w/ Color, Spectral and Contrast if Necessary Per Protocol    Addendum Date: 3/9/2024      Left ventricular systolic function is severely decreased. Calculated left ventricular EF = 16.8% Left ventricular ejection fraction appears to be less than 20%.   The left ventricular cavity is mildly dilated.   Left ventricular wall thickness is consistent with mild concentric hypertrophy.   Left ventricular diastolic function is consistent with (grade III w/high LAP) restrictive pattern.   Moderately reduced right ventricular systolic function noted.   The left atrial cavity is moderately dilated.   The right atrial cavity is moderately  dilated.   There are myxomatous changes of the mitral valve apparatus present.   Severe mitral valve regurgitation is present.   Moderate tricuspid valve regurgitation is present.   Estimated right ventricular systolic pressure from tricuspid regurgitation is moderately elevated (45-55 mmHg).   LV apical thrombus, measuring approximately 1 x 1 cm, not mobile.      CTA Chest 3/8/24:  IMPRESSION:  Impression:  1. Near complete resolution of the previously noted right lower lobe pulmonary embolism. Small residual nonocclusive thrombus is present within a subsegmental branch of the right lower lobe pulmonary artery within the same distribution of the prior exam.   No evidence of new pulmonary embolism.  2. Cardiomegaly with minimal smooth interlobular septal thickening and small bilateral pleural effusions. Findings suggest congestive heart failure.  3. Near complete resolution of the  previously seen right lower lobe pneumonia. Mild bandlike parenchymal opacity within the right lower lobe suggesting atelectasis.    Current Medications:  apixaban, 5 mg, Oral, Q12H  buPROPion XL, 300 mg, Oral, Daily  clopidogrel, 75 mg, Oral, Daily  empagliflozin, 10 mg, Oral, Daily  furosemide, 40 mg, Oral, Daily  metoprolol succinate XL, 75 mg, Oral, Daily  pantoprazole, 40 mg, Oral, Daily  pharmacy consult - MTM, , Does not apply, Daily  sodium chloride, 10 mL, Intravenous, Q12H  spironolactone, 100 mg, Oral, Daily  valsartan, 40 mg, Oral, Daily      Assessment and Plan:   Acute on chronic systolic and diastolic heart failure  Echo 3/9/24: EF = 16.8%,  grade III diastolic dysfunction. Moderately reduced right ventricular systolic function noted. There are myxomatous changes of the mitral valve apparatus present. Severe mitral valve regurgitation. Moderate tricuspid valve regurgitation. Estimated RVSP from tricuspid regurgitation is moderately elevated (45-55 mmHg). LV apical thrombus, measuring approximately 1 x 1 cm, not mobile.  Continue GDMT with Metoprolol, Spironolactone, Jardiance and Valsartan   She has follow up with Cardiology as [facility] and has been referred to CTS there for CAD and severe MR. Discussed with Dr. Sargent, recommend she keep her follow up at [facility]   LV thrombus  Echo: LV apical thrombus, measuring approximately 1 x 1 cm, not mobile.  Continue Eliquis   CAD  See #1. Continue Plavix, BB.   Needs statin  History of DVT   Hypertension  Medical noncompliance  Substance abuse - UDS positive for meth        -CM helping with medications costs.       Electronically signed by Brenda Crawford PA-C, 03/11/24, 8:57 AM EDT.

## 2024-03-11 NOTE — CASE MANAGEMENT/SOCIAL WORK
Discharge Planning Assessment  [facility]     Patient Name: Jill M Paladino  MRN: [id number]  Today's Date: 3/11/2024    Admit Date: 3/8/2024    Plan: home (shelter)   Discharge Needs Assessment       Row Name 03/11/24 1138       Living Environment    People in Home other (see comments)  [facility]    Current Living Arrangements other (see comments)  [facility]    Potentially Unsafe Housing Conditions none    In the past 12 months has the electric, gas, oil, or water company threatened to shut off services in your home? --  homeless    Primary Care Provided by self    Provides Primary Care For no one    Family Caregiver if Needed none    Quality of Family Relationships unable to assess    Able to Return to Prior Arrangements yes       Resource/Environmental Concerns    Resource/Environmental Concerns other (see comments)  homeless    Transportation Concerns none       Transportation Needs    In the past 12 months, has lack of transportation kept you from medical appointments or from getting medications? no    In the past 12 months, has lack of transportation kept you from meetings, work, or from getting things needed for daily living? No       Food Insecurity    Within the past 12 months, you worried that your food would run out before you got the money to buy more. --  homeless    Within the past 12 months, the food you bought just didn't last and you didn't have money to get more. --  homeless       Transition Planning    Patient/Family Anticipates Transition to shelter    Patient/Family Anticipated Services at Transition none       Discharge Needs Assessment    Readmission Within the Last 30 Days no previous admission in last 30 days    Equipment Currently Used at Home cane, straight    Concerns to be Addressed no discharge needs identified    Anticipated Changes Related to Illness none    Equipment Needed After Discharge none                   Discharge Plan       Row Name 03/11/24 1148        Plan    Plan home (shelter)    Plan Comments Met with Ms. Paladino at the bedside to initiate discharge plan. She lives at [facility]. She is independent with activities of daily living and occasionally uses a straight cane. Her primary care provider is [facility] Roman. She denies problems with accessing medical care or obtaining meds. She wants meds to bed, and pharmacy information in Epic is correct. No discharge needs identified.    Final Discharge Disposition Code 01 - home or self-care                  Continued Care and Services - Admitted Since 3/8/2024    No active coordination exists for this encounter.       Expected Discharge Date and Time       Expected Discharge Date Expected Discharge Time    Mar 11, 2024            Demographic Summary       Row Name 03/11/24 1136       General Information    Admission Type observation    Arrived From other (see comments)  [facility]    Referral Source admission list    Reason for Consult discharge planning    Preferred Language English       Contact Information    Permission Granted to Share Info With     Contact Information Obtained for     Contact Information Comments CECILIO ALDANAN Friend   [phone]                   Functional Status       Row Name 03/11/24 1138       Functional Status    Usual Activity Tolerance good    Current Activity Tolerance good       Physical Activity    On average, how many days per week do you engage in moderate to strenuous exercise (like a brisk walk)? 5 days    On average, how many minutes do you engage in exercise at this level? 30 min    Number of minutes of exercise per week 150       Assessment of Health Literacy    How often do you have someone help you read hospital materials? Never    How often do you have problems learning about your medical condition because of difficulty understanding written information? Never    How often do you have a problem understanding what  is told to you about your medical condition? Never    How confident are you filling out medical forms by yourself? Quite a bit    Health Literacy Good       Functional Status, IADL    Medications independent    Meal Preparation independent    Housekeeping independent    Laundry independent    Shopping independent       Mental Status    General Appearance WDL WDL       Mental Status Summary    Recent Changes in Mental Status/Cognitive Functioning unable to assess                   Psychosocial    No documentation.                  Abuse/Neglect    No documentation.                  Legal    No documentation.                  Substance Abuse    No documentation.                  Patient Forms    No documentation.                     Eleanor Duque RN

## 2024-03-11 NOTE — PROGRESS NOTES
Continued Stay Note  [facility]     Patient Name: Jill M Paladino  MRN: [id number]  Today's Date: 3/11/2024    Admit Date: 3/8/2024    Plan: home (shelter)   Discharge Plan       Row Name 03/11/24 9789       Plan    Plan Comments Social work will assist with a Wheels application for the patient for transportation..                   Discharge Codes    No documentation.                 Expected Discharge Date and Time       Expected Discharge Date Expected Discharge Time    Mar 11, 2024               ANJEL Jacobs

## 2024-03-11 NOTE — DISCHARGE SUMMARY
[facility] Medicine Services  DISCHARGE SUMMARY    Patient Name: Jill M Paladino  : 1965  MRN: [id number]    Date of Admission: 3/8/2024  9:47 PM  Date of Discharge:  3/11/2024  Primary Care Physician: Sigrid Byers PA    Consults       Date and Time Order Name Status Description    3/9/2024  8:23 AM Inpatient Cardiology Consult Completed             Hospital Course     Presenting Problem: soa    Active Hospital Problems    Diagnosis  POA    **Shortness of breath [R06.02]  Yes    Chronic HFrEF (heart failure with reduced ejection fraction) [I50.22]  Yes    Acute pulmonary embolism without acute cor pulmonale [I26.99]  Yes    Chronic systolic CHF (congestive heart failure) [I50.22]  Yes    Coronary artery disease of native artery of native heart with stable angina pectoris [I25.118]  Yes    Essential hypertension [I10]  Yes      Resolved Hospital Problems   No resolved problems to display.          Hospital Course:  Jill M Paladino is a 58 y.o. female  with a past medical history of heart failure with reduced ejection fraction, severe multivessel coronary artery disease, hyperlipidemia, hypertension, DVT on Eliquis, anxiety/depression and medication noncompliance. Patient was admitted for pneumonia .  Patient presented to ED  with worsening shortness of breath associated with productive cough.         A/C Systolic and Diastolic CHF  Valvular heart disease   LV Apical Thrombus   Medication noncompliance  -Cardiology following, recommendations placed  -Ef 16.8 % with severe MVR   -Received IV Lasix during admission. Transition to PO lasix daily at dc  -Continue Jardiance   -Continue Metoprolol, Spironolactone, Valsartan   -follows with [facility] cardiothoracic surgery. Follow up at [place]     History of DVT/PE  -Patient ran out for Eliquis for the last few days  -Continue home Eliquis.- Rx sent to pharmacy      UDS positive for meth  -no WD sx     Recent PNA  -CTA chest  shows near complete resolution     Elevated LFTs   -Trending down, likely congestive hepatopathy      Discharge Follow Up Recommendations for outpatient labs/diagnostics:   PCP follow up one week  Follow up CTS at [facility] 2 weeks    Day of Discharge     HPI:   Patient feeling back to baseline. No overnight issues    Review of Systems  Gen- No fevers, chills  CV- No chest pain, palpitations  Resp- No cough, dyspnea  GI- No N/V/D, abd pain      Vital Signs:   Temp:  [97.5 °F (36.4 °C)-98.5 °F (36.9 °C)] 97.5 °F (36.4 °C)  Heart Rate:  [61-68] 66  Resp:  [16-18] 18  BP: ()/(68-94) 129/94      Physical Exam:  Constitutional: No acute distress, awake, alert  HENT: NCAT, mucous membranes moist  Respiratory: Clear to auscultation bilaterally, respiratory effort normal   Cardiovascular: RRR, + murmur  Gastrointestinal: Positive bowel sounds, soft, nontender, nondistended  Musculoskeletal: No bilateral ankle edema  Psychiatric: Appropriate affect, cooperative  Neurologic: Oriented x 3, strength symmetric in all extremities, Cranial Nerves grossly intact to confrontation, speech clear  Skin: No rashes      Pertinent  and/or Most Recent Results     LAB RESULTS:      Lab 03/10/24  0529 03/08/24  1858   WBC 11.40* 8.71   HEMOGLOBIN 13.1 12.4   HEMATOCRIT 41.7 40.4   PLATELETS 344 327   NEUTROS ABS 8.55* 5.46   IMMATURE GRANS (ABS)  --  0.08*   LYMPHS ABS  --  2.51   MONOS ABS  --  0.36   EOS ABS 0.11 0.23   MCV 91.0 92.2   PROCALCITONIN  --  0.05   D DIMER QUANT  --  1.13*         Lab 03/10/24  0529 03/08/24  1858   SODIUM 139 144   POTASSIUM 4.2 4.2   CHLORIDE 102 109*   CO2 25.0 25.0   ANION GAP 12.0 10.0   BUN 26* 26*   CREATININE 0.79 0.83   EGFR 87.4 82.3   GLUCOSE 117* 90   CALCIUM 9.0 9.2   MAGNESIUM 2.1  --    PHOSPHORUS 4.9*  --    TSH 3.830  --          Lab 03/10/24  0529 03/08/24  1858   TOTAL PROTEIN 6.5 6.9   ALBUMIN 3.7 3.9   GLOBULIN 2.8 3.0   ALT (SGPT) 75* 70*   AST (SGOT) 38* 44*   BILIRUBIN 0.5 0.7   ALK  PHOS 141* 126*         Lab 03/08/24 2120 03/08/24  1858   PROBNP  --  14,881.0*   HSTROP T 31* 36*                 Brief Urine Lab Results  (Last result in the past 365 days)        Color   Clarity   Blood   Leuk Est   Nitrite   Protein   CREAT   Urine HCG        12/14/23 2044 Yellow   Clear   Trace   Small (1+)   Negative   Negative                 Microbiology Results (last 10 days)       Procedure Component Value - Date/Time    COVID PRE-OP / PRE-PROCEDURE SCREENING ORDER (NO ISOLATION) - Swab, Nasopharynx [[id number]]  (Normal) Collected: 03/08/24 2017    Lab Status: Final result Specimen: Swab from Nasopharynx Updated: 03/08/24 2116    Narrative:      The following orders were created for panel order COVID PRE-OP / PRE-PROCEDURE SCREENING ORDER (NO ISOLATION) - Swab, Nasopharynx.  Procedure                               Abnormality         Status                     ---------                               -----------         ------                     Respiratory Panel PCR w/...[[id number]]  Normal              Final result                 Please view results for these tests on the individual orders.    Respiratory Panel PCR w/COVID-19(SARS-CoV-2) [facility] In-House, NP Swab in UTM/VTM, 2 HR TAT - Swab, Nasopharynx [[id number]]  (Normal) Collected: 03/08/24 2017    Lab Status: Final result Specimen: Swab from Nasopharynx Updated: 03/08/24 2116     ADENOVIRUS, PCR Not Detected     Coronavirus 229E Not Detected     Coronavirus HKU1 Not Detected     Coronavirus NL63 Not Detected     Coronavirus OC43 Not Detected     COVID19 Not Detected     Human Metapneumovirus Not Detected     Human Rhinovirus/Enterovirus Not Detected     Influenza A PCR Not Detected     Influenza B PCR Not Detected     Parainfluenza Virus 1 Not Detected     Parainfluenza Virus 2 Not Detected     Parainfluenza Virus 3 Not Detected     Parainfluenza Virus 4 Not Detected     RSV, PCR Not Detected     Bordetella pertussis pcr Not  Detected     Bordetella parapertussis PCR Not Detected     Chlamydophila pneumoniae PCR Not Detected     Mycoplasma pneumo by PCR Not Detected    Narrative:      In the setting of a positive respiratory panel with a viral infection PLUS a negative procalcitonin without other underlying concern for bacterial infection, consider observing off antibiotics or discontinuation of antibiotics and continue supportive care. If the respiratory panel is positive for atypical bacterial infection (Bordetella pertussis, Chlamydophila pneumoniae, or Mycoplasma pneumoniae), consider antibiotic de-escalation to target atypical bacterial infection.            Adult Transthoracic Echo Complete w/ Color, Spectral and Contrast if Necessary Per Protocol    Addendum Date: 3/9/2024      Left ventricular systolic function is severely decreased. Calculated left ventricular EF = 16.8% Left ventricular ejection fraction appears to be less than 20%.   The left ventricular cavity is mildly dilated.   Left ventricular wall thickness is consistent with mild concentric hypertrophy.   Left ventricular diastolic function is consistent with (grade III w/high LAP) restrictive pattern.   Moderately reduced right ventricular systolic function noted.   The left atrial cavity is moderately dilated.   The right atrial cavity is moderately  dilated.   There are myxomatous changes of the mitral valve apparatus present.   Severe mitral valve regurgitation is present.   Moderate tricuspid valve regurgitation is present.   Estimated right ventricular systolic pressure from tricuspid regurgitation is moderately elevated (45-55 mmHg).   LV apical thrombus, measuring approximately 1 x 1 cm, not mobile.     Result Date: 3/9/2024    Left ventricular systolic function is severely decreased. Calculated left ventricular EF = 16.8% Left ventricular ejection fraction appears to be less than 20%.   The left ventricular cavity is mildly dilated.   Left ventricular wall  thickness is consistent with mild concentric hypertrophy.   Left ventricular diastolic function is consistent with (grade III w/high LAP) restrictive pattern.   Moderately reduced right ventricular systolic function noted.   The left atrial cavity is moderately dilated.   The right atrial cavity is moderately  dilated.   There are myxomatous changes of the mitral valve apparatus present.   Severe mitral valve regurgitation is present.   Moderate tricuspid valve regurgitation is present.   Estimated right ventricular systolic pressure from tricuspid regurgitation is moderately elevated (45-55 mmHg).     CT Angiogram Chest    Result Date: 3/8/2024  CT ANGIOGRAM CHEST Date of Exam: 3/8/2024 10:39 PM EST Indication: Shortness of breath, elevated d-dimer. Comparison: CT chest dated 12/14/2023 Technique: CTA of the chest was performed after the uneventful intravenous administration of 80 mL Isovue 370. Reconstructed coronal and sagittal images were also obtained. In addition, a 3-D volume rendered image was created for interpretation. Automated exposure control and iterative reconstruction methods were used. Findings: The visualized soft tissue structures at the base the neck including the thyroid appear within normal limits. There is no lower cervical or axillary adenopathy. The heart is enlarged. There is reflux of contrast into the IVC and hepatic veins which can be seen with right-sided heart dysfunction. There is mild aneurysmal dilatation of the ascending thoracic aorta at the level of the main pulmonary artery measuring up to 4.4 cm. There is coronary and aortic atherosclerotic calcification. The main pulmonary artery appears normal in caliber. There is near complete resolution of the previously noted right lower lobe pulmonary embolism with a minimal residual  subsegmental branch filling defect best seen on axial image 57-58. There is no evidence of new pulmonary embolism. There is no mediastinal or hilar  lymphadenopathy by size criteria. The tracheobronchial tree is patent. There are small bilateral layering pleural effusions which appears improved on the right and increased on the left when compared to the prior examination. There is a small amount of residual airspace opacity within the right lower lobe representing residual atelectasis. The majority of the prior right lower lobe pneumonia appears resolved. There is bandlike atelectasis within the lingula. There is minimal smooth interlobular septal thickening. The esophagus is normal in course and caliber. Visualized portions of the upper abdomen demonstrate no acute findings. There are no acute osseous findings of the chest.     Impression: 1. Near complete resolution of the previously noted right lower lobe pulmonary embolism. Small residual nonocclusive thrombus is present within a subsegmental branch of the right lower lobe pulmonary artery within the same distribution of the prior exam.  No evidence of new pulmonary embolism. 2. Cardiomegaly with minimal smooth interlobular septal thickening and small bilateral pleural effusions. Findings suggest congestive heart failure. 3. Near complete resolution of the previously seen right lower lobe pneumonia. Mild bandlike parenchymal opacity within the right lower lobe suggesting atelectasis. Electronically Signed: Jose Zepeda  3/8/2024 11:02 PM EST  Workstation ID: [id number]    XR Chest 1 View    Result Date: 3/8/2024  XR CHEST 1 VW Date of Exam: 3/8/2024 7:12 PM EST Indication: SOA triage protocol Comparison: Chest radiograph dated 12/16/2023 Findings: There is cardiomegaly. Pulmonary vascularity appears normal. There is minimal linear atelectasis within the left lung base. There is no significant pleural effusion. No evidence of pneumothorax. There are degenerative changes of the thoracic spine.     Impression: 1. Cardiomegaly with minimal linear left basilar atelectasis. Electronically Signed: Jose Zepeda   3/8/2024 7:36 PM EST  Workstation ID: [id number]     Results for orders placed during the hospital encounter of 01/26/24    Duplex Venous Lower Extremity - BILATERAL    Interpretation Summary    Normal bilateral lower extremity venous duplex scan.    2.5 x 1.7 x 0.75cm avascular complex cystic structure noted in right popliteal fossa. Possible Baker's cyst, although suggestive of hematoma.      Results for orders placed during the hospital encounter of 01/26/24    Duplex Venous Lower Extremity - BILATERAL    Interpretation Summary    Normal bilateral lower extremity venous duplex scan.    2.5 x 1.7 x 0.75cm avascular complex cystic structure noted in right popliteal fossa. Possible Baker's cyst, although suggestive of hematoma.      Results for orders placed during the hospital encounter of 03/08/24    Adult Transthoracic Echo Complete w/ Color, Spectral and Contrast if Necessary Per Protocol    Interpretation Summary    Left ventricular systolic function is severely decreased. Calculated left ventricular EF = 16.8% Left ventricular ejection fraction appears to be less than 20%.    The left ventricular cavity is mildly dilated.    Left ventricular wall thickness is consistent with mild concentric hypertrophy.    Left ventricular diastolic function is consistent with (grade III w/high LAP) restrictive pattern.    Moderately reduced right ventricular systolic function noted.    The left atrial cavity is moderately dilated.    The right atrial cavity is moderately  dilated.    There are myxomatous changes of the mitral valve apparatus present.    Severe mitral valve regurgitation is present.    Moderate tricuspid valve regurgitation is present.    Estimated right ventricular systolic pressure from tricuspid regurgitation is moderately elevated (45-55 mmHg).    LV apical thrombus, measuring approximately 1 x 1 cm, not mobile.      Plan for Follow-up of Pending Labs/Results:     Discharge Details        Discharge  "Medications        New Medications        Instructions Start Date   atorvastatin 40 MG tablet  Commonly known as: LIPITOR   40 mg, Oral, Nightly      empagliflozin 10 MG tablet tablet  Commonly known as: JARDIANCE  Replaces: dapagliflozin 5 MG tablet tablet   10 mg, Oral, Daily   Start Date: March 12, 2024     furosemide 40 MG tablet  Commonly known as: LASIX   40 mg, Oral, Daily   Start Date: March 12, 2024            Changes to Medications        Instructions Start Date   metoprolol succinate XL 50 MG 24 hr tablet  Commonly known as: TOPROL-XL  What changed: how much to take   75 mg, Oral, Daily      valsartan 40 MG tablet  Commonly known as: DIOVAN  What changed:   medication strength  how much to take   40 mg, Oral, Daily             Continue These Medications        Instructions Start Date   apixaban 5 MG tablet tablet  Commonly known as: ELIQUIS   5 mg, Oral, 2 Times Daily      ARIPiprazole 2 MG tablet  Commonly known as: ABILIFY   2 mg, Oral, Daily      buPROPion  MG 24 hr tablet  Commonly known as: WELLBUTRIN XL   300 mg, Oral, Daily      clopidogrel 75 MG tablet  Commonly known as: PLAVIX   75 mg, Oral, Daily      pantoprazole 40 MG EC tablet  Commonly known as: PROTONIX   40 mg, Oral, Daily   Start Date: March 12, 2024     spironolactone 100 MG tablet  Commonly known as: ALDACTONE   100 mg, Oral, Daily             Stop These Medications      bumetanide 0.5 MG tablet  Commonly known as: BUMEX     dapagliflozin 5 MG tablet tablet  Commonly known as: FARXIGA  Replaced by: empagliflozin 10 MG tablet tablet     Farxiga 10 MG tablet  Generic drug: dapagliflozin Propanediol              Allergies   Allergen Reactions    Lisinopril Other (See Comments)     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    Other Nausea And Vomiting     Pt states \"all opiods make me throw up instantly\"    Percocet [Oxycodone-Acetaminophen] GI Intolerance    Sulfa Antibiotics Rash "         Discharge Disposition:  Home or Self Care    Diet:  Hospital:  Diet Order   Procedures    Diet: Cardiac, Fluid Restriction (240 mL/tray); Low Sodium (2g); 2000 mL/day; Fluid Consistency: Thin (IDDSI 0)       Diet Instructions       Diet: Cardiac Diets, Fluid Restriction (240 mL/tray) Diets; Healthy Heart (2-3 Na+); Thin (IDDSI 0); 1500 mL/day      Discharge Diet:  Cardiac Diets  Fluid Restriction (240 mL/tray) Diets       Cardiac Diet: Healthy Heart (2-3 Na+)    Fluid Consistency: Thin (IDDSI 0)    Fluid Restriction Diet (240 mL/tray): 1500 mL/day             Activity:  Activity Instructions       Activity as Tolerated              Restrictions or Other Recommendations:         CODE STATUS:    Code Status and Medical Interventions:   Ordered at: 03/10/24 1026     Code Status (Patient has no pulse and is not breathing):    CPR (Attempt to Resuscitate)     Medical Interventions (Patient has pulse or is breathing):    Full Support       Future Appointments   Date Time Provider Department Center   3/14/2024 10:00 AM Kaelyn Mayo APRN [facility]       Additional Instructions for the Follow-ups that You Need to Schedule       Discharge Follow-up with PCP   As directed       Currently Documented PCP:    Sigrid Byers PA    PCP Phone Number:    [phone]     Follow Up Details: 1 week        Discharge Follow-up with Specified Provider: SYLVESTER at [facility]; 2 Weeks   As directed      To: SYLVESTER at [facility]   Follow Up: 2 Weeks                      KAYLA Chong  03/11/24      Time Spent on Discharge:  I spent  40  minutes on this discharge activity which included: face-to-face encounter with the patient, reviewing the data in the system, coordination of the care with the nursing staff as well as consultants, documentation, and entering orders.      Electronically signed by KAYLA Chong, 03/11/24, 1:14 PM EDT.

## 2024-03-12 ENCOUNTER — APPOINTMENT (OUTPATIENT)
Dept: CT IMAGING | Facility: HOSPITAL | Age: 59
End: 2024-03-12
Payer: MEDICAID

## 2024-03-12 ENCOUNTER — HOSPITAL ENCOUNTER (EMERGENCY)
Facility: HOSPITAL | Age: 59
Discharge: HOME | End: 2024-03-12
Attending: EMERGENCY MEDICINE | Admitting: EMERGENCY MEDICINE
Payer: MEDICAID

## 2024-03-12 VITALS
DIASTOLIC BLOOD PRESSURE: 110 MMHG | WEIGHT: 129 LBS | OXYGEN SATURATION: 95 % | HEIGHT: 65 IN | BODY MASS INDEX: 21.49 KG/M2 | RESPIRATION RATE: 18 BRPM | SYSTOLIC BLOOD PRESSURE: 154 MMHG | TEMPERATURE: 98.8 F | HEART RATE: 61 BPM

## 2024-03-12 DIAGNOSIS — N17.9 ACUTE KIDNEY INJURY: ICD-10-CM

## 2024-03-12 DIAGNOSIS — R10.84 GENERALIZED ABDOMINAL PAIN: Primary | ICD-10-CM

## 2024-03-12 LAB
ALBUMIN SERPL-MCNC: 4.2 G/DL (ref 3.5–5.2)
ALBUMIN/GLOB SERPL: 1.6 G/DL
ALP SERPL-CCNC: 179 U/L (ref 39–117)
ALT SERPL W P-5'-P-CCNC: 110 U/L (ref 1–33)
ANION GAP SERPL CALCULATED.3IONS-SCNC: 13 MMOL/L (ref 5–15)
AST SERPL-CCNC: 70 U/L (ref 1–32)
BASOPHILS # BLD AUTO: 0.06 10*3/MM3 (ref 0–0.2)
BASOPHILS NFR BLD AUTO: 0.4 % (ref 0–1.5)
BILIRUB SERPL-MCNC: 0.5 MG/DL (ref 0–1.2)
BUN SERPL-MCNC: 39 MG/DL (ref 6–20)
BUN/CREAT SERPL: 35.5 (ref 7–25)
CALCIUM SPEC-SCNC: 9 MG/DL (ref 8.6–10.5)
CHLORIDE SERPL-SCNC: 92 MMOL/L (ref 98–107)
CO2 SERPL-SCNC: 23 MMOL/L (ref 22–29)
CREAT SERPL-MCNC: 1.1 MG/DL (ref 0.57–1)
D-LACTATE SERPL-SCNC: 2.6 MMOL/L (ref 0.5–2)
D-LACTATE SERPL-SCNC: 3.3 MMOL/L (ref 0.5–2)
DEPRECATED RDW RBC AUTO: 55.3 FL (ref 37–54)
EGFRCR SERPLBLD CKD-EPI 2021: 58.7 ML/MIN/1.73
EOSINOPHIL # BLD AUTO: 0.06 10*3/MM3 (ref 0–0.4)
EOSINOPHIL NFR BLD AUTO: 0.4 % (ref 0.3–6.2)
ERYTHROCYTE [DISTWIDTH] IN BLOOD BY AUTOMATED COUNT: 16.9 % (ref 12.3–15.4)
GLOBULIN UR ELPH-MCNC: 2.7 GM/DL
GLUCOSE SERPL-MCNC: 132 MG/DL (ref 65–99)
HCT VFR BLD AUTO: 44.1 % (ref 34–46.6)
HGB BLD-MCNC: 13.7 G/DL (ref 12–15.9)
HOLD SPECIMEN: NORMAL
IMM GRANULOCYTES # BLD AUTO: 0.07 10*3/MM3 (ref 0–0.05)
IMM GRANULOCYTES NFR BLD AUTO: 0.5 % (ref 0–0.5)
LIPASE SERPL-CCNC: 10 U/L (ref 13–60)
LYMPHOCYTES # BLD AUTO: 2.04 10*3/MM3 (ref 0.7–3.1)
LYMPHOCYTES NFR BLD AUTO: 13.3 % (ref 19.6–45.3)
MCH RBC QN AUTO: 27.9 PG (ref 26.6–33)
MCHC RBC AUTO-ENTMCNC: 31.1 G/DL (ref 31.5–35.7)
MCV RBC AUTO: 89.8 FL (ref 79–97)
MONOCYTES # BLD AUTO: 0.7 10*3/MM3 (ref 0.1–0.9)
MONOCYTES NFR BLD AUTO: 4.6 % (ref 5–12)
NEUTROPHILS NFR BLD AUTO: 12.36 10*3/MM3 (ref 1.7–7)
NEUTROPHILS NFR BLD AUTO: 80.8 % (ref 42.7–76)
NRBC BLD AUTO-RTO: 0 /100 WBC (ref 0–0.2)
PLATELET # BLD AUTO: 384 10*3/MM3 (ref 140–450)
PMV BLD AUTO: 10.3 FL (ref 6–12)
POTASSIUM SERPL-SCNC: 5.4 MMOL/L (ref 3.5–5.2)
PROT SERPL-MCNC: 6.9 G/DL (ref 6–8.5)
RBC # BLD AUTO: 4.91 10*6/MM3 (ref 3.77–5.28)
SODIUM SERPL-SCNC: 128 MMOL/L (ref 136–145)
WBC NRBC COR # BLD AUTO: 15.29 10*3/MM3 (ref 3.4–10.8)
WHOLE BLOOD HOLD COAG: NORMAL
WHOLE BLOOD HOLD SPECIMEN: NORMAL

## 2024-03-12 PROCEDURE — 25010000002 ONDANSETRON PER 1 MG: Performed by: EMERGENCY MEDICINE

## 2024-03-12 PROCEDURE — 99284 EMERGENCY DEPT VISIT MOD MDM: CPT

## 2024-03-12 PROCEDURE — 36415 COLL VENOUS BLD VENIPUNCTURE: CPT

## 2024-03-12 PROCEDURE — 96375 TX/PRO/DX INJ NEW DRUG ADDON: CPT

## 2024-03-12 PROCEDURE — 83690 ASSAY OF LIPASE: CPT | Performed by: EMERGENCY MEDICINE

## 2024-03-12 PROCEDURE — 25010000002 MORPHINE PER 10 MG: Performed by: EMERGENCY MEDICINE

## 2024-03-12 PROCEDURE — 85025 COMPLETE CBC W/AUTO DIFF WBC: CPT | Performed by: EMERGENCY MEDICINE

## 2024-03-12 PROCEDURE — 96374 THER/PROPH/DIAG INJ IV PUSH: CPT

## 2024-03-12 PROCEDURE — 83605 ASSAY OF LACTIC ACID: CPT | Performed by: EMERGENCY MEDICINE

## 2024-03-12 PROCEDURE — 25810000003 SODIUM CHLORIDE 0.9 % SOLUTION: Performed by: EMERGENCY MEDICINE

## 2024-03-12 PROCEDURE — 80053 COMPREHEN METABOLIC PANEL: CPT | Performed by: EMERGENCY MEDICINE

## 2024-03-12 PROCEDURE — 74176 CT ABD & PELVIS W/O CONTRAST: CPT

## 2024-03-12 RX ORDER — MORPHINE SULFATE 4 MG/ML
4 INJECTION, SOLUTION INTRAMUSCULAR; INTRAVENOUS ONCE
Status: COMPLETED | OUTPATIENT
Start: 2024-03-12 | End: 2024-03-12

## 2024-03-12 RX ORDER — SODIUM CHLORIDE 9 MG/ML
10 INJECTION, SOLUTION INTRAMUSCULAR; INTRAVENOUS; SUBCUTANEOUS AS NEEDED
Status: DISCONTINUED | OUTPATIENT
Start: 2024-03-12 | End: 2024-03-12 | Stop reason: HOSPADM

## 2024-03-12 RX ORDER — ONDANSETRON 2 MG/ML
4 INJECTION INTRAMUSCULAR; INTRAVENOUS ONCE
Status: COMPLETED | OUTPATIENT
Start: 2024-03-12 | End: 2024-03-12

## 2024-03-12 RX ADMIN — SODIUM CHLORIDE 500 ML: 9 INJECTION, SOLUTION INTRAVENOUS at 03:35

## 2024-03-12 RX ADMIN — ONDANSETRON 4 MG: 2 INJECTION INTRAMUSCULAR; INTRAVENOUS at 03:15

## 2024-03-12 RX ADMIN — MORPHINE SULFATE 4 MG: 4 INJECTION, SOLUTION INTRAMUSCULAR; INTRAVENOUS at 03:15

## 2024-03-12 NOTE — ED PROVIDER NOTES
"Subjective   History of Present Illness  Is a 58-year-old female with past medical history of CHF and pulmonary embolism presented to the emergency department with some abdominal pain and nausea.  Patient states that she was recently admitted and discharged.  She started having some diffuse abdominal discomfort.  Is more like bloating.  She states that she has been very nauseous.  She is vomited twice.  She not having any diarrhea or blood in the stool.  Patient denies any fevers or chills.  No headache or change in vision.  No focal weakness.  No chest pain or shortness of breath.    History provided by:  Patient   used: No        Review of Systems   Constitutional:  Negative for chills and fever.   HENT:  Negative for congestion, ear pain and sore throat.    Eyes:  Negative for visual disturbance.   Respiratory:  Negative for shortness of breath.    Cardiovascular:  Negative for chest pain.   Gastrointestinal:  Positive for abdominal pain, nausea and vomiting.   Genitourinary:  Negative for difficulty urinating.   Musculoskeletal:  Negative for arthralgias.   Skin:  Negative for rash.   Neurological:  Negative for dizziness, weakness and numbness.   Psychiatric/Behavioral:  Negative for agitation.        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    Other Nausea And Vomiting     Pt states \"all opiods make me throw up instantly\"    Percocet [Oxycodone-Acetaminophen] GI Intolerance    Sulfa Antibiotics Rash       Past Surgical History:   Procedure Laterality Date    KNEE SURGERY      RHINOPLASTY         Family History   Problem Relation Age of Onset    No Known Problems Mother     No Known Problems Father        Social History     Socioeconomic History    Marital status:    Tobacco Use    Smoking status: Never    " Smokeless tobacco: Never   Vaping Use    Vaping status: Never Used   Substance and Sexual Activity    Alcohol use: No    Drug use: No    Sexual activity: Defer           Objective   Physical Exam  Vitals and nursing note reviewed.   Constitutional:       General: She is not in acute distress.     Appearance: She is not ill-appearing or toxic-appearing.   HENT:      Mouth/Throat:      Pharynx: No posterior oropharyngeal erythema.   Eyes:      Conjunctiva/sclera: Conjunctivae normal.      Pupils: Pupils are equal, round, and reactive to light.   Cardiovascular:      Rate and Rhythm: Regular rhythm. Bradycardia present.   Pulmonary:      Effort: Pulmonary effort is normal. No respiratory distress.   Abdominal:      General: Abdomen is flat. There is no distension.      Palpations: There is no mass.      Tenderness: There is abdominal tenderness. There is no guarding or rebound.      Comments: Generalized abdominal tenderness   Musculoskeletal:         General: No deformity. Normal range of motion.   Skin:     General: Skin is warm.      Findings: No rash.   Neurological:      General: No focal deficit present.      Mental Status: She is alert and oriented to person, place, and time.      Motor: No weakness.         Procedures           ED Course  ED Course as of 03/12/24 0616   Tue Mar 12, 2024   0306 BP(!): 154/110 [JK]   0306 Temp src: Axillary [JK]   0306 Heart Rate: 58 [JK]   0306 Resp: 24 [JK]   0306 SpO2: 95 %  Interpretation:  Patient's repeat vitals, telemetry tracing, and pulse oximetry tracing were directly viewed and interpreted by myself.  Sinus bradycardia [JK]   0535 Comprehensive Metabolic Panel(!) [JK]   0535 Lipase(!) [JK]   0535 CBC & Differential(!) [JK]   0535 Lactic Acid, Plasma(!!)  Interpretation:  Laboratory studies were reviewed and interpreted directly by myself.  Lactic acid was elevated at 3.3, CMP did show some minor acute kidney injury with a BUN of 39 creatinine 1.1, potassium was  elevated, it was hemolyzed, lab was normal, CBC showed some minor leukocytosis with white blood cell count 15.29 [JK]   0535 CT Abdomen Pelvis Without Contrast  Interpretation:  Imaging was directly visualized by myself, per my interpretations, CT of the abdomen pelvis did not show any acute process. [JK]   0535 On reevaluation, the patient is feeling better.  She is tolerating oral intake at this time.  Repeat abdominal examination is benign.  I did explain to the patient that we need to obtain urinalysis to assess for any type of infection.  The patient is denying providing urine at this time.  She states that she does not want any urine testing performed.  Patient is refusing a urine sample. [JK]   0615 Patient's repeat lactic acid is improving. [JK]   0615 Patient states she is feeling much better.  Resting comfortably in bed.  Continues to eat and drink.  I did have a discussion with the patient regarding further monitoring of her lactic acid.  Patient is declining at this time.  She states that she is ready to go.  She is requesting a ride home.  Patient need to follow-up with PCP in 48 hours.  Given strict return precautions.  Verbalized understanding. [JK]   0616 I had a discussion with the patient/family regarding diagnosis, diagnostic results, treatment plan, and medications. The patient/family indicated understanding of these instructions. I spent adequate time at the bedside prior to discharge necessary to discuss the aftercare instructions, giving patient education, providing explanations of the results of our evaluations/findings, and my decision making to assure that the patient/family understand the plan of care. Time was allotted to answer questions at that time and throughout the ED course. Patient is required to maintain timely follow up, as discussed. I also discussed the potential for the development of an acute emergent condition requiring further evaluation, return to the ER, admission, or  even surgical intervention.  I encouraged the patient to return to the emergency department immediately for any concerns, worsening symptoms, new complaints, or if symptoms persist and they are unable to seek follow-up in a timely fashion. The patient/family expressed understanding and agreement with this plan    Shared decision making:   After full review of the patient's clinical presentation, review of any work-up including but not limited to laboratory studies and radiology obtained, I had a discussion with the patient.  Treatment options were discussed as well as the risks, benefits and consequences.  I discussed all findings with the patient and family members if available.  During the discussion, treatment goals were understood by all as well as any misconceptions which were addressed with the patient.  Ample time was given for any questions they may have had.  They are in agreement with the treatment plan as well as final disposition. [JK]      ED Course User Index  [JK] Aldo Hyatt MD                                             Medical Decision Making  This is a 58-year-old female with a history of CHF and pulmonary embolism presenting to the emergency department with some abdominal discomfort.  Patient complaining of some diffuse abdominal pain, nausea and vomiting.  Her symptoms seem consistent with gastritis.  I am also concerned for obstruction.  However the patient is no evidence of acute abdomen or peritonitis on examination.  Patient is nontoxic-appearing.  IV access will be established the patient.  Provided symptomatic treatment.  Workup initiated.      Differential diagnosis: Peptic ulcer disease, dyspepsia, GERD, pancreatitis, biliary colic, electrolyte abnormality, acute kidney injury, bowel obstruction      Amount and/or Complexity of Data Reviewed  External Data Reviewed: labs, radiology, ECG and notes.     Details: External laboratories, imaging as well as notes were reviewed  personally by myself.  All relevant studies were used to guide decision making.     Date of previous record: 3/8/2024    Source of note: Admission record    Summary: Patient was admitted for CHF and pulmonary embolism.  I did review basic laboratory studies on file as well as a previous CT angiogram of the chest.  EKG is reviewed.  Records reviewed    Labs: ordered. Decision-making details documented in ED Course.  Radiology: ordered and independent interpretation performed. Decision-making details documented in ED Course.    Risk  Prescription drug management.        Final diagnoses:   Generalized abdominal pain   Acute kidney injury       ED Disposition  ED Disposition       ED Disposition   Discharge    Condition   Stable    Comment   --               Sigrid Byers PA  [address] [facility]  [address]  [place] [address]  [phone]    Call in 1 day           Medication List      No changes were made to your prescriptions during this visit.            Aldo Hyatt MD  03/12/24 0616

## 2024-03-12 NOTE — OUTREACH NOTE
Prep Survey      Flowsheet Row Responses   [facility]ond[facility] facility patient discharged from? [place]   Is LACE score < 7 ? No   Eligibility Readm Mgmt   Discharge diagnosis SOB/CHF   Does the patient have one of the following disease processes/diagnoses(primary or secondary)? CHF   Does the patient have Home health ordered? No   Is there a DME ordered? No   Prep survey completed? Yes            LINDA JOYNER - Registered Nurse

## 2024-03-14 ENCOUNTER — READMISSION MANAGEMENT (OUTPATIENT)
Dept: CALL CENTER | Facility: HOSPITAL | Age: 59
End: 2024-03-14
Payer: MEDICAID

## 2024-03-14 ENCOUNTER — TELEPHONE (OUTPATIENT)
Dept: CARDIOLOGY | Facility: HOSPITAL | Age: 59
End: 2024-03-14
Payer: MEDICAID

## 2024-03-14 NOTE — TELEPHONE ENCOUNTER
Wanted to speak to patient about her appointment prior. Left voicemail for patient to call my direct line back

## 2024-03-14 NOTE — OUTREACH NOTE
CHF Week 1 Survey      Flowsheet Row Responses   [facility] facility patient discharged from? [place]   Does the patient have one of the following disease processes/diagnoses(primary or secondary)? CHF   CHF Week 1 attempt successful? No   Unsuccessful attempts Attempt 1            Leia MEMBRENO - Registered Nurse

## 2024-03-18 ENCOUNTER — READMISSION MANAGEMENT (OUTPATIENT)
Dept: CALL CENTER | Facility: HOSPITAL | Age: 59
End: 2024-03-18
Payer: MEDICAID

## 2024-03-18 NOTE — OUTREACH NOTE
CHF Week 1 Survey      Flowsheet Row Responses   [facility] [place] facility patient discharged from? [place]   Does the patient have one of the following disease processes/diagnoses(primary or secondary)? CHF   CHF Week 1 attempt successful? No   Unsuccessful attempts Attempt 2            Mimi GENTILE - Registered Nurse

## 2024-03-21 ENCOUNTER — READMISSION MANAGEMENT (OUTPATIENT)
Dept: CALL CENTER | Facility: HOSPITAL | Age: 59
End: 2024-03-21
Payer: MEDICAID

## 2024-03-21 NOTE — OUTREACH NOTE
CHF Week 1 Survey      Flowsheet Row Responses   [facility] patient discharged from? [place]   Does the patient have one of the following disease processes/diagnoses(primary or secondary)? CHF   CHF Week 1 attempt successful? No   Unsuccessful attempts Attempt 3   Revoke Phone number issues  [Number listed does not belong to patient or friend, will update chart]            Leia MEMBRENO - Registered Nurse

## 2024-09-25 ENCOUNTER — APPOINTMENT (OUTPATIENT)
Dept: CT IMAGING | Facility: HOSPITAL | Age: 59
End: 2024-09-25
Payer: MEDICAID

## 2024-09-25 ENCOUNTER — APPOINTMENT (OUTPATIENT)
Dept: GENERAL RADIOLOGY | Facility: HOSPITAL | Age: 59
End: 2024-09-25
Payer: MEDICAID

## 2024-09-25 ENCOUNTER — HOSPITAL ENCOUNTER (OUTPATIENT)
Facility: HOSPITAL | Age: 59
Setting detail: OBSERVATION
Discharge: HOME | End: 2024-09-29
Attending: EMERGENCY MEDICINE | Admitting: INTERNAL MEDICINE
Payer: MEDICAID

## 2024-09-25 DIAGNOSIS — R07.81 RIB PAIN: ICD-10-CM

## 2024-09-25 DIAGNOSIS — R07.1 PAINFUL BREATHING: ICD-10-CM

## 2024-09-25 DIAGNOSIS — R06.02 SOB (SHORTNESS OF BREATH): Primary | ICD-10-CM

## 2024-09-25 DIAGNOSIS — J18.9 PNEUMONIA OF BOTH LOWER LOBES DUE TO INFECTIOUS ORGANISM: ICD-10-CM

## 2024-09-25 DIAGNOSIS — I50.9 ACUTE ON CHRONIC CONGESTIVE HEART FAILURE, UNSPECIFIED HEART FAILURE TYPE: ICD-10-CM

## 2024-09-25 LAB
ALBUMIN SERPL-MCNC: 3.7 G/DL (ref 3.5–5.2)
ALBUMIN/GLOB SERPL: 1.2 G/DL
ALP SERPL-CCNC: 133 U/L (ref 39–117)
ALT SERPL W P-5'-P-CCNC: 22 U/L (ref 1–33)
ANION GAP SERPL CALCULATED.3IONS-SCNC: 12 MMOL/L (ref 5–15)
AST SERPL-CCNC: 27 U/L (ref 1–32)
B PARAPERT DNA SPEC QL NAA+PROBE: NOT DETECTED
B PERT DNA SPEC QL NAA+PROBE: NOT DETECTED
BASOPHILS # BLD AUTO: 0.06 10*3/MM3 (ref 0–0.2)
BASOPHILS NFR BLD AUTO: 0.5 % (ref 0–1.5)
BILIRUB SERPL-MCNC: 0.8 MG/DL (ref 0–1.2)
BUN SERPL-MCNC: 21 MG/DL (ref 6–20)
BUN/CREAT SERPL: 24.1 (ref 7–25)
C PNEUM DNA NPH QL NAA+NON-PROBE: NOT DETECTED
CALCIUM SPEC-SCNC: 9 MG/DL (ref 8.6–10.5)
CHLORIDE SERPL-SCNC: 98 MMOL/L (ref 98–107)
CO2 SERPL-SCNC: 26 MMOL/L (ref 22–29)
CREAT SERPL-MCNC: 0.87 MG/DL (ref 0.57–1)
CRP SERPL-MCNC: 2.05 MG/DL (ref 0–0.5)
D DIMER PPP FEU-MCNC: 0.75 MCGFEU/ML (ref 0–0.58)
D-LACTATE SERPL-SCNC: 2 MMOL/L (ref 0.5–2)
D-LACTATE SERPL-SCNC: 2.3 MMOL/L (ref 0.5–2)
D-LACTATE SERPL-SCNC: 3.1 MMOL/L (ref 0.5–2)
DEPRECATED RDW RBC AUTO: 47.8 FL (ref 37–54)
EGFRCR SERPLBLD CKD-EPI 2021: 77.3 ML/MIN/1.73
EOSINOPHIL # BLD AUTO: 0.07 10*3/MM3 (ref 0–0.4)
EOSINOPHIL NFR BLD AUTO: 0.5 % (ref 0.3–6.2)
ERYTHROCYTE [DISTWIDTH] IN BLOOD BY AUTOMATED COUNT: 14.7 % (ref 12.3–15.4)
FLUAV SUBTYP SPEC NAA+PROBE: NOT DETECTED
FLUBV RNA ISLT QL NAA+PROBE: NOT DETECTED
GLOBULIN UR ELPH-MCNC: 3.1 GM/DL
GLUCOSE SERPL-MCNC: 125 MG/DL (ref 65–99)
HADV DNA SPEC NAA+PROBE: NOT DETECTED
HCOV 229E RNA SPEC QL NAA+PROBE: NOT DETECTED
HCOV HKU1 RNA SPEC QL NAA+PROBE: NOT DETECTED
HCOV NL63 RNA SPEC QL NAA+PROBE: NOT DETECTED
HCOV OC43 RNA SPEC QL NAA+PROBE: NOT DETECTED
HCT VFR BLD AUTO: 42.8 % (ref 34–46.6)
HGB BLD-MCNC: 13.6 G/DL (ref 12–15.9)
HMPV RNA NPH QL NAA+NON-PROBE: NOT DETECTED
HOLD SPECIMEN: NORMAL
HPIV1 RNA ISLT QL NAA+PROBE: NOT DETECTED
HPIV2 RNA SPEC QL NAA+PROBE: NOT DETECTED
HPIV3 RNA NPH QL NAA+PROBE: NOT DETECTED
HPIV4 P GENE NPH QL NAA+PROBE: NOT DETECTED
IMM GRANULOCYTES # BLD AUTO: 0.05 10*3/MM3 (ref 0–0.05)
IMM GRANULOCYTES NFR BLD AUTO: 0.4 % (ref 0–0.5)
LYMPHOCYTES # BLD AUTO: 1.33 10*3/MM3 (ref 0.7–3.1)
LYMPHOCYTES NFR BLD AUTO: 10.3 % (ref 19.6–45.3)
M PNEUMO IGG SER IA-ACNC: NOT DETECTED
MAGNESIUM SERPL-MCNC: 1.7 MG/DL (ref 1.6–2.6)
MCH RBC QN AUTO: 28.5 PG (ref 26.6–33)
MCHC RBC AUTO-ENTMCNC: 31.8 G/DL (ref 31.5–35.7)
MCV RBC AUTO: 89.7 FL (ref 79–97)
MONOCYTES # BLD AUTO: 0.66 10*3/MM3 (ref 0.1–0.9)
MONOCYTES NFR BLD AUTO: 5.1 % (ref 5–12)
MRSA DNA SPEC QL NAA+PROBE: NEGATIVE
NEUTROPHILS NFR BLD AUTO: 10.73 10*3/MM3 (ref 1.7–7)
NEUTROPHILS NFR BLD AUTO: 83.2 % (ref 42.7–76)
NRBC BLD AUTO-RTO: 0 /100 WBC (ref 0–0.2)
NT-PROBNP SERPL-MCNC: ABNORMAL PG/ML (ref 0–900)
PLATELET # BLD AUTO: 292 10*3/MM3 (ref 140–450)
PMV BLD AUTO: 9.6 FL (ref 6–12)
POTASSIUM SERPL-SCNC: 3.7 MMOL/L (ref 3.5–5.2)
PROCALCITONIN SERPL-MCNC: 0.06 NG/ML (ref 0–0.25)
PROT SERPL-MCNC: 6.8 G/DL (ref 6–8.5)
RBC # BLD AUTO: 4.77 10*6/MM3 (ref 3.77–5.28)
RHINOVIRUS RNA SPEC NAA+PROBE: NOT DETECTED
RSV RNA NPH QL NAA+NON-PROBE: NOT DETECTED
SARS-COV-2 RNA NPH QL NAA+NON-PROBE: NOT DETECTED
SODIUM SERPL-SCNC: 136 MMOL/L (ref 136–145)
TROPONIN T SERPL HS-MCNC: 32 NG/L
WBC NRBC COR # BLD AUTO: 12.9 10*3/MM3 (ref 3.4–10.8)
WHOLE BLOOD HOLD COAG: NORMAL
WHOLE BLOOD HOLD SPECIMEN: NORMAL

## 2024-09-25 PROCEDURE — 25010000002 CEFTRIAXONE PER 250 MG: Performed by: NURSE PRACTITIONER

## 2024-09-25 PROCEDURE — G0378 HOSPITAL OBSERVATION PER HR: HCPCS

## 2024-09-25 PROCEDURE — 86140 C-REACTIVE PROTEIN: CPT | Performed by: NURSE PRACTITIONER

## 2024-09-25 PROCEDURE — 36415 COLL VENOUS BLD VENIPUNCTURE: CPT

## 2024-09-25 PROCEDURE — 71275 CT ANGIOGRAPHY CHEST: CPT

## 2024-09-25 PROCEDURE — 87449 NOS EACH ORGANISM AG IA: CPT | Performed by: NURSE PRACTITIONER

## 2024-09-25 PROCEDURE — 70450 CT HEAD/BRAIN W/O DYE: CPT

## 2024-09-25 PROCEDURE — 96366 THER/PROPH/DIAG IV INF ADDON: CPT

## 2024-09-25 PROCEDURE — 87040 BLOOD CULTURE FOR BACTERIA: CPT | Performed by: NURSE PRACTITIONER

## 2024-09-25 PROCEDURE — 84145 PROCALCITONIN (PCT): CPT | Performed by: NURSE PRACTITIONER

## 2024-09-25 PROCEDURE — 83735 ASSAY OF MAGNESIUM: CPT | Performed by: NURSE PRACTITIONER

## 2024-09-25 PROCEDURE — 25510000001 IOPAMIDOL PER 1 ML: Performed by: EMERGENCY MEDICINE

## 2024-09-25 PROCEDURE — 96365 THER/PROPH/DIAG IV INF INIT: CPT

## 2024-09-25 PROCEDURE — 25010000002 MAGNESIUM SULFATE 2 GM/50ML SOLUTION: Performed by: NURSE PRACTITIONER

## 2024-09-25 PROCEDURE — 83605 ASSAY OF LACTIC ACID: CPT | Performed by: NURSE PRACTITIONER

## 2024-09-25 PROCEDURE — 96375 TX/PRO/DX INJ NEW DRUG ADDON: CPT

## 2024-09-25 PROCEDURE — 85025 COMPLETE CBC W/AUTO DIFF WBC: CPT | Performed by: EMERGENCY MEDICINE

## 2024-09-25 PROCEDURE — 99285 EMERGENCY DEPT VISIT HI MDM: CPT

## 2024-09-25 PROCEDURE — 0202U NFCT DS 22 TRGT SARS-COV-2: CPT | Performed by: NURSE PRACTITIONER

## 2024-09-25 PROCEDURE — 84484 ASSAY OF TROPONIN QUANT: CPT | Performed by: EMERGENCY MEDICINE

## 2024-09-25 PROCEDURE — 71045 X-RAY EXAM CHEST 1 VIEW: CPT

## 2024-09-25 PROCEDURE — 83880 ASSAY OF NATRIURETIC PEPTIDE: CPT | Performed by: EMERGENCY MEDICINE

## 2024-09-25 PROCEDURE — 99222 1ST HOSP IP/OBS MODERATE 55: CPT | Performed by: NURSE PRACTITIONER

## 2024-09-25 PROCEDURE — 25010000002 KETOROLAC TROMETHAMINE PER 15 MG: Performed by: NURSE PRACTITIONER

## 2024-09-25 PROCEDURE — 80053 COMPREHEN METABOLIC PANEL: CPT | Performed by: EMERGENCY MEDICINE

## 2024-09-25 PROCEDURE — 93005 ELECTROCARDIOGRAM TRACING: CPT | Performed by: EMERGENCY MEDICINE

## 2024-09-25 PROCEDURE — 96367 TX/PROPH/DG ADDL SEQ IV INF: CPT

## 2024-09-25 PROCEDURE — 25010000002 BUMETANIDE PER 0.5 MG: Performed by: NURSE PRACTITIONER

## 2024-09-25 PROCEDURE — 85379 FIBRIN DEGRADATION QUANT: CPT | Performed by: NURSE PRACTITIONER

## 2024-09-25 PROCEDURE — 87641 MR-STAPH DNA AMP PROBE: CPT | Performed by: NURSE PRACTITIONER

## 2024-09-25 RX ORDER — BISACODYL 5 MG/1
5 TABLET, DELAYED RELEASE ORAL DAILY PRN
Status: DISCONTINUED | OUTPATIENT
Start: 2024-09-25 | End: 2024-09-29 | Stop reason: HOSPADM

## 2024-09-25 RX ORDER — IOPAMIDOL 755 MG/ML
75 INJECTION, SOLUTION INTRAVASCULAR
Status: COMPLETED | OUTPATIENT
Start: 2024-09-25 | End: 2024-09-25

## 2024-09-25 RX ORDER — BUMETANIDE 1 MG/1
1 TABLET ORAL DAILY
Status: ON HOLD | COMMUNITY
End: 2024-09-29

## 2024-09-25 RX ORDER — BISACODYL 10 MG
10 SUPPOSITORY, RECTAL RECTAL DAILY PRN
Status: DISCONTINUED | OUTPATIENT
Start: 2024-09-25 | End: 2024-09-29 | Stop reason: HOSPADM

## 2024-09-25 RX ORDER — BUMETANIDE 0.25 MG/ML
1 INJECTION INTRAMUSCULAR; INTRAVENOUS EVERY 12 HOURS
Status: COMPLETED | OUTPATIENT
Start: 2024-09-26 | End: 2024-09-28

## 2024-09-25 RX ORDER — IPRATROPIUM BROMIDE AND ALBUTEROL SULFATE 2.5; .5 MG/3ML; MG/3ML
3 SOLUTION RESPIRATORY (INHALATION) EVERY 4 HOURS PRN
Status: DISCONTINUED | OUTPATIENT
Start: 2024-09-25 | End: 2024-09-29 | Stop reason: HOSPADM

## 2024-09-25 RX ORDER — AMOXICILLIN 250 MG
2 CAPSULE ORAL 2 TIMES DAILY PRN
Status: DISCONTINUED | OUTPATIENT
Start: 2024-09-25 | End: 2024-09-29 | Stop reason: HOSPADM

## 2024-09-25 RX ORDER — SODIUM CHLORIDE 0.9 % (FLUSH) 0.9 %
10 SYRINGE (ML) INJECTION AS NEEDED
Status: DISCONTINUED | OUTPATIENT
Start: 2024-09-25 | End: 2024-09-29 | Stop reason: HOSPADM

## 2024-09-25 RX ORDER — ACETAMINOPHEN 325 MG/1
650 TABLET ORAL EVERY 6 HOURS PRN
Status: DISCONTINUED | OUTPATIENT
Start: 2024-09-25 | End: 2024-09-29 | Stop reason: HOSPADM

## 2024-09-25 RX ORDER — KETOROLAC TROMETHAMINE 15 MG/ML
15 INJECTION, SOLUTION INTRAMUSCULAR; INTRAVENOUS ONCE
Status: COMPLETED | OUTPATIENT
Start: 2024-09-26 | End: 2024-09-25

## 2024-09-25 RX ORDER — SODIUM CHLORIDE 0.9 % (FLUSH) 0.9 %
10 SYRINGE (ML) INJECTION EVERY 12 HOURS SCHEDULED
Status: DISCONTINUED | OUTPATIENT
Start: 2024-09-25 | End: 2024-09-29 | Stop reason: HOSPADM

## 2024-09-25 RX ORDER — LORAZEPAM 1 MG/1
2 TABLET ORAL ONCE
Status: COMPLETED | OUTPATIENT
Start: 2024-09-25 | End: 2024-09-25

## 2024-09-25 RX ORDER — MAGNESIUM SULFATE HEPTAHYDRATE 40 MG/ML
2 INJECTION, SOLUTION INTRAVENOUS ONCE
Status: COMPLETED | OUTPATIENT
Start: 2024-09-25 | End: 2024-09-25

## 2024-09-25 RX ORDER — SODIUM CHLORIDE 9 MG/ML
40 INJECTION, SOLUTION INTRAVENOUS AS NEEDED
Status: DISCONTINUED | OUTPATIENT
Start: 2024-09-25 | End: 2024-09-29 | Stop reason: HOSPADM

## 2024-09-25 RX ORDER — POLYETHYLENE GLYCOL 3350 17 G/17G
17 POWDER, FOR SOLUTION ORAL DAILY PRN
Status: DISCONTINUED | OUTPATIENT
Start: 2024-09-25 | End: 2024-09-29 | Stop reason: HOSPADM

## 2024-09-25 RX ORDER — PANTOPRAZOLE SODIUM 40 MG/1
40 TABLET, DELAYED RELEASE ORAL DAILY
Status: DISCONTINUED | OUTPATIENT
Start: 2024-09-26 | End: 2024-09-29 | Stop reason: HOSPADM

## 2024-09-25 RX ORDER — DOXYCYCLINE 100 MG/1
100 CAPSULE ORAL ONCE
Status: COMPLETED | OUTPATIENT
Start: 2024-09-25 | End: 2024-09-25

## 2024-09-25 RX ORDER — BUPROPION HYDROCHLORIDE 150 MG/1
300 TABLET ORAL DAILY
Status: DISCONTINUED | OUTPATIENT
Start: 2024-09-26 | End: 2024-09-29 | Stop reason: HOSPADM

## 2024-09-25 RX ORDER — BUMETANIDE 0.25 MG/ML
1 INJECTION INTRAMUSCULAR; INTRAVENOUS ONCE
Status: COMPLETED | OUTPATIENT
Start: 2024-09-25 | End: 2024-09-25

## 2024-09-25 RX ORDER — BUMETANIDE 1 MG/1
1 TABLET ORAL DAILY
Status: DISCONTINUED | OUTPATIENT
Start: 2024-09-25 | End: 2024-09-29 | Stop reason: HOSPADM

## 2024-09-25 RX ORDER — SPIRONOLACTONE 25 MG/1
100 TABLET ORAL DAILY
Status: DISCONTINUED | OUTPATIENT
Start: 2024-09-25 | End: 2024-09-29 | Stop reason: HOSPADM

## 2024-09-25 RX ADMIN — MAGNESIUM SULFATE HEPTAHYDRATE 2 G: 40 INJECTION, SOLUTION INTRAVENOUS at 15:35

## 2024-09-25 RX ADMIN — BUMETANIDE 1 MG: 0.25 INJECTION INTRAMUSCULAR; INTRAVENOUS at 17:24

## 2024-09-25 RX ADMIN — SPIRONOLACTONE 100 MG: 25 TABLET, FILM COATED ORAL at 20:27

## 2024-09-25 RX ADMIN — KETOROLAC TROMETHAMINE 15 MG: 15 INJECTION, SOLUTION INTRAMUSCULAR; INTRAVENOUS at 23:51

## 2024-09-25 RX ADMIN — LORAZEPAM 1 MG: 1 TABLET ORAL at 14:10

## 2024-09-25 RX ADMIN — APIXABAN 5 MG: 5 TABLET, FILM COATED ORAL at 20:27

## 2024-09-25 RX ADMIN — SODIUM CHLORIDE 1000 MG: 900 INJECTION INTRAVENOUS at 17:23

## 2024-09-25 RX ADMIN — IOPAMIDOL 75 ML: 755 INJECTION, SOLUTION INTRAVENOUS at 15:24

## 2024-09-25 RX ADMIN — ACETAMINOPHEN 650 MG: 325 TABLET ORAL at 20:40

## 2024-09-25 RX ADMIN — DOXYCYCLINE 100 MG: 100 CAPSULE ORAL at 17:24

## 2024-09-25 NOTE — H&P
"    [facility] Medicine Services  HISTORY AND PHYSICAL    Patient Name: Jill M Paladino  : 1965  MRN: [id number]  Primary Care Physician: Sigrid Byers PA  Date of admission: 2024      Subjective   Subjective     Chief Complaint:  soa    HPI:  Jill M Paladino is a 59 y.o. female with PMH of HTN, CHF, PNA, homeless, PE. Patient has been seen at  ED twice in September for SOA. She was treated with IV bumex and discharged from ED. She states she has been taking her bumex and eliquis daily. She has noticed over the last week increased soa,orthopnea and increased edema which has gotten progressively worse. She states she has been having rib pain that feels like her previously PNA. She states she has seen Cardiology at  and had follow up ECHO with improvement in EF. I am unable to find ECHO. Patient is on room air during exam and breathing is not labored.     In ED: BNP 11,030, trop 32, K 3.7, creat 0.87, CRP 2.05, lactate 3.1, procal 0.06, WBC 12.90, D dimer 0.75      Personal History     Past Medical History:   Diagnosis Date    CHF (congestive heart failure)     Hypertension     Pneumonia            Past Surgical History:   Procedure Laterality Date    HERNIA REPAIR      KNEE SURGERY      RHINOPLASTY         Family History: family history includes No Known Problems in her father and mother.     Social History:  reports that she has never smoked. She has never used smokeless tobacco. She reports that she does not drink alcohol and does not use drugs.  Social History     Social History Narrative    Not on file       Medications:  Available home medication information reviewed.  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    Other Nausea And Vomiting     Pt states \"all opiods make me throw up " "instantly\"    Percocet [Oxycodone-Acetaminophen] GI Intolerance    Sulfa Antibiotics Rash       Objective   Objective     Vital Signs:   Temp:  [98 °F (36.7 °C)] 98 °F (36.7 °C)  Heart Rate:  [91-96] 91  Resp:  [18] 18  BP: (141-152)/(91-98) 142/91       Physical Exam   Constitutional: Awake, alert  Eyes: PERRLA, sclerae anicteric, no conjunctival injection  HENT: NCAT, mucous membranes moist  Neck: Supple, no thyromegaly, no lymphadenopathy, trachea midline  Respiratory:decreased in boone bases, nonlabored respirations room air 97%  Cardiovascular: RRR, no murmurs, rubs, or gallops, palpable pedal pulses bilaterally  Gastrointestinal: Positive bowel sounds, soft, nontender, nondistended  Musculoskeletal: mild bilateral ankle edema, no clubbing or cyanosis to extremities  Psychiatric: Appropriate affect, cooperative  Neurologic: Oriented x 3, strength symmetric in all extremities, Cranial Nerves grossly intact to confrontation, speech clear  Skin: No rashes      Result Review:  I have personally reviewed the results from the time of this admission to 9/25/2024 17:32 EDT and agree with these findings:  [x]  Laboratory list / accordion  []  Microbiology  [x]  Radiology  []  EKG/Telemetry   []  Cardiology/Vascular   []  Pathology  []  Old records  []  Other:  Most notable findings include:       LAB RESULTS:      Lab 09/25/24  1359   WBC 12.90*   HEMOGLOBIN 13.6   HEMATOCRIT 42.8   PLATELETS 292   NEUTROS ABS 10.73*   IMMATURE GRANS (ABS) 0.05   LYMPHS ABS 1.33   MONOS ABS 0.66   EOS ABS 0.07   MCV 89.7   CRP 2.05*   PROCALCITONIN 0.06   LACTATE 3.1*   D DIMER QUANT 0.75*         Lab 09/25/24  1359   SODIUM 136   POTASSIUM 3.7   CHLORIDE 98   CO2 26.0   ANION GAP 12.0   BUN 21*   CREATININE 0.87   EGFR 77.3   GLUCOSE 125*   CALCIUM 9.0   MAGNESIUM 1.7         Lab 09/25/24  1359   TOTAL PROTEIN 6.8   ALBUMIN 3.7   GLOBULIN 3.1   ALT (SGPT) 22   AST (SGOT) 27   BILIRUBIN 0.8   ALK PHOS 133*         Lab 09/25/24  1359 "   PROBNP 11,030.0*   HSTROP T 32*                 UA          12/14/2023    20:44 5/15/2024    11:51 7/23/2024    21:56   Urinalysis   Squamous Epithelial Cells, UA 0-2  0-2        Specific Gravity, UA 1.017  1.008     1.008       Ketones, UA Negative      Blood, UA Trace  Negative     Negative       Leukocytes, UA Small (1+)  Trace     Negative       Nitrite, UA Negative      RBC, UA 0-2  <1        WBC, UA 11-20      Bacteria, UA None Seen  Negative           Details          This result is from an external source.               Microbiology Results (last 10 days)       Procedure Component Value - Date/Time    Respiratory Panel PCR w/COVID-19(SARS-CoV-2) [facility]ONDA/[facility]UEL/[facility]/SWAPNA In-House, NP Swab in UTM/VTM, 2 HR TAT - Swab, Nasopharynx [[id number]]  (Normal) Collected: 09/25/24 1358    Lab Status: Final result Specimen: Swab from Nasopharynx Updated: 09/25/24 1452     ADENOVIRUS, PCR Not Detected     Coronavirus 229E Not Detected     Coronavirus HKU1 Not Detected     Coronavirus NL63 Not Detected     Coronavirus OC43 Not Detected     COVID19 Not Detected     Human Metapneumovirus Not Detected     Human Rhinovirus/Enterovirus Not Detected     Influenza A PCR Not Detected     Influenza B PCR Not Detected     Parainfluenza Virus 1 Not Detected     Parainfluenza Virus 2 Not Detected     Parainfluenza Virus 3 Not Detected     Parainfluenza Virus 4 Not Detected     RSV, PCR Not Detected     Bordetella pertussis pcr Not Detected     Bordetella parapertussis PCR Not Detected     Chlamydophila pneumoniae PCR Not Detected     Mycoplasma pneumo by PCR Not Detected    Narrative:      In the setting of a positive respiratory panel with a viral infection PLUS a negative procalcitonin without other underlying concern for bacterial infection, consider observing off antibiotics or discontinuation of antibiotics and continue supportive care. If the respiratory panel is positive for atypical bacterial infection (Bordetella pertussis,  Chlamydophila pneumoniae, or Mycoplasma pneumoniae), consider antibiotic de-escalation to target atypical bacterial infection.    COVID-19, FLU A/B, RSV PCR 1 HR TAT - , [[id number]]  (Normal) Collected: 09/17/24 0007    Lab Status: Final result Specimen: Swab from Nasopharynx Updated: 09/25/24 1333     SARS-CoV-2, CAYLA Not Detected     Influenza A PCR Not Detected     Influenza B PCR Not Detected     RSV, PCR Not Detected    Narrative:      This test is FDA approved for use with nasopharyngeal specimens in Viral Transport Media (VTM). This test is used for clinical purposes. It should not be regarded as investigational or for research. This laboratory is certified under the Clinical Laboratory improvement Amendments of 1988 (CLIA-88 as qualified to perform high complexity clinical laboratory testing.  This test was performed on the Xpert Xpress SARS CoV-2 Plus assay test, a PCR-based method.  Negative results should be considered presumptive and do not preclude current or future infection obtained through community transmission or other exposures. Negative results must be considered in the context of an individual's recent exposures, history, presence of clinical signs and symptoms consistent with COVID-19.            CT Angiogram Chest Pulmonary Embolism    Result Date: 9/25/2024  CT ANGIOGRAM CHEST PULMONARY EMBOLISM Date of Exam: 9/25/2024 3:14 PM EDT Indication: sob, elevated troponin. Comparison: Chest CTA performed on March 8, 2024 Technique: Axial CT images were obtained of the chest after the uneventful intravenous administration of Isovue-370, 75 mL utilizing pulmonary embolism protocol.  Reconstructed coronal and sagittal images were also obtained. Automated exposure control and iterative construction methods were used. Findings: Pulmonary arteries: Adequate opacification of the pulmonary arteries. No evidence of acute pulmonary embolism. Thoracic aorta: The thoracic aorta is not opacified with contrast due  to bolus timing. Thoracic aorta is normal in caliber and contour. Small calcified plaques are visualized. Cardiovascular: The heart is moderately to severely enlarged. There is retrograde opacification of the IVC and hepatic veins, suggestive of CHF. No evidence of pericardial effusion. Thyroid: The visualized portion of the thyroid is unremarkable. Lungs and Pleura: Patchy airspace opacities are visualized in the lung bases with associated interlobular septal thickening. No pleural effusion is visualized. No pneumothorax. Central airways are patent. Mediastinum/Andie: No mediastinal or hilar lymphadenopathy. Lymph nodes: No axillary or supraclavicular lymphadenopathy. Bones and Soft Tissue:No acute fracture, aggressive osseous lesions, or soft tissue process. Upper Abdomen: No acute process in the upper abdomen.     Impression: Impression: No evidence of pulmonary embolism. Moderate to severe cardiomegaly. Retrograde opacification of the IVC and hepatic veins suggest CHF. Small patchy airspace opacities with associated interlobular septal thickening in the lung bases. Finding may be on the basis of pneumonia versus alveolar pulmonary edema. Electronically Signed: Cristopher Hill MD  9/25/2024 3:37 PM EDT  Workstation ID: [id number]    CT Head Without Contrast    Result Date: 9/25/2024  CT HEAD WO CONTRAST Date of Exam: 9/25/2024 3:14 PM EDT Indication: ams. Comparison: None available. Technique: Axial CT images were obtained of the head without contrast administration.  Automated exposure control and iterative construction methods were used. Findings: Motion artifact near the vertex limits diagnostic sensitivity. There is no evidence of hemorrhage. There is no mass effect or midline shift. There is no extra-axial collections. Ventricles are normal in size and configuration for patient's stated age. Posterior fossa is within normal limits. Calvarium and skull base appear intact. Visualized sinuses show no air fluid  levels. Partial visualization of left maxillary retention cyst measuring 2.8 cm. The mastoid air cells are clear. Visualized orbits are unremarkable.     Impression: Impression: Motion artifact near the vertex limits diagnostic study. No acute or cranial process evident. Electronically Signed: Cristopher Hill MD  9/25/2024 3:28 PM EDT  Workstation ID: [id number]    XR Chest 1 View    Result Date: 9/25/2024  XR CHEST 1 VW Date of Exam: 9/25/2024 2:42 PM EDT Indication: SOA triage protocol Comparison: Chest x-ray 3/8/2024 Findings: Redemonstration of cardiomegaly. There is mild blunting of the left costophrenic angle suggestive of small left pleural fluid, similar to prior. No pneumothorax. The lungs are grossly clear. No bhupendra pulmonary edema. No acute osseous findings.     Impression: Impression: Cardiomegaly and mild blunting of the left costophrenic angle which is similar to prior suggestive of small left pleural fluid or perhaps pleural scarring. No bhupendra pulmonary edema. Electronically Signed: Jesus Law MD  9/25/2024 3:07 PM EDT  Workstation ID: [id number]     Results for orders placed during the hospital encounter of 03/08/24    Adult Transthoracic Echo Complete w/ Color, Spectral and Contrast if Necessary Per Protocol    Interpretation Summary    Left ventricular systolic function is severely decreased. Calculated left ventricular EF = 16.8% Left ventricular ejection fraction appears to be less than 20%.    The left ventricular cavity is mildly dilated.    Left ventricular wall thickness is consistent with mild concentric hypertrophy.    Left ventricular diastolic function is consistent with (grade III w/high LAP) restrictive pattern.    Moderately reduced right ventricular systolic function noted.    The left atrial cavity is moderately dilated.    The right atrial cavity is moderately  dilated.    There are myxomatous changes of the mitral valve apparatus present.    Severe mitral valve regurgitation is  present.    Moderate tricuspid valve regurgitation is present.    Estimated right ventricular systolic pressure from tricuspid regurgitation is moderately elevated (45-55 mmHg).    LV apical thrombus, measuring approximately 1 x 1 cm, not mobile.      Assessment & Plan   Assessment & Plan       Acute exacerbation of CHF (congestive heart failure)    Pneumonia    Essential hypertension    Acute on chronic CHF  -- echo on 3/2024 EF 16%  -- repeat ECHO in am  -- consult cardiology in am   -- cont IV bumex bid  -- cont home aldactone   -- BNP 11,030  -- oxygen as needed  --CT chest neg for PE, mod to severe cardiomegaly    PNA  -- cont rocephin and doxy started in ED  -- nebs prn   -- WBC 12.90, procal 0.06, lactate 3.1  -- resp PCR neg   -- blood cultures pending  -- Ct chest opacities in lung bases    -- urine studies       HTN  Hx of PE   Prolonged QT   -- cont eliquis  -- cont aldactone       VTE Prophylaxis:  Mechanical & pharmacologic VTE prophylaxis orders are signed & held.           CODE STATUS:    Code Status and Medical Interventions: CPR (Attempt to Resuscitate); Full Support   Ordered at: 09/25/24 7729     Level Of Support Discussed With:    Patient     Code Status (Patient has no pulse and is not breathing):    CPR (Attempt to Resuscitate)     Medical Interventions (Patient has pulse or is breathing):    Full Support       Expected Discharge   Expected Discharge Date: 9/27/2024; Expected Discharge Time:      Marlene Ochoa, APRN  09/25/24

## 2024-09-25 NOTE — ED NOTES
Jill M Paladino    Nursing Report ED to Floor:  Mental status: aox4  Ambulatory status: independent  Oxygen Therapy:  room air   Cardiac Rhythm: nsr with pvcs  Admitted from: home  Safety Concerns:  none  Social Issues: none  ED Room #:  11    ED Nurse Phone Extension - [phone] or may call [phone].      HPI:   Chief Complaint   Patient presents with    Shortness of Breath       Past Medical History:  Past Medical History:   Diagnosis Date    CHF (congestive heart failure)     Hypertension     Pneumonia         Past Surgical History:  Past Surgical History:   Procedure Laterality Date    HERNIA REPAIR      KNEE SURGERY      RHINOPLASTY          Admitting Doctor:   Brian Joseph Kerley, DO    Consulting Provider(s):  Consults       No orders found from 8/27/2024 to 9/26/2024.             Admitting Diagnosis:   The primary encounter diagnosis was SOB (shortness of breath). Diagnoses of Painful breathing, Rib pain, Acute on chronic congestive heart failure, unspecified heart failure type, and Pneumonia of both lower lobes due to infectious organism were also pertinent to this visit.    Most Recent Vitals:   Vitals:    09/25/24 1501 09/25/24 1621 09/25/24 1636 09/25/24 1656   BP: 142/91      Pulse: 93 91 92 91   Resp:       Temp:       TempSrc:       SpO2: 97% 98% 97% 97%   Weight:       Height:           Active LDAs/IV Access:   Lines, Drains & Airways       Active LDAs       Name Placement date Placement time Site Days    Peripheral IV 09/25/24 1453 Anterior;Distal;Right Forearm 09/25/24  1453  Forearm  less than 1                    Labs (abnormal labs have a star):   Labs Reviewed   COMPREHENSIVE METABOLIC PANEL - Abnormal; Notable for the following components:       Result Value    Glucose 125 (*)     BUN 21 (*)     Alkaline Phosphatase 133 (*)     All other components within normal limits    Narrative:     GFR Normal >60  Chronic Kidney Disease <60  Kidney Failure <15     BNP (IN-HOUSE) - Abnormal; Notable for the  following components:    proBNP 11,030.0 (*)     All other components within normal limits    Narrative:     This assay is used as an aid in the diagnosis of individuals suspected of having heart failure. It can be used as an aid in the diagnosis of acute decompensated heart failure (ADHF) in patients presenting with signs and symptoms of ADHF to the emergency department (ED). In addition, NT-proBNP of <300 pg/mL indicates ADHF is not likely.    Age Range Result Interpretation  NT-proBNP Concentration (pg/mL:      <50             Positive            >450                   Gray                 300-450                    Negative             <300    50-75           Positive            >900                  Gray                300-900                  Negative            <300      >75             Positive            >1800                  Gray                300-1800                  Negative            <300   SINGLE HS TROPONIN T - Abnormal; Notable for the following components:    HS Troponin T 32 (*)     All other components within normal limits    Narrative:     High Sensitive Troponin T Reference Range:  <14.0 ng/L- Negative Female for AMI  <22.0 ng/L- Negative Male for AMI  >=14 - Abnormal Female indicating possible myocardial injury.  >=22 - Abnormal Male indicating possible myocardial injury.   Clinicians would have to utilize clinical acumen, EKG, Troponin, and serial changes to determine if it is an Acute Myocardial Infarction or myocardial injury due to an underlying chronic condition.        CBC WITH AUTO DIFFERENTIAL - Abnormal; Notable for the following components:    WBC 12.90 (*)     Neutrophil % 83.2 (*)     Lymphocyte % 10.3 (*)     Neutrophils, Absolute 10.73 (*)     All other components within normal limits   D-DIMER, QUANTITATIVE - Abnormal; Notable for the following components:    D-Dimer, Quantitative 0.75 (*)     All other components within normal limits    Narrative:     According to the assay  "'s published package insert, a normal (<0.50 MCGFEU/mL) D-dimer result in conjunction with a non-high clinical probability assessment, excludes deep vein thrombosis (DVT) and pulmonary embolism (PE) with high sensitivity.    D-dimer values increase with age and this can make VTE exclusion of an older population difficult. To address this, the [employer], based on best available evidence and recent guidelines, recommends that clinicians use age-adjusted D-dimer thresholds in patients greater than 50 years of age with: a) a low probability of PE who do not meet all Pulmonary Embolism Rule Out Criteria, or b) in those with intermediate probability of PE.   The formula for an age-adjusted D-dimer cut-off is \"age/100\".  For example, a 60 year old patient would have an age-adjusted cut-off of 0.60 MCGFEU/mL and an 80 year old 0.80 MCGFEU/mL.   LACTIC ACID, PLASMA - Abnormal; Notable for the following components:    Lactate 3.1 (*)     All other components within normal limits   C-REACTIVE PROTEIN - Abnormal; Notable for the following components:    C-Reactive Protein 2.05 (*)     All other components within normal limits   RESPIRATORY PANEL PCR W/ COVID-19 (SARS-COV-2), NP SWAB IN UTM/VTP, 2 HR TAT - Normal    Narrative:     In the setting of a positive respiratory panel with a viral infection PLUS a negative procalcitonin without other underlying concern for bacterial infection, consider observing off antibiotics or discontinuation of antibiotics and continue supportive care. If the respiratory panel is positive for atypical bacterial infection (Bordetella pertussis, Chlamydophila pneumoniae, or Mycoplasma pneumoniae), consider antibiotic de-escalation to target atypical bacterial infection.   PROCALCITONIN - Normal    Narrative:     As a Marker for Sepsis (Non-Neonates):    1. <0.5 ng/mL represents a low risk of severe sepsis and/or septic shock.  2. >2 ng/mL represents a high risk of " "severe sepsis and/or septic shock.    As a Marker for Lower Respiratory Tract Infections that require antibiotic therapy:    PCT on Admission    Antibiotic Therapy       6-12 Hrs later    >0.5                Strongly Recommended  >0.25 - <0.5        Recommended   0.1 - 0.25          Discouraged              Remeasure/reassess PCT  <0.1                Strongly Discouraged     Remeasure/reassess PCT    As 28 day mortality risk marker: \"Change in Procalcitonin Result\" (>80% or <=80%) if Day 0 (or Day 1) and Day 4 values are available. Refer to http://www.[facility]-pct-calculator.com    Change in PCT <=80%  A decrease of PCT levels below or equal to 80% defines a positive change in PCT test result representing a higher risk for 28-day all-cause mortality of patients diagnosed with severe sepsis for septic shock.    Change in PCT >80%  A decrease of PCT levels of more than 80% defines a negative change in PCT result representing a lower risk for 28-day all-cause mortality of patients diagnosed with severe sepsis or septic shock.      MAGNESIUM - Normal   BLOOD CULTURE   BLOOD CULTURE   RAINBOW DRAW    Narrative:     The following orders were created for panel order [place] Draw.  Procedure                               Abnormality         Status                     ---------                               -----------         ------                     Green Top (Gel)[[id number]]                                  Final result               Lavender Top[[id number]]                                     Final result               Gold Top - SST[[id number]]                                   Final result               Garcia Top[[id number]]                                         Final result               Light Blue Top[[id number]]                                   Final result                 Please view results for these tests on the individual orders.   LACTIC ACID, REFLEX   CBC AND DIFFERENTIAL    Narrative:     The following orders " were created for panel order CBC & Differential.  Procedure                               Abnormality         Status                     ---------                               -----------         ------                     CBC Auto Differential[[id number]]        Abnormal            Final result                 Please view results for these tests on the individual orders.   GREEN TOP   LAVENDER TOP   GOLD TOP - SST   GRAY TOP   LIGHT BLUE TOP       Meds Given in ED:   Medications   sodium chloride 0.9 % flush 10 mL (has no administration in time range)   bumetanide (BUMEX) injection 1 mg (has no administration in time range)   cefTRIAXone (ROCEPHIN) 1,000 mg in sodium chloride 0.9 % 100 mL MBP (has no administration in time range)   doxycycline (MONODOX) capsule 100 mg (has no administration in time range)   LORazepam (ATIVAN) tablet 2 mg (1 mg Oral Given 9/25/24 1410)   magnesium sulfate 2g/50 mL (PREMIX) infusion (2 g Intravenous New Bag 9/25/24 1535)   iopamidol (ISOVUE-370) 76 % injection 75 mL (75 mL Intravenous Given 9/25/24 1524)           Last NIH score:                                                          Dysphagia screening results:        [place] Coma Scale:  No data recorded     CIWA:        Restraint Type:            Isolation Status:  No active isolations

## 2024-09-25 NOTE — ED PROVIDER NOTES
EMERGENCY DEPARTMENT ENCOUNTER    Pt Name: Jill M Paladino  MRN: [id number]  Pt :   1965  Room Number:    Date of encounter:  2024  PCP: Sigrid Byers PA  ED Provider: KAYLA Mcguire    Historian: Patient      HPI:  Chief Complaint: Shortness of breath, rib pain        Context: Jill M Paladino is a 59 y.o. female who presents to the ED c/o shortness of breath, rib pain since this morning.  Patient reports painful breathing, nonproductive cough.  Describes pain 5 out of 10.  History of PE, on Eliquis, CHF, on Bumex and spironolactone.  She had to double Bumex for the past couple days.  Denies fever, chills, hemoptysis.      PAST MEDICAL HISTORY  Past Medical History:   Diagnosis Date    Hypertension          PAST SURGICAL HISTORY  Past Surgical History:   Procedure Laterality Date    KNEE SURGERY      RHINOPLASTY           FAMILY HISTORY  Family History   Problem Relation Age of Onset    No Known Problems Mother     No Known Problems Father          SOCIAL HISTORY  Social History     Socioeconomic History    Marital status:    Tobacco Use    Smoking status: Never    Smokeless tobacco: Never   Vaping Use    Vaping status: Never Used   Substance and Sexual Activity    Alcohol use: No    Drug use: No    Sexual activity: Defer         ALLERGIES  Lisinopril, Other, Percocet [oxycodone-acetaminophen], and Sulfa antibiotics        REVIEW OF SYSTEMS  Review of Systems       All systems reviewed and negative except for those discussed in HPI.       PHYSICAL EXAM    I have reviewed the triage vital signs and nursing notes.    ED Triage Vitals   Temp Heart Rate Resp BP SpO2   24 1338 24 1338 24 1339 24 1338 24 1338   98 °F (36.7 °C) 96 18 150/91 99 %      Temp src Heart Rate Source Patient Position BP Location FiO2 (%)   24 1338 -- -- -- --   Oral           Physical Exam  GENERAL:   Appears in no acute distress.   HENT: Nares patent.  EYES: No scleral  icterus.  CV: Regular rhythm, regular rate.  1+ ankle edema  RESPIRATORY: Tachypnea, nonproductive cough noted.  No audible wheezes, rales or rhonchi.  Mildly diminished breath sounds at the bases  ABDOMEN: Soft, nontender  MUSCULOSKELETAL: No deformities.   NEURO: Alert, moves all extremities, follows commands.  SKIN: Warm, dry, no rash visualized.      LAB RESULTS  Recent Results (from the past 24 hour(s))   ECG 12 Lead ED Triage Standing Order; SOA    Collection Time: 09/25/24  1:49 PM   Result Value Ref Range    QT Interval 416 ms    QTC Interval 522 ms   Respiratory Panel PCR w/COVID-19(SARS-CoV-2) [facility] In-House, NP Swab in UTM/VTM, 2 HR TAT - Swab, Nasopharynx    Collection Time: 09/25/24  1:58 PM    Specimen: Nasopharynx; Swab   Result Value Ref Range    ADENOVIRUS, PCR Not Detected Not Detected    Coronavirus 229E Not Detected Not Detected    Coronavirus HKU1 Not Detected Not Detected    Coronavirus NL63 Not Detected Not Detected    Coronavirus OC43 Not Detected Not Detected    COVID19 Not Detected Not Detected - Ref. Range    Human Metapneumovirus Not Detected Not Detected    Human Rhinovirus/Enterovirus Not Detected Not Detected    Influenza A PCR Not Detected Not Detected    Influenza B PCR Not Detected Not Detected    Parainfluenza Virus 1 Not Detected Not Detected    Parainfluenza Virus 2 Not Detected Not Detected    Parainfluenza Virus 3 Not Detected Not Detected    Parainfluenza Virus 4 Not Detected Not Detected    RSV, PCR Not Detected Not Detected    Bordetella pertussis pcr Not Detected Not Detected    Bordetella parapertussis PCR Not Detected Not Detected    Chlamydophila pneumoniae PCR Not Detected Not Detected    Mycoplasma pneumo by PCR Not Detected Not Detected   Comprehensive Metabolic Panel    Collection Time: 09/25/24  1:59 PM    Specimen: Blood   Result Value Ref Range    Glucose 125 (H) 65 - 99 mg/dL    BUN 21 (H) 6 - 20 mg/dL    Creatinine 0.87 0.57 - 1.00 mg/dL     Sodium 136 136 - 145 mmol/L    Potassium 3.7 3.5 - 5.2 mmol/L    Chloride 98 98 - 107 mmol/L    CO2 26.0 22.0 - 29.0 mmol/L    Calcium 9.0 8.6 - 10.5 mg/dL    Total Protein 6.8 6.0 - 8.5 g/dL    Albumin 3.7 3.5 - 5.2 g/dL    ALT (SGPT) 22 1 - 33 U/L    AST (SGOT) 27 1 - 32 U/L    Alkaline Phosphatase 133 (H) 39 - 117 U/L    Total Bilirubin 0.8 0.0 - 1.2 mg/dL    Globulin 3.1 gm/dL    A/G Ratio 1.2 g/dL    BUN/Creatinine Ratio 24.1 7.0 - 25.0    Anion Gap 12.0 5.0 - 15.0 mmol/L    eGFR 77.3 >60.0 mL/min/1.73   BNP    Collection Time: 09/25/24  1:59 PM    Specimen: Blood   Result Value Ref Range    proBNP 11,030.0 (H) 0.0 - 900.0 pg/mL   Single High Sensitivity Troponin T    Collection Time: 09/25/24  1:59 PM    Specimen: Blood   Result Value Ref Range    HS Troponin T 32 (H) <14 ng/L   Green Top (Gel)    Collection Time: 09/25/24  1:59 PM   Result Value Ref Range    Extra Tube Hold for add-ons.    Lavender Top    Collection Time: 09/25/24  1:59 PM   Result Value Ref Range    Extra Tube hold for add-on    Gold Top - SST    Collection Time: 09/25/24  1:59 PM   Result Value Ref Range    Extra Tube Hold for add-ons.    Gray Top    Collection Time: 09/25/24  1:59 PM   Result Value Ref Range    Extra Tube Hold for add-ons.    Light Blue Top    Collection Time: 09/25/24  1:59 PM   Result Value Ref Range    Extra Tube Hold for add-ons.    CBC Auto Differential    Collection Time: 09/25/24  1:59 PM    Specimen: Blood   Result Value Ref Range    WBC 12.90 (H) 3.40 - 10.80 10*3/mm3    RBC 4.77 3.77 - 5.28 10*6/mm3    Hemoglobin 13.6 12.0 - 15.9 g/dL    Hematocrit 42.8 34.0 - 46.6 %    MCV 89.7 79.0 - 97.0 fL    MCH 28.5 26.6 - 33.0 pg    MCHC 31.8 31.5 - 35.7 g/dL    RDW 14.7 12.3 - 15.4 %    RDW-SD 47.8 37.0 - 54.0 fl    MPV 9.6 6.0 - 12.0 fL    Platelets 292 140 - 450 10*3/mm3    Neutrophil % 83.2 (H) 42.7 - 76.0 %    Lymphocyte % 10.3 (L) 19.6 - 45.3 %    Monocyte % 5.1 5.0 - 12.0 %    Eosinophil % 0.5 0.3 - 6.2 %     Basophil % 0.5 0.0 - 1.5 %    Immature Grans % 0.4 0.0 - 0.5 %    Neutrophils, Absolute 10.73 (H) 1.70 - 7.00 10*3/mm3    Lymphocytes, Absolute 1.33 0.70 - 3.10 10*3/mm3    Monocytes, Absolute 0.66 0.10 - 0.90 10*3/mm3    Eosinophils, Absolute 0.07 0.00 - 0.40 10*3/mm3    Basophils, Absolute 0.06 0.00 - 0.20 10*3/mm3    Immature Grans, Absolute 0.05 0.00 - 0.05 10*3/mm3    nRBC 0.0 0.0 - 0.2 /100 WBC   D-dimer, Quantitative    Collection Time: 09/25/24  1:59 PM    Specimen: Blood   Result Value Ref Range    D-Dimer, Quantitative 0.75 (H) 0.00 - 0.58 MCGFEU/mL   Lactic Acid, Plasma    Collection Time: 09/25/24  1:59 PM    Specimen: Blood   Result Value Ref Range    Lactate 3.1 (C) 0.5 - 2.0 mmol/L   C-reactive Protein    Collection Time: 09/25/24  1:59 PM    Specimen: Blood   Result Value Ref Range    C-Reactive Protein 2.05 (H) 0.00 - 0.50 mg/dL   Procalcitonin    Collection Time: 09/25/24  1:59 PM    Specimen: Blood   Result Value Ref Range    Procalcitonin 0.06 0.00 - 0.25 ng/mL   Magnesium    Collection Time: 09/25/24  1:59 PM    Specimen: Blood   Result Value Ref Range    Magnesium 1.7 1.6 - 2.6 mg/dL       If labs were ordered, I independently reviewed the results and considered them in treating the patient.        RADIOLOGY  CT Angiogram Chest Pulmonary Embolism    Result Date: 9/25/2024  CT ANGIOGRAM CHEST PULMONARY EMBOLISM Date of Exam: 9/25/2024 3:14 PM EDT Indication: sob, elevated troponin. Comparison: Chest CTA performed on March 8, 2024 Technique: Axial CT images were obtained of the chest after the uneventful intravenous administration of Isovue-370, 75 mL utilizing pulmonary embolism protocol.  Reconstructed coronal and sagittal images were also obtained. Automated exposure control and iterative construction methods were used. Findings: Pulmonary arteries: Adequate opacification of the pulmonary arteries. No evidence of acute pulmonary embolism. Thoracic aorta: The thoracic aorta is not opacified  with contrast due to bolus timing. Thoracic aorta is normal in caliber and contour. Small calcified plaques are visualized. Cardiovascular: The heart is moderately to severely enlarged. There is retrograde opacification of the IVC and hepatic veins, suggestive of CHF. No evidence of pericardial effusion. Thyroid: The visualized portion of the thyroid is unremarkable. Lungs and Pleura: Patchy airspace opacities are visualized in the lung bases with associated interlobular septal thickening. No pleural effusion is visualized. No pneumothorax. Central airways are patent. Mediastinum/Andie: No mediastinal or hilar lymphadenopathy. Lymph nodes: No axillary or supraclavicular lymphadenopathy. Bones and Soft Tissue:No acute fracture, aggressive osseous lesions, or soft tissue process. Upper Abdomen: No acute process in the upper abdomen.     Impression: No evidence of pulmonary embolism. Moderate to severe cardiomegaly. Retrograde opacification of the IVC and hepatic veins suggest CHF. Small patchy airspace opacities with associated interlobular septal thickening in the lung bases. Finding may be on the basis of pneumonia versus alveolar pulmonary edema. Electronically Signed: Cristopher Hill MD  9/25/2024 3:37 PM EDT  Workstation ID: [id number]    CT Head Without Contrast    Result Date: 9/25/2024  CT HEAD WO CONTRAST Date of Exam: 9/25/2024 3:14 PM EDT Indication: ams. Comparison: None available. Technique: Axial CT images were obtained of the head without contrast administration.  Automated exposure control and iterative construction methods were used. Findings: Motion artifact near the vertex limits diagnostic sensitivity. There is no evidence of hemorrhage. There is no mass effect or midline shift. There is no extra-axial collections. Ventricles are normal in size and configuration for patient's stated age. Posterior fossa is within normal limits. Calvarium and skull base appear intact. Visualized sinuses show no air  fluid levels. Partial visualization of left maxillary retention cyst measuring 2.8 cm. The mastoid air cells are clear. Visualized orbits are unremarkable.     Impression: Motion artifact near the vertex limits diagnostic study. No acute or cranial process evident. Electronically Signed: Cristopher Hill MD  9/25/2024 3:28 PM EDT  Workstation ID: [id number]    XR Chest 1 View    Result Date: 9/25/2024  XR CHEST 1 VW Date of Exam: 9/25/2024 2:42 PM EDT Indication: SOA triage protocol Comparison: Chest x-ray 3/8/2024 Findings: Redemonstration of cardiomegaly. There is mild blunting of the left costophrenic angle suggestive of small left pleural fluid, similar to prior. No pneumothorax. The lungs are grossly clear. No bhupendra pulmonary edema. No acute osseous findings.     Impression: Cardiomegaly and mild blunting of the left costophrenic angle which is similar to prior suggestive of small left pleural fluid or perhaps pleural scarring. No bhupendra pulmonary edema. Electronically Signed: Jesus Law MD  9/25/2024 3:07 PM EDT  Workstation ID: [id number]     I ordered and independently reviewed the above noted radiographic studies.      I viewed images of chest x-ray which showed cardiomegaly per my independent interpretation.    See radiologist's dictation for official interpretation.        PROCEDURES    Procedures    ECG 12 Lead ED Triage Standing Order; SOA   Preliminary Result   Test Reason : ED Triage Standing Order~   Blood Pressure :   */*   mmHG   Vent. Rate :  95 BPM     Atrial Rate :  95 BPM      P-R Int : 140 ms          QRS Dur :  88 ms       QT Int : 416 ms       P-R-T Axes :  46 -43  70 degrees      QTc Int : 522 ms      Sinus rhythm with occasional premature ventricular complexes   Possible Left atrial enlargement   Left axis deviation   Left ventricular hypertrophy   Anteroseptal infarct (cited on or before 14-DEC-2023)   Prolonged QT   Abnormal ECG   When compared with ECG of 10-MAR-2024 20:51,   premature  ventricular complexes are now present   Vent. rate has increased BY  33 BPM   Nonspecific T wave abnormality no longer evident in Inferior leads   T wave inversion less evident in Lateral leads      Referred By: EDMD           Confirmed By:           MEDICATIONS GIVEN IN ER    Medications   sodium chloride 0.9 % flush 10 mL (has no administration in time range)   bumetanide (BUMEX) injection 1 mg (has no administration in time range)   cefTRIAXone (ROCEPHIN) 1,000 mg in sodium chloride 0.9 % 100 mL MBP (has no administration in time range)   doxycycline (MONODOX) capsule 100 mg (has no administration in time range)   LORazepam (ATIVAN) tablet 2 mg (1 mg Oral Given 9/25/24 1410)   magnesium sulfate 2g/50 mL (PREMIX) infusion (2 g Intravenous New Bag 9/25/24 1535)   iopamidol (ISOVUE-370) 76 % injection 75 mL (75 mL Intravenous Given 9/25/24 1524)         MEDICAL DECISION MAKING, PROGRESS, and CONSULTS    All labs, if obtained, have been independently reviewed by me.  All radiology studies, if obtained, have been reviewed by me and the radiologist dictating the report.  All EKG's, if obtained, have been independently viewed and interpreted by me/my attending physician.      Discussion below represents my analysis of pertinent findings related to patient's condition, differential diagnosis, treatment plan and final disposition.  Patient is 59-year-old female with known history of CHF, PE, on Eliquis, homelessness presented for evaluation of rib pain, painful breathing and shortness of breath.  On physical exam she was tachypneic, no significant tenderness on palpation lower rib cage, no zoster rash, no bruising, lung  sounds were somewhat diminished at the bases.  Lab work was obtained significant for mildly elevated troponin 32 at patient's baseline, elevated proBNP 11,000 at patient's baseline, lactate 3.1, elevated white count 12.9, increased from 9 days ago, magnesium 1.7, noted prolonged QTc on EKG, patient  received 2 g of magnesium IV.  CT of the chest showing no evidence of PE, moderate severe cardiomegaly, patchy opacities at the lung bases associated with pneumonia versus alveolar edema.  Patient received Bumex IV, started on Rocephin, doxycycline.  Patient is homeless, severe CHF with ejection fraction less than 20, consulted Dr. Kerley for admission, patient placed in observation status.                       Differential diagnosis:    Pneumonia, CHF exacerbation, PE, URI, costochondritis      Additional sources:    - Discussed/ obtained information from independent historians: EMS    - External (non-ED) record review:  Interpretation Summary Echo 3/9/24         Left ventricular systolic function is severely decreased. Calculated left ventricular EF = 16.8% Left ventricular ejection fraction appears to be less than 20%.    The left ventricular cavity is mildly dilated.    Left ventricular wall thickness is consistent with mild concentric hypertrophy.    Left ventricular diastolic function is consistent with (grade III w/high LAP) restrictive pattern.    Moderately reduced right ventricular systolic function noted.    The left atrial cavity is moderately dilated.    The right atrial cavity is moderately  dilated.    There are myxomatous changes of the mitral valve apparatus present.    Severe mitral valve regurgitation is present.    Moderate tricuspid valve regurgitation is present.    Estimated right ventricular systolic pressure from tricuspid regurgitation is moderately elevated (45-55 mmHg).    LV apical thrombus, measuring approximately 1 x 1 cm, not mobile.       - Chronic or social conditions impacting care: Homelessness, CHF    - Shared decision making: Patient      Orders placed during this visit:  Orders Placed This Encounter   Procedures    Respiratory Panel PCR w/COVID-19(SARS-CoV-2) [facility] In-House, NP Swab in UTM/VTM, 2 HR TAT - Swab, Nasopharynx    Blood Culture - Blood,    Blood  "Culture - Blood,    XR Chest 1 View    CT Head Without Contrast    CT Angiogram Chest Pulmonary Embolism    [place] Draw    Comprehensive Metabolic Panel    BNP    Single High Sensitivity Troponin T    CBC Auto Differential    D-dimer, Quantitative    Lactic Acid, Plasma    C-reactive Protein    Procalcitonin    STAT Lactic Acid, Reflex    Magnesium    NPO Diet NPO Type: Strict NPO    Undress & Gown    Continuous Pulse Oximetry    Vital Signs    Oxygen Therapy- Nasal Cannula; Titrate 1-6 LPM Per SpO2; 90 - 95%    ECG 12 Lead ED Triage Standing Order; SOA    Insert Peripheral IV    Initiate Observation Status    CBC & Differential    Green Top (Gel)    Lavender Top    Gold Top - SST    Gray Top    Light Blue Top         Additional orders considered but not ordered:  Urine drug screen, EtOH    ED Course:    Consultants:      ED Course as of 09/25/24 1638   Wed Sep 25, 2024   1345 I did my initial assessment of this patient in [facility], where waiting for the bed. [IR]   1400 Patient became agitated, wanted to leave, pulled IV out.  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  She rdeclined pain medicine.  I offered her anxiety medication, she will stay for further evaluation. [IR]   1420 KG with prolonged QT's, 2 g magnesium IV ordered. [IR]   1424 CLINICAL INDICATION:  Hx of PE, sudden onset of left sided chest pain    TECHNIQUE:  Imaging of the chest was performed from thoracic inlet through upper abdomen, using spiral technique, with administration of IV contrast per the pulmonary angiogram protocol. 80 mL of Omnipaque-350 were administered intravenously. Coronal MIP images were reconstructed from this dataset.    Total DLP (Dose-Length Product): 504.60 mGy.cm. Please note: The reported value represents the total of one or more individual components during the CT acquisition on this date and at this time, and as such, the same value may appear in more than one " CT report depending on the interpreting/reporting physicians.    COMPARISON:  July 23, 2024    FINDINGS:  Pulmonary Arteries/Vessels: No central, perihilar, or proximal segmental pulmonary emboli identified. Evaluation of basilar more distal branches limited by motion. Similar mild aneurysmal dilation of ascending thoracic aorta to a diameter 4.3 cm, as measured at the level of the right pulmonary artery (6/289).    Right Heart Strain: Absent.    Mediastinum and Pleura: Similar cardiomegaly. Coronary calcifications. No pericardial effusion. Trace pleural effusion in the posterior right costophrenic sulcus.    Lungs: Haziness of the lungs with some areas of mosaic attenuation. No consolidation. Small amount of linear atelectasis in the basilar right lower lobe. Central airways appear patent.    Upper Abdomen: No free gas in the upper abdomen. Reflux of contrast into mildly dilated suprahepatic IVC, and adjacent hepatic veins. Visualized portions of upper abdominal viscera without acute findings. Undissolved pills within the gastric lumen.    Musculoskeletal: No aggressive osseous lesions or acute findings.  Exam End: 09/06/24 20:39     [IR]   1452 WBC(!): 12.90 [IR]   1452 C-Reactive Protein(!): 2.05 [IR]   1452 HS Troponin T(!): 32 [IR]   1452 proBNP(!): 11,030.0 [IR]   1452 D-Dimer, Quant(!): 0.75 [IR]   1453 Magnesium: 1.7 [IR]   1600 Messaged Dr. Kerley for admission [IR]   1628 Lactate(!!): 3.1 [IR]      ED Course User Index  [IR] Magda Gann, APRN              Shared Decision Making:  After my consideration of clinical presentation and any laboratory/radiology studies obtained, I discussed the findings with the patient/patient representative who is in agreement with the treatment plan and the final disposition.   Risks and benefits of discharge and/or observation/admission were discussed. \      AS OF 16:38 EDT VITALS:    BP - 141/92  HR - 92  TEMP - 98 °F (36.7 °C) (Oral)  O2 SATS - 98%                   DIAGNOSIS  Final diagnoses:   SOB (shortness of breath)   Painful breathing   Rib pain   Acute on chronic congestive heart failure, unspecified heart failure type   Pneumonia of both lower lobes due to infectious organism         DISPOSITION  Observation       Please note that portions of this document were completed with voice recognition software.     KAYLA Mcguire   09/25/24   16:38 EDT        Magda Gann, APRN 09/25/24 [id number]

## 2024-09-26 ENCOUNTER — APPOINTMENT (OUTPATIENT)
Dept: CARDIOLOGY | Facility: HOSPITAL | Age: 59
End: 2024-09-26
Payer: MEDICAID

## 2024-09-26 LAB
ANION GAP SERPL CALCULATED.3IONS-SCNC: 10 MMOL/L (ref 5–15)
BACTERIA UR QL AUTO: ABNORMAL /HPF
BASOPHILS # BLD AUTO: 0.06 10*3/MM3 (ref 0–0.2)
BASOPHILS NFR BLD AUTO: 0.5 % (ref 0–1.5)
BILIRUB UR QL STRIP: NEGATIVE
BUN SERPL-MCNC: 20 MG/DL (ref 6–20)
BUN/CREAT SERPL: 27 (ref 7–25)
CALCIUM SPEC-SCNC: 8.7 MG/DL (ref 8.6–10.5)
CHLORIDE SERPL-SCNC: 99 MMOL/L (ref 98–107)
CLARITY UR: CLEAR
CO2 SERPL-SCNC: 27 MMOL/L (ref 22–29)
COLOR UR: YELLOW
CREAT SERPL-MCNC: 0.74 MG/DL (ref 0.57–1)
DEPRECATED RDW RBC AUTO: 48.2 FL (ref 37–54)
EGFRCR SERPLBLD CKD-EPI 2021: 93.9 ML/MIN/1.73
EOSINOPHIL # BLD AUTO: 0.35 10*3/MM3 (ref 0–0.4)
EOSINOPHIL NFR BLD AUTO: 3 % (ref 0.3–6.2)
ERYTHROCYTE [DISTWIDTH] IN BLOOD BY AUTOMATED COUNT: 14.7 % (ref 12.3–15.4)
GLUCOSE SERPL-MCNC: 90 MG/DL (ref 65–99)
GLUCOSE UR STRIP-MCNC: NEGATIVE MG/DL
HCT VFR BLD AUTO: 43.9 % (ref 34–46.6)
HGB BLD-MCNC: 14 G/DL (ref 12–15.9)
HGB UR QL STRIP.AUTO: NEGATIVE
HYALINE CASTS UR QL AUTO: ABNORMAL /LPF
IMM GRANULOCYTES # BLD AUTO: 0.04 10*3/MM3 (ref 0–0.05)
IMM GRANULOCYTES NFR BLD AUTO: 0.3 % (ref 0–0.5)
KETONES UR QL STRIP: NEGATIVE
L PNEUMO1 AG UR QL IA: NEGATIVE
LEUKOCYTE ESTERASE UR QL STRIP.AUTO: ABNORMAL
LYMPHOCYTES # BLD AUTO: 2.01 10*3/MM3 (ref 0.7–3.1)
LYMPHOCYTES NFR BLD AUTO: 17.2 % (ref 19.6–45.3)
MCH RBC QN AUTO: 28.5 PG (ref 26.6–33)
MCHC RBC AUTO-ENTMCNC: 31.9 G/DL (ref 31.5–35.7)
MCV RBC AUTO: 89.2 FL (ref 79–97)
MONOCYTES # BLD AUTO: 0.88 10*3/MM3 (ref 0.1–0.9)
MONOCYTES NFR BLD AUTO: 7.5 % (ref 5–12)
NEUTROPHILS NFR BLD AUTO: 71.5 % (ref 42.7–76)
NEUTROPHILS NFR BLD AUTO: 8.33 10*3/MM3 (ref 1.7–7)
NITRITE UR QL STRIP: NEGATIVE
NRBC BLD AUTO-RTO: 0 /100 WBC (ref 0–0.2)
PH UR STRIP.AUTO: 6.5 [PH] (ref 5–8)
PLATELET # BLD AUTO: 269 10*3/MM3 (ref 140–450)
PMV BLD AUTO: 10.1 FL (ref 6–12)
POTASSIUM SERPL-SCNC: 3.7 MMOL/L (ref 3.5–5.2)
PROT UR QL STRIP: ABNORMAL
QT INTERVAL: 416 MS
QTC INTERVAL: 522 MS
RBC # BLD AUTO: 4.92 10*6/MM3 (ref 3.77–5.28)
RBC # UR STRIP: ABNORMAL /HPF
REF LAB TEST METHOD: ABNORMAL
S PNEUM AG SPEC QL LA: NEGATIVE
SODIUM SERPL-SCNC: 136 MMOL/L (ref 136–145)
SP GR UR STRIP: 1.02 (ref 1–1.03)
SQUAMOUS #/AREA URNS HPF: ABNORMAL /HPF
UROBILINOGEN UR QL STRIP: ABNORMAL
WBC # UR STRIP: ABNORMAL /HPF
WBC NRBC COR # BLD AUTO: 11.67 10*3/MM3 (ref 3.4–10.8)

## 2024-09-26 PROCEDURE — 94799 UNLISTED PULMONARY SVC/PX: CPT

## 2024-09-26 PROCEDURE — G0378 HOSPITAL OBSERVATION PER HR: HCPCS

## 2024-09-26 PROCEDURE — 93005 ELECTROCARDIOGRAM TRACING: CPT | Performed by: FAMILY MEDICINE

## 2024-09-26 PROCEDURE — 85025 COMPLETE CBC W/AUTO DIFF WBC: CPT | Performed by: NURSE PRACTITIONER

## 2024-09-26 PROCEDURE — 96376 TX/PRO/DX INJ SAME DRUG ADON: CPT

## 2024-09-26 PROCEDURE — 96375 TX/PRO/DX INJ NEW DRUG ADDON: CPT

## 2024-09-26 PROCEDURE — 25010000002 BUMETANIDE PER 0.5 MG: Performed by: NURSE PRACTITIONER

## 2024-09-26 PROCEDURE — 87086 URINE CULTURE/COLONY COUNT: CPT | Performed by: FAMILY MEDICINE

## 2024-09-26 PROCEDURE — 80048 BASIC METABOLIC PNL TOTAL CA: CPT | Performed by: NURSE PRACTITIONER

## 2024-09-26 PROCEDURE — 81001 URINALYSIS AUTO W/SCOPE: CPT | Performed by: FAMILY MEDICINE

## 2024-09-26 PROCEDURE — 93010 ELECTROCARDIOGRAM REPORT: CPT | Performed by: INTERNAL MEDICINE

## 2024-09-26 PROCEDURE — 94660 CPAP INITIATION&MGMT: CPT

## 2024-09-26 PROCEDURE — 25010000002 CEFTRIAXONE PER 250 MG: Performed by: NURSE PRACTITIONER

## 2024-09-26 PROCEDURE — 99214 OFFICE O/P EST MOD 30 MIN: CPT | Performed by: INTERNAL MEDICINE

## 2024-09-26 PROCEDURE — 99232 SBSQ HOSP IP/OBS MODERATE 35: CPT | Performed by: FAMILY MEDICINE

## 2024-09-26 RX ORDER — METOPROLOL SUCCINATE 50 MG/1
50 TABLET, EXTENDED RELEASE ORAL DAILY
Status: ON HOLD | COMMUNITY
End: 2024-09-29

## 2024-09-26 RX ORDER — HYDROXYZINE HYDROCHLORIDE 25 MG/1
25 TABLET, FILM COATED ORAL 3 TIMES DAILY PRN
Status: DISCONTINUED | OUTPATIENT
Start: 2024-09-26 | End: 2024-09-29 | Stop reason: HOSPADM

## 2024-09-26 RX ORDER — ROSUVASTATIN CALCIUM 40 MG/1
40 TABLET, COATED ORAL DAILY
Status: ON HOLD | COMMUNITY
End: 2024-09-29

## 2024-09-26 RX ORDER — VALSARTAN 80 MG/1
80 TABLET ORAL
Status: DISCONTINUED | OUTPATIENT
Start: 2024-09-26 | End: 2024-09-28

## 2024-09-26 RX ORDER — VALSARTAN AND HYDROCHLOROTHIAZIDE 80; 12.5 MG/1; MG/1
0.5 TABLET, FILM COATED ORAL DAILY
Status: ON HOLD | COMMUNITY
End: 2024-09-29

## 2024-09-26 RX ORDER — OLANZAPINE 10 MG/1
10 TABLET, ORALLY DISINTEGRATING ORAL NIGHTLY
COMMUNITY
End: 2024-09-29 | Stop reason: HOSPADM

## 2024-09-26 RX ORDER — LORAZEPAM 1 MG/1
1 TABLET ORAL ONCE
Status: COMPLETED | OUTPATIENT
Start: 2024-09-26 | End: 2024-09-26

## 2024-09-26 RX ADMIN — SPIRONOLACTONE 100 MG: 25 TABLET, FILM COATED ORAL at 08:12

## 2024-09-26 RX ADMIN — BUPROPION HYDROCHLORIDE 300 MG: 150 TABLET, EXTENDED RELEASE ORAL at 08:11

## 2024-09-26 RX ADMIN — DOXYCYCLINE 100 MG: 100 INJECTION, POWDER, LYOPHILIZED, FOR SOLUTION INTRAVENOUS at 18:03

## 2024-09-26 RX ADMIN — PANTOPRAZOLE SODIUM 40 MG: 40 TABLET, DELAYED RELEASE ORAL at 08:11

## 2024-09-26 RX ADMIN — ACETAMINOPHEN 650 MG: 325 TABLET ORAL at 18:32

## 2024-09-26 RX ADMIN — LORAZEPAM 1 MG: 1 TABLET ORAL at 16:53

## 2024-09-26 RX ADMIN — APIXABAN 5 MG: 5 TABLET, FILM COATED ORAL at 08:11

## 2024-09-26 RX ADMIN — HYDROXYZINE HYDROCHLORIDE 25 MG: 25 TABLET ORAL at 18:49

## 2024-09-26 RX ADMIN — DOXYCYCLINE 100 MG: 100 INJECTION, POWDER, LYOPHILIZED, FOR SOLUTION INTRAVENOUS at 05:04

## 2024-09-26 RX ADMIN — VALSARTAN 80 MG: 80 TABLET, FILM COATED ORAL at 11:40

## 2024-09-26 RX ADMIN — BUMETANIDE 1 MG: 0.25 INJECTION INTRAMUSCULAR; INTRAVENOUS at 21:08

## 2024-09-26 RX ADMIN — SODIUM CHLORIDE 1000 MG: 900 INJECTION INTRAVENOUS at 16:53

## 2024-09-26 RX ADMIN — Medication 10 ML: at 21:18

## 2024-09-26 RX ADMIN — APIXABAN 5 MG: 5 TABLET, FILM COATED ORAL at 21:08

## 2024-09-26 RX ADMIN — BUMETANIDE 1 MG: 0.25 INJECTION INTRAMUSCULAR; INTRAVENOUS at 08:11

## 2024-09-26 NOTE — CASE MANAGEMENT/SOCIAL WORK
Discharge Planning Assessment  [facility]     Patient Name: Jill M Paladino  MRN: [id number]  Today's Date: 9/26/2024    Admit Date: 9/25/2024    Plan: home   Discharge Needs Assessment       Row Name 09/26/24 0826       Living Environment    People in Home alone    Current Living Arrangements condominium    Primary Care Provided by self       Transition Planning    Patient/Family Anticipates Transition to home       Discharge Needs Assessment    Readmission Within the Last 30 Days no previous admission in last 30 days    Equipment Currently Used at Home none    Concerns to be Addressed basic needs;discharge planning                   Discharge Plan       Row Name 09/26/24 0826       Plan    Plan home    Patient/Family in Agreement with Plan yes    Plan Comments I met with this patient bedside. She stated that she is homeless, but stays at [facility] during the daytime in [facility]. She is independent with activities of daily living and mobility. She anticipates going to [facility] after this hospitalization, and her friend can transport. Case management will follow.    Final Discharge Disposition Code 01 - home or self-care                  Continued Care and Services - Admitted Since 9/25/2024    No active coordination exists for this encounter.       Expected Discharge Date and Time       Expected Discharge Date Expected Discharge Time    Sep 27, 2024            Demographic Summary       Row Name 09/26/24 0825       General Information    General Information Comments I confirmed that Sigrid Byers is Ms Paladino's PCP and she has [place] Medicaid                   Functional Status       Row Name 09/26/24 0825       Functional Status, IADL    Medications independent    Meal Preparation independent    Housekeeping independent    Laundry independent    Shopping independent                   Psychosocial    No documentation.                  Abuse/Neglect    No documentation.                  Legal     No documentation.                  Substance Abuse    No documentation.                  Patient Forms    No documentation.                     Keeley Jose RN

## 2024-09-26 NOTE — CONSULTS
[facility] Cardiology  Consultation History and Physical  Jill M Paladino  1965      PCP:   Sigrid Byers PA        Date of  Consultation: 9/26/2024 11:24 EDT     Reason for Consultation: Acute on chronic systolic heart failure    History of Present Illness:  Jill M Paladino  Is a 59 y.o. female with medical history including chronic systolic congestive heart failure.  She has a heart catheterization done at [facility] in 2023 in December that reported 100% LAD occlusion with nonobstructive disease in the circumflex and RCA with collaterals to the LAD.  Echocardiogram at that time showed an EF of 20 to 25% with moderate MR.  CT surgery consult was planned but the patient has some delusions and paranoia related to these results and thought that these test were not actually reflective of her heart.  She has not followed up with routinely with a outpatient cardiologist but has had multiple ER visits for short stays at  emergency room over the course of this summer.  She came in yesterday with complaints of bilateral rib pain and difficulty getting a good breath.  Reported chest pain was worse with taking a deep inspiration.  Currently she is laying flat and on room air in no distress.  The chest pain is feeling better.  Troponins are only mildly elevated.  proBNP is elevated and has been chronically elevated.  She states she currently does not have valsartan  but is compliant with Eliquis spironolactone and Bumex.  She is homeless.  Previous toxicology screens have shown manage phentermine use.      Patient Active Problem List    Diagnosis Date Noted    *Acute exacerbation of CHF (congestive heart failure) 09/25/2024    Shortness of breath 03/09/2024    Pneumonia, unspecified organism 12/18/2023    Chronic HFrEF (heart failure with reduced ejection fraction) 12/18/2023    Pneumonia 12/15/2023    Acute pulmonary embolism without acute cor pulmonale 12/15/2023    Chronic systolic  "CHF (congestive heart failure) 12/15/2023    Coronary artery disease of native artery of native heart with stable angina pectoris 12/15/2023    Essential hypertension 12/15/2023       Allergies   Allergen Reactions    Lisinopril Other (See Comments)     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    Other Nausea And Vomiting     Pt states \"all opiods make me throw up instantly\"    Percocet [Oxycodone-Acetaminophen] GI Intolerance    Sulfa Antibiotics Rash       Social History     Socioeconomic History    Marital status:    Tobacco Use    Smoking status: Never    Smokeless tobacco: Never   Vaping Use    Vaping status: Never Used   Substance and Sexual Activity    Alcohol use: No    Drug use: No    Sexual activity: Defer       Family History   Problem Relation Age of Onset    No Known Problems Mother     No Known Problems Father        Current Medications:    Current Facility-Administered Medications:     acetaminophen (TYLENOL) tablet 650 mg, 650 mg, Oral, Q6H PRN, Victoria Bella APRN, 650 mg at 09/25/24 2040    apixaban (ELIQUIS) tablet 5 mg, 5 mg, Oral, BID, Marlene Ochoa, APRN, 5 mg at 09/26/24 0811    sennosides-docusate (PERICOLACE) 8.6-50 MG per tablet 2 tablet, 2 tablet, Oral, BID PRN **AND** polyethylene glycol (MIRALAX) packet 17 g, 17 g, Oral, Daily PRN **AND** bisacodyl (DULCOLAX) EC tablet 5 mg, 5 mg, Oral, Daily PRN **AND** bisacodyl (DULCOLAX) suppository 10 mg, 10 mg, Rectal, Daily PRN, Marlene Ochoa, APRN    bumetanide (BUMEX) injection 1 mg, 1 mg, Intravenous, Q12H, Marlene Ochoa, APRN, 1 mg at 09/26/24 0811    [Held by provider] bumetanide (BUMEX) tablet 1 mg, 1 mg, Oral, Daily, Marlene Ochoa, APRN    buPROPion XL (WELLBUTRIN XL) 24 hr tablet 300 mg, 300 mg, Oral, Daily, Marlene Ochoa, APRN, 300 mg at 09/26/24 0811    Calcium Replacement - Follow Nurse / BPA Driven Protocol, , Does not apply, PRN, Marlene Ochoa, " APRN    cefTRIAXone (ROCEPHIN) 1,000 mg in sodium chloride 0.9 % 100 mL MBP, 1,000 mg, Intravenous, Q24H, Marlene Ochoa APRN    doxycycline (VIBRAMYCIN) 100 mg in sodium chloride 0.9 % 100 mL MBP, 100 mg, Intravenous, Q12H, Marlene Ochoa APRN, 100 mg at 09/26/24 0504    ipratropium-albuterol (DUO-NEB) nebulizer solution 3 mL, 3 mL, Nebulization, Q4H PRN, Marlene Ochoa APRN    Magnesium Standard Dose Replacement - Follow Nurse / BPA Driven Protocol, , Does not apply, PRN, Marlene Ochoa APRN    pantoprazole (PROTONIX) EC tablet 40 mg, 40 mg, Oral, Daily, Marlene Ochoa APRN, 40 mg at 09/26/24 0811    Phosphorus Replacement - Follow Nurse / BPA Driven Protocol, , Does not apply, PRN, Marlene Ochoa APRN    Potassium Replacement - Follow Nurse / BPA Driven Protocol, , Does not apply, PRN, Marlene Ochoa APRN    sodium chloride 0.9 % flush 10 mL, 10 mL, Intravenous, PRN, Adolph Valente MD    sodium chloride 0.9 % flush 10 mL, 10 mL, Intravenous, Q12H, Marlene Ochoa APRN    sodium chloride 0.9 % flush 10 mL, 10 mL, Intravenous, PRN, Marlene Ochoa APRN    sodium chloride 0.9 % infusion 40 mL, 40 mL, Intravenous, PRN, Marlene Ochoa APRN    spironolactone (ALDACTONE) tablet 100 mg, 100 mg, Oral, Daily, Marlene Ochoa APRN, 100 mg at 09/26/24 0812    valsartan (DIOVAN) tablet 80 mg, 80 mg, Oral, Q24H, Imtiaz Fontenot MD     Review of Systems   Cardiovascular:  Positive for chest pain and dyspnea on exertion.   Respiratory:  Positive for shortness of breath.        OBJECTIVE:  Vitals:    09/26/24 0700 09/26/24 0811 09/26/24 0900 09/26/24 1000   BP: 139/99 139/99     BP Location: Right arm      Patient Position: Lying      Pulse: 84 88 87 89   Resp: 20      Temp:       TempSrc:       SpO2: 97%  96% 94%   Weight:       Height:         I/O last 3 completed shifts:  In: 390 [P.O.:240; I.V.:50; IV Piggyback:100]  Out: 700 [Urine:700]  I/O this  shift:  In: -   Out: 1400 [Urine:1400]  Intake & Output (last 3 days)         09/23 0701 09/24 0700 09/24 0701 09/25 0700 09/25 0701 09/26 0700 09/26 0701 09/27 0700    P.O.   240     I.V. (mL/kg)   50 (0.8)     IV Piggyback   100     Total Intake(mL/kg)   390 (6.6)     Urine (mL/kg/hr)   700 1400 (5.4)    Total Output   700 1400    Net   -310 -1400            Urine Unmeasured Occurrence    2 x               Physical Exam  Cardiovascular:      Rate and Rhythm: Normal rate and regular rhythm.      Heart sounds: Murmur (3/6 systolic murmur at the apex) heard.   Pulmonary:      Effort: Pulmonary effort is normal.      Breath sounds: No rales.   Musculoskeletal:      Right lower leg: No edema.      Left lower leg: No edema.   Neurological:      Mental Status: She is alert.         Diagnostic Data:  Lab Results (last 24 hours)       Procedure Component Value Units Date/Time    Basic Metabolic Panel [[id number]]  (Abnormal) Collected: 09/26/24 0550    Specimen: Blood Updated: 09/26/24 0715     Glucose 90 mg/dL      BUN 20 mg/dL      Creatinine 0.74 mg/dL      Sodium 136 mmol/L      Potassium 3.7 mmol/L      Chloride 99 mmol/L      CO2 27.0 mmol/L      Calcium 8.7 mg/dL      BUN/Creatinine Ratio 27.0     Anion Gap 10.0 mmol/L      eGFR 93.9 mL/min/1.73     Narrative:      GFR Normal >60  Chronic Kidney Disease <60  Kidney Failure <15      CBC Auto Differential [[id number]]  (Abnormal) Collected: 09/26/24 0551    Specimen: Blood Updated: 09/26/24 0629     WBC 11.67 10*3/mm3      RBC 4.92 10*6/mm3      Hemoglobin 14.0 g/dL      Hematocrit 43.9 %      MCV 89.2 fL      MCH 28.5 pg      MCHC 31.9 g/dL      RDW 14.7 %      RDW-SD 48.2 fl      MPV 10.1 fL      Platelets 269 10*3/mm3      Neutrophil % 71.5 %      Lymphocyte % 17.2 %      Monocyte % 7.5 %      Eosinophil % 3.0 %      Basophil % 0.5 %      Immature Grans % 0.3 %      Neutrophils, Absolute 8.33 10*3/mm3      Lymphocytes, Absolute 2.01 10*3/mm3      Monocytes,  Absolute 0.88 10*3/mm3      Eosinophils, Absolute 0.35 10*3/mm3      Basophils, Absolute 0.06 10*3/mm3      Immature Grans, Absolute 0.04 10*3/mm3      nRBC 0.0 /100 WBC     S. Pneumo Ag Urine or CSF - Urine, Urine, Clean Catch [[id number]]  (Normal) Collected: 09/25/24 1943    Specimen: Urine, Clean Catch Updated: 09/26/24 0037     Strep Pneumo Ag Negative    Legionella Antigen, Urine - Urine, Urine, Clean Catch [[id number]]  (Normal) Collected: 09/25/24 1943    Specimen: Urine, Clean Catch Updated: 09/26/24 0037     LEGIONELLA ANTIGEN, URINE Negative    MRSA Screen, PCR (Inpatient) - Swab, Nares [[id number]]  (Normal) Collected: 09/25/24 2058    Specimen: Swab from Nares Updated: 09/25/24 2209     MRSA PCR Negative    Narrative:      The negative predictive value of this diagnostic test is high and should only be used to consider de-escalating anti-MRSA therapy. A positive result may indicate colonization with MRSA and must be correlated clinically.  MRSA Negative    STAT Lactic Acid, Reflex [[id number]]  (Normal) Collected: 09/25/24 2038    Specimen: Blood Updated: 09/25/24 2100     Lactate 2.0 mmol/L      Comment: Falsely depressed results may occur on samples drawn from patients receiving N-Acetylcysteine (NAC) or Metamizole.       STAT Lactic Acid, Reflex [[id number]]  (Abnormal) Collected: 09/25/24 1739    Specimen: Blood Updated: 09/25/24 1809     Lactate 2.3 mmol/L      Comment: Falsely depressed results may occur on samples drawn from patients receiving N-Acetylcysteine (NAC) or Metamizole.       Blood Culture - Blood, Arm, Right [[id number]] Collected: 09/25/24 1450    Specimen: Blood from Arm, Right Updated: 09/25/24 1611    Blood Culture - Blood, Arm, Left [[id number]] Collected: 09/25/24 1457    Specimen: Blood from Arm, Left Updated: 09/25/24 1610    Respiratory Panel PCR w/COVID-19(SARS-CoV-2) CHANDLER/VONDA/VICTOR MANUEL/PAD/COR/SWAPNA In-House, NP Swab in UTM/VTM, 2 HR TAT - Swab, Nasopharynx [[id number]]  (Normal) Collected:  09/25/24 1358    Specimen: Swab from Nasopharynx Updated: 09/25/24 1452     ADENOVIRUS, PCR Not Detected     Coronavirus 229E Not Detected     Coronavirus HKU1 Not Detected     Coronavirus NL63 Not Detected     Coronavirus OC43 Not Detected     COVID19 Not Detected     Human Metapneumovirus Not Detected     Human Rhinovirus/Enterovirus Not Detected     Influenza A PCR Not Detected     Influenza B PCR Not Detected     Parainfluenza Virus 1 Not Detected     Parainfluenza Virus 2 Not Detected     Parainfluenza Virus 3 Not Detected     Parainfluenza Virus 4 Not Detected     RSV, PCR Not Detected     Bordetella pertussis pcr Not Detected     Bordetella parapertussis PCR Not Detected     Chlamydophila pneumoniae PCR Not Detected     Mycoplasma pneumo by PCR Not Detected    Narrative:      In the setting of a positive respiratory panel with a viral infection PLUS a negative procalcitonin without other underlying concern for bacterial infection, consider observing off antibiotics or discontinuation of antibiotics and continue supportive care. If the respiratory panel is positive for atypical bacterial infection (Bordetella pertussis, Chlamydophila pneumoniae, or Mycoplasma pneumoniae), consider antibiotic de-escalation to target atypical bacterial infection.    Magnesium [[id number]]  (Normal) Collected: 09/25/24 1359    Specimen: Blood Updated: 09/25/24 1436     Magnesium 1.7 mg/dL     Procalcitonin [[id number]]  (Normal) Collected: 09/25/24 1359    Specimen: Blood Updated: 09/25/24 1433     Procalcitonin 0.06 ng/mL     Narrative:      As a Marker for Sepsis (Non-Neonates):    1. <0.5 ng/mL represents a low risk of severe sepsis and/or septic shock.  2. >2 ng/mL represents a high risk of severe sepsis and/or septic shock.    As a Marker for Lower Respiratory Tract Infections that require antibiotic therapy:    PCT on Admission    Antibiotic Therapy       6-12 Hrs later    >0.5                Strongly Recommended  >0.25 -  "<0.5        Recommended   0.1 - 0.25          Discouraged              Remeasure/reassess PCT  <0.1                Strongly Discouraged     Remeasure/reassess PCT    As 28 day mortality risk marker: \"Change in Procalcitonin Result\" (>80% or <=80%) if Day 0 (or Day 1) and Day 4 values are available. Refer to http://[email or web address]pct-[email or web address]    Change in PCT <=80%  A decrease of PCT levels below or equal to 80% defines a positive change in PCT test result representing a higher risk for 28-day all-cause mortality of patients diagnosed with severe sepsis for septic shock.    Change in PCT >80%  A decrease of PCT levels of more than 80% defines a negative change in PCT result representing a lower risk for 28-day all-cause mortality of patients diagnosed with severe sepsis or septic shock.       D-dimer, Quantitative [[id number]]  (Abnormal) Collected: 09/25/24 1359    Specimen: Blood Updated: 09/25/24 1432     D-Dimer, Quantitative 0.75 MCGFEU/mL     Narrative:      According to the assay 's published package insert, a normal (<0.50 MCGFEU/mL) D-dimer result in conjunction with a non-high clinical probability assessment, excludes deep vein thrombosis (DVT) and pulmonary embolism (PE) with high sensitivity.    D-dimer values increase with age and this can make VTE exclusion of an older population difficult. To address this, the [employer], based on best available evidence and recent guidelines, recommends that clinicians use age-adjusted D-dimer thresholds in patients greater than 50 years of age with: a) a low probability of PE who do not meet all Pulmonary Embolism Rule Out Criteria, or b) in those with intermediate probability of PE.   The formula for an age-adjusted D-dimer cut-off is \"age/100\".  For example, a 60 year old patient would have an age-adjusted cut-off of 0.60 MCGFEU/mL and an 80 year old 0.80 MCGFEU/mL.    Comprehensive Metabolic Panel [[id number]]  (Abnormal) Collected: " 09/25/24 1359    Specimen: Blood Updated: 09/25/24 1430     Glucose 125 mg/dL      BUN 21 mg/dL      Creatinine 0.87 mg/dL      Sodium 136 mmol/L      Potassium 3.7 mmol/L      Chloride 98 mmol/L      CO2 26.0 mmol/L      Calcium 9.0 mg/dL      Total Protein 6.8 g/dL      Albumin 3.7 g/dL      ALT (SGPT) 22 U/L      AST (SGOT) 27 U/L      Alkaline Phosphatase 133 U/L      Total Bilirubin 0.8 mg/dL      Globulin 3.1 gm/dL      Comment: Calculated Result        A/G Ratio 1.2 g/dL      BUN/Creatinine Ratio 24.1     Anion Gap 12.0 mmol/L      eGFR 77.3 mL/min/1.73     Narrative:      GFR Normal >60  Chronic Kidney Disease <60  Kidney Failure <15      BNP [[id number]]  (Abnormal) Collected: 09/25/24 1359    Specimen: Blood Updated: 09/25/24 1430     proBNP 11,030.0 pg/mL     Narrative:      This assay is used as an aid in the diagnosis of individuals suspected of having heart failure. It can be used as an aid in the diagnosis of acute decompensated heart failure (ADHF) in patients presenting with signs and symptoms of ADHF to the emergency department (ED). In addition, NT-proBNP of <300 pg/mL indicates ADHF is not likely.    Age Range Result Interpretation  NT-proBNP Concentration (pg/mL:      <50             Positive            >450                   Gray                 300-450                    Negative             <300    50-75           Positive            >900                  Gray                300-900                  Negative            <300      >75             Positive            >1800                  Gray                300-1800                  Negative            <300    Single High Sensitivity Troponin T [[id number]]  (Abnormal) Collected: 09/25/24 1359    Specimen: Blood Updated: 09/25/24 1430     HS Troponin T 32 ng/L     Narrative:      High Sensitive Troponin T Reference Range:  <14.0 ng/L- Negative Female for AMI  <22.0 ng/L- Negative Male for AMI  >=14 - Abnormal Female indicating possible  myocardial injury.  >=22 - Abnormal Male indicating possible myocardial injury.   Clinicians would have to utilize clinical acumen, EKG, Troponin, and serial changes to determine if it is an Acute Myocardial Infarction or myocardial injury due to an underlying chronic condition.         C-reactive Protein [[id number]]  (Abnormal) Collected: 09/25/24 1359    Specimen: Blood Updated: 09/25/24 1430     C-Reactive Protein 2.05 mg/dL     Lactic Acid, Plasma [[id number]]  (Abnormal) Collected: 09/25/24 1359    Specimen: Blood Updated: 09/25/24 1419     Lactate 3.1 mmol/L      Comment: Falsely depressed results may occur on samples drawn from patients receiving N-Acetylcysteine (NAC) or Metamizole.       CBC & Differential [[id number]]  (Abnormal) Collected: 09/25/24 1359    Specimen: Blood Updated: 09/25/24 1405    Narrative:      The following orders were created for panel order CBC & Differential.  Procedure                               Abnormality         Status                     ---------                               -----------         ------                     CBC Auto Differential[[id number]]        Abnormal            Final result                 Please view results for these tests on the individual orders.    CBC Auto Differential [[id number]]  (Abnormal) Collected: 09/25/24 1359    Specimen: Blood Updated: 09/25/24 1405     WBC 12.90 10*3/mm3      RBC 4.77 10*6/mm3      Hemoglobin 13.6 g/dL      Hematocrit 42.8 %      MCV 89.7 fL      MCH 28.5 pg      MCHC 31.8 g/dL      RDW 14.7 %      RDW-SD 47.8 fl      MPV 9.6 fL      Platelets 292 10*3/mm3      Neutrophil % 83.2 %      Lymphocyte % 10.3 %      Monocyte % 5.1 %      Eosinophil % 0.5 %      Basophil % 0.5 %      Immature Grans % 0.4 %      Neutrophils, Absolute 10.73 10*3/mm3      Lymphocytes, Absolute 1.33 10*3/mm3      Monocytes, Absolute 0.66 10*3/mm3      Eosinophils, Absolute 0.07 10*3/mm3      Basophils, Absolute 0.06 10*3/mm3      Immature Grans,  Absolute 0.05 10*3/mm3      nRBC 0.0 /100 WBC     Bee Draw [[id number]] Collected: 09/25/24 1359    Specimen: Blood Updated: 09/25/24 1400    Narrative:      The following orders were created for panel order Bee Draw.  Procedure                               Abnormality         Status                     ---------                               -----------         ------                     Green Top (Gel)[[id number]]                                  Final result               Lavender Top[[id number]]                                     Final result               Gold Top - SST[[id number]]                                   Final result               Garcia Top[[id number]]                                         Final result               Light Blue Top[[id number]]                                   Final result                 Please view results for these tests on the individual orders.    Garcia Top [[id number]] Collected: 09/25/24 1359    Specimen: Blood Updated: 09/25/24 1400     Extra Tube Hold for add-ons.     Comment: Auto resulted.       Green Top (Gel) [[id number]] Collected: 09/25/24 1359    Specimen: Blood Updated: 09/25/24 1400     Extra Tube Hold for add-ons.     Comment: Auto resulted.       Lavender Top [[id number]] Collected: 09/25/24 1359    Specimen: Blood Updated: 09/25/24 1400     Extra Tube hold for add-on     Comment: Auto resulted       Gold Top - SST [[id number]] Collected: 09/25/24 1359    Specimen: Blood Updated: 09/25/24 1400     Extra Tube Hold for add-ons.     Comment: Auto resulted.       Light Blue Top [[id number]] Collected: 09/25/24 1359    Specimen: Blood Updated: 09/25/24 1400     Extra Tube Hold for add-ons.     Comment: Auto resulted             ECG/EMG Results (last 24 hours)       Procedure Component Value Units Date/Time    ECG 12 Lead ED Triage Standing Order; SOA [[id number]] Collected: 09/25/24 1349     Updated: 09/25/24 1351     QT Interval 416 ms      QTC Interval 522 ms      Narrative:      Test Reason : ED Triage Standing Order~  Blood Pressure :   */*   mmHG  Vent. Rate :  95 BPM     Atrial Rate :  95 BPM     P-R Int : 140 ms          QRS Dur :  88 ms      QT Int : 416 ms       P-R-T Axes :  46 -43  70 degrees     QTc Int : 522 ms    Sinus rhythm with occasional premature ventricular complexes  Possible Left atrial enlargement  Left axis deviation  Left ventricular hypertrophy  Anteroseptal infarct (cited on or before 14-DEC-2023)  Prolonged QT  Abnormal ECG  When compared with ECG of 10-MAR-2024 20:51,  premature ventricular complexes are now present  Vent. rate has increased BY  33 BPM  Nonspecific T wave abnormality no longer evident in Inferior leads  T wave inversion less evident in Lateral leads    Referred By: EDMD           Confirmed By:               Acute exacerbation of CHF (congestive heart failure)    Pneumonia    Essential hypertension      Assessment/Plan:      Acute on chronic systolic heart failure and severe MR  Updated echocardiogram pending  Would continue IV diuresis today  Continue spironolactone  Add valsartan 40 mg daily  Monitor electrolytes  Likely start beta-blocker tomorrow once further diuresis  History of left ventricular thrombus  Will reevaluate on pending echo  Continue Eliquis at this time  Coronary artery disease and ischemic cardiomyopathy  Previous catheterization results from outside hospital reviewed from 2023.  100% LAD occlusion with left to right and right to right collaterals  No obstructive disease in the circumflex or RCA that required intervention  CT surgery was recommended but patient did not want follow-up.  At this time with focus on medical optimization.  She does not seem ready to consider options for procedural intervention.  Has a component of psychiatric illness affecting her care.    Cardiology will continue to follow          Imtiaz Fontenot MD [facility]

## 2024-09-26 NOTE — PLAN OF CARE
Goal Outcome Evaluation:         AOx4 with intermittent confusion.  VSS, RA,   NSR-Sinus tach  She is weak and needs help going to the restroom.  Woke up in the middle of the night screaming that she has pain in her legs and needed to walk.(She stated this is a Chronic issue not an acute issue) We walked about 100ft. I called and spoke with APRN and got PRN Toradol x1 dose.  Fall precautions in place.  We will continue to monitor.          0612: She can answer all orientation questions correctly but she is talking to the wall and thinking there people she knows.

## 2024-09-26 NOTE — PROGRESS NOTES
[facility] Medicine Services  PROGRESS NOTE    Patient Name: Jill M Paladino  : 1965  MRN: [id number]    Date of Admission: 2024  Primary Care Physician: Sigrid Byers PA    Subjective   Subjective     CC:  SOA    HPI:  Patient is a 59-year-old female seen and examined by me this a.m., she was admitted overnight with acute CHF exacerbation and possible pneumonia.  She is to be seen by cardiology later today, patient is medically noncompliant and has failed to follow-up with cardiology outpatient in at least over 6 months.  Patient's last known ejection fraction with an EF of 16%, repeat pending at this time. Patient denies any new acute complaints or problems at this time. Will follow with cardiology       Objective   Objective     Vital Signs:   Temp:  [96.3 °F (35.7 °C)-98.7 °F (37.1 °C)] 96.3 °F (35.7 °C)  Heart Rate:  [] 95  Resp:  [18-20] 20  BP: (139-157)/() 150/99     Physical Exam:  Constitutional: No acute distress, awake, alert, resting comfortably in bed, currently on RA   HENT: NCAT, nares patent, mucous membranes moist  Respiratory: Decreased BS bilaterally, slight crackles bilateral lung bases, no rhonchi or wheezing, respiratory effort normal   Cardiovascular: RRR, no murmurs, rubs, or gallops  Gastrointestinal: Positive bowel sounds, soft, nontender, nondistended  Musculoskeletal: Trace bilateral ankle edema, no clubbing or cyanosis   Psychiatric: Flat affect, seems in denial about health conditions   Neurologic: Oriented x 3, strength symmetric in all extremities, Cranial Nerves grossly intact to confrontation, speech clear  Skin: No rashes      Results Reviewed:  LAB RESULTS:      Lab 24  0551 24  2038 24  1739 24  1359   WBC 11.67*  --   --  12.90*   HEMOGLOBIN 14.0  --   --  13.6   HEMATOCRIT 43.9  --   --  42.8   PLATELETS 269  --   --  292   NEUTROS ABS 8.33*  --   --  10.73*   IMMATURE GRANS (ABS) 0.04  --   --   0.05   LYMPHS ABS 2.01  --   --  1.33   MONOS ABS 0.88  --   --  0.66   EOS ABS 0.35  --   --  0.07   MCV 89.2  --   --  89.7   CRP  --   --   --  2.05*   PROCALCITONIN  --   --   --  0.06   LACTATE  --  2.0 2.3* 3.1*   D DIMER QUANT  --   --   --  0.75*         Lab 09/26/24  0550 09/25/24  1359   SODIUM 136 136   POTASSIUM 3.7 3.7   CHLORIDE 99 98   CO2 27.0 26.0   ANION GAP 10.0 12.0   BUN 20 21*   CREATININE 0.74 0.87   EGFR 93.9 77.3   GLUCOSE 90 125*   CALCIUM 8.7 9.0   MAGNESIUM  --  1.7         Lab 09/25/24  1359   TOTAL PROTEIN 6.8   ALBUMIN 3.7   GLOBULIN 3.1   ALT (SGPT) 22   AST (SGOT) 27   BILIRUBIN 0.8   ALK PHOS 133*         Lab 09/25/24  1359   PROBNP 11,030.0*   HSTROP T 32*                 Brief Urine Lab Results  (Last result in the past 365 days)        Color   Clarity   Blood   Leuk Est   Nitrite   Protein   CREAT   Urine HCG        07/23/24 2156 Yellow   Clear     Negative                       Microbiology Results Abnormal       Procedure Component Value - Date/Time    S. Pneumo Ag Urine or CSF - Urine, Urine, Clean Catch [[id number]]  (Normal) Collected: 09/25/24 1943    Lab Status: Final result Specimen: Urine, Clean Catch Updated: 09/26/24 0037     Strep Pneumo Ag Negative    Legionella Antigen, Urine - Urine, Urine, Clean Catch [[id number]]  (Normal) Collected: 09/25/24 1943    Lab Status: Final result Specimen: Urine, Clean Catch Updated: 09/26/24 0037     LEGIONELLA ANTIGEN, URINE Negative    MRSA Screen, PCR (Inpatient) - Swab, Nares [[id number]]  (Normal) Collected: 09/25/24 2058    Lab Status: Final result Specimen: Swab from Nares Updated: 09/25/24 2209     MRSA PCR Negative    Narrative:      The negative predictive value of this diagnostic test is high and should only be used to consider de-escalating anti-MRSA therapy. A positive result may indicate colonization with MRSA and must be correlated clinically.  MRSA Negative    Respiratory Panel PCR w/COVID-19(SARS-CoV-2)  [facility]ER/VONDA/VICTOR MANUEL/PAD/COR/SWAPNA In-House, NP Swab in UTM/VTM, 2 HR TAT - Swab, Nasopharynx [[id number]]  (Normal) Collected: 09/25/24 1358    Lab Status: Final result Specimen: Swab from Nasopharynx Updated: 09/25/24 1459     ADENOVIRUS, PCR Not Detected     Coronavirus 229E Not Detected     Coronavirus HKU1 Not Detected     Coronavirus NL63 Not Detected     Coronavirus OC43 Not Detected     COVID19 Not Detected     Human Metapneumovirus Not Detected     Human Rhinovirus/Enterovirus Not Detected     Influenza A PCR Not Detected     Influenza B PCR Not Detected     Parainfluenza Virus 1 Not Detected     Parainfluenza Virus 2 Not Detected     Parainfluenza Virus 3 Not Detected     Parainfluenza Virus 4 Not Detected     RSV, PCR Not Detected     Bordetella pertussis pcr Not Detected     Bordetella parapertussis PCR Not Detected     Chlamydophila pneumoniae PCR Not Detected     Mycoplasma pneumo by PCR Not Detected    Narrative:      In the setting of a positive respiratory panel with a viral infection PLUS a negative procalcitonin without other underlying concern for bacterial infection, consider observing off antibiotics or discontinuation of antibiotics and continue supportive care. If the respiratory panel is positive for atypical bacterial infection (Bordetella pertussis, Chlamydophila pneumoniae, or Mycoplasma pneumoniae), consider antibiotic de-escalation to target atypical bacterial infection.            CT Angiogram Chest Pulmonary Embolism    Result Date: 9/25/2024  CT ANGIOGRAM CHEST PULMONARY EMBOLISM Date of Exam: 9/25/2024 3:14 PM EDT Indication: sob, elevated troponin. Comparison: Chest CTA performed on March 8, 2024 Technique: Axial CT images were obtained of the chest after the uneventful intravenous administration of Isovue-370, 75 mL utilizing pulmonary embolism protocol.  Reconstructed coronal and sagittal images were also obtained. Automated exposure control and iterative construction methods were used.  Findings: Pulmonary arteries: Adequate opacification of the pulmonary arteries. No evidence of acute pulmonary embolism. Thoracic aorta: The thoracic aorta is not opacified with contrast due to bolus timing. Thoracic aorta is normal in caliber and contour. Small calcified plaques are visualized. Cardiovascular: The heart is moderately to severely enlarged. There is retrograde opacification of the IVC and hepatic veins, suggestive of CHF. No evidence of pericardial effusion. Thyroid: The visualized portion of the thyroid is unremarkable. Lungs and Pleura: Patchy airspace opacities are visualized in the lung bases with associated interlobular septal thickening. No pleural effusion is visualized. No pneumothorax. Central airways are patent. Mediastinum/Andie: No mediastinal or hilar lymphadenopathy. Lymph nodes: No axillary or supraclavicular lymphadenopathy. Bones and Soft Tissue:No acute fracture, aggressive osseous lesions, or soft tissue process. Upper Abdomen: No acute process in the upper abdomen.     Impression: Impression: No evidence of pulmonary embolism. Moderate to severe cardiomegaly. Retrograde opacification of the IVC and hepatic veins suggest CHF. Small patchy airspace opacities with associated interlobular septal thickening in the lung bases. Finding may be on the basis of pneumonia versus alveolar pulmonary edema. Electronically Signed: Cristopher Hill MD  9/25/2024 3:37 PM EDT  Workstation ID: [id number]    CT Head Without Contrast    Result Date: 9/25/2024  CT HEAD WO CONTRAST Date of Exam: 9/25/2024 3:14 PM EDT Indication: ams. Comparison: None available. Technique: Axial CT images were obtained of the head without contrast administration.  Automated exposure control and iterative construction methods were used. Findings: Motion artifact near the vertex limits diagnostic sensitivity. There is no evidence of hemorrhage. There is no mass effect or midline shift. There is no extra-axial collections.  Ventricles are normal in size and configuration for patient's stated age. Posterior fossa is within normal limits. Calvarium and skull base appear intact. Visualized sinuses show no air fluid levels. Partial visualization of left maxillary retention cyst measuring 2.8 cm. The mastoid air cells are clear. Visualized orbits are unremarkable.     Impression: Impression: Motion artifact near the vertex limits diagnostic study. No acute or cranial process evident. Electronically Signed: Cristopher Hill MD  9/25/2024 3:28 PM EDT  Workstation ID: [id number]    XR Chest 1 View    Result Date: 9/25/2024  XR CHEST 1 VW Date of Exam: 9/25/2024 2:42 PM EDT Indication: SOA triage protocol Comparison: Chest x-ray 3/8/2024 Findings: Redemonstration of cardiomegaly. There is mild blunting of the left costophrenic angle suggestive of small left pleural fluid, similar to prior. No pneumothorax. The lungs are grossly clear. No bhupendra pulmonary edema. No acute osseous findings.     Impression: Impression: Cardiomegaly and mild blunting of the left costophrenic angle which is similar to prior suggestive of small left pleural fluid or perhaps pleural scarring. No bhupendra pulmonary edema. Electronically Signed: Jesus Law MD  9/25/2024 3:07 PM EDT  Workstation ID: [id number]     Results for orders placed during the hospital encounter of 03/08/24    Adult Transthoracic Echo Complete w/ Color, Spectral and Contrast if Necessary Per Protocol    Interpretation Summary    Left ventricular systolic function is severely decreased. Calculated left ventricular EF = 16.8% Left ventricular ejection fraction appears to be less than 20%.    The left ventricular cavity is mildly dilated.    Left ventricular wall thickness is consistent with mild concentric hypertrophy.    Left ventricular diastolic function is consistent with (grade III w/high LAP) restrictive pattern.    Moderately reduced right ventricular systolic function noted.    The left atrial  cavity is moderately dilated.    The right atrial cavity is moderately  dilated.    There are myxomatous changes of the mitral valve apparatus present.    Severe mitral valve regurgitation is present.    Moderate tricuspid valve regurgitation is present.    Estimated right ventricular systolic pressure from tricuspid regurgitation is moderately elevated (45-55 mmHg).    LV apical thrombus, measuring approximately 1 x 1 cm, not mobile.      Current medications:  Scheduled Meds:apixaban, 5 mg, Oral, BID  bumetanide, 1 mg, Intravenous, Q12H  [Held by provider] bumetanide, 1 mg, Oral, Daily  buPROPion XL, 300 mg, Oral, Daily  cefTRIAXone, 1,000 mg, Intravenous, Q24H  doxycycline, 100 mg, Intravenous, Q12H  pantoprazole, 40 mg, Oral, Daily  sodium chloride, 10 mL, Intravenous, Q12H  spironolactone, 100 mg, Oral, Daily  valsartan, 80 mg, Oral, Q24H      Continuous Infusions:   PRN Meds:.  acetaminophen    senna-docusate sodium **AND** polyethylene glycol **AND** bisacodyl **AND** bisacodyl    Calcium Replacement - Follow Nurse / BPA Driven Protocol    ipratropium-albuterol    Magnesium Standard Dose Replacement - Follow Nurse / BPA Driven Protocol    Phosphorus Replacement - Follow Nurse / BPA Driven Protocol    Potassium Replacement - Follow Nurse / BPA Driven Protocol    sodium chloride    sodium chloride    sodium chloride    Assessment & Plan   Assessment & Plan     Active Hospital Problems    Diagnosis  POA    **Acute exacerbation of CHF (congestive heart failure) [I50.9]  Yes    Pneumonia [J18.9]  Yes    Essential hypertension [I10]  Yes      Resolved Hospital Problems   No resolved problems to display.        Brief Hospital Course to date:  Jill M Paladino is a 59 y.o. female admitted with acute CHF exacerbation, patient with long history of systolic CHF, medical noncompliance, HTN, PE, and homeless. Patient was admitted as well with possible pneumonia. She was transferred to  services on the AM of 9/26, seen and  examined, she reports overall feeling better. Plans for cardiology evaluation, discussed with Dr. Fontenot later today, patient seems to be in major denial about her heart failure, awaiting repeat echo results at this time. Continued diuresis as tolerated.     Acute on chronic CHF  -- echo on 3/2024 EF 16%  -- repeat ECHO ordered and pending   -- Dr. Fontenot with cardiology following   -- cont IV bumex bid  -- cont home aldactone   -- BNP 11,030  -- oxygen as needed, on RA on 9/26   --CT chest neg for PE, mod to severe cardiomegaly     PNA  -- cont rocephin and doxy started in ED  -- nebs prn   -- WBC 12.90, procal 0.06, lactate 3.1  -- resp PCR neg   -- blood cultures pending  -- Ct chest opacities in lung bases    -- urine antigens are negative, MRSA negative         HTN  Hx of PE   Prolonged QT   -- cont eliquis  -- cont aldactone     Expected Discharge Location and Transportation: TBD  Expected Discharge   Expected Discharge Date: 9/27/2024; Expected Discharge Time:      VTE Prophylaxis:  Pharmacologic & mechanical VTE prophylaxis orders are present.         AM-PAC 6 Clicks Score (PT): 22 (09/26/24 0800)    CODE STATUS:   Code Status and Medical Interventions: CPR (Attempt to Resuscitate); Full Support   Ordered at: 09/25/24 4751     Level Of Support Discussed With:    Patient     Code Status (Patient has no pulse and is not breathing):    CPR (Attempt to Resuscitate)     Medical Interventions (Patient has pulse or is breathing):    Full Support       LIO Fink, DO  09/26/24

## 2024-09-26 NOTE — PLAN OF CARE
Goal Outcome Evaluation:         -VSS, RA  -PO ativan given for anxiety this evening  -IV abx infusing  -Echo pending, unable to complete this evening due to anxiety  -Fall precautions in place

## 2024-09-27 ENCOUNTER — APPOINTMENT (OUTPATIENT)
Dept: CARDIOLOGY | Facility: HOSPITAL | Age: 59
End: 2024-09-27
Payer: MEDICAID

## 2024-09-27 LAB
AMPHET+METHAMPHET UR QL: NEGATIVE
AMPHETAMINES UR QL: POSITIVE
BACTERIA SPEC AEROBE CULT: NO GROWTH
BARBITURATES UR QL SCN: NEGATIVE
BENZODIAZ UR QL SCN: POSITIVE
BH CV ECHO MEAS - AO MAX PG: 6.3 MMHG
BH CV ECHO MEAS - AO MEAN PG: 3 MMHG
BH CV ECHO MEAS - AO ROOT DIAM: 3.2 CM
BH CV ECHO MEAS - AO V2 MAX: 125 CM/SEC
BH CV ECHO MEAS - AO V2 VTI: 20.7 CM
BH CV ECHO MEAS - AVA(I,D): 1.62 CM2
BH CV ECHO MEAS - EDV(CUBED): 227 ML
BH CV ECHO MEAS - EDV(MOD-SP2): 187 ML
BH CV ECHO MEAS - EDV(MOD-SP4): 178 ML
BH CV ECHO MEAS - EF(MOD-BP): 10.8 %
BH CV ECHO MEAS - EF(MOD-SP2): 7.5 %
BH CV ECHO MEAS - EF(MOD-SP4): 10.7 %
BH CV ECHO MEAS - ESV(CUBED): 195.1 ML
BH CV ECHO MEAS - ESV(MOD-SP2): 173 ML
BH CV ECHO MEAS - ESV(MOD-SP4): 159 ML
BH CV ECHO MEAS - FS: 4.9 %
BH CV ECHO MEAS - IVS/LVPW: 1 CM
BH CV ECHO MEAS - IVSD: 1 CM
BH CV ECHO MEAS - LA DIMENSION: 4.2 CM
BH CV ECHO MEAS - LAT PEAK E' VEL: 7.6 CM/SEC
BH CV ECHO MEAS - LV MASS(C)D: 253.9 GRAMS
BH CV ECHO MEAS - LV MAX PG: 2.04 MMHG
BH CV ECHO MEAS - LV MEAN PG: 1 MMHG
BH CV ECHO MEAS - LV V1 MAX: 71.4 CM/SEC
BH CV ECHO MEAS - LV V1 VTI: 10.7 CM
BH CV ECHO MEAS - LVIDD: 6.1 CM
BH CV ECHO MEAS - LVIDS: 5.8 CM
BH CV ECHO MEAS - LVOT AREA: 3.1 CM2
BH CV ECHO MEAS - LVOT DIAM: 2 CM
BH CV ECHO MEAS - LVPWD: 1 CM
BH CV ECHO MEAS - MED PEAK E' VEL: 3.4 CM/SEC
BH CV ECHO MEAS - MV A MAX VEL: 63.5 CM/SEC
BH CV ECHO MEAS - MV DEC TIME: 0.15 SEC
BH CV ECHO MEAS - MV E MAX VEL: 107 CM/SEC
BH CV ECHO MEAS - MV E/A: 1.69
BH CV ECHO MEAS - RAP SYSTOLE: 3 MMHG
BH CV ECHO MEAS - RVSP: 21 MMHG
BH CV ECHO MEAS - SV(LVOT): 33.6 ML
BH CV ECHO MEAS - SV(MOD-SP2): 14 ML
BH CV ECHO MEAS - SV(MOD-SP4): 19 ML
BH CV ECHO MEAS - TAPSE (>1.6): 2.03 CM
BH CV ECHO MEAS - TR MAX PG: 18.1 MMHG
BH CV ECHO MEAS - TR MAX VEL: 212.6 CM/SEC
BH CV ECHO MEASUREMENTS AVERAGE E/E' RATIO: 19.45
BH CV VAS BP LEFT ARM: NORMAL MMHG
BH CV XLRA - RV BASE: 3.3 CM
BH CV XLRA - RV LENGTH: 6.4 CM
BH CV XLRA - RV MID: 2.5 CM
BH CV XLRA - TDI S': 11.5 CM/SEC
BUPRENORPHINE SERPL-MCNC: NEGATIVE NG/ML
CANNABINOIDS SERPL QL: NEGATIVE
COCAINE UR QL: NEGATIVE
FENTANYL UR-MCNC: NEGATIVE NG/ML
LEFT ATRIUM VOLUME INDEX: 39.2 ML/M2
METHADONE UR QL SCN: NEGATIVE
OPIATES UR QL: NEGATIVE
OXYCODONE UR QL SCN: NEGATIVE
PCP UR QL SCN: NEGATIVE
TRICYCLICS UR QL SCN: NEGATIVE

## 2024-09-27 PROCEDURE — G0378 HOSPITAL OBSERVATION PER HR: HCPCS

## 2024-09-27 PROCEDURE — 93306 TTE W/DOPPLER COMPLETE: CPT

## 2024-09-27 PROCEDURE — 99214 OFFICE O/P EST MOD 30 MIN: CPT | Performed by: INTERNAL MEDICINE

## 2024-09-27 PROCEDURE — 25010000002 CEFTRIAXONE PER 250 MG: Performed by: NURSE PRACTITIONER

## 2024-09-27 PROCEDURE — 96376 TX/PRO/DX INJ SAME DRUG ADON: CPT

## 2024-09-27 PROCEDURE — 80307 DRUG TEST PRSMV CHEM ANLYZR: CPT | Performed by: FAMILY MEDICINE

## 2024-09-27 PROCEDURE — 93306 TTE W/DOPPLER COMPLETE: CPT | Performed by: INTERNAL MEDICINE

## 2024-09-27 PROCEDURE — 99232 SBSQ HOSP IP/OBS MODERATE 35: CPT | Performed by: INTERNAL MEDICINE

## 2024-09-27 PROCEDURE — 25010000002 BUMETANIDE PER 0.5 MG: Performed by: NURSE PRACTITIONER

## 2024-09-27 RX ORDER — ROSUVASTATIN CALCIUM 20 MG/1
40 TABLET, COATED ORAL DAILY
Status: DISCONTINUED | OUTPATIENT
Start: 2024-09-27 | End: 2024-09-29 | Stop reason: HOSPADM

## 2024-09-27 RX ORDER — ASPIRIN 81 MG/1
81 TABLET ORAL DAILY
Status: DISCONTINUED | OUTPATIENT
Start: 2024-09-27 | End: 2024-09-29 | Stop reason: HOSPADM

## 2024-09-27 RX ORDER — LORAZEPAM 1 MG/1
1 TABLET ORAL ONCE AS NEEDED
Status: COMPLETED | OUTPATIENT
Start: 2024-09-27 | End: 2024-09-27

## 2024-09-27 RX ORDER — METOPROLOL SUCCINATE 50 MG/1
50 TABLET, EXTENDED RELEASE ORAL DAILY
Status: DISCONTINUED | OUTPATIENT
Start: 2024-09-27 | End: 2024-09-29 | Stop reason: HOSPADM

## 2024-09-27 RX ORDER — OLANZAPINE 5 MG/1
10 TABLET, ORALLY DISINTEGRATING ORAL NIGHTLY
Status: DISCONTINUED | OUTPATIENT
Start: 2024-09-27 | End: 2024-09-29 | Stop reason: HOSPADM

## 2024-09-27 RX ADMIN — PANTOPRAZOLE SODIUM 40 MG: 40 TABLET, DELAYED RELEASE ORAL at 08:49

## 2024-09-27 RX ADMIN — METOPROLOL SUCCINATE 50 MG: 50 TABLET, EXTENDED RELEASE ORAL at 08:56

## 2024-09-27 RX ADMIN — ASPIRIN 81 MG: 81 TABLET, COATED ORAL at 10:26

## 2024-09-27 RX ADMIN — ROSUVASTATIN 40 MG: 20 TABLET, FILM COATED ORAL at 08:49

## 2024-09-27 RX ADMIN — DOXYCYCLINE 100 MG: 100 INJECTION, POWDER, LYOPHILIZED, FOR SOLUTION INTRAVENOUS at 17:53

## 2024-09-27 RX ADMIN — APIXABAN 5 MG: 5 TABLET, FILM COATED ORAL at 22:50

## 2024-09-27 RX ADMIN — HYDROXYZINE HYDROCHLORIDE 25 MG: 25 TABLET ORAL at 19:25

## 2024-09-27 RX ADMIN — ACETAMINOPHEN 650 MG: 325 TABLET ORAL at 13:35

## 2024-09-27 RX ADMIN — BUMETANIDE 1 MG: 0.25 INJECTION INTRAMUSCULAR; INTRAVENOUS at 08:50

## 2024-09-27 RX ADMIN — BUPROPION HYDROCHLORIDE 300 MG: 150 TABLET, EXTENDED RELEASE ORAL at 08:49

## 2024-09-27 RX ADMIN — SODIUM CHLORIDE 1000 MG: 900 INJECTION INTRAVENOUS at 16:50

## 2024-09-27 RX ADMIN — BUMETANIDE 1 MG: 0.25 INJECTION INTRAMUSCULAR; INTRAVENOUS at 22:50

## 2024-09-27 RX ADMIN — APIXABAN 5 MG: 5 TABLET, FILM COATED ORAL at 08:49

## 2024-09-27 RX ADMIN — SPIRONOLACTONE 100 MG: 25 TABLET, FILM COATED ORAL at 08:49

## 2024-09-27 RX ADMIN — LORAZEPAM 1 MG: 1 TABLET ORAL at 17:53

## 2024-09-27 RX ADMIN — DOXYCYCLINE 100 MG: 100 INJECTION, POWDER, LYOPHILIZED, FOR SOLUTION INTRAVENOUS at 06:46

## 2024-09-27 RX ADMIN — Medication 10 ML: at 22:51

## 2024-09-27 RX ADMIN — HYDROXYZINE HYDROCHLORIDE 25 MG: 25 TABLET ORAL at 08:50

## 2024-09-27 RX ADMIN — VALSARTAN 80 MG: 80 TABLET, FILM COATED ORAL at 08:49

## 2024-09-27 NOTE — CASE MANAGEMENT/SOCIAL WORK
"Continued Stay Note  [facility]     Patient Name: Jill M Paladino  MRN: [id number]  Today's Date: 9/27/2024    Admit Date: 9/25/2024    Plan: MAGDA follow-up re medications   Discharge Plan       Row Name 09/27/24 1505       Plan    Plan MAGDA follow-up re medications    Plan Comments SW met with pt at bedside.  Pt stated her community pharmacy is \"[facility]\".  When asked if her medications are affordable, pt stated she \"has Medicaid.\"  SW also called the [facility] pharmacy that was listed in Epic.  SW was notified that most, if not all, of pt's medicatins have a $0/zero dollar copay.  Pharmacy tech reported that pt hasn't filled medications from [facility] since 12/2023.    Final Discharge Disposition Code 01 - home or self-care                   Discharge Codes    No documentation.                 Expected Discharge Date and Time       Expected Discharge Date Expected Discharge Time    Sep 28, 2024               ANJEL Henderson    "

## 2024-09-27 NOTE — CASE MANAGEMENT/SOCIAL WORK
Continued Stay Note   [facility]     Patient Name: Jill M Paladino  MRN: [id number]  Today's Date: 9/27/2024    Admit Date: 9/25/2024    Plan: home   Discharge Plan       Row Name 09/27/24 0810       Plan    Plan home    Patient/Family in Agreement with Plan yes    Plan Comments I met with this patient bedside. Her plan remains discharge to [facility] with her friend to transport. CM to follow.    Final Discharge Disposition Code 01 - home or self-care                   Discharge Codes    No documentation.                 Expected Discharge Date and Time       Expected Discharge Date Expected Discharge Time    Sep 28, 2024               Keeley Jose RN

## 2024-09-27 NOTE — PLAN OF CARE
Problem: Adult Inpatient Plan of Care  Goal: Plan of Care Review  Outcome: Progressing  Goal: Patient-Specific Goal (Individualized)  Outcome: Progressing  Goal: Absence of Hospital-Acquired Illness or Injury  Outcome: Progressing  Intervention: Identify and Manage Fall Risk  Recent Flowsheet Documentation  Taken 9/27/2024 0200 by Radha Miller, RN  Safety Promotion/Fall Prevention:   clutter free environment maintained   fall prevention program maintained   gait belt   lighting adjusted   nonskid shoes/slippers when out of bed   room organization consistent   safety round/check completed   toileting scheduled  Taken 9/27/2024 0000 by Radha Miller RN  Safety Promotion/Fall Prevention:   clutter free environment maintained   fall prevention program maintained   gait belt   lighting adjusted   nonskid shoes/slippers when out of bed   room organization consistent   safety round/check completed   toileting scheduled  Taken 9/26/2024 2200 by Radha Miller, RN  Safety Promotion/Fall Prevention:   clutter free environment maintained   fall prevention program maintained   gait belt   lighting adjusted   nonskid shoes/slippers when out of bed   room organization consistent   safety round/check completed   toileting scheduled  Taken 9/26/2024 2000 by Radha Miller RN  Safety Promotion/Fall Prevention:   clutter free environment maintained   fall prevention program maintained   gait belt   lighting adjusted   nonskid shoes/slippers when out of bed   room organization consistent   safety round/check completed   toileting scheduled  Intervention: Prevent Skin Injury  Recent Flowsheet Documentation  Taken 9/27/2024 0200 by Radha Miller, RN  Body Position: position changed independently  Skin Protection:   adhesive use limited   protective footwear used   silicone foam dressing in place   transparent dressing maintained   tubing/devices free from skin contact  Taken 9/27/2024 0000 by Angela  ERIK Garcia  Body Position: position changed independently  Skin Protection:   adhesive use limited   protective footwear used   transparent dressing maintained   tubing/devices free from skin contact  Taken 9/26/2024 2200 by Radha Miller RN  Body Position: position changed independently  Taken 9/26/2024 2000 by Radha Miller RN  Body Position: position changed independently  Skin Protection:   adhesive use limited   protective footwear used   transparent dressing maintained   tubing/devices free from skin contact  Intervention: Prevent and Manage VTE (Venous Thromboembolism) Risk  Recent Flowsheet Documentation  Taken 9/27/2024 0200 by Radha Miller RN  Activity Management: activity encouraged  Taken 9/27/2024 0000 by Radha Miller RN  Activity Management: activity encouraged  Taken 9/26/2024 2200 by Radha Miller RN  Activity Management: activity encouraged  Taken 9/26/2024 2000 by Radha Miller RN  Activity Management: activity encouraged  Range of Motion: active ROM (range of motion) encouraged  Intervention: Prevent Infection  Recent Flowsheet Documentation  Taken 9/27/2024 0200 by Radha Miller RN  Infection Prevention:   environmental surveillance performed   hand hygiene promoted   personal protective equipment utilized   rest/sleep promoted  Taken 9/27/2024 0000 by Radha Miller RN  Infection Prevention:   environmental surveillance performed   hand hygiene promoted   personal protective equipment utilized   rest/sleep promoted  Taken 9/26/2024 2200 by Radha Miller RN  Infection Prevention:   environmental surveillance performed   hand hygiene promoted   personal protective equipment utilized   rest/sleep promoted  Taken 9/26/2024 2000 by Radha Miller RN  Infection Prevention:   environmental surveillance performed   hand hygiene promoted   personal protective equipment utilized   rest/sleep promoted  Goal: Optimal Comfort and  Wellbeing  Outcome: Progressing  Goal: Readiness for Transition of Care  Outcome: Progressing     Problem: Hypertension Comorbidity  Goal: Blood Pressure in Desired Range  Outcome: Progressing  Intervention: Maintain Blood Pressure Management  Recent Flowsheet Documentation  Taken 9/27/2024 0200 by Radha Miller RN  Medication Review/Management: medications reviewed  Taken 9/27/2024 0000 by Radha Miller RN  Medication Review/Management: medications reviewed  Taken 9/26/2024 2200 by Radha Miller RN  Medication Review/Management: medications reviewed  Taken 9/26/2024 2000 by Radha Miller RN  Medication Review/Management: medications reviewed     Problem: Adjustment to Illness (Sepsis/Septic Shock)  Goal: Optimal Coping  Outcome: Progressing     Problem: Bleeding (Sepsis/Septic Shock)  Goal: Absence of Bleeding  Outcome: Progressing     Problem: Glycemic Control Impaired (Sepsis/Septic Shock)  Goal: Blood Glucose Level Within Desired Range  Outcome: Progressing     Problem: Infection Progression (Sepsis/Septic Shock)  Goal: Absence of Infection Signs and Symptoms  Outcome: Progressing  Intervention: Initiate Sepsis Management  Recent Flowsheet Documentation  Taken 9/27/2024 0200 by Radha Miller RN  Infection Prevention:   environmental surveillance performed   hand hygiene promoted   personal protective equipment utilized   rest/sleep promoted  Taken 9/27/2024 0000 by Radha Miller RN  Infection Prevention:   environmental surveillance performed   hand hygiene promoted   personal protective equipment utilized   rest/sleep promoted  Taken 9/26/2024 2200 by Radha Miller RN  Infection Prevention:   environmental surveillance performed   hand hygiene promoted   personal protective equipment utilized   rest/sleep promoted  Taken 9/26/2024 2000 by Radha Miller RN  Infection Prevention:   environmental surveillance performed   hand hygiene promoted   personal protective  equipment utilized   rest/sleep promoted  Intervention: Promote Recovery  Recent Flowsheet Documentation  Taken 9/27/2024 0200 by Radha Miller, RN  Activity Management: activity encouraged  Sleep/Rest Enhancement:   awakenings minimized   consistent schedule promoted   noise level reduced   regular sleep/rest pattern promoted   room darkened  Taken 9/27/2024 0000 by Radha Miller RN  Activity Management: activity encouraged  Sleep/Rest Enhancement:   awakenings minimized   noise level reduced   regular sleep/rest pattern promoted   room darkened  Taken 9/26/2024 2200 by Radha Miller, RN  Activity Management: activity encouraged  Sleep/Rest Enhancement:   awakenings minimized   noise level reduced   regular sleep/rest pattern promoted   room darkened  Taken 9/26/2024 2000 by Radha Miller, RN  Activity Management: activity encouraged  Sleep/Rest Enhancement:   awakenings minimized   noise level reduced   regular sleep/rest pattern promoted   room darkened     Problem: Nutrition Impaired (Sepsis/Septic Shock)  Goal: Optimal Nutrition Intake  Outcome: Progressing     Problem: Fall Injury Risk  Goal: Absence of Fall and Fall-Related Injury  Outcome: Progressing  Intervention: Identify and Manage Contributors  Recent Flowsheet Documentation  Taken 9/27/2024 0200 by Radha Miller RN  Medication Review/Management: medications reviewed  Taken 9/27/2024 0000 by Radha Miller RN  Medication Review/Management: medications reviewed  Taken 9/26/2024 2200 by Radha Miller RN  Medication Review/Management: medications reviewed  Taken 9/26/2024 2000 by Radha Miller, RN  Medication Review/Management: medications reviewed  Intervention: Promote Injury-Free Environment  Recent Flowsheet Documentation  Taken 9/27/2024 0200 by Radha Miller, RN  Safety Promotion/Fall Prevention:   clutter free environment maintained   fall prevention program maintained   gait belt   lighting adjusted    nonskid shoes/slippers when out of bed   room organization consistent   safety round/check completed   toileting scheduled  Taken 9/27/2024 0000 by Radha Miller, RN  Safety Promotion/Fall Prevention:   clutter free environment maintained   fall prevention program maintained   gait belt   lighting adjusted   nonskid shoes/slippers when out of bed   room organization consistent   safety round/check completed   toileting scheduled  Taken 9/26/2024 2200 by Radha Miller, RN  Safety Promotion/Fall Prevention:   clutter free environment maintained   fall prevention program maintained   gait belt   lighting adjusted   nonskid shoes/slippers when out of bed   room organization consistent   safety round/check completed   toileting scheduled  Taken 9/26/2024 2000 by Radha Miller, RN  Safety Promotion/Fall Prevention:   clutter free environment maintained   fall prevention program maintained   gait belt   lighting adjusted   nonskid shoes/slippers when out of bed   room organization consistent   safety round/check completed   toileting scheduled   Goal Outcome Evaluation:

## 2024-09-27 NOTE — PLAN OF CARE
Goal Outcome Evaluation:  Plan of Care Reviewed With: patient   - VSS, RA, A&Ox4  - PRNs given for pain and anxiety.   - No complaints of nausea.  - Fall precautions in place.      Progress: no change

## 2024-09-27 NOTE — PROGRESS NOTES
[facility] Medicine Services  PROGRESS NOTE    Patient Name: Jill M Paladino  : 1965  MRN: [id number]    Date of Admission: 2024  Primary Care Physician: Sigrid Byers PA    Subjective   Subjective     CC:  SOA    HPI:  Doing well this am, no new issues overnight.       Objective   Objective     Vital Signs:   Temp:  [96.3 °F (35.7 °C)-98.4 °F (36.9 °C)] 98.1 °F (36.7 °C)  Heart Rate:  [84-99] 92  Resp:  [20] 20  BP: (125-158)/() 125/93     Physical Exam:  Constitutional: No acute distress, awake, alert, resting comfortably in bed, currently on RA   HENT: NCAT, nares patent, mucous membranes moist  Respiratory: Decreased BS bilaterally, slight crackles bilateral lung bases, no rhonchi or wheezing, respiratory effort normal   Cardiovascular: RRR, no murmurs, rubs, or gallops  Gastrointestinal: Positive bowel sounds, soft, nontender, nondistended  Musculoskeletal: Trace bilateral ankle edema, no clubbing or cyanosis   Psychiatric: Flat affect, seems in denial about health conditions   Neurologic: Oriented x 3, strength symmetric in all extremities, Cranial Nerves grossly intact to confrontation, speech clear  Skin: No rashes      Results Reviewed:  LAB RESULTS:      Lab 24  0551 248 24  1739 24  1359   WBC 11.67*  --   --  12.90*   HEMOGLOBIN 14.0  --   --  13.6   HEMATOCRIT 43.9  --   --  42.8   PLATELETS 269  --   --  292   NEUTROS ABS 8.33*  --   --  10.73*   IMMATURE GRANS (ABS) 0.04  --   --  0.05   LYMPHS ABS 2.01  --   --  1.33   MONOS ABS 0.88  --   --  0.66   EOS ABS 0.35  --   --  0.07   MCV 89.2  --   --  89.7   CRP  --   --   --  2.05*   PROCALCITONIN  --   --   --  0.06   LACTATE  --  2.0 2.3* 3.1*   D DIMER QUANT  --   --   --  0.75*         Lab 24  0550 24  1359   SODIUM 136 136   POTASSIUM 3.7 3.7   CHLORIDE 99 98   CO2 27.0 26.0   ANION GAP 10.0 12.0   BUN 20 21*   CREATININE 0.74 0.87   EGFR 93.9 77.3    GLUCOSE 90 125*   CALCIUM 8.7 9.0   MAGNESIUM  --  1.7         Lab 09/25/24  1359   TOTAL PROTEIN 6.8   ALBUMIN 3.7   GLOBULIN 3.1   ALT (SGPT) 22   AST (SGOT) 27   BILIRUBIN 0.8   ALK PHOS 133*         Lab 09/25/24  1359   PROBNP 11,030.0*   HSTROP T 32*                 Brief Urine Lab Results  (Last result in the past 365 days)        Color   Clarity   Blood   Leuk Est   Nitrite   Protein   CREAT   Urine HCG        09/26/24 1554 Yellow   Clear   Negative   Moderate (2+)   Negative   30 mg/dL (1+)                   Microbiology Results Abnormal       Procedure Component Value - Date/Time    Blood Culture - Blood, Arm, Left [[id number]]  (Normal) Collected: 09/25/24 1457    Lab Status: Preliminary result Specimen: Blood from Arm, Left Updated: 09/26/24 1615     Blood Culture No growth at 24 hours    Blood Culture - Blood, Arm, Right [[id number]]  (Normal) Collected: 09/25/24 1450    Lab Status: Preliminary result Specimen: Blood from Arm, Right Updated: 09/26/24 1615     Blood Culture No growth at 24 hours    S. Pneumo Ag Urine or CSF - Urine, Urine, Clean Catch [[id number]]  (Normal) Collected: 09/25/24 1943    Lab Status: Final result Specimen: Urine, Clean Catch Updated: 09/26/24 0037     Strep Pneumo Ag Negative    Legionella Antigen, Urine - Urine, Urine, Clean Catch [[id number]]  (Normal) Collected: 09/25/24 1943    Lab Status: Final result Specimen: Urine, Clean Catch Updated: 09/26/24 0037     LEGIONELLA ANTIGEN, URINE Negative    MRSA Screen, PCR (Inpatient) - Swab, Nares [[id number]]  (Normal) Collected: 09/25/24 2058    Lab Status: Final result Specimen: Swab from Nares Updated: 09/25/24 2209     MRSA PCR Negative    Narrative:      The negative predictive value of this diagnostic test is high and should only be used to consider de-escalating anti-MRSA therapy. A positive result may indicate colonization with MRSA and must be correlated clinically.  MRSA Negative    Respiratory Panel PCR  w/COVID-19(SARS-CoV-2) [facility]ONDA/[facility]UEL/[facility]/SWAPNA In-House, NP Swab in UTM/VTM, 2 HR TAT - Swab, Nasopharynx [[id number]]  (Normal) Collected: 09/25/24 1358    Lab Status: Final result Specimen: Swab from Nasopharynx Updated: 09/25/24 1453     ADENOVIRUS, PCR Not Detected     Coronavirus 229E Not Detected     Coronavirus HKU1 Not Detected     Coronavirus NL63 Not Detected     Coronavirus OC43 Not Detected     COVID19 Not Detected     Human Metapneumovirus Not Detected     Human Rhinovirus/Enterovirus Not Detected     Influenza A PCR Not Detected     Influenza B PCR Not Detected     Parainfluenza Virus 1 Not Detected     Parainfluenza Virus 2 Not Detected     Parainfluenza Virus 3 Not Detected     Parainfluenza Virus 4 Not Detected     RSV, PCR Not Detected     Bordetella pertussis pcr Not Detected     Bordetella parapertussis PCR Not Detected     Chlamydophila pneumoniae PCR Not Detected     Mycoplasma pneumo by PCR Not Detected    Narrative:      In the setting of a positive respiratory panel with a viral infection PLUS a negative procalcitonin without other underlying concern for bacterial infection, consider observing off antibiotics or discontinuation of antibiotics and continue supportive care. If the respiratory panel is positive for atypical bacterial infection (Bordetella pertussis, Chlamydophila pneumoniae, or Mycoplasma pneumoniae), consider antibiotic de-escalation to target atypical bacterial infection.            CT Angiogram Chest Pulmonary Embolism    Result Date: 9/25/2024  CT ANGIOGRAM CHEST PULMONARY EMBOLISM Date of Exam: 9/25/2024 3:14 PM EDT Indication: sob, elevated troponin. Comparison: Chest CTA performed on March 8, 2024 Technique: Axial CT images were obtained of the chest after the uneventful intravenous administration of Isovue-370, 75 mL utilizing pulmonary embolism protocol.  Reconstructed coronal and sagittal images were also obtained. Automated exposure control and iterative construction  methods were used. Findings: Pulmonary arteries: Adequate opacification of the pulmonary arteries. No evidence of acute pulmonary embolism. Thoracic aorta: The thoracic aorta is not opacified with contrast due to bolus timing. Thoracic aorta is normal in caliber and contour. Small calcified plaques are visualized. Cardiovascular: The heart is moderately to severely enlarged. There is retrograde opacification of the IVC and hepatic veins, suggestive of CHF. No evidence of pericardial effusion. Thyroid: The visualized portion of the thyroid is unremarkable. Lungs and Pleura: Patchy airspace opacities are visualized in the lung bases with associated interlobular septal thickening. No pleural effusion is visualized. No pneumothorax. Central airways are patent. Mediastinum/Andie: No mediastinal or hilar lymphadenopathy. Lymph nodes: No axillary or supraclavicular lymphadenopathy. Bones and Soft Tissue:No acute fracture, aggressive osseous lesions, or soft tissue process. Upper Abdomen: No acute process in the upper abdomen.     Impression: Impression: No evidence of pulmonary embolism. Moderate to severe cardiomegaly. Retrograde opacification of the IVC and hepatic veins suggest CHF. Small patchy airspace opacities with associated interlobular septal thickening in the lung bases. Finding may be on the basis of pneumonia versus alveolar pulmonary edema. Electronically Signed: Cristopher Hill MD  9/25/2024 3:37 PM EDT  Workstation ID: [id number]    CT Head Without Contrast    Result Date: 9/25/2024  CT HEAD WO CONTRAST Date of Exam: 9/25/2024 3:14 PM EDT Indication: ams. Comparison: None available. Technique: Axial CT images were obtained of the head without contrast administration.  Automated exposure control and iterative construction methods were used. Findings: Motion artifact near the vertex limits diagnostic sensitivity. There is no evidence of hemorrhage. There is no mass effect or midline shift. There is no extra-axial  collections. Ventricles are normal in size and configuration for patient's stated age. Posterior fossa is within normal limits. Calvarium and skull base appear intact. Visualized sinuses show no air fluid levels. Partial visualization of left maxillary retention cyst measuring 2.8 cm. The mastoid air cells are clear. Visualized orbits are unremarkable.     Impression: Impression: Motion artifact near the vertex limits diagnostic study. No acute or cranial process evident. Electronically Signed: Cristopher Hill MD  9/25/2024 3:28 PM EDT  Workstation ID: [id number]    XR Chest 1 View    Result Date: 9/25/2024  XR CHEST 1 VW Date of Exam: 9/25/2024 2:42 PM EDT Indication: SOA triage protocol Comparison: Chest x-ray 3/8/2024 Findings: Redemonstration of cardiomegaly. There is mild blunting of the left costophrenic angle suggestive of small left pleural fluid, similar to prior. No pneumothorax. The lungs are grossly clear. No bhupendra pulmonary edema. No acute osseous findings.     Impression: Impression: Cardiomegaly and mild blunting of the left costophrenic angle which is similar to prior suggestive of small left pleural fluid or perhaps pleural scarring. No bhupendra pulmonary edema. Electronically Signed: Jesus Law MD  9/25/2024 3:07 PM EDT  Workstation ID: [id number]     Results for orders placed during the hospital encounter of 03/08/24    Adult Transthoracic Echo Complete w/ Color, Spectral and Contrast if Necessary Per Protocol    Interpretation Summary    Left ventricular systolic function is severely decreased. Calculated left ventricular EF = 16.8% Left ventricular ejection fraction appears to be less than 20%.    The left ventricular cavity is mildly dilated.    Left ventricular wall thickness is consistent with mild concentric hypertrophy.    Left ventricular diastolic function is consistent with (grade III w/high LAP) restrictive pattern.    Moderately reduced right ventricular systolic function noted.    The  left atrial cavity is moderately dilated.    The right atrial cavity is moderately  dilated.    There are myxomatous changes of the mitral valve apparatus present.    Severe mitral valve regurgitation is present.    Moderate tricuspid valve regurgitation is present.    Estimated right ventricular systolic pressure from tricuspid regurgitation is moderately elevated (45-55 mmHg).    LV apical thrombus, measuring approximately 1 x 1 cm, not mobile.      Current medications:  Scheduled Meds:apixaban, 5 mg, Oral, BID  bumetanide, 1 mg, Intravenous, Q12H  [Held by provider] bumetanide, 1 mg, Oral, Daily  buPROPion XL, 300 mg, Oral, Daily  cefTRIAXone, 1,000 mg, Intravenous, Q24H  doxycycline, 100 mg, Intravenous, Q12H  pantoprazole, 40 mg, Oral, Daily  pharmacy consult - MTM, , Does not apply, Daily  sodium chloride, 10 mL, Intravenous, Q12H  spironolactone, 100 mg, Oral, Daily  valsartan, 80 mg, Oral, Q24H      Continuous Infusions:   PRN Meds:.  acetaminophen    senna-docusate sodium **AND** polyethylene glycol **AND** bisacodyl **AND** bisacodyl    Calcium Replacement - Follow Nurse / BPA Driven Protocol    hydrOXYzine    ipratropium-albuterol    Magnesium Standard Dose Replacement - Follow Nurse / BPA Driven Protocol    Phosphorus Replacement - Follow Nurse / BPA Driven Protocol    Potassium Replacement - Follow Nurse / BPA Driven Protocol    sodium chloride    sodium chloride    sodium chloride    Assessment & Plan   Assessment & Plan     Active Hospital Problems    Diagnosis  POA    **Acute exacerbation of CHF (congestive heart failure) [I50.9]  Yes    Pulmonary embolism without acute cor pulmonale [I26.99]  Yes     Priority: Low    Pneumonia, unspecified organism [J18.9]  Yes    Pneumonia [J18.9]  Yes    Essential hypertension [I10]  Yes    Coronary artery disease of native artery of native heart with stable angina pectoris [I25.118]  Yes      Resolved Hospital Problems   No resolved problems to display.         Brief Hospital Course to date:  Jill M Paladino is a 59 y.o. female admitted with acute CHF exacerbation and possible PNA, patient with long history of systolic CHF, medical noncompliance, CAD, HTN, PE on Eliquis, and homeless.     This patient's problems and plans were partially entered by my partner and updated as appropriate by me 09/27/24.      Acute on chronic HFrEF  -- echo on 3/2024 EF 16%  -- repeat ECHO ordered and pending   -- Dr. Fontenot with cardiology following   -- cont IV bumex bid  -- cont home aldactone   -- BNP 11,030  -- oxygen as needed, on RA on 9/26   --CT chest neg for PE, mod to severe cardiomegaly.   -- net negative 1480 for 24 hours, continue strict Is/Os, daily weights     PNA  -- cont rocephin and doxy started in ED  -- nebs prn   -- WBC 12.90, procal 0.06, lactate 3.1 on admission   -- resp PCR neg   -- blood cultures pending  -- CT chest with opacities in lung bases    -- urine antigens are negative, MRSA negative     CAD  -- with reported 100% LAD occlusion per [facility]. Pt declined CTS referral per Cardiology.    -- continue with medical optimization         HTN  Hx of PE   Prolonged QT   -- cont eliquis  -- cont aldactone     Expected Discharge Location and Transportation: TBD  Expected Discharge   Expected Discharge Date: 9/28/2024; Expected Discharge Time:      VTE Prophylaxis:  Pharmacologic & mechanical VTE prophylaxis orders are present.         AM-PAC 6 Clicks Score (PT): 22 (09/26/24 2000)    CODE STATUS:   Code Status and Medical Interventions: CPR (Attempt to Resuscitate); Full Support   Ordered at: 09/25/24 7066     Level Of Support Discussed With:    Patient     Code Status (Patient has no pulse and is not breathing):    CPR (Attempt to Resuscitate)     Medical Interventions (Patient has pulse or is breathing):    Full Support       Chiqui Cabrales MD  09/27/24

## 2024-09-27 NOTE — PROGRESS NOTES
"[facility] at [facility]  INPATIENT PROGRESS NOTE         Psychiatric 2F    9/27/2024      PATIENT IDENTIFICATION:   Name:  Jill M Paladino      MRN:  [id number]     59 y.o.  female             Reason for visit: Acute on chronic systolic heart failure, ischemic heart disease      SUBJECTIVE:   Still having pain when she rolls on her left or right side, shortness of breath slightly improved but still present     OBJECTIVE:  Vitals:    09/26/24 1945 09/27/24 0015 09/27/24 0305 09/27/24 0700   BP: 158/89 132/83 125/93 (!) 135/101   BP Location: Right arm Right arm Right arm Right arm   Patient Position: Lying Lying Lying Lying   Pulse: 99 91 92 91   Resp: 20 20 20 20   Temp: 98.4 °F (36.9 °C) 98.2 °F (36.8 °C) 98.1 °F (36.7 °C) 96.7 °F (35.9 °C)   TempSrc: Oral Oral Oral Oral   SpO2: 92% 96% 96% 93%   Weight:       Height:               Body mass index is 21.63 kg/m².    Intake/Output Summary (Last 24 hours) at 9/27/2024 0844  Last data filed at 9/27/2024 0132  Gross per 24 hour   Intake 720 ml   Output 1900 ml   Net -1180 ml       Telemetry: Personally reviewed, normal sinus rhythm, no arrhythmia     Exam:  General Appearance:   well developed  well nourished  Neck:  thyroid not enlarged  supple  Respiratory:  no respiratory distress  normal breath sounds  no rales  Cardiovascular:  no jugular venous distention  regular rhythm  apical impulse normal  S1 normal, S2 normal  no S3, no S4   no murmur  no rub, no thrill  carotid pulses normal; no bruit  pedal pulses normal  lower extremity edema: 1-2+  Skin:   warm, dry      Allergies   Allergen Reactions    Lisinopril Other (See Comments)     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    Other Nausea And Vomiting     Pt states \"all opiods make me throw up instantly\"    Percocet [Oxycodone-Acetaminophen] GI Intolerance    Sulfa Antibiotics Rash     Scheduled meds:       apixaban, 5 mg, Oral, " "BID  bumetanide, 1 mg, Intravenous, Q12H  [Held by provider] bumetanide, 1 mg, Oral, Daily  buPROPion XL, 300 mg, Oral, Daily  cefTRIAXone, 1,000 mg, Intravenous, Q24H  doxycycline, 100 mg, Intravenous, Q12H  empagliflozin, 10 mg, Oral, Daily  [Held by provider] metoprolol succinate XL, 50 mg, Oral, Daily  [Held by provider] OLANZapine zydis, 10 mg, Oral, Nightly  pantoprazole, 40 mg, Oral, Daily  pharmacy consult - MTM, , Does not apply, Daily  rosuvastatin, 40 mg, Oral, Daily  sodium chloride, 10 mL, Intravenous, Q12H  spironolactone, 100 mg, Oral, Daily  valsartan, 80 mg, Oral, Q24H      IV meds:                         Data Review:  Results from last 7 days   Lab Units 09/26/24  0550 09/25/24  1359   SODIUM mmol/L 136 136   BUN mg/dL 20 21*   CREATININE mg/dL 0.74 0.87   GLUCOSE mg/dL 90 125*     Results from last 7 days   Lab Units 09/26/24  0551 09/25/24  1359   WBC 10*3/mm3 11.67* 12.90*   HEMOGLOBIN g/dL 14.0 13.6         Results from last 7 days   Lab Units 09/25/24  1359   ALT (SGPT) U/L 22   AST (SGOT) U/L 27     No results found for: \"DIGOXIN\"   Lab Results   Component Value Date    TSH 3.830 03/10/2024           Invalid input(s): \"LDLCALC\"    Estimated Creatinine Clearance: 77.2 mL/min (by C-G formula based on SCr of 0.74 mg/dL).        Imaging (last 24 hr):   I personally reviewed the most recent chest x-ray and other pertinent imaging studies.  Results for orders placed during the hospital encounter of 09/25/24    XR Chest 1 View    Narrative  XR CHEST 1 VW    Date of Exam: 9/25/2024 2:42 PM EDT    Indication: SOA triage protocol    Comparison: Chest x-ray 3/8/2024    Findings:  Redemonstration of cardiomegaly. There is mild blunting of the left costophrenic angle suggestive of small left pleural fluid, similar to prior. No pneumothorax. The lungs are grossly clear. No bhupendra pulmonary edema. No acute osseous findings.    Impression  Impression:  Cardiomegaly and mild blunting of the left costophrenic " angle which is similar to prior suggestive of small left pleural fluid or perhaps pleural scarring. No bhupendra pulmonary edema.      Electronically Signed: Jesus Law MD  9/25/2024 3:07 PM EDT  Workstation ID: [id number]        Last ECHO:  Results for orders placed during the hospital encounter of 03/08/24    Adult Transthoracic Echo Complete w/ Color, Spectral and Contrast if Necessary Per Protocol    Interpretation Summary    Left ventricular systolic function is severely decreased. Calculated left ventricular EF = 16.8% Left ventricular ejection fraction appears to be less than 20%.    The left ventricular cavity is mildly dilated.    Left ventricular wall thickness is consistent with mild concentric hypertrophy.    Left ventricular diastolic function is consistent with (grade III w/high LAP) restrictive pattern.    Moderately reduced right ventricular systolic function noted.    The left atrial cavity is moderately dilated.    The right atrial cavity is moderately  dilated.    There are myxomatous changes of the mitral valve apparatus present.    Severe mitral valve regurgitation is present.    Moderate tricuspid valve regurgitation is present.    Estimated right ventricular systolic pressure from tricuspid regurgitation is moderately elevated (45-55 mmHg).    LV apical thrombus, measuring approximately 1 x 1 cm, not mobile.        PROBLEM LIST:     Acute exacerbation of CHF (congestive heart failure)    Pneumonia    Pulmonary embolism without acute cor pulmonale    Coronary artery disease of native artery of native heart with stable angina pectoris    Essential hypertension    Pneumonia, unspecified organism        Initial cardiac assessment: 59-year-old homeless female presenting with acute on chronic systolic heart failure previous EF 20 to 25%, multiple ER visits over the past several months for shortness of breath, has been out of all her medications for at least 1 month, history of 100%  of the LAD  from cath at [facility] 2023, declined revascularization    ASSESSMENT/PLAN:  1.  Acute on chronic systolic heart failure, ischemic cardiomyopathy:  Continue IV diuresis with Bumex today, goal net -1.5 to 2 L/day.  We restarted GDMT with Toprol, empagliflozin, valsartan and spironolactone  She was noncompliant with her medications for 1 month prior to admission.  Declined revascularization in the past  Declined LifeVest in the past    Repeat echocardiogram pending, she was too anxious 9/26, reiterated to the patient today that she needs echocardiogram also to assess for LV apical thrombus    If LV apical thrombus not present she may discontinue Eliquis and continue aspirin only.    2.  CAD:  Add aspirin 81 mg daily  Continue rosuvastatin  No evidence of ACS  Patient unwilling to accept that she has CAD and needs revascularization, declines further workup.    3.  Pneumonia:  Per primary team    4.  Hypertension:  Do not hold metoprolol, empagliflozin, valsartan, spironolactone, unless systolic blood pressure is less than 90    Weekend plan:  Continue diuresis, switch to p.o. diuretics Saturday or Sunday.  Patient needs social work assistance for medications      Heath Sargent MD  9/27/2024    08:44 EDT

## 2024-09-28 LAB
ANION GAP SERPL CALCULATED.3IONS-SCNC: 13 MMOL/L (ref 5–15)
BASOPHILS # BLD AUTO: 0.07 10*3/MM3 (ref 0–0.2)
BASOPHILS NFR BLD AUTO: 0.6 % (ref 0–1.5)
BUN SERPL-MCNC: 30 MG/DL (ref 6–20)
BUN/CREAT SERPL: 33.3 (ref 7–25)
CALCIUM SPEC-SCNC: 9.1 MG/DL (ref 8.6–10.5)
CHLORIDE SERPL-SCNC: 100 MMOL/L (ref 98–107)
CO2 SERPL-SCNC: 25 MMOL/L (ref 22–29)
CREAT SERPL-MCNC: 0.9 MG/DL (ref 0.57–1)
DEPRECATED RDW RBC AUTO: 47.6 FL (ref 37–54)
EGFRCR SERPLBLD CKD-EPI 2021: 74.3 ML/MIN/1.73
EOSINOPHIL # BLD AUTO: 0.26 10*3/MM3 (ref 0–0.4)
EOSINOPHIL NFR BLD AUTO: 2.2 % (ref 0.3–6.2)
ERYTHROCYTE [DISTWIDTH] IN BLOOD BY AUTOMATED COUNT: 14.6 % (ref 12.3–15.4)
GLUCOSE SERPL-MCNC: 97 MG/DL (ref 65–99)
HCT VFR BLD AUTO: 45.8 % (ref 34–46.6)
HGB BLD-MCNC: 14.7 G/DL (ref 12–15.9)
IMM GRANULOCYTES # BLD AUTO: 0.05 10*3/MM3 (ref 0–0.05)
IMM GRANULOCYTES NFR BLD AUTO: 0.4 % (ref 0–0.5)
LYMPHOCYTES # BLD AUTO: 2.4 10*3/MM3 (ref 0.7–3.1)
LYMPHOCYTES NFR BLD AUTO: 19.9 % (ref 19.6–45.3)
MCH RBC QN AUTO: 28.5 PG (ref 26.6–33)
MCHC RBC AUTO-ENTMCNC: 32.1 G/DL (ref 31.5–35.7)
MCV RBC AUTO: 88.9 FL (ref 79–97)
MONOCYTES # BLD AUTO: 0.84 10*3/MM3 (ref 0.1–0.9)
MONOCYTES NFR BLD AUTO: 7 % (ref 5–12)
NEUTROPHILS NFR BLD AUTO: 69.9 % (ref 42.7–76)
NEUTROPHILS NFR BLD AUTO: 8.45 10*3/MM3 (ref 1.7–7)
NRBC BLD AUTO-RTO: 0 /100 WBC (ref 0–0.2)
PLATELET # BLD AUTO: 389 10*3/MM3 (ref 140–450)
PMV BLD AUTO: 10 FL (ref 6–12)
POTASSIUM SERPL-SCNC: 4.5 MMOL/L (ref 3.5–5.2)
RBC # BLD AUTO: 5.15 10*6/MM3 (ref 3.77–5.28)
SODIUM SERPL-SCNC: 138 MMOL/L (ref 136–145)
WBC NRBC COR # BLD AUTO: 12.07 10*3/MM3 (ref 3.4–10.8)

## 2024-09-28 PROCEDURE — 99232 SBSQ HOSP IP/OBS MODERATE 35: CPT | Performed by: INTERNAL MEDICINE

## 2024-09-28 PROCEDURE — 80048 BASIC METABOLIC PNL TOTAL CA: CPT | Performed by: INTERNAL MEDICINE

## 2024-09-28 PROCEDURE — 85025 COMPLETE CBC W/AUTO DIFF WBC: CPT | Performed by: INTERNAL MEDICINE

## 2024-09-28 PROCEDURE — 25010000002 BUMETANIDE PER 0.5 MG: Performed by: NURSE PRACTITIONER

## 2024-09-28 PROCEDURE — G0378 HOSPITAL OBSERVATION PER HR: HCPCS

## 2024-09-28 PROCEDURE — 96376 TX/PRO/DX INJ SAME DRUG ADON: CPT

## 2024-09-28 PROCEDURE — 25010000002 CEFTRIAXONE PER 250 MG: Performed by: NURSE PRACTITIONER

## 2024-09-28 PROCEDURE — 99214 OFFICE O/P EST MOD 30 MIN: CPT | Performed by: INTERNAL MEDICINE

## 2024-09-28 PROCEDURE — 25010000002 BUMETANIDE PER 0.5 MG: Performed by: INTERNAL MEDICINE

## 2024-09-28 RX ORDER — TEMAZEPAM 15 MG/1
15 CAPSULE ORAL NIGHTLY PRN
Status: DISCONTINUED | OUTPATIENT
Start: 2024-09-28 | End: 2024-09-29 | Stop reason: HOSPADM

## 2024-09-28 RX ORDER — VALSARTAN 160 MG/1
160 TABLET ORAL
Status: DISCONTINUED | OUTPATIENT
Start: 2024-09-29 | End: 2024-09-29 | Stop reason: HOSPADM

## 2024-09-28 RX ORDER — DOXYCYCLINE 100 MG/1
100 CAPSULE ORAL EVERY 12 HOURS
Status: DISCONTINUED | OUTPATIENT
Start: 2024-09-28 | End: 2024-09-29 | Stop reason: HOSPADM

## 2024-09-28 RX ADMIN — APIXABAN 5 MG: 5 TABLET, FILM COATED ORAL at 09:45

## 2024-09-28 RX ADMIN — PANTOPRAZOLE SODIUM 40 MG: 40 TABLET, DELAYED RELEASE ORAL at 09:44

## 2024-09-28 RX ADMIN — APIXABAN 5 MG: 5 TABLET, FILM COATED ORAL at 21:44

## 2024-09-28 RX ADMIN — BUMETANIDE 1 MG: 0.25 INJECTION INTRAMUSCULAR; INTRAVENOUS at 21:45

## 2024-09-28 RX ADMIN — Medication 10 ML: at 21:45

## 2024-09-28 RX ADMIN — BUPROPION HYDROCHLORIDE 300 MG: 150 TABLET, EXTENDED RELEASE ORAL at 09:44

## 2024-09-28 RX ADMIN — DOXYCYCLINE 100 MG: 100 CAPSULE ORAL at 16:04

## 2024-09-28 RX ADMIN — SPIRONOLACTONE 100 MG: 25 TABLET, FILM COATED ORAL at 09:44

## 2024-09-28 RX ADMIN — DOXYCYCLINE 100 MG: 100 INJECTION, POWDER, LYOPHILIZED, FOR SOLUTION INTRAVENOUS at 04:46

## 2024-09-28 RX ADMIN — HYDROXYZINE HYDROCHLORIDE 25 MG: 25 TABLET ORAL at 22:10

## 2024-09-28 RX ADMIN — SODIUM CHLORIDE 1000 MG: 900 INJECTION INTRAVENOUS at 16:04

## 2024-09-28 RX ADMIN — BUMETANIDE 1 MG: 0.25 INJECTION INTRAMUSCULAR; INTRAVENOUS at 09:45

## 2024-09-28 RX ADMIN — ASPIRIN 81 MG: 81 TABLET, COATED ORAL at 09:45

## 2024-09-28 RX ADMIN — TEMAZEPAM 15 MG: 15 CAPSULE ORAL at 23:04

## 2024-09-28 RX ADMIN — ACETAMINOPHEN 650 MG: 325 TABLET ORAL at 04:46

## 2024-09-28 RX ADMIN — HYDROXYZINE HYDROCHLORIDE 25 MG: 25 TABLET ORAL at 04:56

## 2024-09-28 RX ADMIN — VALSARTAN 80 MG: 80 TABLET, FILM COATED ORAL at 09:44

## 2024-09-28 NOTE — PLAN OF CARE
Problem: Adult Inpatient Plan of Care  Goal: Plan of Care Review  Outcome: Progressing  Goal: Patient-Specific Goal (Individualized)  Outcome: Progressing  Goal: Absence of Hospital-Acquired Illness or Injury  Outcome: Progressing  Intervention: Identify and Manage Fall Risk  Recent Flowsheet Documentation  Taken 9/28/2024 0200 by Radha Miller RN  Safety Promotion/Fall Prevention:   clutter free environment maintained   fall prevention program maintained   gait belt   lighting adjusted   nonskid shoes/slippers when out of bed   room organization consistent   safety round/check completed   toileting scheduled  Taken 9/28/2024 0000 by Radha Miller RN  Safety Promotion/Fall Prevention:   clutter free environment maintained   fall prevention program maintained   gait belt   lighting adjusted   nonskid shoes/slippers when out of bed   room organization consistent   safety round/check completed   toileting scheduled  Taken 9/27/2024 2200 by Radha Miller RN  Safety Promotion/Fall Prevention:   clutter free environment maintained   fall prevention program maintained   gait belt   lighting adjusted   nonskid shoes/slippers when out of bed   room organization consistent   toileting scheduled   safety round/check completed  Intervention: Prevent Skin Injury  Recent Flowsheet Documentation  Taken 9/28/2024 0200 by Radha Miller RN  Body Position: position changed independently  Skin Protection:   adhesive use limited   protective footwear used   transparent dressing maintained   tubing/devices free from skin contact  Taken 9/28/2024 0000 by Radha Miller RN  Body Position: position changed independently  Skin Protection:   adhesive use limited   protective footwear used   transparent dressing maintained   tubing/devices free from skin contact  Taken 9/27/2024 2200 by Radha Miller RN  Body Position: position changed independently  Skin Protection:   adhesive use limited   protective  footwear used   transparent dressing maintained   tubing/devices free from skin contact  Intervention: Prevent and Manage VTE (Venous Thromboembolism) Risk  Recent Flowsheet Documentation  Taken 9/28/2024 0200 by Radha Miller RN  Activity Management: activity encouraged  Taken 9/28/2024 0000 by Radha Miller RN  Activity Management: activity encouraged  Taken 9/27/2024 2200 by Radha Miller RN  Activity Management:   up to bedside commode   activity encouraged  Intervention: Prevent Infection  Recent Flowsheet Documentation  Taken 9/28/2024 0200 by Radha Miller RN  Infection Prevention:   environmental surveillance performed   hand hygiene promoted   personal protective equipment utilized   rest/sleep promoted  Taken 9/27/2024 2200 by Radha Miller RN  Infection Prevention:   environmental surveillance performed   hand hygiene promoted   personal protective equipment utilized   rest/sleep promoted  Goal: Optimal Comfort and Wellbeing  Outcome: Progressing  Goal: Readiness for Transition of Care  Outcome: Progressing     Problem: Hypertension Comorbidity  Goal: Blood Pressure in Desired Range  Outcome: Progressing  Intervention: Maintain Blood Pressure Management  Recent Flowsheet Documentation  Taken 9/28/2024 0200 by Radha Miller RN  Medication Review/Management: medications reviewed  Taken 9/28/2024 0000 by Radha Miller RN  Medication Review/Management: medications reviewed  Taken 9/27/2024 2200 by Radha Miller RN  Medication Review/Management: medications reviewed     Problem: Adjustment to Illness (Sepsis/Septic Shock)  Goal: Optimal Coping  Outcome: Progressing     Problem: Bleeding (Sepsis/Septic Shock)  Goal: Absence of Bleeding  Outcome: Progressing     Problem: Glycemic Control Impaired (Sepsis/Septic Shock)  Goal: Blood Glucose Level Within Desired Range  Outcome: Progressing     Problem: Infection Progression (Sepsis/Septic Shock)  Goal: Absence of  Infection Signs and Symptoms  Outcome: Progressing  Intervention: Initiate Sepsis Management  Recent Flowsheet Documentation  Taken 9/28/2024 0200 by Radha Miller RN  Infection Prevention:   environmental surveillance performed   hand hygiene promoted   personal protective equipment utilized   rest/sleep promoted  Taken 9/27/2024 2200 by Radha Miller RN  Infection Prevention:   environmental surveillance performed   hand hygiene promoted   personal protective equipment utilized   rest/sleep promoted  Intervention: Promote Recovery  Recent Flowsheet Documentation  Taken 9/28/2024 0200 by Radha Miller, RN  Activity Management: activity encouraged  Taken 9/28/2024 0000 by Radha Miller RN  Activity Management: activity encouraged  Taken 9/27/2024 2200 by Radha Miller RN  Activity Management:   up to bedside commode   activity encouraged  Taken 9/27/2024 2000 by Radha Miller RN  Sleep/Rest Enhancement:   awakenings minimized   regular sleep/rest pattern promoted   noise level reduced   room darkened     Problem: Nutrition Impaired (Sepsis/Septic Shock)  Goal: Optimal Nutrition Intake  Outcome: Progressing     Problem: Fall Injury Risk  Goal: Absence of Fall and Fall-Related Injury  Outcome: Progressing  Intervention: Identify and Manage Contributors  Recent Flowsheet Documentation  Taken 9/28/2024 0200 by Radha Miller RN  Medication Review/Management: medications reviewed  Taken 9/28/2024 0000 by Radha Miller RN  Medication Review/Management: medications reviewed  Taken 9/27/2024 2200 by Radha Miller RN  Medication Review/Management: medications reviewed  Intervention: Promote Injury-Free Environment  Recent Flowsheet Documentation  Taken 9/28/2024 0200 by Radha Miller RN  Safety Promotion/Fall Prevention:   clutter free environment maintained   fall prevention program maintained   gait belt   lighting adjusted   nonskid shoes/slippers when out of bed    room organization consistent   safety round/check completed   toileting scheduled  Taken 9/28/2024 0000 by Radha Miller, RN  Safety Promotion/Fall Prevention:   clutter free environment maintained   fall prevention program maintained   gait belt   lighting adjusted   nonskid shoes/slippers when out of bed   room organization consistent   safety round/check completed   toileting scheduled  Taken 9/27/2024 2200 by Radha Miller, RN  Safety Promotion/Fall Prevention:   clutter free environment maintained   fall prevention program maintained   gait belt   lighting adjusted   nonskid shoes/slippers when out of bed   room organization consistent   toileting scheduled   safety round/check completed   Goal Outcome Evaluation:

## 2024-09-28 NOTE — PROGRESS NOTES
[facility] Medicine Services  PROGRESS NOTE    Patient Name: Jill M Paladino  : 1965  MRN: [id number]    Date of Admission: 2024  Primary Care Physician: Sigrid Byers PA    Subjective   Subjective     CC:  SOA    HPI:  Doing well this am, no new issues overnight. Sleeping initially when I came in.       Objective   Objective     Vital Signs:   Temp:  [96.5 °F (35.8 °C)-97.8 °F (36.6 °C)] 97.8 °F (36.6 °C)  Heart Rate:  [71-87] 74  Resp:  [20-] 26  BP: (105-131)/() 131/100     Physical Exam:  Constitutional: No acute distress, resting comfortably in bed, currently on RA   HENT: NCAT, nares patent, mucous membranes moist  Respiratory: Decreased BS bilaterally, slight crackles bilateral lung bases, no rhonchi or wheezing, respiratory effort normal   Cardiovascular: RRR, no murmurs, rubs, or gallops  Gastrointestinal: Positive bowel sounds, soft, nontender, nondistended  Musculoskeletal: Trace bilateral ankle edema, no clubbing or cyanosis   Psychiatric: Flat affect, seems in denial about health conditions   Neurologic: Oriented x 3, strength symmetric in all extremities, Cranial Nerves grossly intact to confrontation, speech clear  Skin: No rashes      Results Reviewed:  LAB RESULTS:      Lab 24  0624 24  0551 24  2038 24  1739 24  1359   WBC 12.07* 11.67*  --   --  12.90*   HEMOGLOBIN 14.7 14.0  --   --  13.6   HEMATOCRIT 45.8 43.9  --   --  42.8   PLATELETS 389 269  --   --  292   NEUTROS ABS 8.45* 8.33*  --   --  10.73*   IMMATURE GRANS (ABS) 0.05 0.04  --   --  0.05   LYMPHS ABS 2.40 2.01  --   --  1.33   MONOS ABS 0.84 0.88  --   --  0.66   EOS ABS 0.26 0.35  --   --  0.07   MCV 88.9 89.2  --   --  89.7   CRP  --   --   --   --  2.05*   PROCALCITONIN  --   --   --   --  0.06   LACTATE  --   --  2.0 2.3* 3.1*   D DIMER QUANT  --   --   --   --  0.75*         Lab 24  0624 24  0550 24  1359   SODIUM 138 136 136    POTASSIUM 4.5 3.7 3.7   CHLORIDE 100 99 98   CO2 25.0 27.0 26.0   ANION GAP 13.0 10.0 12.0   BUN 30* 20 21*   CREATININE 0.90 0.74 0.87   EGFR 74.3 93.9 77.3   GLUCOSE 97 90 125*   CALCIUM 9.1 8.7 9.0   MAGNESIUM  --   --  1.7         Lab 09/25/24  1359   TOTAL PROTEIN 6.8   ALBUMIN 3.7   GLOBULIN 3.1   ALT (SGPT) 22   AST (SGOT) 27   BILIRUBIN 0.8   ALK PHOS 133*         Lab 09/25/24  1359   PROBNP 11,030.0*   HSTROP T 32*                 Brief Urine Lab Results  (Last result in the past 365 days)        Color   Clarity   Blood   Leuk Est   Nitrite   Protein   CREAT   Urine HCG        09/26/24 1554 Yellow   Clear   Negative   Moderate (2+)   Negative   30 mg/dL (1+)                   Microbiology Results Abnormal       Procedure Component Value - Date/Time    Blood Culture - Blood, Arm, Left [[id number]]  (Normal) Collected: 09/25/24 1457    Lab Status: Preliminary result Specimen: Blood from Arm, Left Updated: 09/27/24 1615     Blood Culture No growth at 2 days    Blood Culture - Blood, Arm, Right [[id number]]  (Normal) Collected: 09/25/24 1450    Lab Status: Preliminary result Specimen: Blood from Arm, Right Updated: 09/27/24 1615     Blood Culture No growth at 2 days    Urine Culture - Urine, Urine, Clean Catch [[id number]]  (Normal) Collected: 09/26/24 1554    Lab Status: Final result Specimen: Urine, Clean Catch Updated: 09/27/24 1221     Urine Culture No growth    S. Pneumo Ag Urine or CSF - Urine, Urine, Clean Catch [[id number]]  (Normal) Collected: 09/25/24 1943    Lab Status: Final result Specimen: Urine, Clean Catch Updated: 09/26/24 0037     Strep Pneumo Ag Negative    Legionella Antigen, Urine - Urine, Urine, Clean Catch [[id number]]  (Normal) Collected: 09/25/24 1943    Lab Status: Final result Specimen: Urine, Clean Catch Updated: 09/26/24 0037     LEGIONELLA ANTIGEN, URINE Negative    MRSA Screen, PCR (Inpatient) - Swab, Nares [[id number]]  (Normal) Collected: 09/25/24 2058    Lab Status: Final result  Specimen: Swab from Nares Updated: 09/25/24 2209     MRSA PCR Negative    Narrative:      The negative predictive value of this diagnostic test is high and should only be used to consider de-escalating anti-MRSA therapy. A positive result may indicate colonization with MRSA and must be correlated clinically.  MRSA Negative    Respiratory Panel PCR w/COVID-19(SARS-CoV-2) [facility] In-House, NP Swab in UTM/VTM, 2 HR TAT - Swab, Nasopharynx [[id number]]  (Normal) Collected: 09/25/24 1358    Lab Status: Final result Specimen: Swab from Nasopharynx Updated: 09/25/24 1452     ADENOVIRUS, PCR Not Detected     Coronavirus 229E Not Detected     Coronavirus HKU1 Not Detected     Coronavirus NL63 Not Detected     Coronavirus OC43 Not Detected     COVID19 Not Detected     Human Metapneumovirus Not Detected     Human Rhinovirus/Enterovirus Not Detected     Influenza A PCR Not Detected     Influenza B PCR Not Detected     Parainfluenza Virus 1 Not Detected     Parainfluenza Virus 2 Not Detected     Parainfluenza Virus 3 Not Detected     Parainfluenza Virus 4 Not Detected     RSV, PCR Not Detected     Bordetella pertussis pcr Not Detected     Bordetella parapertussis PCR Not Detected     Chlamydophila pneumoniae PCR Not Detected     Mycoplasma pneumo by PCR Not Detected    Narrative:      In the setting of a positive respiratory panel with a viral infection PLUS a negative procalcitonin without other underlying concern for bacterial infection, consider observing off antibiotics or discontinuation of antibiotics and continue supportive care. If the respiratory panel is positive for atypical bacterial infection (Bordetella pertussis, Chlamydophila pneumoniae, or Mycoplasma pneumoniae), consider antibiotic de-escalation to target atypical bacterial infection.            Adult Transthoracic Echo Complete W/ Cont if Necessary Per Protocol    Result Date: 9/27/2024    Left ventricular systolic function is severely  decreased. Calculated left ventricular EF = 10.8% Left ventricular ejection fraction appears to be less than 20%.   Left ventricular diastolic dysfunction is noted.   There is a thrombus present in the left ventricle.   The left atrial cavity is dilated.   Left atrial volume is mildly increased.   The right atrial cavity is dilated.   Moderate to severe mitral valve regurgitation is present.   Estimated right ventricular systolic pressure from tricuspid regurgitation is normal (<35 mmHg).      Results for orders placed during the hospital encounter of 09/25/24    Adult Transthoracic Echo Complete W/ Cont if Necessary Per Protocol    Interpretation Summary    Left ventricular systolic function is severely decreased. Calculated left ventricular EF = 10.8% Left ventricular ejection fraction appears to be less than 20%.    Left ventricular diastolic dysfunction is noted.    There is a thrombus present in the left ventricle.    The left atrial cavity is dilated.    Left atrial volume is mildly increased.    The right atrial cavity is dilated.    Moderate to severe mitral valve regurgitation is present.    Estimated right ventricular systolic pressure from tricuspid regurgitation is normal (<35 mmHg).      Current medications:  Scheduled Meds:apixaban, 5 mg, Oral, BID  aspirin, 81 mg, Oral, Daily  bumetanide, 1 mg, Intravenous, Q12H  [Held by provider] bumetanide, 1 mg, Oral, Daily  buPROPion XL, 300 mg, Oral, Daily  cefTRIAXone, 1,000 mg, Intravenous, Q24H  doxycycline, 100 mg, Intravenous, Q12H  empagliflozin, 10 mg, Oral, Daily  metoprolol succinate XL, 50 mg, Oral, Daily  [Held by provider] OLANZapine zydis, 10 mg, Oral, Nightly  pantoprazole, 40 mg, Oral, Daily  pharmacy consult - MTM, , Does not apply, Daily  rosuvastatin, 40 mg, Oral, Daily  sodium chloride, 10 mL, Intravenous, Q12H  spironolactone, 100 mg, Oral, Daily  valsartan, 80 mg, Oral, Q24H      Continuous Infusions:   PRN Meds:.  acetaminophen     senna-docusate sodium **AND** polyethylene glycol **AND** bisacodyl **AND** bisacodyl    Calcium Replacement - Follow Nurse / BPA Driven Protocol    hydrOXYzine    ipratropium-albuterol    Magnesium Standard Dose Replacement - Follow Nurse / BPA Driven Protocol    Phosphorus Replacement - Follow Nurse / BPA Driven Protocol    Potassium Replacement - Follow Nurse / BPA Driven Protocol    sodium chloride    sodium chloride    sodium chloride    Assessment & Plan   Assessment & Plan     Active Hospital Problems    Diagnosis  POA    **Acute exacerbation of CHF (congestive heart failure) [I50.9]  Yes    Pulmonary embolism without acute cor pulmonale [I26.99]  Yes     Priority: Low    Pneumonia, unspecified organism [J18.9]  Yes    Pneumonia [J18.9]  Yes    Essential hypertension [I10]  Yes    Coronary artery disease of native artery of native heart with stable angina pectoris [I25.118]  Yes      Resolved Hospital Problems   No resolved problems to display.        Brief Hospital Course to date:  Jill M Paladino is a 59 y.o. female admitted with acute CHF exacerbation and possible PNA, patient with long history of systolic CHF, medical noncompliance, CAD, HTN, PE on Eliquis, and homeless.     This patient's problems and plans were partially entered by my partner and updated as appropriate by me 09/28/24.      Acute on chronic HFrEF  -- echo on 3/2024 EF 16%  -- repeat ECHO ordered and pending   -- Dr. Fontenot with cardiology following   -- cont IV bumex bid  -- cont home aldactone   -- BNP 11,030  -- oxygen as needed, on RA on 9/26   --CT chest neg for PE, mod to severe cardiomegaly.   -- net negative 1480 for 24 hours, continue strict Is/Os, daily weights     PNA  -- cont rocephin and doxy started in ED  -- nebs prn   -- WBC 12.90, procal 0.06, lactate 3.1 on admission   -- resp PCR neg   -- blood cultures pending  -- monitor WBC as increased slightly today  -- CT chest with opacities in lung bases    -- urine antigens  are negative, MRSA negative     CAD  -- with reported 100% LAD occlusion per [facility]. Pt declined CTS referral per Cardiology.    -- continue with medical optimization         HTN  Hx of PE   Prolonged QT   -- cont eliquis  -- cont aldactone     Expected Discharge Location and Transportation: TBD  Expected Discharge   Expected Discharge Date: 9/30/2024; Expected Discharge Time:      VTE Prophylaxis:  Pharmacologic & mechanical VTE prophylaxis orders are present.         AM-PAC 6 Clicks Score (PT): 22 (09/28/24 0800)    CODE STATUS:   Code Status and Medical Interventions: CPR (Attempt to Resuscitate); Full Support   Ordered at: 09/25/24 7328     Level Of Support Discussed With:    Patient     Code Status (Patient has no pulse and is not breathing):    CPR (Attempt to Resuscitate)     Medical Interventions (Patient has pulse or is breathing):    Full Support       Chiqui Cabrales MD  09/28/24

## 2024-09-28 NOTE — PROGRESS NOTES
"[facility] Progress Note     LOS: 0 days   Patient Care Team:  Sigrid Beyrs PA as PCP - General (Internal Medicine)  PCP:  Sigrid Byers PA    Chief Complaint: Follow-up acute on chronic systolic heart failure    Subjective: Patient resting comfortably.  Appears euvolemic.  No dyspnea or chest pain      Review of Systems:   All systems have been reviewed and are negative with the exception of those mentioned above.      Objective:    Vital Sign Min/Max for last 24 hours  Temp  Min: 96.4 °F (35.8 °C)  Max: 97.8 °F (36.6 °C)   BP  Min: 105/68  Max: 134/87   Pulse  Min: 71  Max: 82   Resp  Min: 20  Max: 26   SpO2  Min: 93 %  Max: 100 %   No data recorded   No data recorded     Flowsheet Rows      Flowsheet Row First Filed Value   Admission Height 165.1 cm (65\") Documented at 09/25/2024 1410   Admission Weight 59 kg (130 lb) Documented at 09/25/2024 1410            Telemetry: Sinus rhythm.    No intake or output data in the 24 hours ending 09/28/24 1419  Intake & Output (last 3 days)         09/25 0701  09/26 0700 09/26 0701  09/27 0700 09/27 0701  09/28 0700 09/28 0701  09/29 0700    P.O. 240 720 480     I.V. (mL/kg) 50 (0.8)       IV Piggyback 100       Total Intake(mL/kg) 390 (6.6) 720 (12.2) 480 (8.1)     Urine (mL/kg/hr) 700 2200 (1.6) 1700 (1.2)     Stool   0     Total Output 700 2200 1700     Net -310 -1480 -1220             Urine Unmeasured Occurrence  2 x 8 x 1 x    Stool Unmeasured Occurrence   2 x              Physical Exam:  Constitutional:       Appearance: Not in distress.   Pulmonary:      Effort: Pulmonary effort is normal.   Cardiovascular:      Normal rate. Regular rhythm.      Murmurs: There is a grade 2/6 systolic murmur.   Edema:     Peripheral edema absent.          LABS/DIAGNOSTIC DATA:  Results from last 7 days   Lab Units 09/28/24  0624 09/26/24  0551 09/25/24  1359   WBC 10*3/mm3 12.07* 11.67* 12.90*   HEMOGLOBIN g/dL 14.7 14.0 13.6 "   HEMATOCRIT % 45.8 43.9 42.8   PLATELETS 10*3/mm3 389 269 292     Lab Results   Lab Value Date/Time    TROPONINT 32 (H) 09/25/2024 1359    TROPONINT 35 (H) 09/16/2024 2350    TROPONINT 33 (H) 09/13/2024 0451    TROPONINT 35 (H) 09/06/2024 1646    TROPONINT 55 (H) 07/23/2024 2156    TROPONINT 33 (H) 07/14/2024 0528    TROPONINT 30 (H) 06/03/2024 0549    TROPONINT 95 (H) 05/16/2024 0335    TROPONINT 105 (H) 05/15/2024 0935    TROPONINT 41 (H) 05/09/2024 1901    TROPONINT 38 (H) 04/24/2024 1012    TROPONINT 126 (H) 04/12/2024 0951    TROPONINT 91 (H) 04/10/2024 1955    TROPONINT 31 (H) 03/30/2024 0336    TROPONINT 31 (H) 03/08/2024 2120    TROPONINT 36 (H) 03/08/2024 1858    TROPONINT 28 (H) 12/14/2023 2321    TROPONINT 28 (H) 12/14/2023 1932    TROPONINT <0.010 10/30/2019 0702    TROPONINT <0.010 07/05/2019 1806         Results from last 7 days   Lab Units 09/28/24  0624 09/26/24  0550 09/25/24  1359   SODIUM mmol/L 138 136 136   POTASSIUM mmol/L 4.5 3.7 3.7   CHLORIDE mmol/L 100 99 98   CO2 mmol/L 25.0 27.0 26.0   BUN mg/dL 30* 20 21*   CREATININE mg/dL 0.90 0.74 0.87   CALCIUM mg/dL 9.1 8.7 9.0   BILIRUBIN mg/dL  --   --  0.8   ALK PHOS U/L  --   --  133*   ALT (SGPT) U/L  --   --  22   AST (SGOT) U/L  --   --  27   GLUCOSE mg/dL 97 90 125*               Echo:    Left ventricular systolic function is severely decreased. Calculated left ventricular EF = 10.8% Left ventricular ejection fraction appears to be less than 20%.    Left ventricular diastolic dysfunction is noted.    There is a thrombus present in the left ventricle.    The left atrial cavity is dilated.    Left atrial volume is mildly increased.    The right atrial cavity is dilated.    Moderate to severe mitral valve regurgitation is present.    Estimated right ventricular systolic pressure from tricuspid regurgitation is normal (<35 mmHg).        Medication Review:   apixaban, 5 mg, Oral, BID  aspirin, 81 mg, Oral, Daily  bumetanide, 1 mg, Intravenous,  Q12H  bumetanide, 1 mg, Oral, Daily  buPROPion XL, 300 mg, Oral, Daily  cefTRIAXone, 1,000 mg, Intravenous, Q24H  doxycycline, 100 mg, Oral, Q12H  empagliflozin, 10 mg, Oral, Daily  metoprolol succinate XL, 50 mg, Oral, Daily  [Held by provider] OLANZapine zydis, 10 mg, Oral, Nightly  pantoprazole, 40 mg, Oral, Daily  pharmacy consult - MTM, , Does not apply, Daily  rosuvastatin, 40 mg, Oral, Daily  sodium chloride, 10 mL, Intravenous, Q12H  spironolactone, 100 mg, Oral, Daily  [START ON 9/29/2024] valsartan, 160 mg, Oral, Q24H               Acute exacerbation of CHF (congestive heart failure)    Pneumonia    Pulmonary embolism without acute cor pulmonale    Coronary artery disease of native artery of native heart with stable angina pectoris    Essential hypertension    Pneumonia, unspecified organism      ASSESSMENT/PLAN:  1.  Acute on chronic systolic heart failure, ischemic cardiomyopathy:  Continue IV diuresis with Bumex today, transition to oral tomorrow  We restarted GDMT with Toprol, empagliflozin, valsartan and spironolactone  She was noncompliant with her medications for 1 month prior to admission.  Declined revascularization in the past  Declined LifeVest in the past     2.  CAD:   aspirin 81 mg daily  Continue rosuvastatin  No evidence of ACS  Patient unwilling to accept that she has CAD and needs revascularization, declines further workup.     3.  Pneumonia:  Per primary team     4.  Hypertension:  Increase valsartan dose to 160 mg daily       Disposition.  Expect patient ready for discharge tomorrow.      Imtiaz Fontenot MD [facility]  09/28/24

## 2024-09-28 NOTE — PLAN OF CARE
Goal Outcome Evaluation:  Plan of Care Reviewed With: patient     - VSS, RA, A&Ox4  - No complaints of pain or nausea  - IV and PO abx administered  - Fall precautions in place  - No other complaints at this time     Progress: no change

## 2024-09-29 ENCOUNTER — READMISSION MANAGEMENT (OUTPATIENT)
Dept: CALL CENTER | Facility: HOSPITAL | Age: 59
End: 2024-09-29
Payer: MEDICAID

## 2024-09-29 VITALS
HEIGHT: 65 IN | TEMPERATURE: 98.2 F | OXYGEN SATURATION: 97 % | WEIGHT: 130.07 LBS | SYSTOLIC BLOOD PRESSURE: 141 MMHG | BODY MASS INDEX: 21.67 KG/M2 | RESPIRATION RATE: 20 BRPM | HEART RATE: 81 BPM | DIASTOLIC BLOOD PRESSURE: 91 MMHG

## 2024-09-29 PROBLEM — J15.4 PNEUMONIA DUE TO STREPTOCOCCUS: Status: ACTIVE | Noted: 2024-09-29

## 2024-09-29 PROBLEM — I50.23 ACUTE ON CHRONIC HFREF (HEART FAILURE WITH REDUCED EJECTION FRACTION): Status: ACTIVE | Noted: 2024-09-29

## 2024-09-29 LAB
ANION GAP SERPL CALCULATED.3IONS-SCNC: 13 MMOL/L (ref 5–15)
BASOPHILS # BLD AUTO: 0.07 10*3/MM3 (ref 0–0.2)
BASOPHILS NFR BLD AUTO: 0.7 % (ref 0–1.5)
BUN SERPL-MCNC: 26 MG/DL (ref 6–20)
BUN/CREAT SERPL: 33.3 (ref 7–25)
CALCIUM SPEC-SCNC: 9.3 MG/DL (ref 8.6–10.5)
CHLORIDE SERPL-SCNC: 101 MMOL/L (ref 98–107)
CO2 SERPL-SCNC: 22 MMOL/L (ref 22–29)
CREAT SERPL-MCNC: 0.78 MG/DL (ref 0.57–1)
DEPRECATED RDW RBC AUTO: 46.3 FL (ref 37–54)
EGFRCR SERPLBLD CKD-EPI 2021: 88.2 ML/MIN/1.73
EOSINOPHIL # BLD AUTO: 0.33 10*3/MM3 (ref 0–0.4)
EOSINOPHIL NFR BLD AUTO: 3.2 % (ref 0.3–6.2)
ERYTHROCYTE [DISTWIDTH] IN BLOOD BY AUTOMATED COUNT: 14.4 % (ref 12.3–15.4)
GLUCOSE SERPL-MCNC: 164 MG/DL (ref 65–99)
HCT VFR BLD AUTO: 46.2 % (ref 34–46.6)
HGB BLD-MCNC: 14.8 G/DL (ref 12–15.9)
IMM GRANULOCYTES # BLD AUTO: 0.03 10*3/MM3 (ref 0–0.05)
IMM GRANULOCYTES NFR BLD AUTO: 0.3 % (ref 0–0.5)
LYMPHOCYTES # BLD AUTO: 2.39 10*3/MM3 (ref 0.7–3.1)
LYMPHOCYTES NFR BLD AUTO: 23 % (ref 19.6–45.3)
MCH RBC QN AUTO: 28.3 PG (ref 26.6–33)
MCHC RBC AUTO-ENTMCNC: 32 G/DL (ref 31.5–35.7)
MCV RBC AUTO: 88.3 FL (ref 79–97)
MONOCYTES # BLD AUTO: 0.82 10*3/MM3 (ref 0.1–0.9)
MONOCYTES NFR BLD AUTO: 7.9 % (ref 5–12)
NEUTROPHILS NFR BLD AUTO: 6.76 10*3/MM3 (ref 1.7–7)
NEUTROPHILS NFR BLD AUTO: 64.9 % (ref 42.7–76)
NRBC BLD AUTO-RTO: 0 /100 WBC (ref 0–0.2)
PLATELET # BLD AUTO: 360 10*3/MM3 (ref 140–450)
PMV BLD AUTO: 9.2 FL (ref 6–12)
POTASSIUM SERPL-SCNC: 4.3 MMOL/L (ref 3.5–5.2)
RBC # BLD AUTO: 5.23 10*6/MM3 (ref 3.77–5.28)
SODIUM SERPL-SCNC: 136 MMOL/L (ref 136–145)
WBC NRBC COR # BLD AUTO: 10.4 10*3/MM3 (ref 3.4–10.8)

## 2024-09-29 PROCEDURE — 80048 BASIC METABOLIC PNL TOTAL CA: CPT | Performed by: INTERNAL MEDICINE

## 2024-09-29 PROCEDURE — G0378 HOSPITAL OBSERVATION PER HR: HCPCS

## 2024-09-29 PROCEDURE — 99239 HOSP IP/OBS DSCHRG MGMT >30: CPT | Performed by: INTERNAL MEDICINE

## 2024-09-29 PROCEDURE — 85025 COMPLETE CBC W/AUTO DIFF WBC: CPT | Performed by: INTERNAL MEDICINE

## 2024-09-29 RX ORDER — BUPROPION HYDROCHLORIDE 300 MG/1
300 TABLET ORAL DAILY
Qty: 30 TABLET | Refills: 0 | Status: SHIPPED | OUTPATIENT
Start: 2024-09-29

## 2024-09-29 RX ORDER — VALSARTAN AND HYDROCHLOROTHIAZIDE 80; 12.5 MG/1; MG/1
0.5 TABLET, FILM COATED ORAL DAILY
Qty: 15 TABLET | Refills: 0 | Status: SHIPPED | OUTPATIENT
Start: 2024-09-29 | End: 2024-10-29

## 2024-09-29 RX ORDER — SPIRONOLACTONE 100 MG/1
100 TABLET, FILM COATED ORAL DAILY
Qty: 90 TABLET | Refills: 0 | Status: SHIPPED | OUTPATIENT
Start: 2024-09-29

## 2024-09-29 RX ORDER — ASPIRIN 81 MG/1
81 TABLET ORAL DAILY
Qty: 30 TABLET | Refills: 0 | Status: SHIPPED | OUTPATIENT
Start: 2024-09-30

## 2024-09-29 RX ORDER — DOXYCYCLINE 100 MG/1
100 CAPSULE ORAL EVERY 12 HOURS
Qty: 2 CAPSULE | Refills: 0 | Status: SHIPPED | OUTPATIENT
Start: 2024-09-29 | End: 2024-09-30

## 2024-09-29 RX ORDER — PANTOPRAZOLE SODIUM 40 MG/1
40 TABLET, DELAYED RELEASE ORAL DAILY
Qty: 30 TABLET | Refills: 0 | Status: SHIPPED | OUTPATIENT
Start: 2024-09-29

## 2024-09-29 RX ORDER — ROSUVASTATIN CALCIUM 40 MG/1
40 TABLET, COATED ORAL DAILY
Qty: 90 TABLET | Refills: 0 | Status: SHIPPED | OUTPATIENT
Start: 2024-09-29

## 2024-09-29 RX ORDER — METOPROLOL SUCCINATE 50 MG/1
50 TABLET, EXTENDED RELEASE ORAL DAILY
Qty: 30 TABLET | Refills: 0 | Status: SHIPPED | OUTPATIENT
Start: 2024-09-29

## 2024-09-29 RX ORDER — BUMETANIDE 1 MG/1
1 TABLET ORAL DAILY
Qty: 30 TABLET | Refills: 0 | Status: SHIPPED | OUTPATIENT
Start: 2024-09-29

## 2024-09-29 RX ADMIN — VALSARTAN 160 MG: 160 TABLET, FILM COATED ORAL at 08:51

## 2024-09-29 RX ADMIN — BUPROPION HYDROCHLORIDE 300 MG: 150 TABLET, EXTENDED RELEASE ORAL at 08:52

## 2024-09-29 RX ADMIN — SPIRONOLACTONE 100 MG: 25 TABLET, FILM COATED ORAL at 08:51

## 2024-09-29 RX ADMIN — BUMETANIDE 1 MG: 1 TABLET ORAL at 08:52

## 2024-09-29 RX ADMIN — APIXABAN 5 MG: 5 TABLET, FILM COATED ORAL at 08:52

## 2024-09-29 RX ADMIN — DOXYCYCLINE 100 MG: 100 CAPSULE ORAL at 05:43

## 2024-09-29 RX ADMIN — ASPIRIN 81 MG: 81 TABLET, COATED ORAL at 08:51

## 2024-09-29 RX ADMIN — PANTOPRAZOLE SODIUM 40 MG: 40 TABLET, DELAYED RELEASE ORAL at 08:52

## 2024-09-29 NOTE — PROGRESS NOTES
[facility] Medicine Services  PROGRESS NOTE    Patient Name: Jill M Paladino  : 1965  MRN: [id number]    Date of Admission: 2024  Primary Care Physician: Sigrid Byers PA    Subjective   Subjective     CC:  SOA    HPI:  Pt doing okay today, denies any issues overnight.  Unable to get to hotel today with her friend so prefers to be discharged tomorrow.       Objective   Objective     Vital Signs:   Temp:  [96.4 °F (35.8 °C)-98.4 °F (36.9 °C)] 98.2 °F (36.8 °C)  Heart Rate:  [69-86] 80  Resp:  [20-24] 20  BP: (117-143)/(81-98) 143/97     Physical Exam:  Constitutional: No acute distress, resting comfortably in bed, currently on RA   HENT: NCAT, nares patent, mucous membranes moist  Respiratory: Decreased BS bilaterally, slight crackles bilateral lung bases, no rhonchi or wheezing, respiratory effort normal   Cardiovascular: RRR, no murmurs, rubs, or gallops  Gastrointestinal: Positive bowel sounds, soft, nontender, nondistended  Musculoskeletal: Trace bilateral ankle edema, no clubbing or cyanosis   Psychiatric: Flat affect  Neurologic: Oriented x 3, strength symmetric in all extremities, Cranial Nerves grossly intact to confrontation, speech clear  Skin: No rashes      Results Reviewed:  LAB RESULTS:      Lab 24  0623 24  0624 24  0551 24  2038 24  1739 24  1359   WBC 10.40 12.07* 11.67*  --   --  12.90*   HEMOGLOBIN 14.8 14.7 14.0  --   --  13.6   HEMATOCRIT 46.2 45.8 43.9  --   --  42.8   PLATELETS 360 389 269  --   --  292   NEUTROS ABS 6.76 8.45* 8.33*  --   --  10.73*   IMMATURE GRANS (ABS) 0.03 0.05 0.04  --   --  0.05   LYMPHS ABS 2.39 2.40 2.01  --   --  1.33   MONOS ABS 0.82 0.84 0.88  --   --  0.66   EOS ABS 0.33 0.26 0.35  --   --  0.07   MCV 88.3 88.9 89.2  --   --  89.7   CRP  --   --   --   --   --  2.05*   PROCALCITONIN  --   --   --   --   --  0.06   LACTATE  --   --   --  2.0 2.3* 3.1*   D DIMER QUANT  --   --   --    --   --  0.75*         Lab 09/29/24  0858 09/28/24  0624 09/26/24  0550 09/25/24  1359   SODIUM 136 138 136 136   POTASSIUM 4.3 4.5 3.7 3.7   CHLORIDE 101 100 99 98   CO2 22.0 25.0 27.0 26.0   ANION GAP 13.0 13.0 10.0 12.0   BUN 26* 30* 20 21*   CREATININE 0.78 0.90 0.74 0.87   EGFR 88.2 74.3 93.9 77.3   GLUCOSE 164* 97 90 125*   CALCIUM 9.3 9.1 8.7 9.0   MAGNESIUM  --   --   --  1.7         Lab 09/25/24  1359   TOTAL PROTEIN 6.8   ALBUMIN 3.7   GLOBULIN 3.1   ALT (SGPT) 22   AST (SGOT) 27   BILIRUBIN 0.8   ALK PHOS 133*         Lab 09/25/24  1359   PROBNP 11,030.0*   HSTROP T 32*                 Brief Urine Lab Results  (Last result in the past 365 days)        Color   Clarity   Blood   Leuk Est   Nitrite   Protein   CREAT   Urine HCG        09/26/24 1554 Yellow   Clear   Negative   Moderate (2+)   Negative   30 mg/dL (1+)                   Microbiology Results Abnormal       Procedure Component Value - Date/Time    Blood Culture - Blood, Arm, Left [[id number]]  (Normal) Collected: 09/25/24 1457    Lab Status: Preliminary result Specimen: Blood from Arm, Left Updated: 09/28/24 1616     Blood Culture No growth at 3 days    Blood Culture - Blood, Arm, Right [[id number]]  (Normal) Collected: 09/25/24 1450    Lab Status: Preliminary result Specimen: Blood from Arm, Right Updated: 09/28/24 1616     Blood Culture No growth at 3 days    Urine Culture - Urine, Urine, Clean Catch [[id number]]  (Normal) Collected: 09/26/24 1554    Lab Status: Final result Specimen: Urine, Clean Catch Updated: 09/27/24 1221     Urine Culture No growth    S. Pneumo Ag Urine or CSF - Urine, Urine, Clean Catch [[id number]]  (Normal) Collected: 09/25/24 1943    Lab Status: Final result Specimen: Urine, Clean Catch Updated: 09/26/24 0037     Strep Pneumo Ag Negative    Legionella Antigen, Urine - Urine, Urine, Clean Catch [[id number]]  (Normal) Collected: 09/25/24 1943    Lab Status: Final result Specimen: Urine, Clean Catch Updated: 09/26/24 0037      LEGIONELLA ANTIGEN, URINE Negative    MRSA Screen, PCR (Inpatient) - Swab, Nares [[id number]]  (Normal) Collected: 09/25/24 2058    Lab Status: Final result Specimen: Swab from Nares Updated: 09/25/24 2209     MRSA PCR Negative    Narrative:      The negative predictive value of this diagnostic test is high and should only be used to consider de-escalating anti-MRSA therapy. A positive result may indicate colonization with MRSA and must be correlated clinically.  MRSA Negative    Respiratory Panel PCR w/COVID-19(SARS-CoV-2) [facility] In-House, NP Swab in UTM/VTM, 2 HR TAT - Swab, Nasopharynx [[id number]]  (Normal) Collected: 09/25/24 1358    Lab Status: Final result Specimen: Swab from Nasopharynx Updated: 09/25/24 1452     ADENOVIRUS, PCR Not Detected     Coronavirus 229E Not Detected     Coronavirus HKU1 Not Detected     Coronavirus NL63 Not Detected     Coronavirus OC43 Not Detected     COVID19 Not Detected     Human Metapneumovirus Not Detected     Human Rhinovirus/Enterovirus Not Detected     Influenza A PCR Not Detected     Influenza B PCR Not Detected     Parainfluenza Virus 1 Not Detected     Parainfluenza Virus 2 Not Detected     Parainfluenza Virus 3 Not Detected     Parainfluenza Virus 4 Not Detected     RSV, PCR Not Detected     Bordetella pertussis pcr Not Detected     Bordetella parapertussis PCR Not Detected     Chlamydophila pneumoniae PCR Not Detected     Mycoplasma pneumo by PCR Not Detected    Narrative:      In the setting of a positive respiratory panel with a viral infection PLUS a negative procalcitonin without other underlying concern for bacterial infection, consider observing off antibiotics or discontinuation of antibiotics and continue supportive care. If the respiratory panel is positive for atypical bacterial infection (Bordetella pertussis, Chlamydophila pneumoniae, or Mycoplasma pneumoniae), consider antibiotic de-escalation to target atypical bacterial infection.             Adult Transthoracic Echo Complete W/ Cont if Necessary Per Protocol    Result Date: 9/27/2024    Left ventricular systolic function is severely decreased. Calculated left ventricular EF = 10.8% Left ventricular ejection fraction appears to be less than 20%.   Left ventricular diastolic dysfunction is noted.   There is a thrombus present in the left ventricle.   The left atrial cavity is dilated.   Left atrial volume is mildly increased.   The right atrial cavity is dilated.   Moderate to severe mitral valve regurgitation is present.   Estimated right ventricular systolic pressure from tricuspid regurgitation is normal (<35 mmHg).      Results for orders placed during the hospital encounter of 09/25/24    Adult Transthoracic Echo Complete W/ Cont if Necessary Per Protocol    Interpretation Summary    Left ventricular systolic function is severely decreased. Calculated left ventricular EF = 10.8% Left ventricular ejection fraction appears to be less than 20%.    Left ventricular diastolic dysfunction is noted.    There is a thrombus present in the left ventricle.    The left atrial cavity is dilated.    Left atrial volume is mildly increased.    The right atrial cavity is dilated.    Moderate to severe mitral valve regurgitation is present.    Estimated right ventricular systolic pressure from tricuspid regurgitation is normal (<35 mmHg).      Current medications:  Scheduled Meds:apixaban, 5 mg, Oral, BID  aspirin, 81 mg, Oral, Daily  bumetanide, 1 mg, Oral, Daily  buPROPion XL, 300 mg, Oral, Daily  cefTRIAXone, 1,000 mg, Intravenous, Q24H  doxycycline, 100 mg, Oral, Q12H  empagliflozin, 10 mg, Oral, Daily  metoprolol succinate XL, 50 mg, Oral, Daily  [Held by provider] OLANZapine zydis, 10 mg, Oral, Nightly  pantoprazole, 40 mg, Oral, Daily  pharmacy consult - MTM, , Does not apply, Daily  rosuvastatin, 40 mg, Oral, Daily  sodium chloride, 10 mL, Intravenous, Q12H  spironolactone, 100 mg, Oral,  Daily  valsartan, 160 mg, Oral, Q24H      Continuous Infusions:   PRN Meds:.  acetaminophen    senna-docusate sodium **AND** polyethylene glycol **AND** bisacodyl **AND** bisacodyl    Calcium Replacement - Follow Nurse / BPA Driven Protocol    hydrOXYzine    ipratropium-albuterol    Magnesium Standard Dose Replacement - Follow Nurse / BPA Driven Protocol    Phosphorus Replacement - Follow Nurse / BPA Driven Protocol    Potassium Replacement - Follow Nurse / BPA Driven Protocol    sodium chloride    sodium chloride    sodium chloride    temazepam    Assessment & Plan   Assessment & Plan     Active Hospital Problems    Diagnosis  POA    **Acute exacerbation of CHF (congestive heart failure) [I50.9]  Yes    Pulmonary embolism without acute cor pulmonale [I26.99]  Yes     Priority: Low    Acute on chronic HFrEF (heart failure with reduced ejection fraction) [I50.23]  Yes    Pneumonia due to Streptococcus [J15.4]  Yes    Pneumonia, unspecified organism [J18.9]  Yes    Pneumonia [J18.9]  Yes    Essential hypertension [I10]  Yes    Coronary artery disease of native artery of native heart with stable angina pectoris [I25.118]  Yes      Resolved Hospital Problems   No resolved problems to display.        Brief Hospital Course to date:  Jill M Paladino is a 59 y.o. female admitted with acute CHF exacerbation and possible PNA, patient with long history of systolic CHF, medical noncompliance, CAD, HTN, PE on Eliquis, and homeless.     This patient's problems and plans were partially entered by my partner and updated as appropriate by me 09/29/24.      Acute on chronic HFrEF  -- echo on 3/2024 EF 16%  -- repeat ECHO with EF of 11%  -- Dr. Fontenot with cardiology following   -- transition to PO Bumex (9/29)  -- cont home aldactone   -- BNP 11,030  -- oxygen as needed, on RA on 9/26   --CT chest neg for PE, mod to severe cardiomegaly.   --  continue strict Is/Os, daily weights     PNA  -- cont rocephin and doxy started in ED-  last doses due today   -- nebs prn   -- WBC 12.90, procal 0.06, lactate 3.1 on admission   -- resp PCR neg   -- blood cultures pending  -- monitor WBC as increased slightly today  -- CT chest with opacities in lung bases    -- urine antigens are negative, MRSA negative     CAD  -- with reported 100% LAD occlusion per [facility]. Pt declined CTS referral per Cardiology.    -- continue with medical optimization         HTN  Hx of PE   Prolonged QT   -- cont eliquis  -- cont aldactone     Expected Discharge Location and Transportation: back to [facility] where she has been staying, needs to wait for her friend to help her  Expected Discharge   Expected Discharge Date: 9/30/2024; Expected Discharge Time:      VTE Prophylaxis:  Pharmacologic & mechanical VTE prophylaxis orders are present.         AM-PAC 6 Clicks Score (PT): 23 (09/28/24 1945)    CODE STATUS:   Code Status and Medical Interventions: CPR (Attempt to Resuscitate); Full Support   Ordered at: 09/25/24 6254     Level Of Support Discussed With:    Patient     Code Status (Patient has no pulse and is not breathing):    CPR (Attempt to Resuscitate)     Medical Interventions (Patient has pulse or is breathing):    Full Support       Chiqui Cabrales MD  09/29/24

## 2024-09-29 NOTE — DISCHARGE SUMMARY
[facility]  DISCHARGE SUMMARY    Patient Name: Jill M Paladino  : 1965  MRN: [id number]    Date of Admission: 2024  1:33 PM  Date of Discharge:  2024  Primary Care Physician: Sigrid Byers PA    Consults       Date and Time Order Name Status Description    2024  6:55 PM Inpatient Cardiology Consult Completed             Hospital Course     Presenting Problem: CHF    Active Hospital Problems    Diagnosis  POA    **Acute exacerbation of CHF (congestive heart failure) [I50.9]  Yes    Pulmonary embolism without acute cor pulmonale [I26.99]  Yes     Priority: Low    Acute on chronic HFrEF (heart failure with reduced ejection fraction) [I50.23]  Yes    Pneumonia due to Streptococcus [J15.4]  Yes    Pneumonia, unspecified organism [J18.9]  Yes    Pneumonia [J18.9]  Yes    Essential hypertension [I10]  Yes    Coronary artery disease of native artery of native heart with stable angina pectoris [I25.118]  Yes      Resolved Hospital Problems   No resolved problems to display.          Hospital Course:  Jill M Paladino is a 59 y.o. female with acute CHF exacerbation and possible PNA, patient with long history of systolic CHF, medical noncompliance, CAD, HTN, PE on Eliquis, and homeless.       Acute on chronic HFrEF  -- echo on 3/2024 EF 16%  -- repeat ECHO with EF of 11%  -- Dr. Fontenot with cardiology followed  -- transition to PO Bumex ()  -- cont home aldactone   -- BNP 11,030  -- oxygen as needed, on RA on    --CT chest neg for PE, mod to severe cardiomegaly.   --  continue strict Is/Os, daily weights     PNA  -- cont rocephin and doxy started in ED- last doses due today   -- nebs prn   -- WBC 12.90, procal 0.06, lactate 3.1 on admission   -- resp PCR neg   -- blood cultures NGTD  -- WBC improved today   -- CT chest with opacities in lung bases    -- urine antigens are negative, MRSA negative     CAD  -- with reported 100% LAD occlusion per  [facility]. Pt declined CTS referral per Cardiology.    -- continue with medical optimization         HTN  Hx of PE   Prolonged QT   -- cont eliquis  -- cont aldactone       Discharge Follow Up Recommendations for outpatient labs/diagnostics:   Follow up with PCP within one week  Follow up with Cardiology as per their instructions     Day of Discharge     HPI:   Doing well this am, now has a ride to get her to her hotel.     Review of Systems  Gen- No fevers, chills  CV- No chest pain, palpitations  Resp- No cough, dyspnea  GI- No N/V/D, abd pain      Vital Signs:   Temp:  [97.2 °F (36.2 °C)-98.4 °F (36.9 °C)] 98.2 °F (36.8 °C)  Heart Rate:  [69-86] 80  Resp:  [20-21] 20  BP: (117-143)/(81-98) 143/97      Physical Exam:  Constitutional: No acute distress, resting comfortably in bed, currently on RA   HENT: NCAT, nares patent, mucous membranes moist  Respiratory: Decreased BS bilaterally, slight crackles bilateral lung bases, no rhonchi or wheezing, respiratory effort normal   Cardiovascular: RRR, no murmurs, rubs, or gallops  Gastrointestinal: Positive bowel sounds, soft, nontender, nondistended  Musculoskeletal: Trace bilateral ankle edema, no clubbing or cyanosis   Psychiatric: Flat affect  Neurologic: Oriented x 3, strength symmetric in all extremities, Cranial Nerves grossly intact to confrontation, speech clear  Skin: No rashes    Pertinent  and/or Most Recent Results     LAB RESULTS:      Lab 09/29/24  0623 09/28/24  0624 09/26/24  0551 09/25/24  2038 09/25/24  1739 09/25/24  1359   WBC 10.40 12.07* 11.67*  --   --  12.90*   HEMOGLOBIN 14.8 14.7 14.0  --   --  13.6   HEMATOCRIT 46.2 45.8 43.9  --   --  42.8   PLATELETS 360 389 269  --   --  292   NEUTROS ABS 6.76 8.45* 8.33*  --   --  10.73*   IMMATURE GRANS (ABS) 0.03 0.05 0.04  --   --  0.05   LYMPHS ABS 2.39 2.40 2.01  --   --  1.33   MONOS ABS 0.82 0.84 0.88  --   --  0.66   EOS ABS 0.33 0.26 0.35  --   --  0.07   MCV 88.3 88.9 89.2  --   --  89.7   CRP  --    --   --   --   --  2.05*   PROCALCITONIN  --   --   --   --   --  0.06   LACTATE  --   --   --  2.0 2.3* 3.1*   D DIMER QUANT  --   --   --   --   --  0.75*         Lab 09/29/24  0858 09/28/24  0624 09/26/24  0550 09/25/24  1359   SODIUM 136 138 136 136   POTASSIUM 4.3 4.5 3.7 3.7   CHLORIDE 101 100 99 98   CO2 22.0 25.0 27.0 26.0   ANION GAP 13.0 13.0 10.0 12.0   BUN 26* 30* 20 21*   CREATININE 0.78 0.90 0.74 0.87   EGFR 88.2 74.3 93.9 77.3   GLUCOSE 164* 97 90 125*   CALCIUM 9.3 9.1 8.7 9.0   MAGNESIUM  --   --   --  1.7         Lab 09/25/24  1359   TOTAL PROTEIN 6.8   ALBUMIN 3.7   GLOBULIN 3.1   ALT (SGPT) 22   AST (SGOT) 27   BILIRUBIN 0.8   ALK PHOS 133*         Lab 09/25/24  1359   PROBNP 11,030.0*   HSTROP T 32*                 Brief Urine Lab Results  (Last result in the past 365 days)        Color   Clarity   Blood   Leuk Est   Nitrite   Protein   CREAT   Urine HCG        09/26/24 1554 Yellow   Clear   Negative   Moderate (2+)   Negative   30 mg/dL (1+)                 Microbiology Results (last 10 days)       Procedure Component Value - Date/Time    Urine Culture - Urine, Urine, Clean Catch [[id number]]  (Normal) Collected: 09/26/24 1554    Lab Status: Final result Specimen: Urine, Clean Catch Updated: 09/27/24 1221     Urine Culture No growth    MRSA Screen, PCR (Inpatient) - Swab, Nares [[id number]]  (Normal) Collected: 09/25/24 2058    Lab Status: Final result Specimen: Swab from Nares Updated: 09/25/24 2209     MRSA PCR Negative    Narrative:      The negative predictive value of this diagnostic test is high and should only be used to consider de-escalating anti-MRSA therapy. A positive result may indicate colonization with MRSA and must be correlated clinically.  MRSA Negative    Legionella Antigen, Urine - Urine, Urine, Clean Catch [[id number]]  (Normal) Collected: 09/25/24 1943    Lab Status: Final result Specimen: Urine, Clean Catch Updated: 09/26/24 0037     LEGIONELLA ANTIGEN, URINE Negative    S.  Pneumo Ag Urine or CSF - Urine, Urine, Clean Catch [[id number]]  (Normal) Collected: 09/25/24 1943    Lab Status: Final result Specimen: Urine, Clean Catch Updated: 09/26/24 0037     Strep Pneumo Ag Negative    Blood Culture - Blood, Arm, Left [[id number]]  (Normal) Collected: 09/25/24 1457    Lab Status: Preliminary result Specimen: Blood from Arm, Left Updated: 09/28/24 1616     Blood Culture No growth at 3 days    Blood Culture - Blood, Arm, Right [[id number]]  (Normal) Collected: 09/25/24 1450    Lab Status: Preliminary result Specimen: Blood from Arm, Right Updated: 09/28/24 1616     Blood Culture No growth at 3 days    Respiratory Panel PCR w/COVID-19(SARS-CoV-2) [facility]/SWAPNA In-House, NP Swab in UTM/VTM, 2 HR TAT - Swab, Nasopharynx [[id number]]  (Normal) Collected: 09/25/24 1358    Lab Status: Final result Specimen: Swab from Nasopharynx Updated: 09/25/24 1452     ADENOVIRUS, PCR Not Detected     Coronavirus 229E Not Detected     Coronavirus HKU1 Not Detected     Coronavirus NL63 Not Detected     Coronavirus OC43 Not Detected     COVID19 Not Detected     Human Metapneumovirus Not Detected     Human Rhinovirus/Enterovirus Not Detected     Influenza A PCR Not Detected     Influenza B PCR Not Detected     Parainfluenza Virus 1 Not Detected     Parainfluenza Virus 2 Not Detected     Parainfluenza Virus 3 Not Detected     Parainfluenza Virus 4 Not Detected     RSV, PCR Not Detected     Bordetella pertussis pcr Not Detected     Bordetella parapertussis PCR Not Detected     Chlamydophila pneumoniae PCR Not Detected     Mycoplasma pneumo by PCR Not Detected    Narrative:      In the setting of a positive respiratory panel with a viral infection PLUS a negative procalcitonin without other underlying concern for bacterial infection, consider observing off antibiotics or discontinuation of antibiotics and continue supportive care. If the respiratory panel is positive for atypical bacterial infection (Bordetella  pertussis, Chlamydophila pneumoniae, or Mycoplasma pneumoniae), consider antibiotic de-escalation to target atypical bacterial infection.            Adult Transthoracic Echo Complete W/ Cont if Necessary Per Protocol    Result Date: 9/27/2024    Left ventricular systolic function is severely decreased. Calculated left ventricular EF = 10.8% Left ventricular ejection fraction appears to be less than 20%.   Left ventricular diastolic dysfunction is noted.   There is a thrombus present in the left ventricle.   The left atrial cavity is dilated.   Left atrial volume is mildly increased.   The right atrial cavity is dilated.   Moderate to severe mitral valve regurgitation is present.   Estimated right ventricular systolic pressure from tricuspid regurgitation is normal (<35 mmHg).     CT Angiogram Chest Pulmonary Embolism    Result Date: 9/25/2024  CT ANGIOGRAM CHEST PULMONARY EMBOLISM Date of Exam: 9/25/2024 3:14 PM EDT Indication: sob, elevated troponin. Comparison: Chest CTA performed on March 8, 2024 Technique: Axial CT images were obtained of the chest after the uneventful intravenous administration of Isovue-370, 75 mL utilizing pulmonary embolism protocol.  Reconstructed coronal and sagittal images were also obtained. Automated exposure control and iterative construction methods were used. Findings: Pulmonary arteries: Adequate opacification of the pulmonary arteries. No evidence of acute pulmonary embolism. Thoracic aorta: The thoracic aorta is not opacified with contrast due to bolus timing. Thoracic aorta is normal in caliber and contour. Small calcified plaques are visualized. Cardiovascular: The heart is moderately to severely enlarged. There is retrograde opacification of the IVC and hepatic veins, suggestive of CHF. No evidence of pericardial effusion. Thyroid: The visualized portion of the thyroid is unremarkable. Lungs and Pleura: Patchy airspace opacities are visualized in the lung bases with associated  interlobular septal thickening. No pleural effusion is visualized. No pneumothorax. Central airways are patent. Mediastinum/Andie: No mediastinal or hilar lymphadenopathy. Lymph nodes: No axillary or supraclavicular lymphadenopathy. Bones and Soft Tissue:No acute fracture, aggressive osseous lesions, or soft tissue process. Upper Abdomen: No acute process in the upper abdomen.     Impression: No evidence of pulmonary embolism. Moderate to severe cardiomegaly. Retrograde opacification of the IVC and hepatic veins suggest CHF. Small patchy airspace opacities with associated interlobular septal thickening in the lung bases. Finding may be on the basis of pneumonia versus alveolar pulmonary edema. Electronically Signed: Cristopher Hill MD  9/25/2024 3:37 PM EDT  Workstation ID: [id number]    CT Head Without Contrast    Result Date: 9/25/2024  CT HEAD WO CONTRAST Date of Exam: 9/25/2024 3:14 PM EDT Indication: ams. Comparison: None available. Technique: Axial CT images were obtained of the head without contrast administration.  Automated exposure control and iterative construction methods were used. Findings: Motion artifact near the vertex limits diagnostic sensitivity. There is no evidence of hemorrhage. There is no mass effect or midline shift. There is no extra-axial collections. Ventricles are normal in size and configuration for patient's stated age. Posterior fossa is within normal limits. Calvarium and skull base appear intact. Visualized sinuses show no air fluid levels. Partial visualization of left maxillary retention cyst measuring 2.8 cm. The mastoid air cells are clear. Visualized orbits are unremarkable.     Impression: Motion artifact near the vertex limits diagnostic study. No acute or cranial process evident. Electronically Signed: Cristopher Hill MD  9/25/2024 3:28 PM EDT  Workstation ID: [id number]    XR Chest 1 View    Result Date: 9/25/2024  XR CHEST 1 VW Date of Exam: 9/25/2024 2:42 PM EDT Indication: SOA  triage protocol Comparison: Chest x-ray 3/8/2024 Findings: Redemonstration of cardiomegaly. There is mild blunting of the left costophrenic angle suggestive of small left pleural fluid, similar to prior. No pneumothorax. The lungs are grossly clear. No bhupendra pulmonary edema. No acute osseous findings.     Impression: Cardiomegaly and mild blunting of the left costophrenic angle which is similar to prior suggestive of small left pleural fluid or perhaps pleural scarring. No bhupendra pulmonary edema. Electronically Signed: Jesus Law MD  9/25/2024 3:07 PM EDT  Workstation ID: [id number]     Results for orders placed during the hospital encounter of 01/26/24    Duplex Venous Lower Extremity - BILATERAL    Interpretation Summary    Normal bilateral lower extremity venous duplex scan.    2.5 x 1.7 x 0.75cm avascular complex cystic structure noted in right popliteal fossa. Possible Baker's cyst, although suggestive of hematoma.      Results for orders placed during the hospital encounter of 01/26/24    Duplex Venous Lower Extremity - BILATERAL    Interpretation Summary    Normal bilateral lower extremity venous duplex scan.    2.5 x 1.7 x 0.75cm avascular complex cystic structure noted in right popliteal fossa. Possible Baker's cyst, although suggestive of hematoma.      Results for orders placed during the hospital encounter of 09/25/24    Adult Transthoracic Echo Complete W/ Cont if Necessary Per Protocol    Interpretation Summary    Left ventricular systolic function is severely decreased. Calculated left ventricular EF = 10.8% Left ventricular ejection fraction appears to be less than 20%.    Left ventricular diastolic dysfunction is noted.    There is a thrombus present in the left ventricle.    The left atrial cavity is dilated.    Left atrial volume is mildly increased.    The right atrial cavity is dilated.    Moderate to severe mitral valve regurgitation is present.    Estimated right ventricular systolic  "pressure from tricuspid regurgitation is normal (<35 mmHg).      Plan for Follow-up of Pending Labs/Results:   Pending Labs       Order Current Status    Blood Culture - Blood, Arm, Left Preliminary result    Blood Culture - Blood, Arm, Right Preliminary result          Discharge Details        Discharge Medications        New Medications        Instructions Start Date   aspirin 81 MG EC tablet   81 mg, Oral, Daily   Start Date: September 30, 2024     doxycycline 100 MG capsule  Commonly known as: MONODOX   100 mg, Oral, Every 12 Hours      pantoprazole 40 MG EC tablet  Commonly known as: PROTONIX   40 mg, Oral, Daily             Continue These Medications        Instructions Start Date   apixaban 5 MG tablet tablet  Commonly known as: ELIQUIS   5 mg, Oral, 2 Times Daily      bumetanide 1 MG tablet  Commonly known as: BUMEX   1 mg, Oral, Daily      buPROPion  MG 24 hr tablet  Commonly known as: WELLBUTRIN XL   300 mg, Oral, Daily      empagliflozin 10 MG tablet tablet  Commonly known as: JARDIANCE   10 mg, Oral, Daily      metoprolol succinate XL 50 MG 24 hr tablet  Commonly known as: TOPROL-XL   50 mg, Oral, Daily      rosuvastatin 40 MG tablet  Commonly known as: CRESTOR   40 mg, Oral, Daily      spironolactone 100 MG tablet  Commonly known as: ALDACTONE   100 mg, Oral, Daily      valsartan-hydrochlorothiazide 80-12.5 MG per tablet  Commonly known as: DIOVAN-HCT   0.5 tablets, Oral, Daily             Stop These Medications      OLANZapine zydis 10 MG disintegrating tablet  Commonly known as: ZyPREXA              Allergies   Allergen Reactions    Lisinopril Other (See Comments)     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    Other Nausea And Vomiting     Pt states \"all opiods make me throw up instantly\"    Percocet [Oxycodone-Acetaminophen] GI Intolerance    Sulfa Antibiotics Rash         Discharge Disposition:  Home or Self Care    Diet:  Hospital:  Diet Order "   Procedures    Diet: Cardiac; Healthy Heart (2-3 Na+); Fluid Consistency: Thin (IDDSI 0)            Activity:      Restrictions or Other Recommendations:         CODE STATUS:    Code Status and Medical Interventions: CPR (Attempt to Resuscitate); Full Support   Ordered at: 09/25/24 2386     Level Of Support Discussed With:    Patient     Code Status (Patient has no pulse and is not breathing):    CPR (Attempt to Resuscitate)     Medical Interventions (Patient has pulse or is breathing):    Full Support       No future appointments.    Additional Instructions for the Follow-ups that You Need to Schedule       Discharge Follow-up with PCP   As directed       Currently Documented PCP:    Sigrid Byers PA    PCP Phone Number:    [phone]     Follow Up Details: in one week with PCP                      Chiqui Cabrales MD  09/29/24      Time Spent on Discharge:  I spent 35 minutes on this discharge activity which included: face-to-face encounter with the patient, reviewing the data in the system, coordination of the care with the nursing staff as well as consultants, documentation, and entering orders.

## 2024-09-29 NOTE — PLAN OF CARE
Goal Outcome Evaluation:              Outcome Evaluation: Patient answers all orientation questions correctly, however, pt has unrealistic and bizarre conversations with self and animated objects in the room. Pt voices no needs at this time, NAD noted.

## 2024-09-30 LAB
BACTERIA SPEC AEROBE CULT: NORMAL
BACTERIA SPEC AEROBE CULT: NORMAL
QT INTERVAL: 396 MS
QTC INTERVAL: 495 MS

## 2024-10-03 ENCOUNTER — READMISSION MANAGEMENT (OUTPATIENT)
Dept: CALL CENTER | Facility: HOSPITAL | Age: 59
End: 2024-10-03
Payer: MEDICAID

## 2024-10-03 NOTE — OUTREACH NOTE
CHF Week 1 Survey      Flowsheet Row Responses   [facility] facility patient discharged from? [place]   Does the patient have one of the following disease processes/diagnoses(primary or secondary)? CHF   CHF Week 1 attempt successful? No   Unsuccessful attempts Attempt 1            Regina SYKES - Registered Nurse

## 2024-10-08 ENCOUNTER — READMISSION MANAGEMENT (OUTPATIENT)
Dept: CALL CENTER | Facility: HOSPITAL | Age: 59
End: 2024-10-08
Payer: MEDICAID

## 2024-10-08 NOTE — OUTREACH NOTE
CHF Week 1 Survey      Flowsheet Row Responses   [facility] facility patient discharged from? [place]   Does the patient have one of the following disease processes/diagnoses(primary or secondary)? CHF   CHF Week 1 attempt successful? No   Unsuccessful attempts Attempt 2            Christal WHEELER - Licensed Nurse

## 2024-10-11 ENCOUNTER — READMISSION MANAGEMENT (OUTPATIENT)
Dept: CALL CENTER | Facility: HOSPITAL | Age: 59
End: 2024-10-11
Payer: MEDICAID

## 2024-10-11 NOTE — OUTREACH NOTE
CHF Week 1 Survey      Flowsheet Row Responses   [facility] patient discharged from? [place]   Does the patient have one of the following disease processes/diagnoses(primary or secondary)? CHF   CHF Week 1 attempt successful? Yes   Call start time 1333   Call end time 1341   General alerts for this patient Pt is homeless and does not have a phone but spends some time at [facility]   Discharge diagnosis Acute exacerbation of CHF (congestive heart failure)   Meds reviewed with patient/caregiver? Yes   Is the patient having any side effects they believe may be caused by any medication additions or changes? No   Does the patient have all medications ordered at discharge? Yes   Is the patient taking all medications as directed (includes completed medication regime)? Yes   Does the patient have a primary care provider?  Yes   Does the patient have an appointment with their PCP within 7 days of discharge? No   What is preventing the patient from scheduling follow up appointments within 7 days of discharge? Haven't had time   Nursing Interventions Educated patient on importance of making appointment, Advised patient to make appointment   Psychosocial comments Pt is homeless, RN called [facility] and pt happened to be present today, staff allowed pt use of phone for this call.   Comments Pt reports that she remains fatigued and feels as though she continues with PNA. Pt has not made f/u appt with PCP as per AVS, RN educated pt on importance of f/u with her PCP, pt v/u. Pt denies cough, chills or fever or SOA at this time.   Did the patient receive a copy of their discharge instructions? Yes   Nursing interventions Reviewed instructions with patient   What is the patient's perception of their health status since discharge? Same   Nursing interventions Nurse provided patient education, Advised patient to call provider   Is the patient able to teach back signs and symptoms of worsening condition? (i.e. weight  gain, shortness of air, etc.) Yes   Is the patient/caregiver able to teach back the hierarchy of who to call/visit for symptoms/problems? PCP, Specialist, Home health nurse, Urgent Care, ED, 911 Yes   CHF Zone this Call Yellow Zone   Yellow Zone Not feeling as good as usual    CHF Week 1 call completed? Yes   Revoked No further contact(revokes)-requires comment   Call end time 1341            Mimi GENTILE - Registered Nurse

## 2024-10-12 ENCOUNTER — HOSPITAL ENCOUNTER (EMERGENCY)
Facility: HOSPITAL | Age: 59
Discharge: HOME | End: 2024-10-12
Attending: EMERGENCY MEDICINE
Payer: MEDICAID

## 2024-10-12 ENCOUNTER — APPOINTMENT (OUTPATIENT)
Dept: GENERAL RADIOLOGY | Facility: HOSPITAL | Age: 59
End: 2024-10-12
Payer: MEDICAID

## 2024-10-12 VITALS
OXYGEN SATURATION: 96 % | WEIGHT: 130 LBS | HEART RATE: 93 BPM | TEMPERATURE: 98.2 F | HEIGHT: 65 IN | RESPIRATION RATE: 18 BRPM | SYSTOLIC BLOOD PRESSURE: 159 MMHG | BODY MASS INDEX: 21.66 KG/M2 | DIASTOLIC BLOOD PRESSURE: 109 MMHG

## 2024-10-12 DIAGNOSIS — I50.22 CHRONIC SYSTOLIC HEART FAILURE: ICD-10-CM

## 2024-10-12 DIAGNOSIS — I50.20 HEART FAILURE WITH REDUCED EJECTION FRACTION: ICD-10-CM

## 2024-10-12 DIAGNOSIS — I42.9 CARDIOMYOPATHY, UNSPECIFIED TYPE: ICD-10-CM

## 2024-10-12 DIAGNOSIS — Z91.148 NONCOMPLIANCE WITH MEDICATION REGIMEN: ICD-10-CM

## 2024-10-12 DIAGNOSIS — I10 ELEVATED BLOOD PRESSURE READING WITH DIAGNOSIS OF HYPERTENSION: ICD-10-CM

## 2024-10-12 DIAGNOSIS — R68.89 FEELING UNWELL: Primary | ICD-10-CM

## 2024-10-12 LAB
ALBUMIN SERPL-MCNC: 3.7 G/DL (ref 3.5–5.2)
ALBUMIN/GLOB SERPL: 1.2 G/DL
ALP SERPL-CCNC: 132 U/L (ref 39–117)
ALT SERPL W P-5'-P-CCNC: 21 U/L (ref 1–33)
ANION GAP SERPL CALCULATED.3IONS-SCNC: 12 MMOL/L (ref 5–15)
AST SERPL-CCNC: 29 U/L (ref 1–32)
B PARAPERT DNA SPEC QL NAA+PROBE: NOT DETECTED
B PERT DNA SPEC QL NAA+PROBE: NOT DETECTED
BASOPHILS # BLD AUTO: 0.06 10*3/MM3 (ref 0–0.2)
BASOPHILS NFR BLD AUTO: 0.5 % (ref 0–1.5)
BILIRUB SERPL-MCNC: 0.5 MG/DL (ref 0–1.2)
BUN SERPL-MCNC: 26 MG/DL (ref 6–20)
BUN/CREAT SERPL: 29.2 (ref 7–25)
C PNEUM DNA NPH QL NAA+NON-PROBE: NOT DETECTED
CALCIUM SPEC-SCNC: 9.2 MG/DL (ref 8.6–10.5)
CHLORIDE SERPL-SCNC: 105 MMOL/L (ref 98–107)
CO2 SERPL-SCNC: 20 MMOL/L (ref 22–29)
CREAT SERPL-MCNC: 0.89 MG/DL (ref 0.57–1)
D-LACTATE SERPL-SCNC: 1.3 MMOL/L (ref 0.5–2)
DEPRECATED RDW RBC AUTO: 48 FL (ref 37–54)
EGFRCR SERPLBLD CKD-EPI 2021: 75.3 ML/MIN/1.73
EOSINOPHIL # BLD AUTO: 0.15 10*3/MM3 (ref 0–0.4)
EOSINOPHIL NFR BLD AUTO: 1.3 % (ref 0.3–6.2)
ERYTHROCYTE [DISTWIDTH] IN BLOOD BY AUTOMATED COUNT: 14.8 % (ref 12.3–15.4)
FLUAV SUBTYP SPEC NAA+PROBE: NOT DETECTED
FLUBV RNA ISLT QL NAA+PROBE: NOT DETECTED
GLOBULIN UR ELPH-MCNC: 3 GM/DL
GLUCOSE SERPL-MCNC: 91 MG/DL (ref 65–99)
HADV DNA SPEC NAA+PROBE: NOT DETECTED
HCOV 229E RNA SPEC QL NAA+PROBE: NOT DETECTED
HCOV HKU1 RNA SPEC QL NAA+PROBE: NOT DETECTED
HCOV NL63 RNA SPEC QL NAA+PROBE: NOT DETECTED
HCOV OC43 RNA SPEC QL NAA+PROBE: NOT DETECTED
HCT VFR BLD AUTO: 43.9 % (ref 34–46.6)
HGB BLD-MCNC: 13.9 G/DL (ref 12–15.9)
HMPV RNA NPH QL NAA+NON-PROBE: NOT DETECTED
HOLD SPECIMEN: NORMAL
HOLD SPECIMEN: NORMAL
HPIV1 RNA ISLT QL NAA+PROBE: NOT DETECTED
HPIV2 RNA SPEC QL NAA+PROBE: NOT DETECTED
HPIV3 RNA NPH QL NAA+PROBE: NOT DETECTED
HPIV4 P GENE NPH QL NAA+PROBE: NOT DETECTED
IMM GRANULOCYTES # BLD AUTO: 0.03 10*3/MM3 (ref 0–0.05)
IMM GRANULOCYTES NFR BLD AUTO: 0.3 % (ref 0–0.5)
LYMPHOCYTES # BLD AUTO: 2.26 10*3/MM3 (ref 0.7–3.1)
LYMPHOCYTES NFR BLD AUTO: 19.4 % (ref 19.6–45.3)
M PNEUMO IGG SER IA-ACNC: NOT DETECTED
MCH RBC QN AUTO: 28.3 PG (ref 26.6–33)
MCHC RBC AUTO-ENTMCNC: 31.7 G/DL (ref 31.5–35.7)
MCV RBC AUTO: 89.4 FL (ref 79–97)
MONOCYTES # BLD AUTO: 0.61 10*3/MM3 (ref 0.1–0.9)
MONOCYTES NFR BLD AUTO: 5.2 % (ref 5–12)
NEUTROPHILS NFR BLD AUTO: 73.3 % (ref 42.7–76)
NEUTROPHILS NFR BLD AUTO: 8.53 10*3/MM3 (ref 1.7–7)
NRBC BLD AUTO-RTO: 0 /100 WBC (ref 0–0.2)
NT-PROBNP SERPL-MCNC: ABNORMAL PG/ML (ref 0–900)
PLATELET # BLD AUTO: 402 10*3/MM3 (ref 140–450)
PMV BLD AUTO: 9.7 FL (ref 6–12)
POTASSIUM SERPL-SCNC: 4.5 MMOL/L (ref 3.5–5.2)
PROCALCITONIN SERPL-MCNC: 0.06 NG/ML (ref 0–0.25)
PROT SERPL-MCNC: 6.7 G/DL (ref 6–8.5)
QT INTERVAL: 396 MS
QTC INTERVAL: 500 MS
RBC # BLD AUTO: 4.91 10*6/MM3 (ref 3.77–5.28)
RHINOVIRUS RNA SPEC NAA+PROBE: NOT DETECTED
RSV RNA NPH QL NAA+NON-PROBE: NOT DETECTED
SARS-COV-2 RNA NPH QL NAA+NON-PROBE: NOT DETECTED
SODIUM SERPL-SCNC: 137 MMOL/L (ref 136–145)
TROPONIN T SERPL HS-MCNC: 30 NG/L
TROPONIN T SERPL HS-MCNC: 30 NG/L
WBC NRBC COR # BLD AUTO: 11.64 10*3/MM3 (ref 3.4–10.8)
WHOLE BLOOD HOLD SPECIMEN: NORMAL

## 2024-10-12 PROCEDURE — 96374 THER/PROPH/DIAG INJ IV PUSH: CPT

## 2024-10-12 PROCEDURE — 83880 ASSAY OF NATRIURETIC PEPTIDE: CPT | Performed by: EMERGENCY MEDICINE

## 2024-10-12 PROCEDURE — 25010000002 METHYLPREDNISOLONE PER 40 MG: Performed by: EMERGENCY MEDICINE

## 2024-10-12 PROCEDURE — 0202U NFCT DS 22 TRGT SARS-COV-2: CPT | Performed by: EMERGENCY MEDICINE

## 2024-10-12 PROCEDURE — 36415 COLL VENOUS BLD VENIPUNCTURE: CPT

## 2024-10-12 PROCEDURE — 84484 ASSAY OF TROPONIN QUANT: CPT | Performed by: EMERGENCY MEDICINE

## 2024-10-12 PROCEDURE — 85025 COMPLETE CBC W/AUTO DIFF WBC: CPT | Performed by: EMERGENCY MEDICINE

## 2024-10-12 PROCEDURE — 94761 N-INVAS EAR/PLS OXIMETRY MLT: CPT

## 2024-10-12 PROCEDURE — 94640 AIRWAY INHALATION TREATMENT: CPT

## 2024-10-12 PROCEDURE — 25010000002 BUMETANIDE PER 0.5 MG: Performed by: EMERGENCY MEDICINE

## 2024-10-12 PROCEDURE — 94664 DEMO&/EVAL PT USE INHALER: CPT

## 2024-10-12 PROCEDURE — 80053 COMPREHEN METABOLIC PANEL: CPT | Performed by: EMERGENCY MEDICINE

## 2024-10-12 PROCEDURE — 71045 X-RAY EXAM CHEST 1 VIEW: CPT

## 2024-10-12 PROCEDURE — 83605 ASSAY OF LACTIC ACID: CPT | Performed by: EMERGENCY MEDICINE

## 2024-10-12 PROCEDURE — 93005 ELECTROCARDIOGRAM TRACING: CPT | Performed by: EMERGENCY MEDICINE

## 2024-10-12 PROCEDURE — 94799 UNLISTED PULMONARY SVC/PX: CPT

## 2024-10-12 PROCEDURE — 99284 EMERGENCY DEPT VISIT MOD MDM: CPT

## 2024-10-12 PROCEDURE — 96375 TX/PRO/DX INJ NEW DRUG ADDON: CPT

## 2024-10-12 PROCEDURE — 84145 PROCALCITONIN (PCT): CPT | Performed by: EMERGENCY MEDICINE

## 2024-10-12 RX ORDER — BUMETANIDE 0.25 MG/ML
2 INJECTION INTRAMUSCULAR; INTRAVENOUS ONCE
Status: COMPLETED | OUTPATIENT
Start: 2024-10-12 | End: 2024-10-12

## 2024-10-12 RX ORDER — IPRATROPIUM BROMIDE AND ALBUTEROL SULFATE 2.5; .5 MG/3ML; MG/3ML
3 SOLUTION RESPIRATORY (INHALATION) ONCE
Status: COMPLETED | OUTPATIENT
Start: 2024-10-12 | End: 2024-10-12

## 2024-10-12 RX ORDER — VALSARTAN AND HYDROCHLOROTHIAZIDE 80; 12.5 MG/1; MG/1
1 TABLET, FILM COATED ORAL DAILY
Qty: 30 TABLET | Refills: 0 | Status: SHIPPED | OUTPATIENT
Start: 2024-10-12

## 2024-10-12 RX ORDER — METHYLPREDNISOLONE SODIUM SUCCINATE 40 MG/ML
40 INJECTION, POWDER, LYOPHILIZED, FOR SOLUTION INTRAMUSCULAR; INTRAVENOUS ONCE
Status: COMPLETED | OUTPATIENT
Start: 2024-10-12 | End: 2024-10-12

## 2024-10-12 RX ORDER — SODIUM CHLORIDE 0.9 % (FLUSH) 0.9 %
10 SYRINGE (ML) INJECTION AS NEEDED
Status: DISCONTINUED | OUTPATIENT
Start: 2024-10-12 | End: 2024-10-13 | Stop reason: HOSPADM

## 2024-10-12 RX ORDER — VALSARTAN 80 MG/1
40 TABLET ORAL ONCE
Status: COMPLETED | OUTPATIENT
Start: 2024-10-12 | End: 2024-10-12

## 2024-10-12 RX ADMIN — NITROGLYCERIN 1 INCH: 20 OINTMENT TOPICAL at 19:02

## 2024-10-12 RX ADMIN — IPRATROPIUM BROMIDE AND ALBUTEROL SULFATE 3 ML: 2.5; .5 SOLUTION RESPIRATORY (INHALATION) at 19:37

## 2024-10-12 RX ADMIN — BUMETANIDE 2 MG: 0.25 INJECTION INTRAMUSCULAR; INTRAVENOUS at 21:04

## 2024-10-12 RX ADMIN — METHYLPREDNISOLONE SODIUM SUCCINATE 40 MG: 40 INJECTION, POWDER, FOR SOLUTION INTRAMUSCULAR; INTRAVENOUS at 19:03

## 2024-10-12 RX ADMIN — VALSARTAN 40 MG: 80 TABLET, FILM COATED ORAL at 19:06

## 2024-10-12 NOTE — ED PROVIDER NOTES
"Subjective   History of Present Illness  Patient is a 59-year-old female presents emergency department with ongoing cough and shortness of breath.  Patient reports she was diagnosed with pneumonia 2 weeks ago.  Patient reports that since that time she has slowly worsened.  Patient does also report that she is out of her blood pressure medication.  Past history noted for CHF, hypertension, pneumonia, and prior pulmonary embolism.  Patient unfortunately suffers from housing insecurity as well.    History provided by:  Patient and EMS personnel      Review of Systems    Past Medical History:   Diagnosis Date    CHF (congestive heart failure)     Hypertension     Pneumonia     Pulmonary embolism without acute cor pulmonale 12/15/2023       Allergies   Allergen Reactions    Lisinopril Other (See Comments)     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    Other Nausea And Vomiting     Pt states \"all opiods make me throw up instantly\"    Percocet [Oxycodone-Acetaminophen] GI Intolerance    Sulfa Antibiotics Rash       Past Surgical History:   Procedure Laterality Date    HERNIA REPAIR      KNEE SURGERY      RHINOPLASTY         Family History   Problem Relation Age of Onset    No Known Problems Mother     No Known Problems Father        Social History     Socioeconomic History    Marital status:    Tobacco Use    Smoking status: Never    Smokeless tobacco: Never   Vaping Use    Vaping status: Never Used   Substance and Sexual Activity    Alcohol use: No    Drug use: No    Sexual activity: Defer           Objective   Physical Exam  Vitals and nursing note reviewed.   Constitutional:       General: She is not in acute distress.     Appearance: She is well-developed. She is ill-appearing. She is not toxic-appearing.   Cardiovascular:      Rate and Rhythm: Normal rate and regular rhythm.      Pulses: Normal pulses.   Pulmonary:      Effort: Pulmonary effort is normal. No " respiratory distress.      Breath sounds: No decreased breath sounds.      Comments: Mild increased work of breathing.  Neurological:      Mental Status: She is alert and oriented to person, place, and time.   Psychiatric:         Mood and Affect: Mood normal.         Behavior: Behavior normal.         Procedures           ED Course  ED Course as of 10/12/24 2355   Sat Oct 12, 2024   1855 Chart review demonstrates that the patient was admitted here from September 25 until the 29th.  Patient was diagnosed at that point with strep pneumonia, CHF, and PE.  See that discharge summary for further details. [RS]   1856 Review of the echocardiogram from her most recent admission demonstrates an ejection fraction of 11%. [RS]   2020 proBNP(!): 20,798.0  Significant elevated BNP compared to prior values. [RS]   2020 XR Chest 1 View  Personally viewed single view of the chest.  My interpretation there is cardiomegaly but no overt evidence of heart failure or lobar infiltrate. [RS]   2058 Procalcitonin: 0.06  Procalcitonin is negative. [RS]   2135 HS Troponin T(!): 30  Stable troponin level with no interval change. [RS]   2135 Patient with evidence of ongoing shortness of breath likely consistent with longstanding cardiomyopathy.  At this point, no evidence of pneumonia.  Will plan to discharge the patient home to follow-up. I have reviewed results, considerations, and diagnosis with the patient and/or their representative. Anticipatory guidance provided. Follow-up plan reviewed. Precautions for acute return for re-evaluations also reviewed. This including potential for worsening of the presenting condition and need for further evaluation, admission, and/or intervention as indicated. Opportunity to as questions provided. I advised them to return for any concerns and stressed the importance of timely follow-up and outpatient services. They verbalized understanding.   [RS]   2136 Note to patient: The 21st Century Cures Act makes  medical notes like these available to patients in the interest of transparency. However, be advised this is a medical document. It is intended as peer to peer communication. It is written in medical language and may contain abbreviations or verbiage that are unfamiliar. It may appear blunt or direct. Medical documents are intended to carry relevant information, facts as evident, and the clinical opinion of the physician/NPP.   [RS]      ED Course User Index  [RS] Jesus Peter MD                                             Medical Decision Making  Problems Addressed:  Cardiomyopathy, unspecified type: complicated acute illness or injury  Chronic systolic heart failure: complicated acute illness or injury  Elevated blood pressure reading with diagnosis of hypertension: complicated acute illness or injury  Feeling unwell: complicated acute illness or injury  Heart failure with reduced ejection fraction: complicated acute illness or injury  Noncompliance with medication regimen: complicated acute illness or injury    Amount and/or Complexity of Data Reviewed  Independent Historian: EMS  External Data Reviewed: labs and notes.     Details: See ED course  Labs: ordered. Decision-making details documented in ED Course.  Radiology: ordered. Decision-making details documented in ED Course.  ECG/medicine tests: ordered.    Risk  Prescription drug management.        Final diagnoses:   Feeling unwell   Cardiomyopathy, unspecified type   Heart failure with reduced ejection fraction   Chronic systolic heart failure   Elevated blood pressure reading with diagnosis of hypertension   Noncompliance with medication regimen       ED Disposition  ED Disposition       ED Disposition   Discharge    Condition   Stable    Comment   --               Sigrid Byers, PA  [address]  [facility] [address]  [phone]    Schedule an appointment as soon as possible for a visit       [facility]  [address]  [facility] [address]  [phone]  Schedule an appointment as soon as possible for a visit   As soon as possible.    [facility] EMERGENCY DEPARTMENT  [address]  [facility] [id number] [phone]    As needed, If symptoms worsen or ANY concerns.    [facility]  [address]  [address]  [facility] [address] [phone]             Medication List        Changed      * valsartan-hydrochlorothiazide 80-12.5 MG per tablet  Commonly known as: DIOVAN-HCT  Take 0.5 tablets by mouth Daily for 30 days.  What changed: Another medication with the same name was added. Make sure you understand how and when to take each.     * valsartan-hydrochlorothiazide 80-12.5 MG per tablet  Commonly known as: DIOVAN-HCT  Take 1 tablet by mouth Daily.  What changed: You were already taking a medication with the same name, and this prescription was added. Make sure you understand how and when to take each.           * This list has 2 medication(s) that are the same as other medications prescribed for you. Read the directions carefully, and ask your doctor or other care provider to review them with you.                   Where to Get Your Medications        These medications were sent to [facility]/pharmacy #[id number] - [place], [place] - [address] - [phone]  - [phone]   [address] [id number]      Phone: [phone]   valsartan-hydrochlorothiazide 80-12.5 MG per tablet            Jesus Peter MD  10/12/24 6092

## 2024-10-13 LAB
QT INTERVAL: 410 MS
QTC INTERVAL: 504 MS

## 2024-10-24 ENCOUNTER — APPOINTMENT (OUTPATIENT)
Dept: GENERAL RADIOLOGY | Facility: HOSPITAL | Age: 59
End: 2024-10-24
Payer: MEDICAID

## 2024-10-24 ENCOUNTER — HOSPITAL ENCOUNTER (EMERGENCY)
Facility: HOSPITAL | Age: 59
Discharge: HOME | End: 2024-10-24
Attending: EMERGENCY MEDICINE | Admitting: EMERGENCY MEDICINE
Payer: MEDICAID

## 2024-10-24 VITALS
TEMPERATURE: 97.6 F | OXYGEN SATURATION: 97 % | BODY MASS INDEX: 21.66 KG/M2 | HEART RATE: 96 BPM | WEIGHT: 130 LBS | HEIGHT: 65 IN | RESPIRATION RATE: 18 BRPM | DIASTOLIC BLOOD PRESSURE: 121 MMHG | SYSTOLIC BLOOD PRESSURE: 171 MMHG

## 2024-10-24 DIAGNOSIS — J20.9 ACUTE BRONCHITIS, UNSPECIFIED ORGANISM: ICD-10-CM

## 2024-10-24 DIAGNOSIS — J18.9 COMMUNITY ACQUIRED PNEUMONIA OF LEFT LOWER LOBE OF LUNG: Primary | ICD-10-CM

## 2024-10-24 LAB
ALBUMIN SERPL-MCNC: 3.7 G/DL (ref 3.5–5.2)
ALBUMIN/GLOB SERPL: 1.2 G/DL
ALP SERPL-CCNC: 124 U/L (ref 39–117)
ALT SERPL W P-5'-P-CCNC: 26 U/L (ref 1–33)
ANION GAP SERPL CALCULATED.3IONS-SCNC: 15 MMOL/L (ref 5–15)
AST SERPL-CCNC: 26 U/L (ref 1–32)
BASOPHILS # BLD AUTO: 0.07 10*3/MM3 (ref 0–0.2)
BASOPHILS NFR BLD AUTO: 0.7 % (ref 0–1.5)
BILIRUB SERPL-MCNC: 0.5 MG/DL (ref 0–1.2)
BUN SERPL-MCNC: 25 MG/DL (ref 6–20)
BUN/CREAT SERPL: 29.8 (ref 7–25)
CALCIUM SPEC-SCNC: 9.4 MG/DL (ref 8.6–10.5)
CHLORIDE SERPL-SCNC: 104 MMOL/L (ref 98–107)
CO2 SERPL-SCNC: 20 MMOL/L (ref 22–29)
CREAT SERPL-MCNC: 0.84 MG/DL (ref 0.57–1)
D-LACTATE SERPL-SCNC: 3 MMOL/L (ref 0.5–2)
DEPRECATED RDW RBC AUTO: 49.5 FL (ref 37–54)
EGFRCR SERPLBLD CKD-EPI 2021: 80.7 ML/MIN/1.73
EOSINOPHIL # BLD AUTO: 0.23 10*3/MM3 (ref 0–0.4)
EOSINOPHIL NFR BLD AUTO: 2.2 % (ref 0.3–6.2)
ERYTHROCYTE [DISTWIDTH] IN BLOOD BY AUTOMATED COUNT: 15.3 % (ref 12.3–15.4)
FLUAV RNA RESP QL NAA+PROBE: NOT DETECTED
FLUBV RNA RESP QL NAA+PROBE: NOT DETECTED
GLOBULIN UR ELPH-MCNC: 3.2 GM/DL
GLUCOSE SERPL-MCNC: 120 MG/DL (ref 65–99)
HCT VFR BLD AUTO: 43.2 % (ref 34–46.6)
HGB BLD-MCNC: 13.7 G/DL (ref 12–15.9)
IMM GRANULOCYTES # BLD AUTO: 0.04 10*3/MM3 (ref 0–0.05)
IMM GRANULOCYTES NFR BLD AUTO: 0.4 % (ref 0–0.5)
LYMPHOCYTES # BLD AUTO: 3.16 10*3/MM3 (ref 0.7–3.1)
LYMPHOCYTES NFR BLD AUTO: 30.5 % (ref 19.6–45.3)
MCH RBC QN AUTO: 28.2 PG (ref 26.6–33)
MCHC RBC AUTO-ENTMCNC: 31.7 G/DL (ref 31.5–35.7)
MCV RBC AUTO: 89.1 FL (ref 79–97)
MONOCYTES # BLD AUTO: 0.63 10*3/MM3 (ref 0.1–0.9)
MONOCYTES NFR BLD AUTO: 6.1 % (ref 5–12)
NEUTROPHILS NFR BLD AUTO: 6.22 10*3/MM3 (ref 1.7–7)
NEUTROPHILS NFR BLD AUTO: 60.1 % (ref 42.7–76)
NRBC BLD AUTO-RTO: 0 /100 WBC (ref 0–0.2)
NT-PROBNP SERPL-MCNC: ABNORMAL PG/ML (ref 0–900)
PLATELET # BLD AUTO: 292 10*3/MM3 (ref 140–450)
PMV BLD AUTO: 9.5 FL (ref 6–12)
POTASSIUM SERPL-SCNC: 4.2 MMOL/L (ref 3.5–5.2)
PROT SERPL-MCNC: 6.9 G/DL (ref 6–8.5)
RBC # BLD AUTO: 4.85 10*6/MM3 (ref 3.77–5.28)
SARS-COV-2 RNA RESP QL NAA+PROBE: NOT DETECTED
SODIUM SERPL-SCNC: 139 MMOL/L (ref 136–145)
WBC NRBC COR # BLD AUTO: 10.35 10*3/MM3 (ref 3.4–10.8)

## 2024-10-24 PROCEDURE — 71045 X-RAY EXAM CHEST 1 VIEW: CPT

## 2024-10-24 PROCEDURE — 80053 COMPREHEN METABOLIC PANEL: CPT | Performed by: PHYSICIAN ASSISTANT

## 2024-10-24 PROCEDURE — 87636 SARSCOV2 & INF A&B AMP PRB: CPT | Performed by: PHYSICIAN ASSISTANT

## 2024-10-24 PROCEDURE — 99283 EMERGENCY DEPT VISIT LOW MDM: CPT

## 2024-10-24 PROCEDURE — 83605 ASSAY OF LACTIC ACID: CPT | Performed by: PHYSICIAN ASSISTANT

## 2024-10-24 PROCEDURE — 36415 COLL VENOUS BLD VENIPUNCTURE: CPT

## 2024-10-24 PROCEDURE — 83880 ASSAY OF NATRIURETIC PEPTIDE: CPT | Performed by: PHYSICIAN ASSISTANT

## 2024-10-24 PROCEDURE — 85025 COMPLETE CBC W/AUTO DIFF WBC: CPT | Performed by: PHYSICIAN ASSISTANT

## 2024-10-24 RX ORDER — AZITHROMYCIN 500 MG/1
500 TABLET, FILM COATED ORAL DAILY
Qty: 3 TABLET | Refills: 0 | Status: SHIPPED | OUTPATIENT
Start: 2024-10-24 | End: 2024-10-27

## 2024-10-24 RX ORDER — AZITHROMYCIN 250 MG/1
500 TABLET, FILM COATED ORAL ONCE
Status: COMPLETED | OUTPATIENT
Start: 2024-10-24 | End: 2024-10-24

## 2024-10-24 RX ORDER — HYDROXYZINE HYDROCHLORIDE 25 MG/1
50 TABLET, FILM COATED ORAL ONCE
Status: COMPLETED | OUTPATIENT
Start: 2024-10-24 | End: 2024-10-24

## 2024-10-24 RX ADMIN — AZITHROMYCIN MONOHYDRATE 500 MG: 250 TABLET ORAL at 22:23

## 2024-10-24 RX ADMIN — HYDROXYZINE HYDROCHLORIDE 50 MG: 25 TABLET, FILM COATED ORAL at 22:25

## 2024-10-25 NOTE — ED PROVIDER NOTES
" EMERGENCY DEPARTMENT ENCOUNTER    Pt Name: Jill M Paladino  MRN: [id number]  Pt :   1965  Room Number:    Date of encounter:  10/24/2024  PCP: Sigrid Byers PA  ED Provider: TEX Mensah    Historian: Patient    HPI:  Chief Complaint: Cough    Context: Jill M Paladino is a 59 y.o. female who presents to the ED c/o cough. Patient reports history of recurrent pneumonia and shares that she has had a more productive and persistent cough over the last two days. Patient is also concerned as she was recently evaluated with imaging of her chest and told that there were \"spots on her lungs\".   HPI     REVIEW OF SYSTEMS  A chief complaint appropriate review of systems was completed and is negative except as noted in the HPI.     PAST MEDICAL HISTORY  Past Medical History:   Diagnosis Date    CHF (congestive heart failure)     Hypertension     Pneumonia     Pulmonary embolism without acute cor pulmonale 12/15/2023       PAST SURGICAL HISTORY  Past Surgical History:   Procedure Laterality Date    HERNIA REPAIR      KNEE SURGERY      RHINOPLASTY         FAMILY HISTORY  Family History   Problem Relation Age of Onset    No Known Problems Mother     No Known Problems Father        SOCIAL HISTORY  Social History     Socioeconomic History    Marital status:    Tobacco Use    Smoking status: Never    Smokeless tobacco: Never   Vaping Use    Vaping status: Never Used   Substance and Sexual Activity    Alcohol use: No    Drug use: No    Sexual activity: Defer       ALLERGIES  Lisinopril, Other, Percocet [oxycodone-acetaminophen], and Sulfa antibiotics    PHYSICAL EXAM  Physical Exam  Vitals and nursing note reviewed.   Constitutional:       General: She is not in acute distress.     Appearance: Normal appearance. She is not ill-appearing or toxic-appearing.      Comments: Disheveled in appearance   HENT:      Head: Normocephalic and atraumatic.      Nose: Nose normal.      Mouth/Throat:      Mouth: " Mucous membranes are moist.   Eyes:      Extraocular Movements: Extraocular movements intact.   Cardiovascular:      Rate and Rhythm: Normal rate.   Pulmonary:      Effort: Pulmonary effort is normal.      Breath sounds: Normal breath sounds.   Abdominal:      General: There is no distension.   Musculoskeletal:         General: Normal range of motion.      Cervical back: Normal range of motion and neck supple.   Skin:     General: Skin is warm and dry.   Neurological:      General: No focal deficit present.      Mental Status: She is alert.   Psychiatric:         Mood and Affect: Mood normal.         Behavior: Behavior normal.       LAB RESULTS  Results for orders placed or performed during the hospital encounter of 10/24/24   COVID-19 and FLU A/B PCR, 1 HR TAT - Swab, Nasopharynx    Collection Time: 10/24/24  8:27 PM    Specimen: Nasopharynx; Swab   Result Value Ref Range    COVID19 Not Detected Not Detected - Ref. Range    Influenza A PCR Not Detected Not Detected    Influenza B PCR Not Detected Not Detected   Comprehensive Metabolic Panel    Collection Time: 10/24/24  8:29 PM    Specimen: Blood   Result Value Ref Range    Glucose 120 (H) 65 - 99 mg/dL    BUN 25 (H) 6 - 20 mg/dL    Creatinine 0.84 0.57 - 1.00 mg/dL    Sodium 139 136 - 145 mmol/L    Potassium 4.2 3.5 - 5.2 mmol/L    Chloride 104 98 - 107 mmol/L    CO2 20.0 (L) 22.0 - 29.0 mmol/L    Calcium 9.4 8.6 - 10.5 mg/dL    Total Protein 6.9 6.0 - 8.5 g/dL    Albumin 3.7 3.5 - 5.2 g/dL    ALT (SGPT) 26 1 - 33 U/L    AST (SGOT) 26 1 - 32 U/L    Alkaline Phosphatase 124 (H) 39 - 117 U/L    Total Bilirubin 0.5 0.0 - 1.2 mg/dL    Globulin 3.2 gm/dL    A/G Ratio 1.2 g/dL    BUN/Creatinine Ratio 29.8 (H) 7.0 - 25.0    Anion Gap 15.0 5.0 - 15.0 mmol/L    eGFR 80.7 >60.0 mL/min/1.73   BNP    Collection Time: 10/24/24  8:29 PM    Specimen: Blood   Result Value Ref Range    proBNP 17,841.0 (H) 0.0 - 900.0 pg/mL   Lactic Acid, Plasma    Collection Time: 10/24/24  8:29  PM    Specimen: Blood   Result Value Ref Range    Lactate 3.0 (C) 0.5 - 2.0 mmol/L   CBC Auto Differential    Collection Time: 10/24/24  8:29 PM    Specimen: Blood   Result Value Ref Range    WBC 10.35 3.40 - 10.80 10*3/mm3    RBC 4.85 3.77 - 5.28 10*6/mm3    Hemoglobin 13.7 12.0 - 15.9 g/dL    Hematocrit 43.2 34.0 - 46.6 %    MCV 89.1 79.0 - 97.0 fL    MCH 28.2 26.6 - 33.0 pg    MCHC 31.7 31.5 - 35.7 g/dL    RDW 15.3 12.3 - 15.4 %    RDW-SD 49.5 37.0 - 54.0 fl    MPV 9.5 6.0 - 12.0 fL    Platelets 292 140 - 450 10*3/mm3    Neutrophil % 60.1 42.7 - 76.0 %    Lymphocyte % 30.5 19.6 - 45.3 %    Monocyte % 6.1 5.0 - 12.0 %    Eosinophil % 2.2 0.3 - 6.2 %    Basophil % 0.7 0.0 - 1.5 %    Immature Grans % 0.4 0.0 - 0.5 %    Neutrophils, Absolute 6.22 1.70 - 7.00 10*3/mm3    Lymphocytes, Absolute 3.16 (H) 0.70 - 3.10 10*3/mm3    Monocytes, Absolute 0.63 0.10 - 0.90 10*3/mm3    Eosinophils, Absolute 0.23 0.00 - 0.40 10*3/mm3    Basophils, Absolute 0.07 0.00 - 0.20 10*3/mm3    Immature Grans, Absolute 0.04 0.00 - 0.05 10*3/mm3    nRBC 0.0 0.0 - 0.2 /100 WBC       If labs were ordered, I independently reviewed the results and considered them in treating the patient.    RADIOLOGY  XR Chest 1 View   Final Result   Impression:   Mild patchy opacities noted within the left lower lung may be artifactual or may represent pulmonary infiltrates.      Unchanged cardiomegaly         Electronically Signed: Jun Mccarty DO     10/24/2024 9:03 PM EDT     Workstation ID: [id number]        [x] Radiologist's Report Reviewed:  I ordered and independently interpreted the above noted radiographic studies.  See radiologist's dictation for official interpretation.      PROCEDURES    Procedures    No orders to display       MEDICATIONS GIVEN IN ER    Medications   hydrOXYzine (ATARAX) tablet 50 mg (50 mg Oral Given 10/24/24 2225)   azithromycin (ZITHROMAX) tablet 500 mg (500 mg Oral Given 10/24/24 2223)       MEDICAL DECISION MAKING,  PROGRESS, and CONSULTS   Medical Decision Making  This is a 59-year-old nontoxic-appearing homeless female who presents ER for evaluation of worsening cough.  No acute or emergent findings on physical exam.  Labs demonstrate elevated proBNP initial lactate of 3.0.  Patient able to tolerate p.o. fluids and did so while in the ED.  Chest x-ray demonstrated findings concerning for possible pneumonia.  With patient's persistent cough covered with azithromycin with first dose provided in ED.  Patient instructed on symptomatic care and continued treatment with antibiotics outpatient and follow-up with primary care as directed. At time of discharge disposition patient is afebrile, nontoxic appearing, vital signs stable and able to maintain O2 sats of 97% on room air. Patient will be discharged home with symptomatic care and outpatient follow up.     Problems Addressed:  Acute bronchitis, unspecified organism: complicated acute illness or injury  Community acquired pneumonia of left lower lobe of lung: complicated acute illness or injury    Amount and/or Complexity of Data Reviewed  External Data Reviewed: notes.     Details: Personally reviewed patient's presentation to outside ED and October 21, 2024 patient was diagnosed with bronchitis acute cough.  Patient was prescribed Robitussin AC instructed to follow-up outpatient.  Labs: ordered. Decision-making details documented in ED Course.  Radiology: ordered and independent interpretation performed. Decision-making details documented in ED Course.    Risk  Prescription drug management.      Discussion below represents my analysis of pertinent findings related to patient's condition, differential diagnosis, treatment plan and final disposition.    Differential diagnosis:  The differential diagnosis associated with the patient's presentation includes: ACS, CHF, asthma, COPD exacerbation, allergic etiologies, infectious etiologies such as PNA.     Additional  sources  Discussed/ obtained information from independent historians:   [] Spouse  [] Parent  [] Family member  [] Friend  [] EMS   [] Other:  External (non-ED) record review:   [] Inpatient record:   [] Office record:   [] Outpatient record:   [] Prior Outpatient labs:   [] Prior Outpatient radiology:   [] Primary Care record:   [x] Outside ED record:   [] Other:   Patient's care impacted by:   [] Diabetes  [x] Hypertension  [] Hyperlipidemia  [] Hypothyroidism   [x] Coronary Artery Disease   [] COPD   [] Cancer   [] Obesity  [] GERD   [] Tobacco Abuse   [] Substance Abuse    [] Anxiety   [] Depression   [x] Other: CHF,  Care significantly affected by Social Determinants of Health (housing and economic circumstances, unemployment)    [x] Yes     [] No   If yes, Patient's care significantly limited by  Social Determinants of Health including:   [x] Inadequate housing   [x] Low income   [] Alcoholism and drug addiction in family   [] Problems related to primary support group   [] Unemployment   [] Problems related to employment   [] Other Social Determinants of Health:     Orders placed during this visit:  Orders Placed This Encounter   Procedures    COVID PRE-OP / PRE-PROCEDURE SCREENING ORDER (NO ISOLATION) - Swab, Nasopharynx    COVID-19 and FLU A/B PCR, 1 HR TAT - Swab, Nasopharynx    XR Chest 1 View    Comprehensive Metabolic Panel    BNP    Lactic Acid, Plasma    CBC Auto Differential    CBC & Differential     I considered prescription management  with:   [] Pain medication  [] Antiviral  [x] Antibiotic: Implemented as prescribed for treatment of community-acquired pneumonia   [] Other:   Rationale:    ED Course:    ED Course as of 10/25/24 0250   u Oct 24, 2024   2026 Vitals and Telemetry tracing was reviewed and directly interpreted by myself demonstrating blood pressure 175/108, afebrile, heart rate of 94, respirations 18 breaths/min oxygen saturation 98% on room air [JG]   2026 BP(!): 175/108 [JG]   2026  Temp: 97.6 °F (36.4 °C) [JG]   2026 Resp: 18 [JG]   2026 SpO2: 98 % [JG]   2027 Heart Rate: 94 [JG]   2138 Imaging of chest personally interpreted by myself with official read provided by radiology demonstrated mild patchy opacities noted within the left lower lung may be artifactual or may represent pulmonary infiltrates.   [JG]   2143 Patient requesting Ativan stating that it is on her home medication list for anxiety.  I have reviewed her home medication list and there is no active prescriptions for Ativan.  Ordered Atarax for patient's anxiety. [JG]   2200 Labs studies were reviewed and directly interpreted by myself and demonstrated initial lactate 3.0, proBNP 17,841 with remainder of labs without acute abnormalities.  [JG]   Fri Oct 25, 2024   0225 Lactate(!!): 3.0 [JG]   0225 proBNP(!): 17,841.0 [JG]      ED Course User Index  [JG] Matt Cary PA            DIAGNOSIS  Final diagnoses:   Community acquired pneumonia of left lower lobe of lung   Acute bronchitis, unspecified organism     DISPOSITION    ED Disposition       ED Disposition   Discharge    Condition   Stable    Comment   --           DISCHARGE    Patient discharged in stable condition.    Reviewed implications of results, diagnosis, meds, responsibility to follow up, warning signs and symptoms of possible worsening, potential complications and reasons to return to ER.    Patient/Family voiced understanding of above instructions.    Discussed plan for discharge, as there is no emergent indication for admission.  Pt/family is agreeable and understands need for follow up and possible repeat testing.  Pt/family is aware that discharge does not mean that nothing is wrong but that it indicates no emergency is currently present that requires admission and they must continue care with follow-up as given below or with a physician of their choice.     FOLLOW-UP  Sigrid Byers PA  [address]  [address]  [facility]  [address]  [phone]    Call   Call for follow up with primary care    [facility] EMERGENCY DEPARTMENT  [address]  [facility] [address] [phone]  Go to   If symptoms worsen         Medication List        New Prescriptions      azithromycin 500 MG tablet  Commonly known as: ZITHROMAX  Take 1 tablet by mouth Daily for 3 days.               Where to Get Your Medications        These medications were sent to [facility]/pharmacy #[id number] - [place], [place] - [address] [facility] [phone]  - [phone]   [address] [id number]      Phone: [phone]   azithromycin 500 MG tablet          Please note that portions of this document were completed with voice recognition software.        Matt Cary PA  10/25/24 0250

## 2024-11-07 ENCOUNTER — HOSPITAL ENCOUNTER (EMERGENCY)
Facility: HOSPITAL | Age: 59
Discharge: HOME | End: 2024-11-07
Attending: STUDENT IN AN ORGANIZED HEALTH CARE EDUCATION/TRAINING PROGRAM
Payer: MEDICAID

## 2024-11-07 ENCOUNTER — APPOINTMENT (OUTPATIENT)
Facility: HOSPITAL | Age: 59
End: 2024-11-07
Payer: MEDICAID

## 2024-11-07 VITALS
RESPIRATION RATE: 20 BRPM | BODY MASS INDEX: 21.66 KG/M2 | TEMPERATURE: 97.6 F | OXYGEN SATURATION: 97 % | HEIGHT: 65 IN | HEART RATE: 84 BPM | WEIGHT: 130 LBS | DIASTOLIC BLOOD PRESSURE: 108 MMHG | SYSTOLIC BLOOD PRESSURE: 158 MMHG

## 2024-11-07 DIAGNOSIS — R53.1 WEAKNESS: Primary | ICD-10-CM

## 2024-11-07 LAB
ALBUMIN SERPL-MCNC: 3.9 G/DL (ref 3.5–5.2)
ALBUMIN/GLOB SERPL: 1.2 G/DL
ALP SERPL-CCNC: 141 U/L (ref 39–117)
ALT SERPL W P-5'-P-CCNC: 26 U/L (ref 1–33)
ANION GAP SERPL CALCULATED.3IONS-SCNC: 12 MMOL/L (ref 5–15)
AST SERPL-CCNC: 38 U/L (ref 1–32)
BACTERIA UR QL AUTO: ABNORMAL /HPF
BASOPHILS # BLD AUTO: 0.05 10*3/MM3 (ref 0–0.2)
BASOPHILS NFR BLD AUTO: 0.5 % (ref 0–1.5)
BILIRUB SERPL-MCNC: 1.3 MG/DL (ref 0–1.2)
BILIRUB UR QL STRIP: NEGATIVE
BUN SERPL-MCNC: 38 MG/DL (ref 6–20)
BUN/CREAT SERPL: 35.5 (ref 7–25)
CALCIUM SPEC-SCNC: 9.2 MG/DL (ref 8.6–10.5)
CHLORIDE SERPL-SCNC: 98 MMOL/L (ref 98–107)
CLARITY UR: CLEAR
CO2 SERPL-SCNC: 26 MMOL/L (ref 22–29)
COLOR UR: YELLOW
CREAT SERPL-MCNC: 1.07 MG/DL (ref 0.57–1)
DEPRECATED RDW RBC AUTO: 51.6 FL (ref 37–54)
EGFRCR SERPLBLD CKD-EPI 2021: 60.3 ML/MIN/1.73
EOSINOPHIL # BLD AUTO: 0.14 10*3/MM3 (ref 0–0.4)
EOSINOPHIL NFR BLD AUTO: 1.4 % (ref 0.3–6.2)
ERYTHROCYTE [DISTWIDTH] IN BLOOD BY AUTOMATED COUNT: 16 % (ref 12.3–15.4)
GLOBULIN UR ELPH-MCNC: 3.2 GM/DL
GLUCOSE SERPL-MCNC: 134 MG/DL (ref 65–99)
GLUCOSE UR STRIP-MCNC: NEGATIVE MG/DL
HCT VFR BLD AUTO: 46.1 % (ref 34–46.6)
HGB BLD-MCNC: 14.8 G/DL (ref 12–15.9)
HGB UR QL STRIP.AUTO: NEGATIVE
HOLD SPECIMEN: NORMAL
HYALINE CASTS UR QL AUTO: ABNORMAL /LPF
IMM GRANULOCYTES # BLD AUTO: 0.01 10*3/MM3 (ref 0–0.05)
IMM GRANULOCYTES NFR BLD AUTO: 0.1 % (ref 0–0.5)
KETONES UR QL STRIP: NEGATIVE
LEUKOCYTE ESTERASE UR QL STRIP.AUTO: NEGATIVE
LYMPHOCYTES # BLD AUTO: 3.09 10*3/MM3 (ref 0.7–3.1)
LYMPHOCYTES NFR BLD AUTO: 29.8 % (ref 19.6–45.3)
MAGNESIUM SERPL-MCNC: 2.2 MG/DL (ref 1.6–2.6)
MCH RBC QN AUTO: 28.6 PG (ref 26.6–33)
MCHC RBC AUTO-ENTMCNC: 32.1 G/DL (ref 31.5–35.7)
MCV RBC AUTO: 89.2 FL (ref 79–97)
MONOCYTES # BLD AUTO: 0.62 10*3/MM3 (ref 0.1–0.9)
MONOCYTES NFR BLD AUTO: 6 % (ref 5–12)
NEUTROPHILS NFR BLD AUTO: 6.45 10*3/MM3 (ref 1.7–7)
NEUTROPHILS NFR BLD AUTO: 62.2 % (ref 42.7–76)
NITRITE UR QL STRIP: NEGATIVE
NT-PROBNP SERPL-MCNC: ABNORMAL PG/ML (ref 0–900)
PH UR STRIP.AUTO: 6 [PH] (ref 5–8)
PLATELET # BLD AUTO: 425 10*3/MM3 (ref 140–450)
PMV BLD AUTO: 10 FL (ref 6–12)
POTASSIUM SERPL-SCNC: 4.3 MMOL/L (ref 3.5–5.2)
PROT SERPL-MCNC: 7.1 G/DL (ref 6–8.5)
PROT UR QL STRIP: ABNORMAL
RBC # BLD AUTO: 5.17 10*6/MM3 (ref 3.77–5.28)
RBC # UR STRIP: ABNORMAL /HPF
REF LAB TEST METHOD: ABNORMAL
SODIUM SERPL-SCNC: 136 MMOL/L (ref 136–145)
SP GR UR STRIP: 1.02 (ref 1–1.03)
SQUAMOUS #/AREA URNS HPF: ABNORMAL /HPF
TROPONIN T SERPL HS-MCNC: 34 NG/L
TROPONIN T SERPL HS-MCNC: 37 NG/L
UROBILINOGEN UR QL STRIP: ABNORMAL
WBC # UR STRIP: ABNORMAL /HPF
WBC NRBC COR # BLD AUTO: 10.36 10*3/MM3 (ref 3.4–10.8)
WHOLE BLOOD HOLD COAG: NORMAL
WHOLE BLOOD HOLD SPECIMEN: NORMAL

## 2024-11-07 PROCEDURE — 80053 COMPREHEN METABOLIC PANEL: CPT | Performed by: STUDENT IN AN ORGANIZED HEALTH CARE EDUCATION/TRAINING PROGRAM

## 2024-11-07 PROCEDURE — 84484 ASSAY OF TROPONIN QUANT: CPT | Performed by: STUDENT IN AN ORGANIZED HEALTH CARE EDUCATION/TRAINING PROGRAM

## 2024-11-07 PROCEDURE — 81001 URINALYSIS AUTO W/SCOPE: CPT | Performed by: STUDENT IN AN ORGANIZED HEALTH CARE EDUCATION/TRAINING PROGRAM

## 2024-11-07 PROCEDURE — 99284 EMERGENCY DEPT VISIT MOD MDM: CPT

## 2024-11-07 PROCEDURE — 85025 COMPLETE CBC W/AUTO DIFF WBC: CPT | Performed by: STUDENT IN AN ORGANIZED HEALTH CARE EDUCATION/TRAINING PROGRAM

## 2024-11-07 PROCEDURE — 93005 ELECTROCARDIOGRAM TRACING: CPT | Performed by: STUDENT IN AN ORGANIZED HEALTH CARE EDUCATION/TRAINING PROGRAM

## 2024-11-07 PROCEDURE — 84484 ASSAY OF TROPONIN QUANT: CPT | Performed by: PHYSICIAN ASSISTANT

## 2024-11-07 PROCEDURE — 71045 X-RAY EXAM CHEST 1 VIEW: CPT

## 2024-11-07 PROCEDURE — 83880 ASSAY OF NATRIURETIC PEPTIDE: CPT | Performed by: PHYSICIAN ASSISTANT

## 2024-11-07 PROCEDURE — 36415 COLL VENOUS BLD VENIPUNCTURE: CPT

## 2024-11-07 PROCEDURE — 83735 ASSAY OF MAGNESIUM: CPT | Performed by: STUDENT IN AN ORGANIZED HEALTH CARE EDUCATION/TRAINING PROGRAM

## 2024-11-07 RX ORDER — CLONIDINE HYDROCHLORIDE 0.1 MG/1
0.1 TABLET ORAL ONCE
Status: DISCONTINUED | OUTPATIENT
Start: 2024-11-07 | End: 2024-11-07

## 2024-11-07 RX ORDER — SODIUM CHLORIDE 0.9 % (FLUSH) 0.9 %
10 SYRINGE (ML) INJECTION AS NEEDED
Status: DISCONTINUED | OUTPATIENT
Start: 2024-11-07 | End: 2024-11-07 | Stop reason: HOSPADM

## 2024-11-08 LAB — QT INTERVAL: 398 MS

## 2024-11-08 NOTE — [facility] PROVIDER NOTE
"Subjective   History of Present Illness  59-year-old white female who presents emergency room complaining of increased weakness.  Patient reports that she has been getting weaker over the last several months.  Patient has been seen at multiple emergency departments for the same issue.  Patient was diagnosed with pneumonia 3 weeks ago and given a course of Augmentin and Zithromax along with steroids.  Patient reports that she completed this course and does feel some better, but states that she is still weak.  Patient ports that she is no longer homeless and currently living in a hotel.  Patient reports that she has not been taking her medications as prescribed, states that she does not like to take her blood pressure medicine because she \"does not like the generic\".  Patient reports that she has been taking her Bumex and her blood thinner as prescribed.  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  Also cannot take certain medications because this can cause me to become active\".  Patient does have schizophrenia, states she does not always take her medications, patient is alert and oriented and able to make her own decisions, patient does not want psychiatric evaluation.  Patient has a significant past medical history of congestive heart failure, hypertension, pneumonia, pulmonary embolism.  Patient denies substernal chest pain, difficulty breathing, headache, neck pain, stiffness, numbness, tingling, sensation, fever, chills, difficulty walking.    History provided by:  Patient   used: No        Review of Systems   Neurological:  Positive for weakness.       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    Other Nausea And Vomiting     Pt states \"all opiods make me throw up instantly\"    Percocet [Oxycodone-Acetaminophen] GI Intolerance    Sulfa Antibiotics Rash       Past Surgical History:   Procedure Laterality Date    HERNIA REPAIR      KNEE SURGERY      RHINOPLASTY         Family History   Problem Relation Age of Onset    No Known Problems Mother     No Known Problems Father        Social History     Socioeconomic History    Marital status:    Tobacco Use    Smoking status: Never    Smokeless tobacco: Never   Vaping Use    Vaping status: Never Used   Substance and Sexual Activity    Alcohol use: No    Drug use: No    Sexual activity: Defer           Objective   Physical Exam  Vitals and nursing note reviewed.   Constitutional:       Appearance: Normal appearance. She is normal weight.   HENT:      Head: Normocephalic.      Nose: Nose normal.   Eyes:      Pupils: Pupils are equal, round, and reactive to light.   Cardiovascular:      Rate and Rhythm: Normal rate and regular rhythm.   Pulmonary:      Effort: Pulmonary effort is normal.      Breath sounds: Normal breath sounds.   Abdominal:      General: Abdomen is flat.      Tenderness: There is no abdominal tenderness.   Musculoskeletal:         General: Normal range of motion.   Skin:     General: Skin is warm and dry.   Neurological:      General: No focal deficit present.      Mental Status: She is alert and oriented to person, place, and time. Mental status is at baseline.   Psychiatric:         Mood and Affect: Mood normal.         Behavior: Behavior normal.         Judgment: Judgment normal.         Procedures           ED Course  ED Course as of 11/07/24 2145   Thu Nov 07, 2024 2119 HS Troponin T(!): 34 [WB]   2119 Leukocytes, UA: Negative [WB]   2119 Nitrite, UA: Negative [WB]   2119 WBC: 10.36 [WB]   2119 Hemoglobin: 14.8 [WB]   2119 Hematocrit: 46.1 [WB]   2119 Magnesium: 2.2 [WB] "   2119 proBNP(!): 13,370.0 [WB]   2119 Glucose(!): 134 [WB]   2120 BUN(!): 38 [WB]   2120 Creatinine(!): 1.07 [WB]   2120 Sodium: 136 [WB]   2120 Potassium: 4.3 [WB]   2120 Chloride: 98 [WB]      ED Course User Index  [WB] Sanam Mcnulty Y, KARL                                           Medical Decision Making  59-year-old female with weakness differential diagnosis includes pneumonia, congestive heart failure, chronic weakness, chronic issues.  There are no pertinent physical exam findings.  Diagnostic workup includes CBC, CMP, BNP, troponin, chest x-ray, EKG.  Workup showing a troponin that trended downward.  EKG with old changes, nothing acute at this time.  Other labs as follows in chart.  Patient will be discharged home to follow-up outpatient appropriately with her pulmonologist at [facility].  Patient verbalized understanding of this information and need for follow-up.  Patient is nontoxic-appearing and in sound mind at time of discharge to make her own medical decisions.    Amount and/or Complexity of Data Reviewed  Labs: ordered. Decision-making details documented in ED Course.  Radiology: ordered. Decision-making details documented in ED Course.  ECG/medicine tests: ordered.    Risk  Prescription drug management.        Final diagnoses:   Weakness       ED Disposition  ED Disposition       ED Disposition   Discharge    Condition   Stable    Comment   --               Sigrid Byers PA  [address] [facility]  [id number]  [place] [address]  [phone]    Schedule an appointment as soon as possible for a visit in 1 day      [facility] EMERGENCY DEPARTMENT [place]  [address]  [facility] [address] [phone]  Go to   As needed, If symptoms worsen         Medication List      No changes were made to your prescriptions during this visit.

## 2024-11-09 ENCOUNTER — HOSPITAL ENCOUNTER (EMERGENCY)
Facility: HOSPITAL | Age: 59
Discharge: HOME | End: 2024-11-09
Attending: EMERGENCY MEDICINE
Payer: MEDICAID

## 2024-11-09 VITALS
TEMPERATURE: 98 F | HEART RATE: 84 BPM | SYSTOLIC BLOOD PRESSURE: 149 MMHG | WEIGHT: 130 LBS | RESPIRATION RATE: 18 BRPM | BODY MASS INDEX: 21.66 KG/M2 | HEIGHT: 65 IN | OXYGEN SATURATION: 97 % | DIASTOLIC BLOOD PRESSURE: 104 MMHG

## 2024-11-09 DIAGNOSIS — F99 PSYCHIATRIC SYMPTOMS: ICD-10-CM

## 2024-11-09 DIAGNOSIS — G89.29 CHRONIC PAIN OF LEFT KNEE: Primary | ICD-10-CM

## 2024-11-09 DIAGNOSIS — M25.562 CHRONIC PAIN OF LEFT KNEE: Primary | ICD-10-CM

## 2024-11-09 LAB
ALBUMIN SERPL-MCNC: 3.8 G/DL (ref 3.5–5.2)
ALBUMIN/GLOB SERPL: 1.4 G/DL
ALP SERPL-CCNC: 130 U/L (ref 39–117)
ALT SERPL W P-5'-P-CCNC: 19 U/L (ref 1–33)
AMPHET+METHAMPHET UR QL: POSITIVE
AMPHETAMINES UR QL: POSITIVE
ANION GAP SERPL CALCULATED.3IONS-SCNC: 13.1 MMOL/L (ref 5–15)
APAP SERPL-MCNC: <5 MCG/ML (ref 0–30)
AST SERPL-CCNC: 31 U/L (ref 1–32)
BARBITURATES UR QL SCN: NEGATIVE
BASOPHILS # BLD AUTO: 0.05 10*3/MM3 (ref 0–0.2)
BASOPHILS NFR BLD AUTO: 0.6 % (ref 0–1.5)
BENZODIAZ UR QL SCN: POSITIVE
BILIRUB SERPL-MCNC: 1.3 MG/DL (ref 0–1.2)
BUN SERPL-MCNC: 26 MG/DL (ref 6–20)
BUN/CREAT SERPL: 35.6 (ref 7–25)
BUPRENORPHINE SERPL-MCNC: NEGATIVE NG/ML
CALCIUM SPEC-SCNC: 8.9 MG/DL (ref 8.6–10.5)
CANNABINOIDS SERPL QL: NEGATIVE
CHLORIDE SERPL-SCNC: 100 MMOL/L (ref 98–107)
CO2 SERPL-SCNC: 23.9 MMOL/L (ref 22–29)
COCAINE UR QL: NEGATIVE
CREAT SERPL-MCNC: 0.73 MG/DL (ref 0.57–1)
DEPRECATED RDW RBC AUTO: 51.3 FL (ref 37–54)
EGFRCR SERPLBLD CKD-EPI 2021: 95.5 ML/MIN/1.73
EOSINOPHIL # BLD AUTO: 0.17 10*3/MM3 (ref 0–0.4)
EOSINOPHIL NFR BLD AUTO: 2.1 % (ref 0.3–6.2)
ERYTHROCYTE [DISTWIDTH] IN BLOOD BY AUTOMATED COUNT: 16 % (ref 12.3–15.4)
ETHANOL BLD-MCNC: <10 MG/DL (ref 0–10)
FENTANYL UR-MCNC: NEGATIVE NG/ML
GLOBULIN UR ELPH-MCNC: 2.8 GM/DL
GLUCOSE BLDC GLUCOMTR-MCNC: 90 MG/DL (ref 70–130)
GLUCOSE SERPL-MCNC: 90 MG/DL (ref 65–99)
HCT VFR BLD AUTO: 49.7 % (ref 34–46.6)
HGB BLD-MCNC: 15.8 G/DL (ref 12–15.9)
HOLD SPECIMEN: NORMAL
IMM GRANULOCYTES # BLD AUTO: 0.02 10*3/MM3 (ref 0–0.05)
IMM GRANULOCYTES NFR BLD AUTO: 0.2 % (ref 0–0.5)
LYMPHOCYTES # BLD AUTO: 1.97 10*3/MM3 (ref 0.7–3.1)
LYMPHOCYTES NFR BLD AUTO: 24.4 % (ref 19.6–45.3)
MCH RBC QN AUTO: 27.8 PG (ref 26.6–33)
MCHC RBC AUTO-ENTMCNC: 31.8 G/DL (ref 31.5–35.7)
MCV RBC AUTO: 87.3 FL (ref 79–97)
METHADONE UR QL SCN: NEGATIVE
MONOCYTES # BLD AUTO: 0.4 10*3/MM3 (ref 0.1–0.9)
MONOCYTES NFR BLD AUTO: 5 % (ref 5–12)
NEUTROPHILS NFR BLD AUTO: 5.45 10*3/MM3 (ref 1.7–7)
NEUTROPHILS NFR BLD AUTO: 67.7 % (ref 42.7–76)
OPIATES UR QL: NEGATIVE
OXYCODONE UR QL SCN: NEGATIVE
PCP UR QL SCN: NEGATIVE
PLATELET # BLD AUTO: 391 10*3/MM3 (ref 140–450)
PMV BLD AUTO: 9.8 FL (ref 6–12)
POTASSIUM SERPL-SCNC: 3.6 MMOL/L (ref 3.5–5.2)
PROT SERPL-MCNC: 6.6 G/DL (ref 6–8.5)
RBC # BLD AUTO: 5.69 10*6/MM3 (ref 3.77–5.28)
SALICYLATES SERPL-MCNC: <0.5 MG/DL
SODIUM SERPL-SCNC: 137 MMOL/L (ref 136–145)
TRICYCLICS UR QL SCN: NEGATIVE
WBC NRBC COR # BLD AUTO: 8.06 10*3/MM3 (ref 3.4–10.8)
WHOLE BLOOD HOLD COAG: NORMAL
WHOLE BLOOD HOLD SPECIMEN: NORMAL

## 2024-11-09 PROCEDURE — 82077 ASSAY SPEC XCP UR&BREATH IA: CPT

## 2024-11-09 PROCEDURE — 82948 REAGENT STRIP/BLOOD GLUCOSE: CPT

## 2024-11-09 PROCEDURE — 99284 EMERGENCY DEPT VISIT MOD MDM: CPT

## 2024-11-09 PROCEDURE — 80143 DRUG ASSAY ACETAMINOPHEN: CPT

## 2024-11-09 PROCEDURE — 85025 COMPLETE CBC W/AUTO DIFF WBC: CPT

## 2024-11-09 PROCEDURE — 80053 COMPREHEN METABOLIC PANEL: CPT

## 2024-11-09 PROCEDURE — 36415 COLL VENOUS BLD VENIPUNCTURE: CPT

## 2024-11-09 PROCEDURE — 80179 DRUG ASSAY SALICYLATE: CPT

## 2024-11-09 PROCEDURE — 80307 DRUG TEST PRSMV CHEM ANLYZR: CPT

## 2024-11-09 RX ORDER — SODIUM CHLORIDE 0.9 % (FLUSH) 0.9 %
10 SYRINGE (ML) INJECTION AS NEEDED
Status: DISCONTINUED | OUTPATIENT
Start: 2024-11-09 | End: 2024-11-09 | Stop reason: HOSPADM

## 2024-11-09 RX ORDER — LOSARTAN POTASSIUM 50 MG/1
50 TABLET ORAL ONCE
Status: COMPLETED | OUTPATIENT
Start: 2024-11-09 | End: 2024-11-09

## 2024-11-09 RX ORDER — VALSARTAN 80 MG/1
80 TABLET ORAL
Status: DISCONTINUED | OUTPATIENT
Start: 2024-11-09 | End: 2024-11-09 | Stop reason: CLARIF

## 2024-11-09 RX ADMIN — LOSARTAN POTASSIUM 50 MG: 50 TABLET, FILM COATED ORAL at 15:52

## 2024-11-09 NOTE — [facility] PROVIDER NOTE
"Subjective  History of Present Illness:    Patient is a 59-year-old female presents via EMS for left knee pain, patient initially states that it started hurting after being discharged from  but then states has been ongoing for a while and per chart review has been ongoing.  Patient had full cardiac pulmonary workup at  this morning.  Patient states they gave her Ativan in the emergency department there due to her anxiety.  Patient does have known psychiatric illness.  Patient states she is currently staying [place] a [facility], [employer].  Patient denies chest pain, shortness of breath, abdominal pain, nausea, vomiting, difficulty walking, numbness/tingling, peripheral edema      Nurses Notes reviewed and agree, including vitals, allergies, social history and prior medical history.     REVIEW OF SYSTEMS: All systems reviewed and not pertinent unless noted.  Review of Systems    Past Medical History:   Diagnosis Date    CHF (congestive heart failure)     Hypertension     Pneumonia     Pulmonary embolism without acute cor pulmonale 12/15/2023       Allergies:    Lisinopril, Other, Percocet [oxycodone-acetaminophen], and Sulfa antibiotics      Past Surgical History:   Procedure Laterality Date    HERNIA REPAIR      KNEE SURGERY      RHINOPLASTY           Social History     Socioeconomic History    Marital status:    Tobacco Use    Smoking status: Never    Smokeless tobacco: Never   Vaping Use    Vaping status: Never Used   Substance and Sexual Activity    Alcohol use: No    Drug use: No    Sexual activity: Defer         Family History   Problem Relation Age of Onset    No Known Problems Mother     No Known Problems Father        Objective  Physical Exam:  BP (!) 149/104   Pulse 84   Temp 98 °F (36.7 °C) (Oral)   Resp 18   Ht 165.1 cm (65\")   Wt 59 kg (130 lb)   SpO2 97%   BMI 21.63 kg/m²      Physical Exam  Constitutional:       Appearance: Well-developed. No acute distress  HENT:      Head: Normocephalic and " "atraumatic.      Mouth/Throat:      Mouth: Mucous membranes are moist.      Eyes:      Extraocular Movements: Extraocular movements intact.   Cardiovascular:      Rate and Rhythm: Normal rate and regular rhythm.      Heart sounds: Normal heart sounds.   Pulmonary:      Effort: Pulmonary effort is normal. No respiratory distress.      Breath sounds: Normal breath sounds.   Abdominal:      General: There is no distension.      Palpations: Abdomen is soft and nontender. No rebound tenderness or guarding noted  Musculoskeletal:         General: No swelling or tenderness.  No pain to palpation of left knee, calf, popliteal.  2+ pulses left DP and PT.  Full active range of motion of left knee without pain.      Extremities: Moves all 4s   Skin:     General: Skin is warm and dry.  No overlying open skin wounds or skin discoloration of the left knee, well-healed scar noted left medial knee.     Capillary Refill: Capillary refill takes less than 2 seconds.   Neurological:      Mental Status:Alert and oriented to person, place, and time.   Mentation is normal   Psychiatric:         Mood and Affect: Mood normal.         Behavior: Behavior normal.     Procedures    ED Course:    ED Course as of 11/09/24 1558   Sat Nov 09, 2024   1336 Called back into the room due to patient screaming.  Patient is saying\" she needs the generic brand Wellbutrin that is cream coated with a caramel ring and no doctor will give it to her.  She says that her special blood in the special office requires that type of medication and if she goes to any other states she will be able to get it besides here\"  [OM]   1336 After chart review patient has history of schizophrenia and similar statements as noted [OM]   1425 Per chart review patient has had multiple extensive cardiac workups including CT angiogram and cardiac workup earlier this morning at [facility].  Patient appears to have recent pneumonia.  Patient's not complaining of any cough, " congestion, shortness of breath, chest pain today.  Patient mentions her complaint is the left knee pain that appears to have been ongoing per chart review from recent visits to the emergency department. [OM]   1437 Patient is alternating between drowsy but arousable by voice to up jumping around the room screaming. [OM]   1441 Currently waiting for Psych eval  [OM]   1441 Methamphetamine, Ur(!): Positive [OM]   1441 Amphetamine, Urine Qual(!): Positive [OM]   1441 Benzodiazepine Screen, Urine(!): Positive [OM]   1537 waiting for psych to call back and give recommendation. [OM]   1554 Patient's blood pressure has been elevated, per chart review patient's blood pressure actually improved from recent visits.  Discussed with patient to follow-up with primary care for further discussion of blood pressure control, patient is on blood pressure medication unsure if she is taking it. [OM]      ED Course User Index  [OM] Skylar Blanton PA-C       Lab Results (last 24 hours)       Procedure Component Value Units Date/Time    D-DIMER, QUANTITATIVE [[id number]]  (Abnormal) Collected: 11/09/24 0539    Specimen: Blood, Venous Line Updated: 11/09/24 1253     D-Dimer, Quant 0.63 ug/mL FEU     Narrative:      Test performed by [facility] D-dimer assay. Values greater than 0.50 ug/mL FEU should be evaluated for risk stratification of patients with possible venous thromboembolism. In addition to expected elevations following injury or surgery, D-dimer levels increase in normal pregnancy and increase steadily with normal aging. Values in healthy individuals often exceed the VTE exclusion cutoff value, and should be considered in the clinical context.    PROBNP (REFERENCE) [[id number]]  (Abnormal) Collected: 11/09/24 0539    Specimen: Blood, Venous Line Updated: 11/09/24 1253     proBNP 9,946 pg/mL     TROPONIN [[id number]]  (Abnormal) Collected: 11/09/24 0539    Specimen: Blood, Venous Line Updated: 11/09/24 1253     Troponin T 33 ng/L      COMPREHENSIVE METABOLIC PANEL [[id number]]  (Abnormal) Collected: 11/09/24 0539     Updated: 11/09/24 1253    CBC (NO DIFF) [[id number]]  (Abnormal) Collected: 11/09/24 0539    Specimen: Blood, Venous Line Updated: 11/09/24 1253     WBC 8.96 10*3/uL      RBC 4.62 10*6/uL      Hemoglobin 12.9 g/dL      Hematocrit 40.6 %      Platelets 325 10*3/uL      MCV 88 fL      MCH 27.9 pg      MCHC 31.8 g/dL      RDW 15.9 %      MPV 9.6 fL      nRBC 0.0 per 100 WBCs     TROPONIN T REFERENCE [[id number]]  (Abnormal) Collected: 11/09/24 0754     Updated: 11/09/24 1253    CBC & Differential [[id number]]  (Abnormal) Collected: 11/09/24 1349    Specimen: Blood Updated: 11/09/24 1400    Narrative:      The following orders were created for panel order CBC & Differential.  Procedure                               Abnormality         Status                     ---------                               -----------         ------                     CBC Auto Differential[[id number]]        Abnormal            Final result                 Please view results for these tests on the individual orders.    CBC Auto Differential [[id number]]  (Abnormal) Collected: 11/09/24 1349    Specimen: Blood Updated: 11/09/24 1400     WBC 8.06 10*3/mm3      RBC 5.69 10*6/mm3      Hemoglobin 15.8 g/dL      Hematocrit 49.7 %      MCV 87.3 fL      MCH 27.8 pg      MCHC 31.8 g/dL      RDW 16.0 %      RDW-SD 51.3 fl      MPV 9.8 fL      Platelets 391 10*3/mm3      Neutrophil % 67.7 %      Lymphocyte % 24.4 %      Monocyte % 5.0 %      Eosinophil % 2.1 %      Basophil % 0.6 %      Immature Grans % 0.2 %      Neutrophils, Absolute 5.45 10*3/mm3      Lymphocytes, Absolute 1.97 10*3/mm3      Monocytes, Absolute 0.40 10*3/mm3      Eosinophils, Absolute 0.17 10*3/mm3      Basophils, Absolute 0.05 10*3/mm3      Immature Grans, Absolute 0.02 10*3/mm3     Urine Drug Screen - Urine, Clean Catch [[id number]]  (Abnormal) Collected: 11/09/24 1413    Specimen: Urine, Clean Catch  Updated: 11/09/24 1431     THC, Screen, Urine Negative     Phencyclidine (PCP), Urine Negative     Cocaine Screen, Urine Negative     Methamphetamine, Ur Positive     Opiate Screen Negative     Amphetamine Screen, Urine Positive     Benzodiazepine Screen, Urine Positive     Tricyclic Antidepressants Screen Negative     Methadone Screen, Urine Negative     Barbiturates Screen, Urine Negative     Oxycodone Screen, Urine Negative     Buprenorphine, Screen, Urine Negative    Narrative:      Cutoff For Drugs Screened:    Amphetamines               500 ng/ml  Barbiturates               200 ng/ml  Benzodiazepines            150 ng/ml  Cocaine                    150 ng/ml  Methadone                  200 ng/ml  Opiates                    100 ng/ml  Phencyclidine               25 ng/ml  THC                         50 ng/ml  Methamphetamine            500 ng/ml  Tricyclic Antidepressants  300 ng/ml  Oxycodone                  100 ng/ml  Buprenorphine               10 ng/ml    The normal value for all drugs tested is negative. This report includes unconfirmed screening results, with the cutoff values listed, to be used for medical treatment purposes only.  Unconfirmed results must not be used for non-medical purposes such as employment or legal testing.  Clinical consideration should be applied to any drug of abuse test, particularly when unconfirmed results are used.      Fentanyl, Urine - Urine, Clean Catch [[id number]]  (Normal) Collected: 11/09/24 1413    Specimen: Urine, Clean Catch Updated: 11/09/24 1436     Fentanyl, Urine Negative    Narrative:      Negative Threshold:      Fentanyl 5 ng/mL     The normal value for the drug tested is negative. This report includes final unconfirmed screening results to be used for medical treatment purposes only. Unconfirmed results must not be used for non-medical purposes such as employment or legal testing. Clinical consideration should be applied to any drug of abuse test,  particularly when unconfirmed results are used.           Comprehensive Metabolic Panel [[id number]]  (Abnormal) Collected: 11/09/24 1507    Specimen: Blood Updated: 11/09/24 1531     Glucose 90 mg/dL      BUN 26 mg/dL      Creatinine 0.73 mg/dL      Sodium 137 mmol/L      Potassium 3.6 mmol/L      Chloride 100 mmol/L      CO2 23.9 mmol/L      Calcium 8.9 mg/dL      Total Protein 6.6 g/dL      Albumin 3.8 g/dL      ALT (SGPT) 19 U/L      AST (SGOT) 31 U/L      Alkaline Phosphatase 130 U/L      Total Bilirubin 1.3 mg/dL      Globulin 2.8 gm/dL      A/G Ratio 1.4 g/dL      BUN/Creatinine Ratio 35.6     Anion Gap 13.1 mmol/L      eGFR 95.5 mL/min/1.73     Narrative:      GFR Normal >60  Chronic Kidney Disease <60  Kidney Failure <15      Acetaminophen Level [[id number]]  (Normal) Collected: 11/09/24 1507    Specimen: Blood Updated: 11/09/24 1529     Acetaminophen <5.0 mcg/mL     Ethanol [[id number]]  (Normal) Collected: 11/09/24 1507    Specimen: Blood Updated: 11/09/24 1531     Ethanol <10 mg/dL     Narrative:      Elevated lactic acid concentration and lactate dehydrogenase(LD) activity may falsely elevate enzymatically determined ethanol levels. Not for legal purposes.     Salicylate Level [[id number]]  (Normal) Collected: 11/09/24 1507    Specimen: Blood Updated: 11/09/24 1531     Salicylate <0.5 mg/dL     POC Glucose Once [[id number]]  (Normal) Collected: 11/09/24 1509    Specimen: Blood Updated: 11/09/24 1510     Glucose 90 mg/dL      Comment: Serial Number: [id number]:  [id number]                CT Angiogram Chest Pulmonary Embolism    Result Date: 11/9/2024  CLINICAL INDICATION: Pulmonary embolism (PE) suspected, low to intermediate prob, positive D-dimer TECHNIQUE: Imaging of the chest was performed from thoracic inlet through upper abdomen, using spiral technique, with administration of IV contrast per the pulmonary angiogram protocol. 80 mL of Omnipaque-350 were administered intravenously. Coronal MIP images  were reconstructed from this dataset. Total DLP (Dose-Length Product): 151.46 mGy.cm. Please note: The reported value represents the total of one or more individual components during the CT acquisition on this date and at this time, and as such, the same value may appear in more than one CT report depending on the interpreting/reporting physicians. COMPARISON: 9/6/2024 FINDINGS: Pulmonary Arteries/Vessels: No filling defect. Right Heart Strain: Absent. Mediastinum and Pleura: Cardiomegaly. Mild prominence of the ascending aorta not significant change measuring approximately 4 cm. No mediastinal or hilar mass or adenopathy. There are coronary artery calcifications. No pericardial effusion. Lungs: Mosaic attenuation of the lungs may be related to hypoventilatory changes or small airway disease. Bibasilar atelectasis. There is a subpleural opacity is present in the left lung base medially centimeters not readily apparent on the previous exam Upper Abdomen: No suspicious lesions in the partially visualized upper abdomen. Musculoskeletal: No suspicious lytic or sclerotic lesion.    Impression: No pulmonary embolism. Mosaic attenuation of the lungs with bibasilar atelectasis. 2 cm subpleural opacity left lung base medially and developed since the previous exam could be due to an area of consolidation, loculated effusion, or developing mass. Short-term follow-up suggested to confirm stability or resolution. CRITICAL RESULT:   No. COMMUNICATION: Per this written report. Drafted by Kiko Larson MD on 11/9/2024 9:13 AM Final report signed by Kiko Larson MD on 11/9/2024 9:25 AM    XR Chest 1 View    Result Date: 11/9/2024  CLINICAL INDICATION: sob TECHNIQUE: XR CHEST 1 VIEW COMPARISON: 11/4/2024 FINDINGS: Stable mediastinal contours and enlarged cardiac silhouette. Unchanged bilateral perihilar opacities. No consolidation, large pleural effusion or pneumothorax. No acute osseous abnormality.    Impression: No significant  interval change. Unchanged mild bilateral perihilar opacities which are nonspecific but could represent atypical infection, atelectasis or pulmonary vascular congestion. CRITICAL RESULT:   No. COMMUNICATION: Per this written report. Preliminary report signed by Arnulfo Cramer MD on 11/9/2024 5:22 AM By electronically signing this report, I, the attending physician, attest that I have personally reviewed the images/data for the above examination(s) and agree with the final edited report. Drafted by Arnulfo Cramer MD on 11/9/2024 5:20 AM Final report signed by Chris Pabon MD on 11/9/2024 5:35 AM    XR Chest 1 View    Result Date: 11/7/2024  XR CHEST 1 VW Date of Exam: 11/7/2024 5:57 PM EST Indication: Weak/Dizzy/AMS triage protocol Comparison: 10/24/2024, 10/12/2024 FINDINGS: No new consolidations or pleural effusions are observed. The cardiac silhouette and mediastinum are stable. No acute osseous abnormalities are identified.     Impression: No evidence for acute cardiopulmonary process. Electronically Signed: Truman Rodríguez MD  11/7/2024 6:13 PM EST  Workstation ID: [id number]        MDM    Initial impression of presenting illness: Initially left knee pain, this appears chronic, patient was at  emergency department earlier today had a full cardiac workup.  Patient denies chest pain or shortness of breath here.  Patient does have history of psychiatric illnesses.     DDX: includes but is not limited to: Schizophrenia, kareen, bipolar, drug use, alcohol use,    Patient arrives initially drowsy but arousable by voice, then becomes anxious and yelling, then appears drowsy again, still arousable by voice.  With vitals interpreted by myself.     Pertinent results: Urine drug screen shows methamphetamine, amphetamine and benzos, patient was given a benzo at  per chart review.    Diagnostic information from other sources: Chart review    Interventions / Re-evaluation: Patient no longer complaining of any pain  "after initial exam, states that she has been trying to get the generic of Wellbutrin that is cream-colored and caramel outlined and that is all she wants.  Patient says she has a prescription for this.    Medications   sodium chloride 0.9 % flush 10 mL (has no administration in time range)   losartan (COZAAR) tablet 50 mg (50 mg Oral Given 11/9/24 2322)       Results/clinical rationale were discussed with Misa Hitchcock with psychiatry who recommends patient is safe to be discharged home.  From  Ce Hitchcock \"patient denies SI/HI/AVH.   She is experiencing some delusions but is alert and oriented x4. I offered inpatient and she refused. At this point, Dr. Abraham, our psychiatrist, advises that there are no grounds to hold her. Patient feels safe with discharge. Reports that she plans to follow-up with her doctor, Sigrid Betancourt. \"     Patient also denies suicidal and homicidal ideation with me as well.  Patient alert, oriented, sitting up comfortably eating a sandwich.  Patient states she would like a Uber back to [employer].    Consultations/Discussion of results with other physicians: Attending    Data interpreted: Nursing notes reviewed, vital signs reviewed.  Labs independently interpreted by me     Counseling: Discussed the results above with the patient regarding need for admission or discharge.  Patient understands and agrees plan of care.  Discussed with patients the results from today's visit. Discussed with patient strict return precautions and they verbalize understanding. Recommend to them following up with primary care as soon as possible. Patient is discharged hemodynamically stable and comfortable.       -----  ED Disposition       ED Disposition   Discharge    Condition   Stable    Comment   --             Final diagnoses:   Chronic pain of left knee   Psychiatric symptoms     Your Follow-Up Providers    Follow-up information has not been specified.       Contact information for " after-discharge care    Follow-up information has not been specified.          Your medication list        CONTINUE taking these medications        Instructions Last Dose Given Next Dose Due   Aspirin Low Dose 81 MG EC tablet  Generic drug: aspirin      Take 1 tablet by mouth Daily.       bumetanide 1 MG tablet  Commonly known as: BUMEX      Take 1 tablet by mouth Daily.       buPROPion  MG 24 hr tablet  Commonly known as: WELLBUTRIN XL      Take 1 tablet by mouth Daily.       Eliquis 5 MG tablet tablet  Generic drug: apixaban      Take 1 tablet by mouth 2 (Two) Times a Day.       Jardiance 10 MG tablet tablet  Generic drug: empagliflozin      Take 1 tablet by mouth Daily.       metoprolol succinate XL 50 MG 24 hr tablet  Commonly known as: TOPROL-XL      Take 1 tablet by mouth Daily.       pantoprazole 40 MG EC tablet  Commonly known as: PROTONIX      Take 1 tablet by mouth Daily.       rosuvastatin 40 MG tablet  Commonly known as: CRESTOR      Take 1 tablet by mouth Daily.       spironolactone 100 MG tablet  Commonly known as: ALDACTONE      Take 1 tablet by mouth Daily.       valsartan-hydrochlorothiazide 80-12.5 MG per tablet  Commonly known as: DIOVAN-HCT      Take 0.5 tablets by mouth Daily for 30 days.       valsartan-hydrochlorothiazide 80-12.5 MG per tablet  Commonly known as: DIOVAN-HCT      Take 1 tablet by mouth Daily.

## 2024-11-09 NOTE — CONSULTS
Jill M Paladino  1965    This provider is located at [facility] ([address] [place] [address], [id number]) using a secure Teams video visit. Patient is being seen remotely via telehealth at [address] [place] [address], [id number], and stated they are in a secure environment for this session. The patient's condition being diagnosed/treated is appropriate for telemedicine. The provider identified themselves as well as their credentials. The patient, and/or patients guardian, consent to be seen remotely, and when consent is given they understand that the consent allows for patient identifiable information to be sent to a third party as needed. They may refuse to be seen remotely at any time. The electronic data is encrypted and password protected, and the patient and/or guardian has been advised of the potential risks to privacy not withstanding such measures.    Preferred Pronouns: She/her  Race/Ethnicity: White or   Martial Status:   Guardian Name/Contact/etc: Self  Pt Lives With: Alone  Occupation: unemployed  Appearance: clean and casually dressed, appropriate     Time Called for Assessment: 13:42  Assessment Start and End: Approximately 14:30 to 15:00      DATA:   Clinician received a call from [facility] staff for a behavioral health consult. The patient is agreeable to speak with the behavioral health team. Met with patient at bedside through telehealth. The attending treatment team is ERIK Tinajero and KARL Cheng. Patient presents to the ED today complaining of sudden onset knee pain. Behavioral health consult ordered due to patient making delusional statements. Clinician completed assessment with patient and observations are documented as follows.      ASSESSMENT:    Clinician consulted with patient for mental status exam and assessment. Clinical descriptors are documented as follows. Clinician completed CSSRS with patient for suicide risk assessment. The results of  "patient’s CSSRS documented as follows.    Presenting Problems: Patient is alert and oriented x4. Reports that she went to 's ER earlier today due to anxiety and blood clots in her knees. Patient reports that she was given ativan, which causes her to sleep for a long period of time. Per Tanvi RN, chart confirms that patient received ativan around 6:00 this morning. Patient is drowsy during this assessment but able to respond to questions when aroused. Patient tells me that when she arrived at her hotel (where she lives) after being at  this morning, she was extremely sleepy and experienced a painful stephani horse in her leg, which prompted the  to call EMS. Patient states, \"I told him, I just needed to sleep.\" Patient adamantly denies suicidal thoughts, homicidal thoughts, or hallucinations. Patient does make bizarre statements about needing a specific brand of \"Wellbutrin that is designed for the special forces.\" Patient believes that she is a part of the special forces. Patient also believes that she has blood clots. Patient reports that wants to go home so that she can sleep. Clinician asked patient about substance use, but patient denies. UDS positive for methamphetamine, amphetamine, and benzodiazepine.   Current Stressors: medical diagnosis/condition     Established Therapy, Medication Management or Other Mental Health Services: Patient denies current therapy or med management.  Current Medications:  Apixaban 5 mg Oral 2 Times Daily  Aspirin 81 mg Oral Daily  Bumetanide 1 mg Oral Daily  buPROPion HCl 300 mg Oral Daily  Empagliflozin 10 mg Oral Daily  Metoprolol Succinate 50 mg Oral Daily  Pantoprazole Sodium 40 mg Oral Daily  Rosuvastatin Calcium 40 mg Oral Daily  Spironolactone 100 mg Oral Daily  Valsartan-hydroCHLOROthiazide  80-12.5 MG tablet, 0.5 tablets Oral Daily  80-12.5 MG tablet, 1 tablet Oral Daily      Mental Status Exam:  Behavior: Appropriate  Psychomotor Movement: " Appropriate  Attention and Cooperation: Adequate; patient drifted into sleep throughout assessment but was easily aroused and Cooperative  Mood: appropriate to circumstances and Affect: Full range  Orientation: alert and oriented to person, place, and time   Thought Process: linear, logical, and goal directed  Thought Content: delusional  Delusions: bizarre   Hallucinations: Not demonstrated today   Concentration: Normal  Suicidal Ideation: Absent  Homicidal Ideation: Absent  Hopelessness: no  Speech: Normal  Eye Contact: Non-sustained  Insight: Poor  Judgement: Poor    Depression: 0  Anxiety: 0  Sleep: Fair   Appetite: Good       Hx of Psychiatric or Detox Hospitalizations: Patient reports that she was at [facility] for observation for about a week   Most recent inpatient admission: Patient reports 10 years ago    Suicidal Ideation Assessment:    [place]-SUICIDE SEVERITY RATING SCALE  Psychiatric Inpatient Setting - Discharge Screener    Ask questions that are bold and underlined Discharge   Ask Questions 1 and 2 YES NO   Wish to be Dead:   Person endorses thoughts about a wish to be dead or not alive anymore, or wish to fall asleep and not wake up.  While you were here in the hospital, have you wished you were dead or wished you could go to sleep and not wake up?  X   Suicidal Thoughts:   General non-specific thoughts of wanting to end one's life/die by suicide, “I've thought about killing myself” without general thoughts of ways to kill oneself/associated methods, intent, or plan.   While you were here in the hospital, have you actually had thoughts about killing yourself?   X   If YES to 2, ask questions 3, 4, 5, and 6.  If NO to 2, go directly to question 6   3) Suicidal Thoughts with Method (without Specific Plan or Intent to Act):   Person endorses thoughts of suicide and has thought of a least one method during the assessment period. This is different than a specific plan with time, place or  method details worked out. “I thought about taking an overdose but I never made a specific plan as to when where or how I would actually do it….and I would never go through with it.”   Have you been thinking about how you might kill yourself?      4) Suicidal Intent (without Specific Plan):   Active suicidal thoughts of killing oneself and patient reports having some intent to act on such thoughts, as opposed to “I have the thoughts but I definitely will not do anything about them.”   Have you had these thoughts and had some intention of acting on them or do you have some intention of acting on them after you leave the hospital?      5) Suicide Intent with Specific Plan:   Thoughts of killing oneself with details of plan fully or partially worked out and person has some intent to carry it out.   Have you started to work out or worked out the details of how to kill yourself either for while you were here in the hospital or for after you leave the hospital? Do you intend to carry out this plan?        6) Suicide Behavior    While you were here in the hospital, have you done anything, started to do anything, or prepared to do anything to end your life?    Examples: Took pills, cut yourself, tried to hang yourself, took out pills but didn't swallow any because you changed your mind or someone took them from you, collected pills, secured a means of obtaining a gun, gave away valuables, wrote a will or suicide note, etc.  X     Suicidal: Absent; patient adamantly denies SI or wish to be dead.  Previous Attempts: Patient denies  Most Recent Attempt: N/A    Psychosocial History    Highest Level of Education: Unknown to clinician  Family Hx of Mental Health/Substance Abuse: No  Patient Trauma/Abuse History: Patient reports none   Does this require reporting: N/A  Patient Identified Support System (List family members, loved ones, guardians, friends, etc): Patient reports that she attends the [facility] in [place] for  support.    Legal History / History of Violence: Denies significant history of legal issues.   Experience with Interpersonal Violence: No  History of Inappropriate Sexual Behavior: No    Social Determinants of Health  Housing Instability and/or Utility Needs: No  Food Insecurity: No  Transportation Needs: No    Substance Use History  Active Use: Per chart, UDS positive for methamphetamine, amphetamine, and benzodiazepine. Patient denies substance use during this assessment  Does the patient have history or current MAT/MOUD: No      PLAN:  At this time, clinician suggests inpatient treatment based upon the above assessment due to patient's bizarre delusions. Patient is not agreeable. Clinician consulted with psychiatrist, Dr. Abraham, who advises that patient is appropriate for discharge as she is not holdable. Clinician collaborated with the treatment team who agree to adopt the recommendations. Patient reports that she plans to follow-up with her primary doctor, Dr. Byers.    Have the levels of care been discussed with the patient? Yes  Level of care recommendation: Inpatient   Is patient agreeable to treatment? No    Safety Planning:  Does the patient have access to weapons or firearms? No  Did clinician discuss securing weapons, firearms, sharps and/or medications with the patient? Yes  Safety Plan: Clinician verbally engaged in safety planning by assisting the patient in identifying risk factors that would indicate the need for higher level of care, such as thoughts to harm self or others and/or self-harming behavior(s). Safety plan of report to nearest hospital, calling local police or 911 if feeling unsafe, if having suicidal or homicidal thoughts, or if in emergent need of medications verbally reviewed with patient during assessment and suicide prevention/crisis hotlines verbally reviewed with patient during assessment. Patient during assessment verbally agreed to safety plan. Reviewed resources of crisis  hotlines or presenting to the nearest emergency department should symptoms worsen, or in any crisis/emergency. Patient agreeable and voiced understanding.       IF AN ADMISSION WOULD BENEFIT THE PT, BUT PT NOT AGREEABLE NOR APPROPRIATE FOR A HOLD:  Although the patient has potential to benefit from the inpatient level of care, the patient is not agreeable for an acute admission at this time. Patient does not present with criteria to warrant an involuntary psychiatric hold at this time as they are not endorsing active suicidal ideation with plan/intent, homicidal ideation with plan/intent or displaying symptoms of an acute psychotic episode without ability to appropriately plan for safety at a lesser restrictive level of care. The patient identified proactive factors and is agreeable to establish/engage in outpatient behavioral health services. Clinician provided resources for outpatient services and discussed the availability of emergency behavioral health services 24/7 through the [facility]. Clinician verbally engaged in safety planning by assisting the patient in identifying risk factors that would indicate the need for higher level of care, such as thoughts to harm self or others and/or self-harming behavior(s). Safety plan of report to nearest hospital, calling local police or 911 if feeling unsafe, if having suicidal or homicidal thoughts, or if in emergent need of medications verbally reviewed with patient during assessment and suicide prevention/crisis hotlines verbally reviewed with patient during assessment. Patient during assessment verbally agreed to safety plan. Reviewed resources of crisis hotlines or presenting to the nearest emergency department should symptoms worsen, or in any crisis/emergency. Patient agreeable and voiced understanding.       SIGNATURE  MARISA Elias  11/9/2024

## 2024-11-23 ENCOUNTER — HOSPITAL ENCOUNTER (EMERGENCY)
Facility: HOSPITAL | Age: 59
Discharge: HOME | End: 2024-11-23
Attending: EMERGENCY MEDICINE
Payer: MEDICAID

## 2024-11-23 ENCOUNTER — APPOINTMENT (OUTPATIENT)
Facility: HOSPITAL | Age: 59
End: 2024-11-23
Payer: MEDICAID

## 2024-11-23 VITALS
HEART RATE: 88 BPM | HEIGHT: 65 IN | RESPIRATION RATE: 22 BRPM | SYSTOLIC BLOOD PRESSURE: 144 MMHG | TEMPERATURE: 97.6 F | OXYGEN SATURATION: 96 % | WEIGHT: 130 LBS | BODY MASS INDEX: 21.66 KG/M2 | DIASTOLIC BLOOD PRESSURE: 106 MMHG

## 2024-11-23 DIAGNOSIS — I50.9 CONGESTIVE HEART FAILURE, UNSPECIFIED HF CHRONICITY, UNSPECIFIED HEART FAILURE TYPE: Primary | ICD-10-CM

## 2024-11-23 DIAGNOSIS — R60.0 PERIPHERAL EDEMA: ICD-10-CM

## 2024-11-23 LAB
ALBUMIN SERPL-MCNC: 3.9 G/DL (ref 3.5–5.2)
ALBUMIN/GLOB SERPL: 1.4 G/DL
ALP SERPL-CCNC: 126 U/L (ref 39–117)
ALT SERPL W P-5'-P-CCNC: 24 U/L (ref 1–33)
ANION GAP SERPL CALCULATED.3IONS-SCNC: 12 MMOL/L (ref 5–15)
AST SERPL-CCNC: 37 U/L (ref 1–32)
BACTERIA UR QL AUTO: ABNORMAL /HPF
BASOPHILS # BLD AUTO: 0.05 10*3/MM3 (ref 0–0.2)
BASOPHILS NFR BLD AUTO: 0.6 % (ref 0–1.5)
BILIRUB SERPL-MCNC: 0.7 MG/DL (ref 0–1.2)
BILIRUB UR QL STRIP: NEGATIVE
BUN SERPL-MCNC: 25 MG/DL (ref 6–20)
BUN/CREAT SERPL: 29.8 (ref 7–25)
CALCIUM SPEC-SCNC: 8.8 MG/DL (ref 8.6–10.5)
CHLORIDE SERPL-SCNC: 102 MMOL/L (ref 98–107)
CLARITY UR: CLEAR
CO2 SERPL-SCNC: 23 MMOL/L (ref 22–29)
COLOR UR: YELLOW
CREAT SERPL-MCNC: 0.84 MG/DL (ref 0.57–1)
DEPRECATED RDW RBC AUTO: 52.9 FL (ref 37–54)
EGFRCR SERPLBLD CKD-EPI 2021: 80.7 ML/MIN/1.73
EOSINOPHIL # BLD AUTO: 0.23 10*3/MM3 (ref 0–0.4)
EOSINOPHIL NFR BLD AUTO: 2.9 % (ref 0.3–6.2)
ERYTHROCYTE [DISTWIDTH] IN BLOOD BY AUTOMATED COUNT: 16 % (ref 12.3–15.4)
GLOBULIN UR ELPH-MCNC: 2.7 GM/DL
GLUCOSE BLDC GLUCOMTR-MCNC: 97 MG/DL (ref 70–130)
GLUCOSE SERPL-MCNC: 119 MG/DL (ref 65–99)
GLUCOSE UR STRIP-MCNC: NEGATIVE MG/DL
HCT VFR BLD AUTO: 45.5 % (ref 34–46.6)
HGB BLD-MCNC: 14 G/DL (ref 12–15.9)
HGB UR QL STRIP.AUTO: ABNORMAL
HOLD SPECIMEN: NORMAL
HYALINE CASTS UR QL AUTO: ABNORMAL /LPF
IMM GRANULOCYTES # BLD AUTO: 0.01 10*3/MM3 (ref 0–0.05)
IMM GRANULOCYTES NFR BLD AUTO: 0.1 % (ref 0–0.5)
KETONES UR QL STRIP: NEGATIVE
LEUKOCYTE ESTERASE UR QL STRIP.AUTO: ABNORMAL
LYMPHOCYTES # BLD AUTO: 2.37 10*3/MM3 (ref 0.7–3.1)
LYMPHOCYTES NFR BLD AUTO: 30.2 % (ref 19.6–45.3)
MAGNESIUM SERPL-MCNC: 2 MG/DL (ref 1.6–2.6)
MCH RBC QN AUTO: 27.8 PG (ref 26.6–33)
MCHC RBC AUTO-ENTMCNC: 30.8 G/DL (ref 31.5–35.7)
MCV RBC AUTO: 90.3 FL (ref 79–97)
MONOCYTES # BLD AUTO: 0.45 10*3/MM3 (ref 0.1–0.9)
MONOCYTES NFR BLD AUTO: 5.7 % (ref 5–12)
NEUTROPHILS NFR BLD AUTO: 4.74 10*3/MM3 (ref 1.7–7)
NEUTROPHILS NFR BLD AUTO: 60.5 % (ref 42.7–76)
NITRITE UR QL STRIP: NEGATIVE
PH UR STRIP.AUTO: 6 [PH] (ref 5–8)
PLATELET # BLD AUTO: 278 10*3/MM3 (ref 140–450)
PMV BLD AUTO: 10.2 FL (ref 6–12)
POTASSIUM SERPL-SCNC: 4.3 MMOL/L (ref 3.5–5.2)
PROT SERPL-MCNC: 6.6 G/DL (ref 6–8.5)
PROT UR QL STRIP: ABNORMAL
RBC # BLD AUTO: 5.04 10*6/MM3 (ref 3.77–5.28)
RBC # UR STRIP: ABNORMAL /HPF
REF LAB TEST METHOD: ABNORMAL
SODIUM SERPL-SCNC: 137 MMOL/L (ref 136–145)
SP GR UR STRIP: 1.02 (ref 1–1.03)
SQUAMOUS #/AREA URNS HPF: ABNORMAL /HPF
TROPONIN T SERPL HS-MCNC: 43 NG/L
TROPONIN T SERPL HS-MCNC: 45 NG/L
UROBILINOGEN UR QL STRIP: ABNORMAL
WBC # UR STRIP: ABNORMAL /HPF
WBC NRBC COR # BLD AUTO: 7.85 10*3/MM3 (ref 3.4–10.8)
WHOLE BLOOD HOLD COAG: NORMAL
WHOLE BLOOD HOLD SPECIMEN: NORMAL

## 2024-11-23 PROCEDURE — 93005 ELECTROCARDIOGRAM TRACING: CPT | Performed by: EMERGENCY MEDICINE

## 2024-11-23 PROCEDURE — 96374 THER/PROPH/DIAG INJ IV PUSH: CPT

## 2024-11-23 PROCEDURE — 83735 ASSAY OF MAGNESIUM: CPT | Performed by: EMERGENCY MEDICINE

## 2024-11-23 PROCEDURE — 84484 ASSAY OF TROPONIN QUANT: CPT | Performed by: EMERGENCY MEDICINE

## 2024-11-23 PROCEDURE — 80053 COMPREHEN METABOLIC PANEL: CPT | Performed by: EMERGENCY MEDICINE

## 2024-11-23 PROCEDURE — 99284 EMERGENCY DEPT VISIT MOD MDM: CPT

## 2024-11-23 PROCEDURE — 82948 REAGENT STRIP/BLOOD GLUCOSE: CPT

## 2024-11-23 PROCEDURE — 25010000002 BUMETANIDE PER 0.5 MG: Performed by: EMERGENCY MEDICINE

## 2024-11-23 PROCEDURE — 36415 COLL VENOUS BLD VENIPUNCTURE: CPT

## 2024-11-23 PROCEDURE — 85025 COMPLETE CBC W/AUTO DIFF WBC: CPT | Performed by: EMERGENCY MEDICINE

## 2024-11-23 PROCEDURE — 81001 URINALYSIS AUTO W/SCOPE: CPT | Performed by: EMERGENCY MEDICINE

## 2024-11-23 PROCEDURE — 71045 X-RAY EXAM CHEST 1 VIEW: CPT

## 2024-11-23 RX ORDER — SODIUM CHLORIDE 0.9 % (FLUSH) 0.9 %
10 SYRINGE (ML) INJECTION AS NEEDED
Status: DISCONTINUED | OUTPATIENT
Start: 2024-11-23 | End: 2024-11-24 | Stop reason: HOSPADM

## 2024-11-23 RX ORDER — BUMETANIDE 0.25 MG/ML
2 INJECTION, SOLUTION INTRAMUSCULAR; INTRAVENOUS ONCE
Status: COMPLETED | OUTPATIENT
Start: 2024-11-23 | End: 2024-11-23

## 2024-11-23 RX ADMIN — BUMETANIDE 2 MG: 0.25 INJECTION INTRAMUSCULAR; INTRAVENOUS at 22:09

## 2024-11-24 NOTE — FSED PROVIDER NOTE
"Subjective   History of Present Illness  Patient presents to the emergency department for generalized weakness.  History of CHF.  Patient thinks that her Bumex is not working as well as it should.  Was seen at [facility] earlier today had a negative workup was discharged.  Says she still feels anxious and so wanted to be seen again.  Says she feels generally weak without any alleviating or exacerbating factors is not any supplemental oxygen.  No chest pain shortness of breath at this time    History provided by:  Patient   used: No        Review of Systems   Constitutional:  Positive for fatigue.   Cardiovascular:  Positive for leg swelling.   All other systems reviewed and are negative.      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    Other Nausea And Vomiting     Pt states \"all opiods make me throw up instantly\"    Percocet [Oxycodone-Acetaminophen] GI Intolerance    Sulfa Antibiotics Rash       Past Surgical History:   Procedure Laterality Date    HERNIA REPAIR      KNEE SURGERY      RHINOPLASTY         Family History   Problem Relation Age of Onset    No Known Problems Mother     No Known Problems Father        Social History     Socioeconomic History    Marital status:    Tobacco Use    Smoking status: Never    Smokeless tobacco: Never   Vaping Use    Vaping status: Never Used   Substance and Sexual Activity    Alcohol use: No    Drug use: No    Sexual activity: Defer           Objective   Physical Exam  Vitals and nursing note reviewed.   Constitutional:       General: She is not in acute distress.     Appearance: Normal appearance.   HENT:      Head: Normocephalic and atraumatic.      " Nose: Nose normal. No congestion.      Mouth/Throat:      Mouth: Mucous membranes are moist.      Pharynx: Oropharynx is clear.   Eyes:      Extraocular Movements: Extraocular movements intact.      Pupils: Pupils are equal, round, and reactive to light.   Cardiovascular:      Rate and Rhythm: Normal rate and regular rhythm.      Pulses: Normal pulses.      Heart sounds: Normal heart sounds.   Pulmonary:      Effort: Pulmonary effort is normal. No respiratory distress.      Breath sounds: Normal breath sounds. No wheezing or rhonchi.   Abdominal:      General: There is no distension.      Palpations: Abdomen is soft.      Tenderness: There is no abdominal tenderness. There is no guarding or rebound.   Musculoskeletal:         General: No deformity or signs of injury. Normal range of motion.      Cervical back: Normal range of motion and neck supple.      Right lower leg: Edema (1+) present.      Left lower leg: Edema (1+) present.   Skin:     General: Skin is warm and dry.      Capillary Refill: Capillary refill takes less than 2 seconds.      Findings: No bruising.   Neurological:      General: No focal deficit present.      Mental Status: She is alert and oriented to person, place, and time.      Cranial Nerves: No cranial nerve deficit.      Motor: No weakness.   Psychiatric:         Mood and Affect: Mood normal.         Behavior: Behavior normal.         ECG 12 Lead      Date/Time: 11/23/2024 8:24 PM    Performed by: Won Greenwood MD  Authorized by: Won Greenwood MD  Interpreted by ED physician  Rhythm: sinus rhythm  Rate: normal  BPM: 91  QRS axis: left  T Waves: T waves normal  Other findings: LVH and LAE  Clinical impression: abnormal ECG               ED Course                                           Medical Decision Making  Hemodynamically stable and afebrile.  Patient has mild peripheral edema but has normal lung exam.  Satting 100% on room air.  Troponins are stable.  EKG shows no acute ischemic  changes.  Given a dose of IV Bumex.  Encouraged follow-up with her regular doctor.  Discharge.    Problems Addressed:  Congestive heart failure, unspecified HF chronicity, unspecified heart failure type: complicated acute illness or injury  Peripheral edema: complicated acute illness or injury    Amount and/or Complexity of Data Reviewed  Labs: ordered. Decision-making details documented in ED Course.  Radiology: ordered. Decision-making details documented in ED Course.  ECG/medicine tests: ordered and independent interpretation performed. Decision-making details documented in ED Course.    Risk  Prescription drug management.        Final diagnoses:   Congestive heart failure, unspecified HF chronicity, unspecified heart failure type   Peripheral edema       ED Disposition  ED Disposition       ED Disposition   Discharge    Condition   Stable    Comment   --               Sigrid Byers, PA  [address] [facility]  [id number]  [place] [address]  [phone]    Schedule an appointment as soon as possible for a visit   As needed    [facility] EMERGENCY DEPARTMENT [place]  [address]  [facility] [id number] [phone]  Go to   As needed         Medication List      No changes were made to your prescriptions during this visit.

## 2024-11-26 ENCOUNTER — HOSPITAL ENCOUNTER (EMERGENCY)
Facility: HOSPITAL | Age: 59
Discharge: HOME | End: 2024-11-26
Attending: STUDENT IN AN ORGANIZED HEALTH CARE EDUCATION/TRAINING PROGRAM | Admitting: STUDENT IN AN ORGANIZED HEALTH CARE EDUCATION/TRAINING PROGRAM
Payer: MEDICAID

## 2024-11-26 VITALS
OXYGEN SATURATION: 99 % | SYSTOLIC BLOOD PRESSURE: 136 MMHG | TEMPERATURE: 97.5 F | HEIGHT: 65 IN | RESPIRATION RATE: 18 BRPM | WEIGHT: 130 LBS | HEART RATE: 79 BPM | DIASTOLIC BLOOD PRESSURE: 97 MMHG | BODY MASS INDEX: 21.66 KG/M2

## 2024-11-26 DIAGNOSIS — R60.0 EDEMA OF LEFT LOWER EXTREMITY: Primary | ICD-10-CM

## 2024-11-26 LAB
ALBUMIN SERPL-MCNC: 3.5 G/DL (ref 3.5–5.2)
ALBUMIN/GLOB SERPL: 1.3 G/DL
ALP SERPL-CCNC: 122 U/L (ref 39–117)
ALT SERPL W P-5'-P-CCNC: 25 U/L (ref 1–33)
ANION GAP SERPL CALCULATED.3IONS-SCNC: 14.3 MMOL/L (ref 5–15)
AST SERPL-CCNC: 37 U/L (ref 1–32)
BASOPHILS # BLD AUTO: 0.04 10*3/MM3 (ref 0–0.2)
BASOPHILS NFR BLD AUTO: 0.5 % (ref 0–1.5)
BILIRUB SERPL-MCNC: 0.6 MG/DL (ref 0–1.2)
BUN SERPL-MCNC: 27 MG/DL (ref 6–20)
BUN/CREAT SERPL: 28.4 (ref 7–25)
CALCIUM SPEC-SCNC: 8.8 MG/DL (ref 8.6–10.5)
CHLORIDE SERPL-SCNC: 98 MMOL/L (ref 98–107)
CO2 SERPL-SCNC: 25.7 MMOL/L (ref 22–29)
CREAT SERPL-MCNC: 0.95 MG/DL (ref 0.57–1)
D DIMER PPP FEU-MCNC: 0.72 MCGFEU/ML (ref 0–0.58)
DEPRECATED RDW RBC AUTO: 51.3 FL (ref 37–54)
EGFRCR SERPLBLD CKD-EPI 2021: 69.6 ML/MIN/1.73
EOSINOPHIL # BLD AUTO: 0.23 10*3/MM3 (ref 0–0.4)
EOSINOPHIL NFR BLD AUTO: 3 % (ref 0.3–6.2)
ERYTHROCYTE [DISTWIDTH] IN BLOOD BY AUTOMATED COUNT: 15.6 % (ref 12.3–15.4)
GLOBULIN UR ELPH-MCNC: 2.6 GM/DL
GLUCOSE SERPL-MCNC: 133 MG/DL (ref 65–99)
HCT VFR BLD AUTO: 41.7 % (ref 34–46.6)
HGB BLD-MCNC: 13 G/DL (ref 12–15.9)
IMM GRANULOCYTES # BLD AUTO: 0.01 10*3/MM3 (ref 0–0.05)
IMM GRANULOCYTES NFR BLD AUTO: 0.1 % (ref 0–0.5)
LYMPHOCYTES # BLD AUTO: 2.5 10*3/MM3 (ref 0.7–3.1)
LYMPHOCYTES NFR BLD AUTO: 32.9 % (ref 19.6–45.3)
MCH RBC QN AUTO: 27.9 PG (ref 26.6–33)
MCHC RBC AUTO-ENTMCNC: 31.2 G/DL (ref 31.5–35.7)
MCV RBC AUTO: 89.5 FL (ref 79–97)
MONOCYTES # BLD AUTO: 0.56 10*3/MM3 (ref 0.1–0.9)
MONOCYTES NFR BLD AUTO: 7.4 % (ref 5–12)
NEUTROPHILS NFR BLD AUTO: 4.25 10*3/MM3 (ref 1.7–7)
NEUTROPHILS NFR BLD AUTO: 56.1 % (ref 42.7–76)
PLATELET # BLD AUTO: 245 10*3/MM3 (ref 140–450)
PMV BLD AUTO: 10.2 FL (ref 6–12)
POTASSIUM SERPL-SCNC: 3.5 MMOL/L (ref 3.5–5.2)
PROT SERPL-MCNC: 6.1 G/DL (ref 6–8.5)
QT INTERVAL: 406 MS
QTC INTERVAL: 499 MS
RBC # BLD AUTO: 4.66 10*6/MM3 (ref 3.77–5.28)
SODIUM SERPL-SCNC: 138 MMOL/L (ref 136–145)
WBC NRBC COR # BLD AUTO: 7.59 10*3/MM3 (ref 3.4–10.8)

## 2024-11-26 PROCEDURE — 99283 EMERGENCY DEPT VISIT LOW MDM: CPT

## 2024-11-26 PROCEDURE — 80053 COMPREHEN METABOLIC PANEL: CPT | Performed by: STUDENT IN AN ORGANIZED HEALTH CARE EDUCATION/TRAINING PROGRAM

## 2024-11-26 PROCEDURE — 85025 COMPLETE CBC W/AUTO DIFF WBC: CPT | Performed by: STUDENT IN AN ORGANIZED HEALTH CARE EDUCATION/TRAINING PROGRAM

## 2024-11-26 PROCEDURE — 85379 FIBRIN DEGRADATION QUANT: CPT | Performed by: STUDENT IN AN ORGANIZED HEALTH CARE EDUCATION/TRAINING PROGRAM

## 2024-11-26 PROCEDURE — 36415 COLL VENOUS BLD VENIPUNCTURE: CPT

## 2024-11-26 RX ORDER — HYDROXYZINE HYDROCHLORIDE 25 MG/1
12.5 TABLET, FILM COATED ORAL ONCE
Status: COMPLETED | OUTPATIENT
Start: 2024-11-26 | End: 2024-11-26

## 2024-11-26 RX ADMIN — HYDROXYZINE HYDROCHLORIDE 12.5 MG: 25 TABLET ORAL at 01:42

## 2024-11-26 NOTE — FSED PROVIDER NOTE
"Subjective   History of Present Illness  59-year-old female with a history of recurrent DVTs, PE, congestive heart failure, mental illness presents for evaluation of left knee swelling.  States for several days has had pain and swelling the left knee that feels like her prior DVT.  Does not have chest pain, shortness of breath, or other related symptoms.  Chart review also shows recent visit for left knee pain with chronic swelling.  In addition 3 days ago was seen in 2 different emergency departments for weakness, shortness of breath concerns.  Several negative workups recently on chart.  Patient states compliance with Eliquis, stating she took her scheduled 2 tablets today.        Review of Systems   All other systems reviewed and are negative.      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    Other Nausea And Vomiting     Pt states \"all opiods make me throw up instantly\"    Percocet [Oxycodone-Acetaminophen] GI Intolerance    Sulfa Antibiotics Rash       Past Surgical History:   Procedure Laterality Date    HERNIA REPAIR      KNEE SURGERY      RHINOPLASTY         Family History   Problem Relation Age of Onset    No Known Problems Mother     No Known Problems Father        Social History     Socioeconomic History    Marital status:    Tobacco Use    Smoking status: Never    Smokeless tobacco: Never   Vaping Use    Vaping status: Never Used   Substance and Sexual Activity    Alcohol use: No    Drug use: No    Sexual activity: Defer           Objective   Physical Exam  Vitals reviewed.   Constitutional:       General: She is not in acute distress.     Appearance: Normal appearance. She is not ill-appearing, toxic-appearing or " diaphoretic.   HENT:      Head: Normocephalic and atraumatic.      Mouth/Throat:      Mouth: Mucous membranes are moist.   Cardiovascular:      Rate and Rhythm: Normal rate and regular rhythm.      Heart sounds: Normal heart sounds. No murmur heard.     No friction rub. No gallop.   Pulmonary:      Effort: Pulmonary effort is normal. No respiratory distress.      Breath sounds: Normal breath sounds. No stridor. No wheezing, rhonchi or rales.   Abdominal:      General: Abdomen is flat. There is no distension.      Palpations: Abdomen is soft. There is no mass.      Tenderness: There is no abdominal tenderness. There is no guarding.      Hernia: No hernia is present.   Skin:     Comments: Mild edema generalized through left lower extremity from knee to ankle.  Otherwise normal.  Normal pulses.   Neurological:      Mental Status: She is alert.   Psychiatric:         Mood and Affect: Mood normal.         Behavior: Behavior normal.         Thought Content: Thought content normal.         Judgment: Judgment normal.         Procedures           ED Course                                           Medical Decision Making  Evaluate in ED for left lower extremity edema with history of congestive heart failure and prior DVTs, compliant with anticoagulation.  Differential including DVT, muscular, peripheral edema from CHF, venous insufficiency.  Mild swelling on exam.  History of similar complaints.  D-dimer ordered, and on chart review I see patient has had more significantly elevated D-dimer recently.  Today's age-adjusted D-dimer only very slightly positive.  Discussed with patient this may be a false elevation considering she is chronically elevated and is not indicative of acute DVT necessarily.  Do not have access to ultrasound at night, will plan for DVT rule out ultrasound in the morning and patient is agreeable with plan.  Discussed return precautions including but not limited to chest pain and shortness of breath.   Patient agreeable with plan.  Discharged in stable condition.    Problems Addressed:  Edema of left lower extremity: complicated acute illness or injury    Amount and/or Complexity of Data Reviewed  Labs: ordered.    Risk  Prescription drug management.        Final diagnoses:   None       ED Disposition  ED Disposition       None            No follow-up provider specified.       Medication List      No changes were made to your prescriptions during this visit.

## 2024-11-30 ENCOUNTER — APPOINTMENT (OUTPATIENT)
Facility: HOSPITAL | Age: 59
End: 2024-11-30
Payer: MEDICAID

## 2024-11-30 ENCOUNTER — HOSPITAL ENCOUNTER (EMERGENCY)
Facility: HOSPITAL | Age: 59
Discharge: HOME | End: 2024-11-30
Attending: EMERGENCY MEDICINE
Payer: MEDICAID

## 2024-11-30 VITALS
TEMPERATURE: 98 F | WEIGHT: 130 LBS | HEART RATE: 89 BPM | BODY MASS INDEX: 21.66 KG/M2 | SYSTOLIC BLOOD PRESSURE: 154 MMHG | HEIGHT: 65 IN | DIASTOLIC BLOOD PRESSURE: 104 MMHG | OXYGEN SATURATION: 98 % | RESPIRATION RATE: 16 BRPM

## 2024-11-30 DIAGNOSIS — M79.605 LEFT LEG PAIN: Primary | ICD-10-CM

## 2024-11-30 LAB
ALBUMIN SERPL-MCNC: 3.7 G/DL (ref 3.5–5.2)
ALBUMIN/GLOB SERPL: 1.4 G/DL
ALP SERPL-CCNC: 124 U/L (ref 39–117)
ALT SERPL W P-5'-P-CCNC: 19 U/L (ref 1–33)
ANION GAP SERPL CALCULATED.3IONS-SCNC: 10.5 MMOL/L (ref 5–15)
APTT PPP: 29.1 SECONDS (ref 60–90)
AST SERPL-CCNC: 38 U/L (ref 1–32)
B-HCG UR QL: NEGATIVE
BASOPHILS # BLD AUTO: 0.04 10*3/MM3 (ref 0–0.2)
BASOPHILS NFR BLD AUTO: 0.4 % (ref 0–1.5)
BILIRUB SERPL-MCNC: 1.2 MG/DL (ref 0–1.2)
BUN SERPL-MCNC: 23 MG/DL (ref 6–20)
BUN/CREAT SERPL: 28.8 (ref 7–25)
CALCIUM SPEC-SCNC: 9 MG/DL (ref 8.6–10.5)
CHLORIDE SERPL-SCNC: 101 MMOL/L (ref 98–107)
CO2 SERPL-SCNC: 26.5 MMOL/L (ref 22–29)
CREAT SERPL-MCNC: 0.8 MG/DL (ref 0.57–1)
DEPRECATED RDW RBC AUTO: 51.7 FL (ref 37–54)
EGFRCR SERPLBLD CKD-EPI 2021: 85.5 ML/MIN/1.73
EOSINOPHIL # BLD AUTO: 0.08 10*3/MM3 (ref 0–0.4)
EOSINOPHIL NFR BLD AUTO: 0.8 % (ref 0.3–6.2)
ERYTHROCYTE [DISTWIDTH] IN BLOOD BY AUTOMATED COUNT: 15.7 % (ref 12.3–15.4)
EXPIRATION DATE: NORMAL
GLOBULIN UR ELPH-MCNC: 2.6 GM/DL
GLUCOSE SERPL-MCNC: 88 MG/DL (ref 65–99)
HCT VFR BLD AUTO: 41.9 % (ref 34–46.6)
HGB BLD-MCNC: 13.1 G/DL (ref 12–15.9)
IMM GRANULOCYTES # BLD AUTO: 0.02 10*3/MM3 (ref 0–0.05)
IMM GRANULOCYTES NFR BLD AUTO: 0.2 % (ref 0–0.5)
INR PPP: 1.2 (ref 0.89–1.12)
INTERNAL NEGATIVE CONTROL: NEGATIVE
INTERNAL POSITIVE CONTROL: POSITIVE
LYMPHOCYTES # BLD AUTO: 2.58 10*3/MM3 (ref 0.7–3.1)
LYMPHOCYTES NFR BLD AUTO: 26.7 % (ref 19.6–45.3)
Lab: NORMAL
MCH RBC QN AUTO: 27.8 PG (ref 26.6–33)
MCHC RBC AUTO-ENTMCNC: 31.3 G/DL (ref 31.5–35.7)
MCV RBC AUTO: 88.8 FL (ref 79–97)
MONOCYTES # BLD AUTO: 0.49 10*3/MM3 (ref 0.1–0.9)
MONOCYTES NFR BLD AUTO: 5.1 % (ref 5–12)
NEUTROPHILS NFR BLD AUTO: 6.44 10*3/MM3 (ref 1.7–7)
NEUTROPHILS NFR BLD AUTO: 66.8 % (ref 42.7–76)
PLATELET # BLD AUTO: 304 10*3/MM3 (ref 140–450)
PMV BLD AUTO: 9.7 FL (ref 6–12)
POTASSIUM SERPL-SCNC: 4.3 MMOL/L (ref 3.5–5.2)
PROT SERPL-MCNC: 6.3 G/DL (ref 6–8.5)
PROTHROMBIN TIME: 15.8 SECONDS (ref 12.2–14.5)
RBC # BLD AUTO: 4.72 10*6/MM3 (ref 3.77–5.28)
SODIUM SERPL-SCNC: 138 MMOL/L (ref 136–145)
WBC NRBC COR # BLD AUTO: 9.65 10*3/MM3 (ref 3.4–10.8)

## 2024-11-30 PROCEDURE — 36415 COLL VENOUS BLD VENIPUNCTURE: CPT

## 2024-11-30 PROCEDURE — 25510000001 IOPAMIDOL 61 % SOLUTION: Performed by: EMERGENCY MEDICINE

## 2024-11-30 PROCEDURE — 96374 THER/PROPH/DIAG INJ IV PUSH: CPT

## 2024-11-30 PROCEDURE — 81025 URINE PREGNANCY TEST: CPT | Performed by: EMERGENCY MEDICINE

## 2024-11-30 PROCEDURE — 85610 PROTHROMBIN TIME: CPT | Performed by: EMERGENCY MEDICINE

## 2024-11-30 PROCEDURE — 80053 COMPREHEN METABOLIC PANEL: CPT | Performed by: EMERGENCY MEDICINE

## 2024-11-30 PROCEDURE — 25010000002 HYDROMORPHONE 1 MG/ML SOLUTION: Performed by: EMERGENCY MEDICINE

## 2024-11-30 PROCEDURE — 85730 THROMBOPLASTIN TIME PARTIAL: CPT | Performed by: EMERGENCY MEDICINE

## 2024-11-30 PROCEDURE — 63710000001 ONDANSETRON ODT 4 MG TABLET DISPERSIBLE: Performed by: EMERGENCY MEDICINE

## 2024-11-30 PROCEDURE — 99285 EMERGENCY DEPT VISIT HI MDM: CPT

## 2024-11-30 PROCEDURE — 73701 CT LOWER EXTREMITY W/DYE: CPT

## 2024-11-30 PROCEDURE — 85025 COMPLETE CBC W/AUTO DIFF WBC: CPT | Performed by: EMERGENCY MEDICINE

## 2024-11-30 RX ORDER — IOPAMIDOL 612 MG/ML
100 INJECTION, SOLUTION INTRAVASCULAR
Status: COMPLETED | OUTPATIENT
Start: 2024-11-30 | End: 2024-11-30

## 2024-11-30 RX ORDER — ONDANSETRON 4 MG/1
4 TABLET, ORALLY DISINTEGRATING ORAL ONCE
Status: COMPLETED | OUTPATIENT
Start: 2024-11-30 | End: 2024-11-30

## 2024-11-30 RX ADMIN — ONDANSETRON 4 MG: 4 TABLET, ORALLY DISINTEGRATING ORAL at 19:45

## 2024-11-30 RX ADMIN — HYDROMORPHONE HYDROCHLORIDE 1 MG: 1 INJECTION, SOLUTION INTRAMUSCULAR; INTRAVENOUS; SUBCUTANEOUS at 18:21

## 2024-11-30 RX ADMIN — IOPAMIDOL 90 ML: 612 INJECTION, SOLUTION INTRAVENOUS at 18:28

## 2024-11-30 NOTE — FSED PROVIDER NOTE
"Subjective  History of Present Illness:    Patient presents emergency department with severe left lower extremity pain.  Patient has been seen in the emergency department multiple times within the last week.  She was seen on 11/23/2024 at the [facility] for leg swelling with Dr. Goins.  She has a history of heart failure, coagulopathy with prior DVT on Eliquis.  Workup at that time showed bilateral edema with no unilateral swelling of the legs.  Patient had an elevated D-dimer at 0.76.  The clinical impression at that time was acute on chronic congestive heart failure.  She was given Bumex and discharged.  Patient was seen on the same day by Dr. Greenwood at this emergency department.  She had peripheral edema at that time and was given IV Bumex and then discharged.  She was then seen on the morning of 24 November at the [facility] with Dr. Irvin.  She was seen at that time for abdominal pain.  She was diagnosed with acute cystitis.  She was discharged on Macrobid.  She was seen on 11/26/2024 with Dr. Huggins.  At that time she was complaining of left knee swelling.  She was noted to have mild generalized edema in the left lower extremity from knee to ankle with normal pulses.  D-dimer on that visit was obtained and 0.72.  There was a plan for a rule out DVT later in the morning.  This did not occur.  Patient was seen the following morning with Dr. Khoury at the [facility] emergency department with a concern for being \"fluid overloaded.\"  She was discharged after diuresis.    Nurses Notes reviewed and agree, including vitals, allergies, social history and prior medical history.     REVIEW OF SYSTEMS: All systems reviewed and not pertinent unless noted.    Past Medical History:   Diagnosis Date    CHF (congestive heart failure)     Hypertension     Pneumonia     Pulmonary embolism without acute cor pulmonale 12/15/2023       Allergies:    Lisinopril, Other, Percocet " "[oxycodone-acetaminophen], and Sulfa antibiotics      Past Surgical History:   Procedure Laterality Date    HERNIA REPAIR      KNEE SURGERY      RHINOPLASTY           Social History     Socioeconomic History    Marital status:    Tobacco Use    Smoking status: Never    Smokeless tobacco: Never   Vaping Use    Vaping status: Never Used   Substance and Sexual Activity    Alcohol use: No    Drug use: No    Sexual activity: Defer         Family History   Problem Relation Age of Onset    No Known Problems Mother     No Known Problems Father        Objective  Physical Exam:  BP (!) 154/104   Pulse 89   Temp 98 °F (36.7 °C) (Oral)   Resp 16   Ht 165.1 cm (65\")   Wt 59 kg (130 lb)   SpO2 98%   BMI 21.63 kg/m²      [Primary Survey    Airway: Patent and protected  Breathing: Symmetric bilaterally  Circulation: Mentating well, responsive        Constitutional: Patient is in pain.  Psychological: No abnormalities of mood affect.  Head: Atraumatic  Eyes: Conjunctiva are non-injected. no scleral icterus.  ENT: No obvious congestion or obstruction noted  Neck: No obvious deformity.  ROM appears preserved  Chest: No deformity noted.  No paradoxical breathing noted  Respiratory: Respiratory effort was normal - no use of accessory respiratory muscles noted.  There is no stridor.  Cardiovascular: Perfusion appears preserved - mentating well  Gastrointestinal: Abdomen nondistended.  Genitourinary: Not examined  Lymphatic: Not examined  Back: Not examined  Musculoskeletal: Musculoskeletal system is grossly intact.  There is no obvious deformity.  She is tender to palpation along the proximal knee down to the proximal fibula, full range of motion of the knee.  No obvious ligamentous laxity.  Pulses are somewhat diminished to palpation.  Nontender at the ankle or foot.  Calf size measuring 10 cm distal to the tibial tuberosity measures 33 cm bilaterally.  There is no significant edema noted.  Neurological: Face: No " Asymmetry.  Gross motor movement is intact in all 4 extremities.  Walks and ambulates without difficulty.  Patient exhibits normal speech.  Skin: No Pallor no obvious bruising.  No obvious rash.]      ED Course:    Lab Results (last 24 hours)       Procedure Component Value Units Date/Time    CBC & Differential [[id number]]  (Abnormal) Collected: 11/30/24 1823    Specimen: Blood Updated: 11/30/24 1829    Narrative:      The following orders were created for panel order CBC & Differential.  Procedure                               Abnormality         Status                     ---------                               -----------         ------                     CBC Auto Differential[[id number]]        Abnormal            Final result                 Please view results for these tests on the individual orders.    Comprehensive Metabolic Panel [[id number]]  (Abnormal) Collected: 11/30/24 1823    Specimen: Blood Updated: 11/30/24 1849     Glucose 88 mg/dL      BUN 23 mg/dL      Creatinine 0.80 mg/dL      Sodium 138 mmol/L      Potassium 4.3 mmol/L      Chloride 101 mmol/L      CO2 26.5 mmol/L      Calcium 9.0 mg/dL      Total Protein 6.3 g/dL      Albumin 3.7 g/dL      ALT (SGPT) 19 U/L      AST (SGOT) 38 U/L      Alkaline Phosphatase 124 U/L      Total Bilirubin 1.2 mg/dL      Globulin 2.6 gm/dL      A/G Ratio 1.4 g/dL      BUN/Creatinine Ratio 28.8     Anion Gap 10.5 mmol/L      eGFR 85.5 mL/min/1.73     Narrative:      GFR Normal >60  Chronic Kidney Disease <60  Kidney Failure <15      Protime-INR [[id number]]  (Abnormal) Collected: 11/30/24 1823    Specimen: Blood Updated: 11/30/24 1844     Protime 15.8 Seconds      INR 1.20    aPTT [[id number]]  (Abnormal) Collected: 11/30/24 1823    Specimen: Blood Updated: 11/30/24 1844     PTT 29.1 seconds     Narrative:      PTT = The equivalent PTT values for the therapeutic range of heparin levels at 0.3 to 0.5 U/ml are 60 to 70 seconds.    CBC Auto Differential [[id number]]   (Abnormal) Collected: 11/30/24 1823    Specimen: Blood Updated: 11/30/24 1829     WBC 9.65 10*3/mm3      RBC 4.72 10*6/mm3      Hemoglobin 13.1 g/dL      Hematocrit 41.9 %      MCV 88.8 fL      MCH 27.8 pg      MCHC 31.3 g/dL      RDW 15.7 %      RDW-SD 51.7 fl      MPV 9.7 fL      Platelets 304 10*3/mm3      Neutrophil % 66.8 %      Lymphocyte % 26.7 %      Monocyte % 5.1 %      Eosinophil % 0.8 %      Basophil % 0.4 %      Immature Grans % 0.2 %      Neutrophils, Absolute 6.44 10*3/mm3      Lymphocytes, Absolute 2.58 10*3/mm3      Monocytes, Absolute 0.49 10*3/mm3      Eosinophils, Absolute 0.08 10*3/mm3      Basophils, Absolute 0.04 10*3/mm3      Immature Grans, Absolute 0.02 10*3/mm3     POCT, urine preg [[id number]] Collected: 11/30/24 1905    Specimen: Urine Updated: 11/30/24 1906     HCG, Urine, QL Negative     Lot Number [id number]     Internal Positive Control Positive     Internal Negative Control Negative     Expiration Date 4/22/26             CT Lower Extremity Left With Contrast    Result Date: 11/30/2024  CT LOWER EXTREMITY LEFT W CONTRAST Date of Exam: 11/30/2024 6:20 PM EST Indication: severe LLE pain, proximal fibular area, decreased peripheral pulses. Comparison: None available. Technique: Axial CT images were obtained of the left lower extremity after the uneventful intravenous administration of 95 mL Isovue-300.  Reconstructed coronal and sagittal images were also obtained. Automated exposure control and iterative construction  methods were used. Findings: No significant edema or abnormal enhancement is seen throughout the subcutaneous soft tissues. No abnormal fluid collections are observed. There is no edema within the deep muscular soft tissues. No abnormal fluid collections are seen in the deep muscular soft tissues. There is no hemorrhage or hematoma. There is no gas production throughout the soft tissues. The intermuscular fat planes are well-maintained within the deep muscular soft tissues.  There are no signs of compartment syndrome. There is no fracture or dislocation. The cortical margins are grossly intact. No osseous erosion or destruction is seen. There is no periostitis. There is no evidence for osteomyelitis.     Impression: Impression: 1.Negative CT of the left lower extremity. There is no significant edema or abnormal enhancement throughout the subcutaneous soft tissues. No abnormal fluid collections are seen. There is no gas production throughout the soft tissues. 2.No evidence for compartment syndrome. 3.No evidence for acute osseous abnormality. There is no evidence for osteomyelitis. Electronically Signed: Truman Rodríguez MD  11/30/2024 6:57 PM EST  Workstation ID: [id number]      No orders to display       Procedures    MDM  Number of Diagnoses or Management Options  Left leg pain  Diagnosis management comments: Care of patient.  Patient CT scan of the left lower extremity showed no acute finding.  Patient is resting in bed much more comfortable upon reassessment patient instructed to follow-up primary care return precautions given        This is a 59-year-old with pain out of proportion to exam on the left lower extremity.  She needs pain control on arrival.  Will give IV Dilaudid.  Will obtain laboratory studies.  She has had prior elevated D-dimer but is maintained on Eliquis.  She has not missed any doses.  She comes to the ER frequently.  She has been seen 9 times in the last month.  Based on his presentation I have a lower suspicion for DVT as the cause.  It is possible to have a DVT on DOAC but it is not common.  She will need an ultrasound in the future.  Given her presentation I am more concerned with an ischemic etiology.  Lower suspicion for fracture. laboratory studies and CTA of the left lower extremity will be obtained.        Medications   HYDROmorphone (DILAUDID) injection 1 mg (1 mg Intravenous Given 11/30/24 8131)   iopamidol (ISOVUE-300) 61 % injection 100 mL (90 mL  Intravenous Given 11/30/24 1828)       HEART SCORE   No data recorded           -----  ED Disposition       ED Disposition   Discharge    Condition   Stable    Comment   --             Final diagnoses:   Left leg pain      Your Follow-Up Providers       Sigrid Byers PA.    Specialty: Internal Medicine  [address] [facility]  [address]  [place] [address]  [phone]                       Contact information for after-discharge care    Follow-up information has not been specified.                    Your medication list        CONTINUE taking these medications        Instructions Last Dose Given Next Dose Due   Aspirin Low Dose 81 MG EC tablet  Generic drug: aspirin      Take 1 tablet by mouth Daily.       bumetanide 1 MG tablet  Commonly known as: BUMEX      Take 1 tablet by mouth Daily.       buPROPion  MG 24 hr tablet  Commonly known as: WELLBUTRIN XL      Take 1 tablet by mouth Daily.       Eliquis 5 MG tablet tablet  Generic drug: apixaban      Take 1 tablet by mouth 2 (Two) Times a Day.       Jardiance 10 MG tablet tablet  Generic drug: empagliflozin      Take 1 tablet by mouth Daily.       metoprolol succinate XL 50 MG 24 hr tablet  Commonly known as: TOPROL-XL      Take 1 tablet by mouth Daily.       pantoprazole 40 MG EC tablet  Commonly known as: PROTONIX      Take 1 tablet by mouth Daily.       rosuvastatin 40 MG tablet  Commonly known as: CRESTOR      Take 1 tablet by mouth Daily.       spironolactone 100 MG tablet  Commonly known as: ALDACTONE      Take 1 tablet by mouth Daily.       valsartan-hydrochlorothiazide 80-12.5 MG per tablet  Commonly known as: DIOVAN-HCT      Take 0.5 tablets by mouth Daily for 30 days.       valsartan-hydrochlorothiazide 80-12.5 MG per tablet  Commonly known as: DIOVAN-HCT      Take 1 tablet by mouth Daily.

## 2024-12-02 ENCOUNTER — HOSPITAL ENCOUNTER (EMERGENCY)
Facility: HOSPITAL | Age: 59
Discharge: HOME | DRG: 280 | End: 2024-12-02
Attending: EMERGENCY MEDICINE | Admitting: EMERGENCY MEDICINE
Payer: MEDICAID

## 2024-12-02 VITALS
HEART RATE: 87 BPM | DIASTOLIC BLOOD PRESSURE: 103 MMHG | OXYGEN SATURATION: 98 % | TEMPERATURE: 97.7 F | HEIGHT: 65 IN | RESPIRATION RATE: 20 BRPM | SYSTOLIC BLOOD PRESSURE: 150 MMHG | WEIGHT: 130 LBS | BODY MASS INDEX: 21.66 KG/M2

## 2024-12-02 DIAGNOSIS — M79.605 LEFT LEG PAIN: Primary | ICD-10-CM

## 2024-12-02 PROCEDURE — 96372 THER/PROPH/DIAG INJ SC/IM: CPT

## 2024-12-02 PROCEDURE — 25010000002 KETOROLAC TROMETHAMINE PER 15 MG: Performed by: EMERGENCY MEDICINE

## 2024-12-02 PROCEDURE — 99283 EMERGENCY DEPT VISIT LOW MDM: CPT

## 2024-12-02 RX ORDER — KETOROLAC TROMETHAMINE 30 MG/ML
30 INJECTION, SOLUTION INTRAMUSCULAR; INTRAVENOUS ONCE
Status: COMPLETED | OUTPATIENT
Start: 2024-12-02 | End: 2024-12-02

## 2024-12-02 RX ORDER — LIDOCAINE 40 MG/G
1 CREAM TOPICAL AS NEEDED
Status: DISCONTINUED | OUTPATIENT
Start: 2024-12-02 | End: 2024-12-02 | Stop reason: RX

## 2024-12-02 RX ADMIN — KETOROLAC TROMETHAMINE 30 MG: 30 INJECTION, SOLUTION INTRAMUSCULAR; INTRAVENOUS at 00:16

## 2024-12-02 RX ADMIN — Medication 3 ML: at 00:24

## 2024-12-02 NOTE — FSED PROVIDER NOTE
"Subjective  History of Present Illness:    Patient presents with left knee pain.  Patient was seen here within 1 day with the same complaint patient is already on Eliquis for a DVT and had a complete left leg CT scan with contrast done that was entirely negative along with blood work.  Patient states the same pain is ongoing.  Patient has not followed up with anyone yet denies any numbness tingling weakness fevers chills      Nurses Notes reviewed and agree, including vitals, allergies, social history and prior medical history.     REVIEW OF SYSTEMS: All systems reviewed and not pertinent unless noted.  Review of Systems    Past Medical History:   Diagnosis Date    CHF (congestive heart failure)     Hypertension     Pneumonia     Pulmonary embolism without acute cor pulmonale 12/15/2023       Allergies:    Lisinopril, Other, Percocet [oxycodone-acetaminophen], and Sulfa antibiotics      Past Surgical History:   Procedure Laterality Date    HERNIA REPAIR      KNEE SURGERY      RHINOPLASTY           Social History     Socioeconomic History    Marital status:    Tobacco Use    Smoking status: Never    Smokeless tobacco: Never   Vaping Use    Vaping status: Never Used   Substance and Sexual Activity    Alcohol use: No    Drug use: No    Sexual activity: Defer         Family History   Problem Relation Age of Onset    No Known Problems Mother     No Known Problems Father        Objective  Physical Exam:  BP (!) 150/103   Pulse 87   Temp 97.7 °F (36.5 °C)   Resp 20   Ht 165.1 cm (65\")   Wt 59 kg (130 lb)   SpO2 98%   BMI 21.63 kg/m²      Physical Exam  Vitals and nursing note reviewed.   Constitutional:       General: She is not in acute distress.     Appearance: Normal appearance. She is not ill-appearing, toxic-appearing or diaphoretic.   Musculoskeletal:      Comments: Left knee shows no signs of joint effusion full range of motion no erythema fluctuance joint laxity   Neurological:      Mental Status: " She is alert.   Psychiatric:         Mood and Affect: Mood normal.         Behavior: Behavior normal.         Procedures    ED Course:         Lab Results (last 24 hours)       ** No results found for the last 24 hours. **             CT Lower Extremity Left With Contrast    Result Date: 11/30/2024  CT LOWER EXTREMITY LEFT W CONTRAST Date of Exam: 11/30/2024 6:20 PM EST Indication: severe LLE pain, proximal fibular area, decreased peripheral pulses. Comparison: None available. Technique: Axial CT images were obtained of the left lower extremity after the uneventful intravenous administration of 95 mL Isovue-300.  Reconstructed coronal and sagittal images were also obtained. Automated exposure control and iterative construction  methods were used. Findings: No significant edema or abnormal enhancement is seen throughout the subcutaneous soft tissues. No abnormal fluid collections are observed. There is no edema within the deep muscular soft tissues. No abnormal fluid collections are seen in the deep muscular soft tissues. There is no hemorrhage or hematoma. There is no gas production throughout the soft tissues. The intermuscular fat planes are well-maintained within the deep muscular soft tissues. There are no signs of compartment syndrome. There is no fracture or dislocation. The cortical margins are grossly intact. No osseous erosion or destruction is seen. There is no periostitis. There is no evidence for osteomyelitis.     Impression: Impression: 1.Negative CT of the left lower extremity. There is no significant edema or abnormal enhancement throughout the subcutaneous soft tissues. No abnormal fluid collections are seen. There is no gas production throughout the soft tissues. 2.No evidence for compartment syndrome. 3.No evidence for acute osseous abnormality. There is no evidence for osteomyelitis. Electronically Signed: Truman Rodríguez MD  11/30/2024 6:57 PM EST  Workstation ID: [id number]         MDM      Summary patient presenting left leg pain I reviewed the patient's recent workups which were all negative patient currently exhibits no signs of any type of septic arthritis infectious etiology or other causes of her ongoing leg pain less likely some type of internal derangement.  Patient given a 30 IM Toradol shot and a lidocaine numbing cream patient to follow-up with primary care tomorrow for further evaluation      Data interpreted: Nursing notes reviewed, vital signs reviewed.  Labs independently interpreted by me (CBC, CMP, lipase, UA, troponin, ABG, lactic acid, procalcitonin).  Imaging independently interpreted by me (x-ray, CT scan).  EKG independently interpreted by me.  O2 saturation:    Counseling: Discussed the results above with the patient regarding need for admission or discharge.  Patient understands and agrees plan of care.      -----  ED Disposition       ED Disposition   Discharge    Condition   Stable    Comment   --             Final diagnoses:   None      Your Follow-Up Providers       Sigrid Byers PA In 1 day.    Specialty: Internal Medicine  [address] [place] [address] [address]  [place] [address]  [phone]                       Contact information for after-discharge care    Follow-up information has not been specified.                    Your medication list        CONTINUE taking these medications        Instructions Last Dose Given Next Dose Due   Aspirin Low Dose 81 MG EC tablet  Generic drug: aspirin      Take 1 tablet by mouth Daily.       bumetanide 1 MG tablet  Commonly known as: BUMEX      Take 1 tablet by mouth Daily.       buPROPion  MG 24 hr tablet  Commonly known as: WELLBUTRIN XL      Take 1 tablet by mouth Daily.       Eliquis 5 MG tablet tablet  Generic drug: apixaban      Take 1 tablet by mouth 2 (Two) Times a Day.       Jardiance 10 MG tablet tablet  Generic drug: empagliflozin      Take 1 tablet by mouth Daily.       metoprolol succinate XL 50 MG  24 hr tablet  Commonly known as: TOPROL-XL      Take 1 tablet by mouth Daily.       pantoprazole 40 MG EC tablet  Commonly known as: PROTONIX      Take 1 tablet by mouth Daily.       rosuvastatin 40 MG tablet  Commonly known as: CRESTOR      Take 1 tablet by mouth Daily.       spironolactone 100 MG tablet  Commonly known as: ALDACTONE      Take 1 tablet by mouth Daily.       valsartan-hydrochlorothiazide 80-12.5 MG per tablet  Commonly known as: DIOVAN-HCT      Take 0.5 tablets by mouth Daily for 30 days.       valsartan-hydrochlorothiazide 80-12.5 MG per tablet  Commonly known as: DIOVAN-HCT      Take 1 tablet by mouth Daily.

## 2024-12-03 ENCOUNTER — APPOINTMENT (OUTPATIENT)
Facility: HOSPITAL | Age: 59
DRG: 280 | End: 2024-12-03
Payer: MEDICAID

## 2024-12-03 ENCOUNTER — HOSPITAL ENCOUNTER (INPATIENT)
Facility: HOSPITAL | Age: 59
LOS: 6 days | Discharge: HOME HEALTH | DRG: 280 | End: 2024-12-10
Attending: EMERGENCY MEDICINE | Admitting: INTERNAL MEDICINE
Payer: MEDICAID

## 2024-12-03 DIAGNOSIS — I50.22 CHRONIC SYSTOLIC CHF (CONGESTIVE HEART FAILURE): ICD-10-CM

## 2024-12-03 DIAGNOSIS — J18.9 PNEUMONIA OF RIGHT LOWER LOBE DUE TO INFECTIOUS ORGANISM: ICD-10-CM

## 2024-12-03 DIAGNOSIS — I21.A1 TYPE 2 MYOCARDIAL INFARCTION: ICD-10-CM

## 2024-12-03 DIAGNOSIS — I50.22 CHRONIC HFREF (HEART FAILURE WITH REDUCED EJECTION FRACTION): ICD-10-CM

## 2024-12-03 DIAGNOSIS — K59.00 CONSTIPATION, UNSPECIFIED CONSTIPATION TYPE: ICD-10-CM

## 2024-12-03 DIAGNOSIS — I50.23 ACUTE ON CHRONIC SYSTOLIC CONGESTIVE HEART FAILURE: ICD-10-CM

## 2024-12-03 DIAGNOSIS — I21.4 NSTEMI (NON-ST ELEVATED MYOCARDIAL INFARCTION): Primary | ICD-10-CM

## 2024-12-03 LAB
ALBUMIN SERPL-MCNC: 3.4 G/DL (ref 3.5–5.2)
ALBUMIN/GLOB SERPL: 1.1 G/DL
ALP SERPL-CCNC: 120 U/L (ref 39–117)
ALT SERPL W P-5'-P-CCNC: 22 U/L (ref 1–33)
ANION GAP SERPL CALCULATED.3IONS-SCNC: 12.8 MMOL/L (ref 5–15)
APTT PPP: 28.9 SECONDS (ref 22–39)
AST SERPL-CCNC: 58 U/L (ref 1–32)
BASOPHILS # BLD AUTO: 0.04 10*3/MM3 (ref 0–0.2)
BASOPHILS NFR BLD AUTO: 0.4 % (ref 0–1.5)
BILIRUB SERPL-MCNC: 0.5 MG/DL (ref 0–1.2)
BUN SERPL-MCNC: 16 MG/DL (ref 6–20)
BUN/CREAT SERPL: 23.2 (ref 7–25)
CALCIUM SPEC-SCNC: 9.1 MG/DL (ref 8.6–10.5)
CHLORIDE SERPL-SCNC: 103 MMOL/L (ref 98–107)
CO2 SERPL-SCNC: 22.2 MMOL/L (ref 22–29)
CREAT SERPL-MCNC: 0.69 MG/DL (ref 0.57–1)
DEPRECATED RDW RBC AUTO: 51.9 FL (ref 37–54)
EGFRCR SERPLBLD CKD-EPI 2021: 100.7 ML/MIN/1.73
EOSINOPHIL # BLD AUTO: 0.15 10*3/MM3 (ref 0–0.4)
EOSINOPHIL NFR BLD AUTO: 1.4 % (ref 0.3–6.2)
ERYTHROCYTE [DISTWIDTH] IN BLOOD BY AUTOMATED COUNT: 15.8 % (ref 12.3–15.4)
GEN 5 1HR TROPONIN T REFLEX: 227 NG/L
GLOBULIN UR ELPH-MCNC: 3 GM/DL
GLUCOSE SERPL-MCNC: 103 MG/DL (ref 65–99)
HCT VFR BLD AUTO: 40.9 % (ref 34–46.6)
HGB BLD-MCNC: 12.9 G/DL (ref 12–15.9)
HOLD SPECIMEN: NORMAL
IMM GRANULOCYTES # BLD AUTO: 0.01 10*3/MM3 (ref 0–0.05)
IMM GRANULOCYTES NFR BLD AUTO: 0.1 % (ref 0–0.5)
INR PPP: 1.1 (ref 0.89–1.12)
LIPASE SERPL-CCNC: 17 U/L (ref 13–60)
LYMPHOCYTES # BLD AUTO: 1.96 10*3/MM3 (ref 0.7–3.1)
LYMPHOCYTES NFR BLD AUTO: 17.8 % (ref 19.6–45.3)
MCH RBC QN AUTO: 28 PG (ref 26.6–33)
MCHC RBC AUTO-ENTMCNC: 31.5 G/DL (ref 31.5–35.7)
MCV RBC AUTO: 88.9 FL (ref 79–97)
MONOCYTES # BLD AUTO: 0.54 10*3/MM3 (ref 0.1–0.9)
MONOCYTES NFR BLD AUTO: 4.9 % (ref 5–12)
NEUTROPHILS NFR BLD AUTO: 75.4 % (ref 42.7–76)
NEUTROPHILS NFR BLD AUTO: 8.32 10*3/MM3 (ref 1.7–7)
NT-PROBNP SERPL-MCNC: 8588 PG/ML (ref 0–900)
PLATELET # BLD AUTO: 319 10*3/MM3 (ref 140–450)
PMV BLD AUTO: 9.8 FL (ref 6–12)
POTASSIUM SERPL-SCNC: 5 MMOL/L (ref 3.5–5.2)
PROT SERPL-MCNC: 6.4 G/DL (ref 6–8.5)
PROTHROMBIN TIME: 14.8 SECONDS (ref 12.2–14.5)
RBC # BLD AUTO: 4.6 10*6/MM3 (ref 3.77–5.28)
SODIUM SERPL-SCNC: 138 MMOL/L (ref 136–145)
TROPONIN T NUMERIC DELTA: 13 NG/L
TROPONIN T SERPL HS-MCNC: 214 NG/L
WBC NRBC COR # BLD AUTO: 11.02 10*3/MM3 (ref 3.4–10.8)
WHOLE BLOOD HOLD COAG: NORMAL
WHOLE BLOOD HOLD SPECIMEN: NORMAL

## 2024-12-03 PROCEDURE — 36415 COLL VENOUS BLD VENIPUNCTURE: CPT

## 2024-12-03 PROCEDURE — 85730 THROMBOPLASTIN TIME PARTIAL: CPT

## 2024-12-03 PROCEDURE — 83690 ASSAY OF LIPASE: CPT | Performed by: EMERGENCY MEDICINE

## 2024-12-03 PROCEDURE — 71045 X-RAY EXAM CHEST 1 VIEW: CPT

## 2024-12-03 PROCEDURE — 84484 ASSAY OF TROPONIN QUANT: CPT | Performed by: EMERGENCY MEDICINE

## 2024-12-03 PROCEDURE — 80053 COMPREHEN METABOLIC PANEL: CPT | Performed by: EMERGENCY MEDICINE

## 2024-12-03 PROCEDURE — 85025 COMPLETE CBC W/AUTO DIFF WBC: CPT | Performed by: EMERGENCY MEDICINE

## 2024-12-03 PROCEDURE — 99285 EMERGENCY DEPT VISIT HI MDM: CPT

## 2024-12-03 PROCEDURE — 85610 PROTHROMBIN TIME: CPT

## 2024-12-03 PROCEDURE — 93005 ELECTROCARDIOGRAM TRACING: CPT | Performed by: EMERGENCY MEDICINE

## 2024-12-03 PROCEDURE — 83880 ASSAY OF NATRIURETIC PEPTIDE: CPT | Performed by: EMERGENCY MEDICINE

## 2024-12-03 RX ORDER — ASPIRIN 81 MG/1
324 TABLET, CHEWABLE ORAL ONCE
Status: COMPLETED | OUTPATIENT
Start: 2024-12-03 | End: 2024-12-03

## 2024-12-03 RX ORDER — ASPIRIN 81 MG/1
324 TABLET, CHEWABLE ORAL ONCE
Status: DISCONTINUED | OUTPATIENT
Start: 2024-12-03 | End: 2024-12-03

## 2024-12-03 RX ORDER — SODIUM CHLORIDE 0.9 % (FLUSH) 0.9 %
10 SYRINGE (ML) INJECTION AS NEEDED
Status: DISCONTINUED | OUTPATIENT
Start: 2024-12-03 | End: 2024-12-10 | Stop reason: HOSPADM

## 2024-12-03 RX ORDER — METOPROLOL TARTRATE 1 MG/ML
5 INJECTION, SOLUTION INTRAVENOUS ONCE
Status: COMPLETED | OUTPATIENT
Start: 2024-12-03 | End: 2024-12-03

## 2024-12-03 RX ORDER — HYDROXYZINE HYDROCHLORIDE 25 MG/1
25 TABLET, FILM COATED ORAL ONCE
Status: COMPLETED | OUTPATIENT
Start: 2024-12-03 | End: 2024-12-03

## 2024-12-03 RX ORDER — LOSARTAN POTASSIUM 50 MG/1
50 TABLET ORAL
Status: DISCONTINUED | OUTPATIENT
Start: 2024-12-03 | End: 2024-12-04

## 2024-12-03 RX ADMIN — METOPROLOL TARTRATE 5 MG: 5 INJECTION INTRAVENOUS at 23:09

## 2024-12-03 RX ADMIN — HYDROXYZINE HYDROCHLORIDE 25 MG: 25 TABLET ORAL at 21:16

## 2024-12-03 RX ADMIN — ASPIRIN 81 MG 324 MG: 81 TABLET ORAL at 22:19

## 2024-12-03 RX ADMIN — LOSARTAN POTASSIUM 50 MG: 50 TABLET, FILM COATED ORAL at 23:09

## 2024-12-04 PROBLEM — Z86.711 HISTORY OF PULMONARY EMBOLISM: Status: ACTIVE | Noted: 2024-12-04

## 2024-12-04 PROBLEM — F41.3 OTHER MIXED ANXIETY DISORDERS: Status: ACTIVE | Noted: 2024-12-04

## 2024-12-04 PROBLEM — I21.4 NSTEMI (NON-ST ELEVATED MYOCARDIAL INFARCTION): Status: ACTIVE | Noted: 2024-12-04

## 2024-12-04 LAB
ALBUMIN SERPL-MCNC: 3.2 G/DL (ref 3.5–5.2)
ALBUMIN/GLOB SERPL: 1.2 G/DL
ALP SERPL-CCNC: 116 U/L (ref 39–117)
ALT SERPL W P-5'-P-CCNC: 22 U/L (ref 1–33)
ANION GAP SERPL CALCULATED.3IONS-SCNC: 12 MMOL/L (ref 5–15)
APTT PPP: 24.2 SECONDS (ref 60–90)
AST SERPL-CCNC: 45 U/L (ref 1–32)
BASOPHILS # BLD AUTO: 0.05 10*3/MM3 (ref 0–0.2)
BASOPHILS NFR BLD AUTO: 0.5 % (ref 0–1.5)
BILIRUB SERPL-MCNC: 0.6 MG/DL (ref 0–1.2)
BILIRUB UR QL STRIP: NEGATIVE
BUN SERPL-MCNC: 15 MG/DL (ref 6–20)
BUN/CREAT SERPL: 21.4 (ref 7–25)
CALCIUM SPEC-SCNC: 9 MG/DL (ref 8.6–10.5)
CHLORIDE SERPL-SCNC: 105 MMOL/L (ref 98–107)
CHOLEST SERPL-MCNC: 135 MG/DL (ref 0–200)
CLARITY UR: CLEAR
CO2 SERPL-SCNC: 21 MMOL/L (ref 22–29)
COLOR UR: YELLOW
CREAT SERPL-MCNC: 0.7 MG/DL (ref 0.57–1)
DEPRECATED RDW RBC AUTO: 52.8 FL (ref 37–54)
EGFRCR SERPLBLD CKD-EPI 2021: 100.4 ML/MIN/1.73
EOSINOPHIL # BLD AUTO: 0.21 10*3/MM3 (ref 0–0.4)
EOSINOPHIL NFR BLD AUTO: 2.1 % (ref 0.3–6.2)
ERYTHROCYTE [DISTWIDTH] IN BLOOD BY AUTOMATED COUNT: 16 % (ref 12.3–15.4)
GLOBULIN UR ELPH-MCNC: 2.6 GM/DL
GLUCOSE SERPL-MCNC: 91 MG/DL (ref 65–99)
GLUCOSE UR STRIP-MCNC: NEGATIVE MG/DL
HBA1C MFR BLD: 6.2 % (ref 4.8–5.6)
HCT VFR BLD AUTO: 41 % (ref 34–46.6)
HDLC SERPL-MCNC: 40 MG/DL (ref 40–60)
HGB BLD-MCNC: 12.7 G/DL (ref 12–15.9)
HGB UR QL STRIP.AUTO: NEGATIVE
IMM GRANULOCYTES # BLD AUTO: 0.03 10*3/MM3 (ref 0–0.05)
IMM GRANULOCYTES NFR BLD AUTO: 0.3 % (ref 0–0.5)
INR PPP: 1.2 (ref 0.89–1.12)
KETONES UR QL STRIP: NEGATIVE
LDLC SERPL CALC-MCNC: 83 MG/DL (ref 0–100)
LDLC/HDLC SERPL: 2.1 {RATIO}
LEUKOCYTE ESTERASE UR QL STRIP.AUTO: NEGATIVE
LYMPHOCYTES # BLD AUTO: 2.55 10*3/MM3 (ref 0.7–3.1)
LYMPHOCYTES NFR BLD AUTO: 25.6 % (ref 19.6–45.3)
MAGNESIUM SERPL-MCNC: 2.2 MG/DL (ref 1.6–2.6)
MCH RBC QN AUTO: 28.2 PG (ref 26.6–33)
MCHC RBC AUTO-ENTMCNC: 31 G/DL (ref 31.5–35.7)
MCV RBC AUTO: 90.9 FL (ref 79–97)
MONOCYTES # BLD AUTO: 0.58 10*3/MM3 (ref 0.1–0.9)
MONOCYTES NFR BLD AUTO: 5.8 % (ref 5–12)
NEUTROPHILS NFR BLD AUTO: 6.54 10*3/MM3 (ref 1.7–7)
NEUTROPHILS NFR BLD AUTO: 65.7 % (ref 42.7–76)
NITRITE UR QL STRIP: NEGATIVE
NRBC BLD AUTO-RTO: 0 /100 WBC (ref 0–0.2)
PH UR STRIP.AUTO: 6.5 [PH] (ref 5–8)
PLATELET # BLD AUTO: 300 10*3/MM3 (ref 140–450)
PMV BLD AUTO: 10.2 FL (ref 6–12)
POTASSIUM SERPL-SCNC: 4.6 MMOL/L (ref 3.5–5.2)
PROT SERPL-MCNC: 5.8 G/DL (ref 6–8.5)
PROT UR QL STRIP: NEGATIVE
PROTHROMBIN TIME: 15.4 SECONDS (ref 12.2–14.5)
QT INTERVAL: 450 MS
QTC INTERVAL: 506 MS
RBC # BLD AUTO: 4.51 10*6/MM3 (ref 3.77–5.28)
SODIUM SERPL-SCNC: 138 MMOL/L (ref 136–145)
SP GR UR STRIP: 1.01 (ref 1–1.03)
TRIGL SERPL-MCNC: 55 MG/DL (ref 0–150)
TROPONIN T SERPL HS-MCNC: 307 NG/L
UFH PPP CHRO-ACNC: 0.12 IU/ML (ref 0.3–0.7)
UFH PPP CHRO-ACNC: 0.3 IU/ML (ref 0.3–0.7)
UROBILINOGEN UR QL STRIP: NORMAL
VLDLC SERPL-MCNC: 12 MG/DL (ref 5–40)
WBC NRBC COR # BLD AUTO: 9.96 10*3/MM3 (ref 3.4–10.8)

## 2024-12-04 PROCEDURE — 85610 PROTHROMBIN TIME: CPT | Performed by: INTERNAL MEDICINE

## 2024-12-04 PROCEDURE — 85730 THROMBOPLASTIN TIME PARTIAL: CPT | Performed by: INTERNAL MEDICINE

## 2024-12-04 PROCEDURE — 80053 COMPREHEN METABOLIC PANEL: CPT | Performed by: NURSE PRACTITIONER

## 2024-12-04 PROCEDURE — 99214 OFFICE O/P EST MOD 30 MIN: CPT

## 2024-12-04 PROCEDURE — 85025 COMPLETE CBC W/AUTO DIFF WBC: CPT | Performed by: INTERNAL MEDICINE

## 2024-12-04 PROCEDURE — G0378 HOSPITAL OBSERVATION PER HR: HCPCS

## 2024-12-04 PROCEDURE — 93005 ELECTROCARDIOGRAM TRACING: CPT | Performed by: NURSE PRACTITIONER

## 2024-12-04 PROCEDURE — 25010000002 HEPARIN (PORCINE) 25000-0.45 UT/250ML-% SOLUTION: Performed by: INTERNAL MEDICINE

## 2024-12-04 PROCEDURE — 25010000002 BUMETANIDE PER 0.5 MG: Performed by: NURSE PRACTITIONER

## 2024-12-04 PROCEDURE — 85520 HEPARIN ASSAY: CPT

## 2024-12-04 PROCEDURE — 99223 1ST HOSP IP/OBS HIGH 75: CPT | Performed by: INTERNAL MEDICINE

## 2024-12-04 PROCEDURE — 81003 URINALYSIS AUTO W/O SCOPE: CPT | Performed by: NURSE PRACTITIONER

## 2024-12-04 PROCEDURE — 83036 HEMOGLOBIN GLYCOSYLATED A1C: CPT | Performed by: NURSE PRACTITIONER

## 2024-12-04 PROCEDURE — 84484 ASSAY OF TROPONIN QUANT: CPT | Performed by: NURSE PRACTITIONER

## 2024-12-04 PROCEDURE — 83735 ASSAY OF MAGNESIUM: CPT | Performed by: NURSE PRACTITIONER

## 2024-12-04 PROCEDURE — 85520 HEPARIN ASSAY: CPT | Performed by: INTERNAL MEDICINE

## 2024-12-04 PROCEDURE — 80061 LIPID PANEL: CPT | Performed by: NURSE PRACTITIONER

## 2024-12-04 PROCEDURE — 93010 ELECTROCARDIOGRAM REPORT: CPT | Performed by: INTERNAL MEDICINE

## 2024-12-04 RX ORDER — HEPARIN SODIUM 1000 [USP'U]/ML
25 INJECTION, SOLUTION INTRAVENOUS; SUBCUTANEOUS AS NEEDED
Status: DISCONTINUED | OUTPATIENT
Start: 2024-12-04 | End: 2024-12-04 | Stop reason: SDUPTHER

## 2024-12-04 RX ORDER — METOPROLOL SUCCINATE 50 MG/1
50 TABLET, EXTENDED RELEASE ORAL DAILY
Status: DISCONTINUED | OUTPATIENT
Start: 2024-12-04 | End: 2024-12-10 | Stop reason: HOSPADM

## 2024-12-04 RX ORDER — BUMETANIDE 0.25 MG/ML
1 INJECTION, SOLUTION INTRAMUSCULAR; INTRAVENOUS EVERY 12 HOURS
Status: DISCONTINUED | OUTPATIENT
Start: 2024-12-04 | End: 2024-12-07

## 2024-12-04 RX ORDER — HEPARIN SODIUM 10000 [USP'U]/100ML
12 INJECTION, SOLUTION INTRAVENOUS
Status: DISCONTINUED | OUTPATIENT
Start: 2024-12-04 | End: 2024-12-04

## 2024-12-04 RX ORDER — SODIUM CHLORIDE 0.9 % (FLUSH) 0.9 %
10 SYRINGE (ML) INJECTION EVERY 12 HOURS SCHEDULED
Status: DISCONTINUED | OUTPATIENT
Start: 2024-12-04 | End: 2024-12-10 | Stop reason: HOSPADM

## 2024-12-04 RX ORDER — ACETAMINOPHEN 325 MG/1
650 TABLET ORAL EVERY 4 HOURS PRN
Status: DISCONTINUED | OUTPATIENT
Start: 2024-12-04 | End: 2024-12-10 | Stop reason: HOSPADM

## 2024-12-04 RX ORDER — LORAZEPAM 0.5 MG/1
0.5 TABLET ORAL EVERY 6 HOURS PRN
Status: DISCONTINUED | OUTPATIENT
Start: 2024-12-04 | End: 2024-12-05

## 2024-12-04 RX ORDER — AMOXICILLIN 250 MG
2 CAPSULE ORAL 2 TIMES DAILY
Status: DISCONTINUED | OUTPATIENT
Start: 2024-12-04 | End: 2024-12-05

## 2024-12-04 RX ORDER — PANTOPRAZOLE SODIUM 40 MG/1
40 TABLET, DELAYED RELEASE ORAL DAILY
Status: DISCONTINUED | OUTPATIENT
Start: 2024-12-04 | End: 2024-12-10 | Stop reason: HOSPADM

## 2024-12-04 RX ORDER — HYDROCHLOROTHIAZIDE 25 MG/1
12.5 TABLET ORAL
Status: DISCONTINUED | OUTPATIENT
Start: 2024-12-04 | End: 2024-12-10 | Stop reason: HOSPADM

## 2024-12-04 RX ORDER — POLYETHYLENE GLYCOL 3350 17 G/17G
17 POWDER, FOR SOLUTION ORAL DAILY PRN
Status: DISCONTINUED | OUTPATIENT
Start: 2024-12-04 | End: 2024-12-05

## 2024-12-04 RX ORDER — SODIUM CHLORIDE 0.9 % (FLUSH) 0.9 %
10 SYRINGE (ML) INJECTION AS NEEDED
Status: DISCONTINUED | OUTPATIENT
Start: 2024-12-04 | End: 2024-12-10 | Stop reason: HOSPADM

## 2024-12-04 RX ORDER — ACETAMINOPHEN 650 MG/1
650 SUPPOSITORY RECTAL EVERY 4 HOURS PRN
Status: DISCONTINUED | OUTPATIENT
Start: 2024-12-04 | End: 2024-12-10 | Stop reason: HOSPADM

## 2024-12-04 RX ORDER — SODIUM CHLORIDE 9 MG/ML
40 INJECTION, SOLUTION INTRAVENOUS AS NEEDED
Status: DISCONTINUED | OUTPATIENT
Start: 2024-12-04 | End: 2024-12-10 | Stop reason: HOSPADM

## 2024-12-04 RX ORDER — HEPARIN SODIUM 1000 [USP'U]/ML
50 INJECTION, SOLUTION INTRAVENOUS; SUBCUTANEOUS AS NEEDED
Status: DISCONTINUED | OUTPATIENT
Start: 2024-12-04 | End: 2024-12-04 | Stop reason: SDUPTHER

## 2024-12-04 RX ORDER — BISACODYL 5 MG/1
5 TABLET, DELAYED RELEASE ORAL DAILY PRN
Status: DISCONTINUED | OUTPATIENT
Start: 2024-12-04 | End: 2024-12-05

## 2024-12-04 RX ORDER — BISACODYL 10 MG
10 SUPPOSITORY, RECTAL RECTAL DAILY PRN
Status: DISCONTINUED | OUTPATIENT
Start: 2024-12-04 | End: 2024-12-05

## 2024-12-04 RX ORDER — SPIRONOLACTONE 25 MG/1
100 TABLET ORAL DAILY
Status: DISCONTINUED | OUTPATIENT
Start: 2024-12-04 | End: 2024-12-10 | Stop reason: HOSPADM

## 2024-12-04 RX ORDER — ASPIRIN 81 MG/1
81 TABLET ORAL DAILY
Status: DISCONTINUED | OUTPATIENT
Start: 2024-12-04 | End: 2024-12-10 | Stop reason: HOSPADM

## 2024-12-04 RX ORDER — ROSUVASTATIN CALCIUM 20 MG/1
40 TABLET, COATED ORAL DAILY
Status: DISCONTINUED | OUTPATIENT
Start: 2024-12-04 | End: 2024-12-10 | Stop reason: HOSPADM

## 2024-12-04 RX ORDER — VALSARTAN 80 MG/1
80 TABLET ORAL
Status: DISCONTINUED | OUTPATIENT
Start: 2024-12-04 | End: 2024-12-10 | Stop reason: HOSPADM

## 2024-12-04 RX ORDER — ACETAMINOPHEN 160 MG/5ML
650 SOLUTION ORAL EVERY 4 HOURS PRN
Status: DISCONTINUED | OUTPATIENT
Start: 2024-12-04 | End: 2024-12-10 | Stop reason: HOSPADM

## 2024-12-04 RX ADMIN — VALSARTAN 80 MG: 80 TABLET, FILM COATED ORAL at 09:07

## 2024-12-04 RX ADMIN — HYDROCHLOROTHIAZIDE 12.5 MG: 25 TABLET ORAL at 09:06

## 2024-12-04 RX ADMIN — SENNOSIDES AND DOCUSATE SODIUM 2 TABLET: 50; 8.6 TABLET ORAL at 09:06

## 2024-12-04 RX ADMIN — APIXABAN 5 MG: 5 TABLET, FILM COATED ORAL at 11:28

## 2024-12-04 RX ADMIN — SPIRONOLACTONE 100 MG: 25 TABLET ORAL at 09:06

## 2024-12-04 RX ADMIN — PANTOPRAZOLE SODIUM 40 MG: 40 TABLET, DELAYED RELEASE ORAL at 06:39

## 2024-12-04 RX ADMIN — APIXABAN 5 MG: 5 TABLET, FILM COATED ORAL at 20:19

## 2024-12-04 RX ADMIN — ASPIRIN 81 MG: 81 TABLET, COATED ORAL at 09:06

## 2024-12-04 RX ADMIN — BISACODYL 10 MG: 10 SUPPOSITORY RECTAL at 22:15

## 2024-12-04 RX ADMIN — BUMETANIDE 1 MG: 0.25 INJECTION INTRAMUSCULAR; INTRAVENOUS at 09:07

## 2024-12-04 RX ADMIN — LORAZEPAM 0.5 MG: 0.5 TABLET ORAL at 21:47

## 2024-12-04 RX ADMIN — METOPROLOL SUCCINATE 50 MG: 50 TABLET, EXTENDED RELEASE ORAL at 09:06

## 2024-12-04 RX ADMIN — LORAZEPAM 0.5 MG: 0.5 TABLET ORAL at 02:33

## 2024-12-04 RX ADMIN — Medication 10 ML: at 09:07

## 2024-12-04 RX ADMIN — BUMETANIDE 1 MG: 0.25 INJECTION INTRAMUSCULAR; INTRAVENOUS at 20:17

## 2024-12-04 RX ADMIN — POLYETHYLENE GLYCOL 3350 17 G: 17 POWDER, FOR SOLUTION ORAL at 20:18

## 2024-12-04 RX ADMIN — ACETAMINOPHEN 650 MG: 325 TABLET ORAL at 22:28

## 2024-12-04 RX ADMIN — HEPARIN SODIUM 12 UNITS/KG/HR: 10000 INJECTION, SOLUTION INTRAVENOUS at 02:06

## 2024-12-04 RX ADMIN — Medication 10 ML: at 20:21

## 2024-12-04 RX ADMIN — SENNOSIDES AND DOCUSATE SODIUM 2 TABLET: 50; 8.6 TABLET ORAL at 20:19

## 2024-12-04 NOTE — ED NOTES
Jill M Paladino    Nursing Report ED to Floor:  Mental status: a&ox4  Ambulatory status: up ad rajiv  Oxygen Therapy:  RA  Cardiac Rhythm: NSR  Admitted from: home/ed  Safety Concerns:  n/a  Social Issues: anxiety  ED Room #:  12    ED Nurse Phone Extension - [phone] or may call [phone].      HPI:   Chief Complaint   Patient presents with    Chest Pain       Past Medical History:  Past Medical History:   Diagnosis Date    CHF (congestive heart failure)     Hypertension     Pneumonia     Pulmonary embolism without acute cor pulmonale 12/15/2023        Past Surgical History:  Past Surgical History:   Procedure Laterality Date    HERNIA REPAIR      KNEE SURGERY      RHINOPLASTY          Admitting Doctor:   No admitting provider for patient encounter.    Consulting Provider(s):  Consults       No orders found from 11/4/2024 to 12/4/2024.             Admitting Diagnosis:   The encounter diagnosis was NSTEMI (non-ST elevated myocardial infarction).    Most Recent Vitals:   Vitals:    12/03/24 2251 12/03/24 2300 12/03/24 2315 12/03/24 2330   BP:  (!) 148/102 (!) 154/109 125/91   BP Location:       Patient Position:       Pulse: 88 89 79 77   Resp:       Temp:       TempSrc:       SpO2: 100% 97% 97% 96%   Weight:       Height:           Active LDAs/IV Access:   Lines, Drains & Airways       Active LDAs       Name Placement date Placement time Site Days    Peripheral IV 12/03/24 2041 Left Antecubital 12/03/24 2041  Antecubital  less than 1    Peripheral IV 12/03/24 2139 Right Antecubital 12/03/24 2139  Antecubital  less than 1                    Labs (abnormal labs have a star):   Labs Reviewed   TROPONIN - Abnormal; Notable for the following components:       Result Value    HS Troponin T 214 (*)     All other components within normal limits   COMPREHENSIVE METABOLIC PANEL - Abnormal; Notable for the following components:    Glucose 103 (*)     Albumin 3.4 (*)     AST (SGOT) 58 (*)     Alkaline Phosphatase 120 (*)     All other  components within normal limits    Narrative:     GFR Normal >60  Chronic Kidney Disease <60  Kidney Failure <15     BNP (IN-HOUSE) - Abnormal; Notable for the following components:    proBNP 8,588.0 (*)     All other components within normal limits    Narrative:     This assay is used as an aid in the diagnosis of individuals suspected of having heart failure. It can be used as an aid in the diagnosis of acute decompensated heart failure (ADHF) in patients presenting with signs and symptoms of ADHF to the emergency department (ED). In addition, NT-proBNP of <300 pg/mL indicates ADHF is not likely.    Age Range Result Interpretation  NT-proBNP Concentration (pg/mL:      <50             Positive            >450                   Gray                 300-450                    Negative             <300    50-75           Positive            >900                  Gray                300-900                  Negative            <300      >75             Positive            >1800                  Gray                300-1800                  Negative            <300   CBC WITH AUTO DIFFERENTIAL - Abnormal; Notable for the following components:    WBC 11.02 (*)     RDW 15.8 (*)     Lymphocyte % 17.8 (*)     Monocyte % 4.9 (*)     Neutrophils, Absolute 8.32 (*)     All other components within normal limits   HIGH SENSITIVITIY TROPONIN T 2HR - Abnormal; Notable for the following components:    HS Troponin T 227 (*)     Troponin T Delta 13 (*)     All other components within normal limits   PROTIME-INR - Abnormal; Notable for the following components:    Protime 14.8 (*)     All other components within normal limits   LIPASE - Normal   APTT - Normal    Narrative:     PTT = The equivalent PTT values for the therapeutic range of heparin levels at 0.3 to 0.5 U/ml are 60 to 70 seconds.   RAINBOW DRAW    Narrative:     The following orders were created for panel order [place] Draw.  Procedure                                Abnormality         Status                     ---------                               -----------         ------                     Green Top (Gel)[[id number]]                                  Final result               Lavender Top[[id number]]                                     Final result               Gold Top - SST[[id number]]                                   Final result               Garcia Top[[id number]]                                         Final result               Light Blue Top[[id number]]                                   Final result                 Please view results for these tests on the individual orders.   CBC AND DIFFERENTIAL    Narrative:     The following orders were created for panel order CBC & Differential.  Procedure                               Abnormality         Status                     ---------                               -----------         ------                     CBC Auto Differential[[id number]]        Abnormal            Final result                 Please view results for these tests on the individual orders.   GREEN TOP   LAVENDER TOP   GOLD TOP - SST   GRAY TOP   LIGHT BLUE TOP       Meds Given in ED:   Medications   sodium chloride 0.9 % flush 10 mL (has no administration in time range)   losartan (COZAAR) tablet 50 mg (50 mg Oral Given 12/3/24 2309)   hydrOXYzine (ATARAX) tablet 25 mg (25 mg Oral Given 12/3/24 2116)   aspirin chewable tablet 324 mg (324 mg Oral Given 12/3/24 2219)   metoprolol tartrate (LOPRESSOR) injection 5 mg (5 mg Intravenous Given 12/3/24 2309)           Last NIH score:                                                          Dysphagia screening results:        Flower Coma Scale:  No data recorded     CIWA:        Restraint Type:            Isolation Status:  No active isolations

## 2024-12-04 NOTE — FSED PROVIDER NOTE
"Subjective  History of Present Illness:    Patient is a 59-year-old female past medical history of CHF, hypertension, pulmonary embolism on Eliquis, schizophrenia, presents with concern for fluid overload per patient.  Patient denies any pain in the chest currently but states she did earlier.  Patient states \"her fluid pills are not working on her\" patient is crying but will not say why initially  Patient is well-known to this emergency department and other emergency departments per chart review.  Patient is anxious appearing with crying.  Patient complaining of difficulty urinating, states only little bit comes out.  Per chart review patient has been complaining of this, had a CT scan done at  yesterday which showed mild constipation, also urinalysis showed urinary tract infection.  Patient states she also has not had a bowel movement in 2 days which is irregular for her.  Patient denies nausea or vomiting.  Patient recently had a CT of the lower extremity as well due to concern for swelling, did not show any acute abnormalities.  Nurses Notes reviewed and agree, including vitals, allergies, social history and prior medical history.     REVIEW OF SYSTEMS: All systems reviewed and not pertinent unless noted.  Review of Systems    Past Medical History:   Diagnosis Date    CHF (congestive heart failure)     Hypertension     Pneumonia     Pulmonary embolism without acute cor pulmonale 12/15/2023       Allergies:    Lisinopril, Other, Percocet [oxycodone-acetaminophen], and Sulfa antibiotics      Past Surgical History:   Procedure Laterality Date    HERNIA REPAIR      KNEE SURGERY      RHINOPLASTY           Social History     Socioeconomic History    Marital status:    Tobacco Use    Smoking status: Never    Smokeless tobacco: Never   Vaping Use    Vaping status: Never Used   Substance and Sexual Activity    Alcohol use: No    Drug use: No    Sexual activity: Defer         Family History   Problem Relation Age " "of Onset    No Known Problems Mother     No Known Problems Father        Objective  Physical Exam:  /99   Pulse 77   Temp 98.4 °F (36.9 °C) (Oral)   Resp 14   Ht 165.1 cm (65\")   Wt 63.5 kg (140 lb)   SpO2 96%   BMI 23.30 kg/m²      Physical Exam  Constitutional:       Appearance: Well-developed. No acute distress  HENT:      Head: Normocephalic and atraumatic.      Mouth/Throat:      Mouth: Mucous membranes are moist.      Eyes:      Extraocular Movements: Extraocular movements intact.   Cardiovascular:      Rate and Rhythm: Normal rate and regular rhythm.      Heart sounds: Normal heart sounds.   No peripheral edema noted   Pulmonary:      Effort: Pulmonary effort is normal. No respiratory distress.      Breath sounds: Normal breath sounds.   Abdominal:      General: There is no distension.      Palpations: Abdomen is soft and nontender. No rebound tenderness or guarding noted  Musculoskeletal:         General: No swelling or tenderness.       Extremities: Moves all 4s   Skin:     General: Skin is warm and dry.      Capillary Refill: Capillary refill takes less than 2 seconds.   Neurological:      Mental Status:Alert and oriented to person, place, and time.   Mentation is normal   Psychiatric:         Mood and Affect: Mood normal.         Behavior: Behavior normal.     Procedures    ED Course:    ED Course as of 12/03/24 2355   Tue Dec 03, 2024   2118 BNP is elevated, this appears chronic for patient 6 days ago was higher at 8811 days ago was at 13,000 [OM]   2118 Troponin significantly elevated to 214 today which is abnormal for patient. [OM]   2129 Upon recheck patient resting comfortably, not tearful, denies any pain.  Discussed with patient that due to the elevated troponin will need to transfer her to [facility] for further evaluation and treatment.  Patient is agreeable with this plan.Contacted [facility] to transfer patient [OM]   0721 Alerted by nurse that patient's blood pressure has " increased again.  Patient takes metoprolol and valsartan-hydrochlorothiazide at home.  We have metoprolol and losartan so will put that in for patient. [OM]   2255 Spoke with hospitalist who would like the repeat 2-hour troponin prior to accepting [OM]   2316 Patient still resting comfortably.  Troponin increased 13 to 227. Messaged hospitalist as requested  [OM]   2355 Hospitalist was agreeable to admit the patient.  Patient agreeable with this plan. [OM]      ED Course User Index  [OM] Skylar Blanton PA-C       Lab Results (last 24 hours)       Procedure Component Value Units Date/Time    High Sensitivity Troponin T [[id number]]  (Abnormal) Collected: 12/03/24 2047    Specimen: Blood Updated: 12/03/24 2118     HS Troponin T 214 ng/L     CBC & Differential [[id number]]  (Abnormal) Collected: 12/03/24 2047    Specimen: Blood Updated: 12/03/24 2054    Narrative:      The following orders were created for panel order CBC & Differential.  Procedure                               Abnormality         Status                     ---------                               -----------         ------                     CBC Auto Differential[[id number]]        Abnormal            Final result                 Please view results for these tests on the individual orders.    Comprehensive Metabolic Panel [[id number]]  (Abnormal) Collected: 12/03/24 2047    Specimen: Blood Updated: 12/03/24 2116     Glucose 103 mg/dL      BUN 16 mg/dL      Creatinine 0.69 mg/dL      Sodium 138 mmol/L      Potassium 5.0 mmol/L      Comment: Specimen hemolyzed.  Result may be falsely elevated.        Chloride 103 mmol/L      CO2 22.2 mmol/L      Calcium 9.1 mg/dL      Total Protein 6.4 g/dL      Albumin 3.4 g/dL      ALT (SGPT) 22 U/L      AST (SGOT) 58 U/L      Comment: Specimen hemolyzed.  Result may be falsely elevated.        Alkaline Phosphatase 120 U/L      Total Bilirubin 0.5 mg/dL      Globulin 3.0 gm/dL      A/G Ratio 1.1 g/dL      BUN/Creatinine  Ratio 23.2     Anion Gap 12.8 mmol/L      eGFR 100.7 mL/min/1.73     Narrative:      GFR Normal >60  Chronic Kidney Disease <60  Kidney Failure <15      Lipase [[id number]]  (Normal) Collected: 12/03/24 2047    Specimen: Blood Updated: 12/03/24 2112     Lipase 17 U/L     BNP [[id number]]  (Abnormal) Collected: 12/03/24 2047    Specimen: Blood Updated: 12/03/24 2110     proBNP 8,588.0 pg/mL     Narrative:      This assay is used as an aid in the diagnosis of individuals suspected of having heart failure. It can be used as an aid in the diagnosis of acute decompensated heart failure (ADHF) in patients presenting with signs and symptoms of ADHF to the emergency department (ED). In addition, NT-proBNP of <300 pg/mL indicates ADHF is not likely.    Age Range Result Interpretation  NT-proBNP Concentration (pg/mL:      <50             Positive            >450                   Gray                 300-450                    Negative             <300    50-75           Positive            >900                  Gray                300-900                  Negative            <300      >75             Positive            >1800                  Gray                300-1800                  Negative            <300    CBC Auto Differential [[id number]]  (Abnormal) Collected: 12/03/24 2047    Specimen: Blood Updated: 12/03/24 2054     WBC 11.02 10*3/mm3      RBC 4.60 10*6/mm3      Hemoglobin 12.9 g/dL      Hematocrit 40.9 %      MCV 88.9 fL      MCH 28.0 pg      MCHC 31.5 g/dL      RDW 15.8 %      RDW-SD 51.9 fl      MPV 9.8 fL      Platelets 319 10*3/mm3      Neutrophil % 75.4 %      Lymphocyte % 17.8 %      Monocyte % 4.9 %      Eosinophil % 1.4 %      Basophil % 0.4 %      Immature Grans % 0.1 %      Neutrophils, Absolute 8.32 10*3/mm3      Lymphocytes, Absolute 1.96 10*3/mm3      Monocytes, Absolute 0.54 10*3/mm3      Eosinophils, Absolute 0.15 10*3/mm3      Basophils, Absolute 0.04 10*3/mm3      Immature Grans, Absolute 0.01  10*3/mm3     aPTT [[id number]]  (Normal) Collected: 12/03/24 2047    Specimen: Blood Updated: 12/03/24 2217     PTT 28.9 seconds     Narrative:      PTT = The equivalent PTT values for the therapeutic range of heparin levels at 0.3 to 0.5 U/ml are 60 to 70 seconds.    Protime-INR [[id number]]  (Abnormal) Collected: 12/03/24 2047    Specimen: Blood Updated: 12/03/24 2204     Protime 14.8 Seconds      INR 1.10    High Sensitivity Troponin T 2Hr [[id number]]  (Abnormal) Collected: 12/03/24 2247    Specimen: Blood Updated: 12/03/24 2311     HS Troponin T 227 ng/L      Troponin T Delta 13 ng/L              XR Chest 1 View    Result Date: 12/3/2024  XR CHEST 1 VW Date of Exam: 12/3/2024 8:49 PM EST Indication: Chest Pain Triage Protocol Comparison: Chest radiograph 11/23/2024 Findings: Mediastinum: Cardiomediastinal silhouette appears unchanged and enlarged Lungs: Mild left basal opacities likely representing atelectasis. No focal consolidation appreciated. Pleura: No pleural effusion or pneumothorax. Bones and soft tissues: No acute osseous abnormality.     Impression: Impression: No acute intrathoracic finding Electronically Signed: Tae Kang  12/3/2024 9:10 PM EST  Workstation ID: [id number]    CT Abdomen Pelvis With Contrast    Result Date: 12/2/2024  CLINICAL INDICATION: diffuse abd pain, primarily lower TECHNIQUE: Imaging of the abdomen and pelvis was performed, from lung bases through pubic symphysis, using spiral technique, following administration of IV contrast, Omnipaque 300, 100 mL. Delayed (excretory phase) images were performed through the kidneys. Reformatted images in the coronal and sagittal planes were generated from the axial data set to facilitate diagnostic accuracy. Total DLP (Dose-Length Product): 770.97 mGy.cm. Please note: The reported value represents the total of one or more individual components during the CT acquisition on this date and at this time, and as such, the same value may appear in  more than one CT report depending on the interpreting/reporting physicians. COMPARISON: July 29, 2022 CT abdomen and pelvis. FINDINGS: Lung Bases: Cardiomegaly. Left basilar atelectasis. Liver/Gallbladder/Biliary system: The liver demonstrates homogeneous enhancement. Normal Gallbladder. No intra- or extra-hepatic biliary ductal dilatation. Spleen: The spleen enhances homogeneously. Pancreas: The pancreas enhances homogeneously. Adrenals: The adrenals are morphologically unremarkable. Kidneys: Right interpolar region scar. Multiple left kidney simple cysts with the largest measuring 16 mm. Symmetric nephrogram and excretion. No renal or ureteral calculi. No hydronephrosis. Bowel/Mesentery: The small bowel loops are not dilated. Significant sigmoid diverticulosis. No pericolonic fluid collections or inflammation. Moderate pancolonic stool burden.Fecalization of small bowel contents could be secondary to delayed bowel transit. The appendix is not clearly visualized. Vessels/Lymph Nodes: No abdominal aortic aneurysm. Mild atherosclerosis of the infrarenal aorta without aneurysm. No lymphadenopathy within the abdomen or pelvis. Fluid Survey: No free fluid in the abdomen. No free fluid in the pelvis. Pelvis: No pelvic masses. Mildly thickened urinary bladder with perivesical fat stranding.   Body Wall: Surgical clips from prior right inguinal hernia repair. Small fat-containing left inguinal hernia. Dependent edema of the back soft tissue. Bones: Grade 1 retrolisthesis of L2 on L3. No aggressive osseous lesion. Similar dextrocurvature of the lumbar spine.    Impression: Uncomplicated sigmoid diverticulosis. No pericolonic fat stranding or fluid collections. Moderate pancolonic stool burden. Moderate for constipation. Fecalization of small bowel contents could be secondary to delayed bowel transit. Mildly thickened urinary bladder with perivesical fat stranding suggest cystitis. No acute abnormality within the abdomen and  pelvis. CRITICAL RESULT:   No. COMMUNICATION: Per this written report  contrast By electronically signing this report, I, the attending physician, attest that I have personally reviewed the images/data for the above examination(s) and agree with the final edited report. Drafted by John Stovall MD on 12/2/2024 7:00 PM Final report signed by Annette Dexter MD on 12/2/2024 7:24 PM        MDM      Initial impression of presenting illness: Patient presents with concern for fluid overload, per chart review patient has been seen a multitude of times in the emergency departments for concern for this, patient does have past history of schizophrenia.  No peripheral edema noted upon exam.  Patient moving all limbs appropriately.  Patient not short of breath, no respiratory distress noted.   Upon re check patient resting comfortably in no acute distress, no longer crying.   DDX: includes but is not limited to: Schizophrenia, anxiety, CHF exacerbation, pneumonia.  Patient states she has not missed any doses of her Eliquis.    Patient arrives crying, hypertensive with vitals interpreted by myself.     Pertinent results: Elevated BNP, appears chronically elevated, slightly less than that has been recently.  Troponin elevated to 214.  Most recently troponin 43 and 45 approximately 10 days ago.    Diagnostic information from other sources: Chart review  Patient had a recent echo in September 2024 that shows     Left ventricular systolic function is severely decreased. Calculated left ventricular EF = 10.8% Left ventricular ejection fraction appears to be less than 20%.        Interventions / Re-evaluation: Hydroxyzine    Medications   sodium chloride 0.9 % flush 10 mL (has no administration in time range)   losartan (COZAAR) tablet 50 mg (50 mg Oral Given 12/3/24 2309)   hydrOXYzine (ATARAX) tablet 25 mg (25 mg Oral Given 12/3/24 2116)   aspirin chewable tablet 324 mg (324 mg Oral Given 12/3/24 2219)   metoprolol tartrate  (LOPRESSOR) injection 5 mg (5 mg Intravenous Given 12/3/24 9317)       Results/clinical rationale were discussed with patient    Consultations/Discussion of results with other physicians: Attedning    Data interpreted: Nursing notes reviewed, vital signs reviewed.  Labs independently interpreted by me     Counseling: Discussed the results above with the patient regarding need for admission or discharge.  Patient understands and agrees plan of care.  Patient the need to go to [facility] and she is agreeable with this plan,  Hospitalist Dr. Jovel agreeable to except the patient to [facility].  Patient was given home medications here, IV metoprolol that she takes orally every day, oral losartan here.  As we do not have valsartan here or valsartan hydrochlorothiazide, equivalent of losartan given.  Patient denies headache, vision changes, dizziness, chest pain.  -----  ED Disposition       ED Disposition   Decision to Admit    Condition   --    Comment   Level of Care: Telemetry [5]   Accepting Provider:: MERISSA JOVEL [[id number]]               Final diagnoses:   NSTEMI (non-ST elevated myocardial infarction)     Your Follow-Up Providers    Follow-up information has not been specified.       Contact information for after-discharge care    Follow-up information has not been specified.          Your medication list        CONTINUE taking these medications        Instructions Last Dose Given Next Dose Due   Aspirin Low Dose 81 MG EC tablet  Generic drug: aspirin      Take 1 tablet by mouth Daily.       bumetanide 1 MG tablet  Commonly known as: BUMEX      Take 1 tablet by mouth Daily.       buPROPion  MG 24 hr tablet  Commonly known as: WELLBUTRIN XL      Take 1 tablet by mouth Daily.       Eliquis 5 MG tablet tablet  Generic drug: apixaban      Take 1 tablet by mouth 2 (Two) Times a Day.       Jardiance 10 MG tablet tablet  Generic drug: empagliflozin      Take 1 tablet by mouth  Daily.       metoprolol succinate XL 50 MG 24 hr tablet  Commonly known as: TOPROL-XL      Take 1 tablet by mouth Daily.       pantoprazole 40 MG EC tablet  Commonly known as: PROTONIX      Take 1 tablet by mouth Daily.       rosuvastatin 40 MG tablet  Commonly known as: CRESTOR      Take 1 tablet by mouth Daily.       spironolactone 100 MG tablet  Commonly known as: ALDACTONE      Take 1 tablet by mouth Daily.       valsartan-hydrochlorothiazide 80-12.5 MG per tablet  Commonly known as: DIOVAN-HCT      Take 0.5 tablets by mouth Daily for 30 days.       valsartan-hydrochlorothiazide 80-12.5 MG per tablet  Commonly known as: DIOVAN-HCT      Take 1 tablet by mouth Daily.

## 2024-12-04 NOTE — CASE MANAGEMENT/SOCIAL WORK
Discharge Planning Assessment  [facility]     Patient Name: Jill M Paladino  MRN: [id number]  Today's Date: 12/4/2024    Admit Date: 12/3/2024    Plan: IDP   Discharge Needs Assessment       Row Name 12/04/24 1100       Living Environment    People in Home alone    Current Living Arrangements other (see comments)    Duration at Residence [facility] [address]    Primary Care Provided by self    Provides Primary Care For no one    Family Caregiver if Needed friend(s)    Family Caregiver Names Mariza Gordreid    Quality of Family Relationships unable to assess    Able to Return to Prior Arrangements yes       Transition Planning    Patient/Family Anticipates Transition to home    Patient/Family Anticipated Services at Transition none    Transportation Anticipated public transportation       Discharge Needs Assessment    Equipment Currently Used at Home none                   Discharge Plan       Row Name 12/04/24 1101       Plan    Plan IDP    Patient/Family in Agreement with Plan yes    Plan Comments  spoke with Ms. Paladino, at the bedside. Pt states she is currently living at [facility] [address] in [facility]. Independent with ADL's. No DME/HH/OPPT. Pt plans to D/C home. She states if her friend Mariza is not available to transport her home, she will need transportation. PCP-TEX Roman. Confirmed [place] Medicaid and prescriptions filled at [facility] or [facility].  will continue to follow.    Final Discharge Disposition Code 01 - home or self-care                  Continued Care and Services - Admitted Since 12/3/2024    No active coordination exists for this encounter.       Expected Discharge Date and Time       Expected Discharge Date Expected Discharge Time    Dec 6, 2024            Demographic Summary       Row Name 12/04/24 1100       General Information    Reason for Consult discharge planning       Contact Information    Permission Granted to Share Info With                     Functional Status       Row Name 12/04/24 1100       Functional Status    Usual Activity Tolerance good       Functional Status, IADL    Medications independent    Meal Preparation independent    Housekeeping independent    Laundry independent    Shopping independent                   Psychosocial    No documentation.                  Abuse/Neglect    No documentation.                  Legal    No documentation.                  Substance Abuse    No documentation.                  Patient Forms    No documentation.                     Regina Lewis RN
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
## 2024-12-04 NOTE — CONSULTS
Date of Hospital Visit: 24  Encounter Provider: Shea Mojica PA-C  Place of Service: [facility]  Patient Name: Jill M Paladino  :1965  Referral Provider: No ref. provider found  Primary Care Provider: Sigrid Byers PA    Chief complaint/Reason for Consultation: Elevated troponin    History of Present Illness:  Jill M Paladino  Is a 59 y.o. female with medical history including chronic systolic congestive heart failure.  She has a heart catheterization done at [facility] in  in December that reported 100% LAD occlusion with nonobstructive disease in the circumflex and RCA with collaterals to the LAD.  Echocardiogram at that time showed an EF of 20 to 25% with moderate MR.  CT surgery consult was planned but the patient has some delusions and paranoia related to these results and thought that these test were not actually reflective of her heart.    Today, patient reports that she woke up last night with some mild chest discomfort. She characterizes this as a burning sensation and states that as soon as she got up, the pain went away and never returned. She states that up until 2 weeks ago, she was doing great. She has a bike which she has been riding and has not had any chest pain or shortness of breath with exertion. Did feel like she started to retain fluid 2 weeks ago and felt like her bumex was no longer working. Also has been to  and was diagnosed with UTI but did not complete antibiotic. Main complaint this morning is not having had a bowel movement in more than three days with associated abdominal pain and distention. Has responded well to IV diuretics.       Past Medical History:   Diagnosis Date    CHF (congestive heart failure)     Hypertension     Pneumonia     Pulmonary embolism without acute cor pulmonale 12/15/2023       Past Surgical History:   Procedure Laterality Date    HERNIA REPAIR      KNEE SURGERY      RHINOPLASTY         Medications  Prior to Admission   Medication Sig Dispense Refill Last Dose/Taking    spironolactone (ALDACTONE) 100 MG tablet Take 1 tablet by mouth Daily. 90 tablet 0 12/3/2024 Morning    apixaban (ELIQUIS) 5 MG tablet tablet Take 1 tablet by mouth 2 (Two) Times a Day. 60 tablet 0 12/2/2024 Morning    aspirin 81 MG EC tablet Take 1 tablet by mouth Daily. 30 tablet 0     bumetanide (BUMEX) 1 MG tablet Take 1 tablet by mouth Daily. 30 tablet 0 12/1/2024 Morning    buPROPion XL (WELLBUTRIN XL) 300 MG 24 hr tablet Take 1 tablet by mouth Daily. 30 tablet 0 More than a month    empagliflozin (JARDIANCE) 10 MG tablet tablet Take 1 tablet by mouth Daily. 30 tablet 0     metoprolol succinate XL (TOPROL-XL) 50 MG 24 hr tablet Take 1 tablet by mouth Daily. 30 tablet 0     pantoprazole (PROTONIX) 40 MG EC tablet Take 1 tablet by mouth Daily. (Patient taking differently: Take 1 tablet by mouth Daily. Pt does not have refills and could not get it) 30 tablet 0 More than a month    rosuvastatin (CRESTOR) 40 MG tablet Take 1 tablet by mouth Daily. 90 tablet 0     valsartan-hydrochlorothiazide (DIOVAN-HCT) 80-12.5 MG per tablet Take 0.5 tablets by mouth Daily for 30 days. 15 tablet 0     valsartan-hydrochlorothiazide (DIOVAN-HCT) 80-12.5 MG per tablet Take 1 tablet by mouth Daily. 30 tablet 0 More than a month       Social History     Socioeconomic History    Marital status:    Tobacco Use    Smoking status: Never    Smokeless tobacco: Never   Vaping Use    Vaping status: Never Used   Substance and Sexual Activity    Alcohol use: No    Drug use: No    Sexual activity: Defer       Family History   Problem Relation Age of Onset    No Known Problems Mother     No Known Problems Father        REVIEW OF SYSTEMS:     12 point ROS was performed and is Negative except as outlined in HPI     Objective:     Vitals:    12/04/24 0400 12/04/24 0453 12/04/24 0600 12/04/24 0642   BP: 119/83 143/95 129/93 (!) 143/103   BP Location:  Left arm     Patient  "Position:  Lying     Pulse: 77 79     Resp:  14     Temp:  97.6 °F (36.4 °C)     TempSrc:  Oral     SpO2: 95% 95%     Weight:  66.2 kg (146 lb)     Height:         Body mass index is 24.3 kg/m².  Flowsheet Rows      Flowsheet Row First Filed Value   Admission Height 165.1 cm (65\") Documented at 12/03/2024 2042   Admission Weight 63.5 kg (140 lb) Documented at 12/03/2024 2042            Physical Exam   General:  well developed and well nourished.    Skin: Skin is warm and dry. No obvious cyanosis, erythema or pallor.   Neck: Supple, no JVD. Normal carotid upstrokes, no bruits.    Chest:No respiratory distress. No chest wall tenderness. Breath sounds are normal. No wheezes, rhonchi or rales.  Cardiovascular: Normal S1 and S2,cardiac murmur present  Pulses:Radial and pedal pulses are 2+ and symmetric.    Musculoskeletal/Extremities: 1+ BLE edema.   Neurological: Alert and oriented to person, place, and time, no gross focal deficits.   Psychiatric: Normal mood and affect.Speech and behavior is normal.    Lab Review:                Results from last 7 days   Lab Units 12/04/24  0155   SODIUM mmol/L 138   POTASSIUM mmol/L 4.6   CHLORIDE mmol/L 105   CO2 mmol/L 21.0*   BUN mg/dL 15   CREATININE mg/dL 0.70   GLUCOSE mg/dL 91   CALCIUM mg/dL 9.0     Results from last 7 days   Lab Units 12/04/24  0155 12/03/24 2247 12/03/24 2047   HSTROP T ng/L 307* 227* 214*     Results from last 7 days   Lab Units 12/04/24  0156   WBC 10*3/mm3 9.96   HEMOGLOBIN g/dL 12.7   HEMATOCRIT % 41.0   PLATELETS 10*3/mm3 300     Results from last 7 days   Lab Units 12/04/24  0156 12/03/24 2047 11/30/24  1823   INR  1.20* 1.10 1.20*   APTT seconds 24.2* 28.9 29.1*     Results from last 7 days   Lab Units 12/04/24  0155   MAGNESIUM mg/dL 2.2     Results from last 7 days   Lab Units 12/04/24  0155   CHOLESTEROL mg/dL 135   TRIGLYCERIDES mg/dL 55   HDL CHOL mg/dL 40   LDL CHOL mg/dL 83     @LABRCNT(bnp)@  Imaging Results (Last 24 Hours)       " Procedure Component Value Units Date/Time    XR Chest 1 View [[id number]] Collected: 12/03/24 2110     Updated: 12/03/24 2113    Narrative:      XR CHEST 1 VW    Date of Exam: 12/3/2024 8:49 PM EST    Indication: Chest Pain Triage Protocol    Comparison: Chest radiograph 11/23/2024    Findings:      Mediastinum: Cardiomediastinal silhouette appears unchanged and enlarged    Lungs: Mild left basal opacities likely representing atelectasis. No focal consolidation appreciated.    Pleura: No pleural effusion or pneumothorax.    Bones and soft tissues: No acute osseous abnormality.        Impression:      Impression:  No acute intrathoracic finding      Electronically Signed: Tae Kang    12/3/2024 9:10 PM EST    Workstation ID: [id number]             EKG: NSR. Left axis deviation. LVH.    Results for orders placed during the hospital encounter of 09/25/24    Adult Transthoracic Echo Complete W/ Cont if Necessary Per Protocol    Interpretation Summary    Left ventricular systolic function is severely decreased. Calculated left ventricular EF = 10.8% Left ventricular ejection fraction appears to be less than 20%.    Left ventricular diastolic dysfunction is noted.    There is a thrombus present in the left ventricle.    The left atrial cavity is dilated.    Left atrial volume is mildly increased.    The right atrial cavity is dilated.    Moderate to severe mitral valve regurgitation is present.    Estimated right ventricular systolic pressure from tricuspid regurgitation is normal (<35 mmHg).      Initial cardiac assessment: 59-year-old homeless female presenting with acute on chronic systolic heart failure previous EF 20 to 25%, persistent LV thrombus, multiple ER visits over the past several months, history of 100%  of the LAD from cath at [facility] 2023, declined revascularization.        Assessment and plan:   Acute on chronic systolic heart failure and severe MR  EF < 20% on echo from 9/25/2024. Updated  echocardiogram pending  Would continue IV diuresis with Bumex today  Continue Jardiance, Toprol, aldactone and valsartan.  Hold HCTZ as she is receiving IV Bumex. Will likely uptitrate valsartan if BP and renal function allow.   Elevated troponin, likely type II in the setting of #1  Patient remains chest pain free and ECG does not show any acute ST-T changes  History of left ventricular thrombus  LV thrombus still present on echo 9/25/2024.  Eliquis has been on hold as she was started on heparin gtt for NSTEMI.   Will go ahead and DC heparin as patient has declined procedure and will resume eliquis.   Coronary artery disease and ischemic cardiomyopathy  Previous catheterization results from outside hospital reviewed from 2023. 100% LAD occlusion with left to right and right to right collaterals  No obstructive disease in the circumflex or RCA that required intervention  CT surgery was recommended but patient did not want follow-up.  At this time with focus on medical optimization.  She states she has been doing well and does not wish to proceed with cardiac intervention.  Continue aspirin and statin for known CAD.     Will allow patient to eat as there is no plan for cardiac intervention. Cardiac diet ordered. Cardiology will follow.     Shea Mojica PA-C      Discussed plan of care with patient.  She does not want to undergo a repeat heart catheterization.  Discussed outside cardiac catheterization findings with occluded LAD and severe ischemic cardiomyopathy.  With her declining procedures we will continue medical management.  Eliquis resumed given her LV thrombus.  Continue IV diuresis today.  Will plan to uptitrate valsartan tomorrow.  Reports she been out of her medications for about a month.    I, Imtiaz Fontenot M.D.,  have reviewed the notes, assessments, and/or procedures performed by KAYLA/PA, I CONCUR with the documentation of Jill M Paladino.

## 2024-12-04 NOTE — PROGRESS NOTES
[facility] Medicine Services  ADMISSION FOLLOW-UP NOTE          Patient admitted after midnight, H&P by my partner performed earlier on today's date reviewed.  Interim findings, labs, and charting also reviewed.        The [facility] Hospital Problem List has been managed and updated to include any new diagnoses:  Active Hospital Problems    Diagnosis  POA    **NSTEMI (non-ST elevated myocardial infarction) [I21.4]  Yes    Other mixed anxiety disorders [F41.3]  Yes    History of pulmonary embolism [Z86.711]  Yes    Acute on chronic HFrEF (heart failure with reduced ejection fraction) [I50.23]  Yes    Coronary artery disease of native artery of native heart with stable angina pectoris [I25.118]  Yes    Essential hypertension [I10]  Yes      Resolved Hospital Problems   No resolved problems to display.     59-year-old female with history of chronic HFrEF, CAD, hypertension, anxiety, recent multiple visits to [facility] for UTI who presented to [place] ED with complaint of constipation, chest pain and feeling full of fluid, admitted with NSTEMI    NSTEMI  - Troponin remains acute elevated, trending up,  - EKG unremarkable, status post aspirin, continue statin, beta-blocker  --Cardiology consulted, currently n.p.o., heparin gtt. ongoing     Acute on chronic HFrEF  - Echo completed 9/25/2024 noting EF 10.8%  - On Bumex 1 mg daily, changed to IV and continue twice daily for now  --Continue Jardiance, strict I/O's, has reportedly history of medical noncompliance  --Cardiology to evaluate as above     History of PE  - Reports taking Eliquis, hold while on heparin drip     Hypertension  Dyslipidemia  - Continue metoprolol and Diovan  - Continue statin     Severe anxiety  - States that she requires Wellbutrin namebrand as generic does not work for her  - Reports taking spironolactone when not on Wellbutrin  - Ativan as needed     Constipation  - Bowel regimen     Otherwise continue plan of care per admission  H&P    Expected Discharge   Expected Discharge Date: 12/6/2024; Expected Discharge Time:      Asha Antoine MD  12/04/24

## 2024-12-04 NOTE — NURSING NOTE
WOC consult for skin crack foot.    Due to high census and quarterly prevalence study will see patient in the next 1 to 2 days.  Recommend painting with Betadine in the meantime.

## 2024-12-04 NOTE — PLAN OF CARE
Goal Outcome Evaluation:      Patient is NSR on the monitor and on room air.  Alert and oriented times four.  Patient is up ad-rajiv.  Cardiology consulted, see notes and orders.  Patient was on heparin drip, drip dc'd and eliquis restarted.  Bed in lowest position, phone and call light in reach.

## 2024-12-04 NOTE — H&P
[facility] Medicine Services  HISTORY AND PHYSICAL    Patient Name: Jill M Paladino  : 1965  MRN: [id number]  Primary Care Physician: Sigrid Byers PA  Date of admission: 12/3/2024    Subjective   Subjective     Chief Complaint:  Constipation, chest pain, full of fluid     HPI:  Jill M Paladino is a 59 y.o. female with PMH significant for HFrEF, HTN, PE on Eliquis, anxiety, who presents to [place] ED with complaint of constipation, chest pain, and feeling full of fluid.   She states that she has been constipated and had decreased urine output for the past 3 days. She has had multiple visits to [facility] over the past couple of weeks and was told she has UTI on 2024 and prescribed macrobid which she states she did not take secondary to her sulfa allergy. Since that time she has had knee pain, abdominal pain, and increased swelling with multiple ED visits.   Tonight, she developed sharp chest pain around 8pm. She also had continued abdominal pain and therefore called EMS for transport to the ED. She admits to having severe anxiety and panic attacks and has been out of her medications for several months.   Upon arrival to [place] ED, labs are concerning for elevated troponin. She denies current chest pain but continues to endorse constipation and feeling swollen/full of fluid. Repeat troponin continues to elevate. Decision was made to transfer to [facility] for higher level of care and concern for NSTEMI.   Upon arrival to [facility], she remains extremely anxious and is intermittently tearful during exam. She reports her last dose of Eliquis was yesterday. She continues to deny chest pain but does request something for anxiety.   She will be admitted to Hospital Medicine for further evaluation.     Review of Systems   Constitutional:  Positive for activity change. Negative for appetite change, chills, diaphoresis, fatigue, fever and unexpected weight change.   HENT: Negative.  Negative for  congestion, sore throat and trouble swallowing.    Eyes: Negative.  Negative for visual disturbance.   Respiratory:  Positive for chest tightness and shortness of breath. Negative for cough and wheezing.    Cardiovascular:  Positive for chest pain and leg swelling. Negative for palpitations.   Gastrointestinal:  Positive for constipation. Negative for abdominal distention, abdominal pain, diarrhea, nausea and vomiting.   Endocrine: Negative.  Negative for polydipsia and polyphagia.   Genitourinary:  Positive for decreased urine volume, dysuria, frequency and urgency. Negative for difficulty urinating and pelvic pain.   Musculoskeletal: Negative.  Negative for arthralgias, back pain, gait problem and neck pain.   Skin: Negative.  Negative for color change, pallor, rash and wound.   Allergic/Immunologic: Negative.  Negative for immunocompromised state.   Neurological: Negative.  Negative for dizziness, syncope, facial asymmetry, speech difficulty, weakness, light-headedness, numbness and headaches.   Hematological: Negative.  Does not bruise/bleed easily.   Psychiatric/Behavioral:  Positive for sleep disturbance. Negative for confusion. The patient is nervous/anxious.             Personal History     Past Medical History:   Diagnosis Date    CHF (congestive heart failure)     Hypertension     Pneumonia     Pulmonary embolism without acute cor pulmonale 12/15/2023         Past Surgical History:   Procedure Laterality Date    HERNIA REPAIR      KNEE SURGERY      RHINOPLASTY         Family History:  family history includes No Known Problems in her father and mother.     Social History:  reports that she has never smoked. She has never used smokeless tobacco. She reports that she does not drink alcohol and does not use drugs.  Social History     Social History Narrative    Not on file       Medications:  apixaban, aspirin, buPROPion XL, bumetanide, empagliflozin, metoprolol succinate XL, pantoprazole, rosuvastatin,  "spironolactone, and valsartan-hydrochlorothiazide    Allergies   Allergen Reactions    Lisinopril Other (See Comments)     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    Other Nausea And Vomiting     Pt states \"all opiods make me throw up instantly\"    Percocet [Oxycodone-Acetaminophen] GI Intolerance    Sulfa Antibiotics Rash       Objective   Objective     Vital Signs:   Temp:  [97.7 °F (36.5 °C)-98.4 °F (36.9 °C)] 97.7 °F (36.5 °C)  Heart Rate:  [76-91] 76  Resp:  [14] 14  BP: (125-176)/() 136/106    Physical Exam   Constitutional: Awake, alert  Eyes: PERRLA, sclerae anicteric, no conjunctival injection  HENT: NCAT, mucous membranes moist  Neck: Supple, no thyromegaly, no lymphadenopathy, trachea midline  Respiratory: Clear to auscultation bilaterally, nonlabored respirations   Cardiovascular: RRR, no murmurs, rubs, or gallops, palpable pedal pulses bilaterally  Gastrointestinal: decreased Positive bowel sounds, soft, nontender, nondistended  Musculoskeletal: No bilateral ankle edema, no clubbing or cyanosis to extremities  Psychiatric: Anxoius, cooperative  Neurologic: Oriented x 3, strength symmetric in all extremities, Cranial Nerves grossly intact to confrontation, speech clear  Skin: No rashes      Result Review:  I have personally reviewed the results from the time of this admission to 12/4/2024 02:30 EST and agree with these findings:  [x]  Laboratory list / accordion  [x]  Microbiology  [x]  Radiology  [x]  EKG/Telemetry   []  Cardiology/Vascular   []  Pathology  [x]  Old records  []  Other:  Most notable findings include:     LAB RESULTS:      Lab 12/04/24  0156 12/03/24 2047 12/02/24 1827 11/30/24  1823 11/27/24  0448   WBC 9.96 11.02* 8.02 9.65 9.41   HEMOGLOBIN 12.7 12.9 12.7 13.1 14.1   HEMATOCRIT 41.0 40.9 39.8 41.9 44.8   PLATELETS 300 319 297 304 262   NEUTROS ABS 6.54 8.32* 5.15 6.44  --    IMMATURE GRANS (ABS) 0.03 0.01 0.02 0.02  --    LYMPHS " ABS 2.55 1.96 2.1 2.58  --    MONOS ABS 0.58 0.54 0.55 0.49  --    EOS ABS 0.21 0.15 0.15 0.08  --    MCV 90.9 88.9 88 88.8 91   LACTATE, ARTERIAL  --   --  1.3  --   --    PROTIME  --  14.8*  --  15.8*  --    APTT  --  28.9  --  29.1*  --          Lab 12/03/24 2047 11/30/24 1823 11/27/24  0448   SODIUM 138 138  --    POTASSIUM 5.0 4.3  --    CHLORIDE 103 101  --    CO2 22.2 26.5  --    ANION GAP 12.8 10.5  --    BUN 16 23*  --    CREATININE 0.69 0.80  --    EGFR 100.7 85.5  --    GLUCOSE 103* 88  --    CALCIUM 9.1 9.0  --    MAGNESIUM  --   --  1.9         Lab 12/03/24 2047 12/02/24 1827 11/30/24 1823   TOTAL PROTEIN 6.4  --  6.3   ALBUMIN 3.4*  --  3.7   GLOBULIN 3.0  --  2.6   ALT (SGPT) 22  --  19   AST (SGOT) 58*  --  38*   BILIRUBIN 0.5  --  1.2   ALK PHOS 120*  --  124*   LIPASE 17 23  --          Lab 12/03/24 2247 12/03/24 2047 11/30/24 1823 11/27/24  0448   PROBNP  --  8,588.0*  --  8,855*   HSTROP T 227* 214*  --   --    PROTIME  --  14.8* 15.8*  --    INR  --  1.10 1.20*  --                  Brief Urine Lab Results  (Last result in the past 365 days)        Color   Clarity   Blood   Leuk Est   Nitrite   Protein   CREAT   Urine HCG        12/02/24 1759 Yellow   Clear     Small                     Microbiology Results (last 10 days)       ** No results found for the last 240 hours. **            XR Chest 1 View    Result Date: 12/3/2024  XR CHEST 1 VW Date of Exam: 12/3/2024 8:49 PM EST Indication: Chest Pain Triage Protocol Comparison: Chest radiograph 11/23/2024 Findings: Mediastinum: Cardiomediastinal silhouette appears unchanged and enlarged Lungs: Mild left basal opacities likely representing atelectasis. No focal consolidation appreciated. Pleura: No pleural effusion or pneumothorax. Bones and soft tissues: No acute osseous abnormality.     Impression: Impression: No acute intrathoracic finding Electronically Signed: Tae Kang  12/3/2024 9:10 PM EST  Workstation ID: [id number]    CT  Abdomen Pelvis With Contrast    Result Date: 12/2/2024  CLINICAL INDICATION: diffuse abd pain, primarily lower TECHNIQUE: Imaging of the abdomen and pelvis was performed, from lung bases through pubic symphysis, using spiral technique, following administration of IV contrast, Omnipaque 300, 100 mL. Delayed (excretory phase) images were performed through the kidneys. Reformatted images in the coronal and sagittal planes were generated from the axial data set to facilitate diagnostic accuracy. Total DLP (Dose-Length Product): 770.97 mGy.cm. Please note: The reported value represents the total of one or more individual components during the CT acquisition on this date and at this time, and as such, the same value may appear in more than one CT report depending on the interpreting/reporting physicians. COMPARISON: July 29, 2022 CT abdomen and pelvis. FINDINGS: Lung Bases: Cardiomegaly. Left basilar atelectasis. Liver/Gallbladder/Biliary system: The liver demonstrates homogeneous enhancement. Normal Gallbladder. No intra- or extra-hepatic biliary ductal dilatation. Spleen: The spleen enhances homogeneously. Pancreas: The pancreas enhances homogeneously. Adrenals: The adrenals are morphologically unremarkable. Kidneys: Right interpolar region scar. Multiple left kidney simple cysts with the largest measuring 16 mm. Symmetric nephrogram and excretion. No renal or ureteral calculi. No hydronephrosis. Bowel/Mesentery: The small bowel loops are not dilated. Significant sigmoid diverticulosis. No pericolonic fluid collections or inflammation. Moderate pancolonic stool burden.Fecalization of small bowel contents could be secondary to delayed bowel transit. The appendix is not clearly visualized. Vessels/Lymph Nodes: No abdominal aortic aneurysm. Mild atherosclerosis of the infrarenal aorta without aneurysm. No lymphadenopathy within the abdomen or pelvis. Fluid Survey: No free fluid in the abdomen. No free fluid in the pelvis.  Pelvis: No pelvic masses. Mildly thickened urinary bladder with perivesical fat stranding.   Body Wall: Surgical clips from prior right inguinal hernia repair. Small fat-containing left inguinal hernia. Dependent edema of the back soft tissue. Bones: Grade 1 retrolisthesis of L2 on L3. No aggressive osseous lesion. Similar dextrocurvature of the lumbar spine.    Impression: Uncomplicated sigmoid diverticulosis. No pericolonic fat stranding or fluid collections. Moderate pancolonic stool burden. Moderate for constipation. Fecalization of small bowel contents could be secondary to delayed bowel transit. Mildly thickened urinary bladder with perivesical fat stranding suggest cystitis. No acute abnormality within the abdomen and pelvis. CRITICAL RESULT:   No. COMMUNICATION: Per this written report  contrast By electronically signing this report, I, the attending physician, attest that I have personally reviewed the images/data for the above examination(s) and agree with the final edited report. Drafted by John Stovall MD on 12/2/2024 7:00 PM Final report signed by Annette Dexter MD on 12/2/2024 7:24 PM     Results for orders placed during the hospital encounter of 09/25/24    Adult Transthoracic Echo Complete W/ Cont if Necessary Per Protocol    Interpretation Summary    Left ventricular systolic function is severely decreased. Calculated left ventricular EF = 10.8% Left ventricular ejection fraction appears to be less than 20%.    Left ventricular diastolic dysfunction is noted.    There is a thrombus present in the left ventricle.    The left atrial cavity is dilated.    Left atrial volume is mildly increased.    The right atrial cavity is dilated.    Moderate to severe mitral valve regurgitation is present.    Estimated right ventricular systolic pressure from tricuspid regurgitation is normal (<35 mmHg).      Assessment & Plan   Assessment & Plan       NSTEMI (non-ST elevated myocardial infarction)     Coronary artery disease of native artery of native heart with stable angina pectoris    Essential hypertension    Acute on chronic HFrEF (heart failure with reduced ejection fraction)    Other mixed anxiety disorders    History of pulmonary embolism    59 y.o. female with PMH significant for HFrEF, HTN, PE on Eliquis, anxiety, who presents to [place] ED with complaint of constipation, chest pain, and feeling full of fluid who is found to have concern for NSTEMI.     NSTEMI  - Troponin trending up  - Trend EKG  - Cardiology consult in the a.m.  - N.p.o.  - Heparin drip  -  mg given at OSH, continue daily dose  - Continue statin, beta-blocker  - Lipid panel, hemoglobin A1c    Acute on chronic HFrEF  - Echo completed 9/25/2024 noting EF 10.8%  - On Bumex 1 mg daily, changed to IV and continue twice daily for now  - Daily weight  - History of medication noncompliance  - Continue Jardiance  - Cardiology consult in the a.m.    History of PE  - Reports taking Eliquis, hold while on heparin drip    Hypertension  Dyslipidemia  - Continue metoprolol and Diovan  - Continue statin    Severe anxiety  - States that she requires Wellbutrin namebrand as generic does not work for her  - Reports taking spironolactone when not on Wellbutrin  - Ativan as needed    Constipation  - Bowel regimen    DVT prophylaxis: Heparin gtt, previously on Eliquis     CODE STATUS:         Expected Discharge  Expected discharge date/ time has not been documented.      This note has been completed as part of a split-shared workflow.     Signature: Electronically signed by KAYLA Barnes, 12/04/24, 3:13 AM EST.      Attending   Admission Attestation       I have performed an independent face-to-face diagnostic evaluation including performing an independent physical examination.  I approve of the documented plan of care above that was reviewed and developed with the advanced practice clinician (APC) and take responsibility for that plan along  "with its associated risks.  I have updated the HPI as appropriate.    Brief HPI    59F transferred here earlier tonight (Tuesday 12/3) from the [place] facility.  She states that earlier tonight she noticed onset of chest pain at approximately 8 PM.  She locates the pain in the center of the chest, no radiation, and no exacerbating or alleviating factors that she can cite.  She describes as occasionally sharp, and had been constant since its onset, though it is currently resolved after lasting approximately 30 minutes total.  No dyspnea, nausea, or diaphoresis.  She adds that for the last 2 weeks she has been constipated, has noticed increased knee pain, occasional abdominal pain, and has gone to the  ER several times for these symptoms.  She states that 10 days ago she was diagnosed with a UTI but she did not take the prescribed Macrobid, citing her allergy to sulfa drugs.  Troponin trend was started at [place] earlier tonight, and her studies have gone from 214 to 227 to 307.  She was then transferred here.  She is on Eliquis at home and did not arrive on a heparin drip, but she states she did not take her Eliquis on Tuesday, so heparin drip was started shortly after she arrived.  She also confirms feeling very anxious at this time, states she has a history of anxiety with panic attacks, and has been out of her antianxiety medications \"for months, now.\"    Attending Physical Exam:  Temp:  [97.7 °F (36.5 °C)-98.4 °F (36.9 °C)] 97.7 °F (36.5 °C)  Heart Rate:  [75-91] 82  Resp:  [14] 14  BP: (125-176)/() 142/101    Constitutional: Awake, alert; anxious but NAD.  Eyes: PERRLA, sclerae anicteric, no conjunctival injection  HENT: NCAT, mucous membranes moist  Neck: Supple, no thyromegaly, no lymphadenopathy, trachea midline  Respiratory: Clear to auscultation bilaterally, nonlabored respirations   Cardiovascular: RRR, no murmurs, rubs, or gallops, palpable pedal pulses bilaterally  Gastrointestinal: Positive " bowel sounds, soft, nontender, nondistended  Musculoskeletal: No bilateral ankle edema, no clubbing or cyanosis to extremities  Psychiatric: Anxious but cooperative  Neurologic: Oriented x 3, strength symmetric in all extremities, Cranial Nerves grossly intact to confrontation, speech clear  Skin: No rashes, normal turgor.    Result Review:  I have personally reviewed the results from the time of this admission to 12/4/2024 04:39 EST and agree with these findings:  [x]  Laboratory list / accordion  []  Microbiology  [x]  Radiology  [x]  EKG/Telemetry   []  Cardiology/Vascular   []  Pathology  []  Old records  []  Other:  Most notable findings include: I reviewed EKG which by my read shows sinus rhythm, ventricular rate approximately 75 bpm, left axis, agree with machine read of prolonged QT, otherwise nonspecific ST/T wave changes.  I reviewed chest x-ray which is a single AP view and by my read shows cardiomegaly but nothing else acute.    Assessment and Plan:    See assessment and plan documented by APC above and updated/edited by me as appropriate.      Total time spent: 42 minutes  Time spent includes time reviewing chart, face-to-face time, counseling patient/family/caregiver, ordering medications/tests/procedures, communicating with other health care professionals, documenting clinical information in the electronic health record, and coordination of care.       Jonny Lopez III, DO  12/04/24

## 2024-12-05 ENCOUNTER — APPOINTMENT (OUTPATIENT)
Dept: GENERAL RADIOLOGY | Facility: HOSPITAL | Age: 59
DRG: 280 | End: 2024-12-05
Payer: MEDICAID

## 2024-12-05 LAB
ANION GAP SERPL CALCULATED.3IONS-SCNC: 11 MMOL/L (ref 5–15)
BASOPHILS # BLD AUTO: 0.07 10*3/MM3 (ref 0–0.2)
BASOPHILS NFR BLD AUTO: 0.7 % (ref 0–1.5)
BUN SERPL-MCNC: 19 MG/DL (ref 6–20)
BUN/CREAT SERPL: 20.2 (ref 7–25)
CALCIUM SPEC-SCNC: 9.4 MG/DL (ref 8.6–10.5)
CHLORIDE SERPL-SCNC: 101 MMOL/L (ref 98–107)
CO2 SERPL-SCNC: 24 MMOL/L (ref 22–29)
CREAT SERPL-MCNC: 0.94 MG/DL (ref 0.57–1)
DEPRECATED RDW RBC AUTO: 52.6 FL (ref 37–54)
EGFRCR SERPLBLD CKD-EPI 2021: 70.5 ML/MIN/1.73
EOSINOPHIL # BLD AUTO: 0.24 10*3/MM3 (ref 0–0.4)
EOSINOPHIL NFR BLD AUTO: 2.3 % (ref 0.3–6.2)
ERYTHROCYTE [DISTWIDTH] IN BLOOD BY AUTOMATED COUNT: 16 % (ref 12.3–15.4)
GLUCOSE SERPL-MCNC: 104 MG/DL (ref 65–99)
HCT VFR BLD AUTO: 44.1 % (ref 34–46.6)
HGB BLD-MCNC: 13.8 G/DL (ref 12–15.9)
IMM GRANULOCYTES # BLD AUTO: 0.03 10*3/MM3 (ref 0–0.05)
IMM GRANULOCYTES NFR BLD AUTO: 0.3 % (ref 0–0.5)
LYMPHOCYTES # BLD AUTO: 2.41 10*3/MM3 (ref 0.7–3.1)
LYMPHOCYTES NFR BLD AUTO: 22.7 % (ref 19.6–45.3)
MAGNESIUM SERPL-MCNC: 2.2 MG/DL (ref 1.6–2.6)
MCH RBC QN AUTO: 28.2 PG (ref 26.6–33)
MCHC RBC AUTO-ENTMCNC: 31.3 G/DL (ref 31.5–35.7)
MCV RBC AUTO: 90.2 FL (ref 79–97)
MONOCYTES # BLD AUTO: 0.65 10*3/MM3 (ref 0.1–0.9)
MONOCYTES NFR BLD AUTO: 6.1 % (ref 5–12)
NEUTROPHILS NFR BLD AUTO: 67.9 % (ref 42.7–76)
NEUTROPHILS NFR BLD AUTO: 7.21 10*3/MM3 (ref 1.7–7)
NRBC BLD AUTO-RTO: 0 /100 WBC (ref 0–0.2)
PLATELET # BLD AUTO: 334 10*3/MM3 (ref 140–450)
PMV BLD AUTO: 10.1 FL (ref 6–12)
POTASSIUM SERPL-SCNC: 5 MMOL/L (ref 3.5–5.2)
QT INTERVAL: 444 MS
QTC INTERVAL: 492 MS
RBC # BLD AUTO: 4.89 10*6/MM3 (ref 3.77–5.28)
SODIUM SERPL-SCNC: 136 MMOL/L (ref 136–145)
WBC NRBC COR # BLD AUTO: 10.61 10*3/MM3 (ref 3.4–10.8)

## 2024-12-05 PROCEDURE — 99232 SBSQ HOSP IP/OBS MODERATE 35: CPT | Performed by: INTERNAL MEDICINE

## 2024-12-05 PROCEDURE — 25010000002 BUMETANIDE PER 0.5 MG: Performed by: NURSE PRACTITIONER

## 2024-12-05 PROCEDURE — 25010000002 MORPHINE PER 10 MG: Performed by: NURSE PRACTITIONER

## 2024-12-05 PROCEDURE — 99232 SBSQ HOSP IP/OBS MODERATE 35: CPT | Performed by: NURSE PRACTITIONER

## 2024-12-05 PROCEDURE — 74018 RADEX ABDOMEN 1 VIEW: CPT

## 2024-12-05 PROCEDURE — 80048 BASIC METABOLIC PNL TOTAL CA: CPT | Performed by: NURSE PRACTITIONER

## 2024-12-05 PROCEDURE — 85025 COMPLETE CBC W/AUTO DIFF WBC: CPT | Performed by: INTERNAL MEDICINE

## 2024-12-05 PROCEDURE — 83735 ASSAY OF MAGNESIUM: CPT | Performed by: NURSE PRACTITIONER

## 2024-12-05 RX ORDER — LORAZEPAM 1 MG/1
1 TABLET ORAL EVERY 6 HOURS PRN
Status: DISCONTINUED | OUTPATIENT
Start: 2024-12-05 | End: 2024-12-05

## 2024-12-05 RX ORDER — POLYETHYLENE GLYCOL 3350 17 G/17G
17 POWDER, FOR SOLUTION ORAL DAILY
Status: DISCONTINUED | OUTPATIENT
Start: 2024-12-05 | End: 2024-12-06

## 2024-12-05 RX ORDER — PROCHLORPERAZINE EDISYLATE 5 MG/ML
5 INJECTION INTRAMUSCULAR; INTRAVENOUS ONCE
Status: DISCONTINUED | OUTPATIENT
Start: 2024-12-05 | End: 2024-12-10 | Stop reason: HOSPADM

## 2024-12-05 RX ORDER — LACTULOSE 10 G/15ML
20 SOLUTION ORAL 2 TIMES DAILY
Status: DISCONTINUED | OUTPATIENT
Start: 2024-12-05 | End: 2024-12-06

## 2024-12-05 RX ORDER — BISACODYL 5 MG/1
5 TABLET, DELAYED RELEASE ORAL DAILY PRN
Status: DISCONTINUED | OUTPATIENT
Start: 2024-12-05 | End: 2024-12-06

## 2024-12-05 RX ORDER — LACTULOSE 10 G/15ML
300 SOLUTION ORAL ONCE
Status: COMPLETED | OUTPATIENT
Start: 2024-12-05 | End: 2024-12-05

## 2024-12-05 RX ORDER — LORAZEPAM 0.5 MG/1
0.5 TABLET ORAL EVERY 6 HOURS PRN
Status: DISPENSED | OUTPATIENT
Start: 2024-12-05 | End: 2024-12-09

## 2024-12-05 RX ORDER — MORPHINE SULFATE 2 MG/ML
2 INJECTION, SOLUTION INTRAMUSCULAR; INTRAVENOUS
Status: DISPENSED | OUTPATIENT
Start: 2024-12-05 | End: 2024-12-10

## 2024-12-05 RX ORDER — BISACODYL 10 MG
10 SUPPOSITORY, RECTAL RECTAL DAILY PRN
Status: DISCONTINUED | OUTPATIENT
Start: 2024-12-05 | End: 2024-12-06

## 2024-12-05 RX ORDER — AMOXICILLIN 250 MG
2 CAPSULE ORAL 2 TIMES DAILY
Status: DISCONTINUED | OUTPATIENT
Start: 2024-12-05 | End: 2024-12-06

## 2024-12-05 RX ADMIN — BUMETANIDE 1 MG: 0.25 INJECTION INTRAMUSCULAR; INTRAVENOUS at 08:29

## 2024-12-05 RX ADMIN — SENNOSIDES AND DOCUSATE SODIUM 2 TABLET: 50; 8.6 TABLET ORAL at 20:56

## 2024-12-05 RX ADMIN — VALSARTAN 80 MG: 80 TABLET, FILM COATED ORAL at 08:28

## 2024-12-05 RX ADMIN — BUMETANIDE 1 MG: 0.25 INJECTION INTRAMUSCULAR; INTRAVENOUS at 20:56

## 2024-12-05 RX ADMIN — APIXABAN 5 MG: 5 TABLET, FILM COATED ORAL at 08:28

## 2024-12-05 RX ADMIN — APIXABAN 5 MG: 5 TABLET, FILM COATED ORAL at 20:56

## 2024-12-05 RX ADMIN — ACETAMINOPHEN 650 MG: 325 TABLET ORAL at 08:28

## 2024-12-05 RX ADMIN — MORPHINE SULFATE 2 MG: 2 INJECTION, SOLUTION INTRAMUSCULAR; INTRAVENOUS at 20:57

## 2024-12-05 RX ADMIN — Medication 10 ML: at 08:31

## 2024-12-05 RX ADMIN — LORAZEPAM 0.5 MG: 0.5 TABLET ORAL at 10:55

## 2024-12-05 RX ADMIN — ASPIRIN 81 MG: 81 TABLET, COATED ORAL at 08:28

## 2024-12-05 RX ADMIN — LACTULOSE 20 G: 20 SOLUTION ORAL at 10:55

## 2024-12-05 RX ADMIN — METOPROLOL SUCCINATE 50 MG: 50 TABLET, EXTENDED RELEASE ORAL at 08:28

## 2024-12-05 RX ADMIN — MORPHINE SULFATE 2 MG: 2 INJECTION, SOLUTION INTRAMUSCULAR; INTRAVENOUS at 14:50

## 2024-12-05 RX ADMIN — SPIRONOLACTONE 100 MG: 25 TABLET ORAL at 08:28

## 2024-12-05 RX ADMIN — LACTULOSE 20 G: 20 SOLUTION ORAL at 20:56

## 2024-12-05 RX ADMIN — SENNOSIDES AND DOCUSATE SODIUM 2 TABLET: 50; 8.6 TABLET ORAL at 08:28

## 2024-12-05 RX ADMIN — LACTULOSE 300 ML: 10 SOLUTION ORAL at 17:15

## 2024-12-05 RX ADMIN — Medication 10 ML: at 21:02

## 2024-12-05 RX ADMIN — MORPHINE SULFATE 2 MG: 2 INJECTION, SOLUTION INTRAMUSCULAR; INTRAVENOUS at 18:26

## 2024-12-05 RX ADMIN — PANTOPRAZOLE SODIUM 40 MG: 40 TABLET, DELAYED RELEASE ORAL at 05:22

## 2024-12-05 NOTE — PROGRESS NOTES
"[facility] at [facility]  INPATIENT PROGRESS NOTE         [facility] 6A    12/5/2024      PATIENT IDENTIFICATION:   Name:  Jill M Paladino      MRN:  [id number]     59 y.o.  female             Reason for visit: Acute on chronic systolic heart failure, CAD      SUBJECTIVE:   She states her breathing is much improved but she is in mild distress due to abdominal pain and constipation.     OBJECTIVE:  Vitals:    12/04/24 2126 12/05/24 0032 12/05/24 0340 12/05/24 0532   BP: 122/89 136/96 128/91    BP Location: Left arm Left arm Left arm    Patient Position: Lying Lying Lying    Pulse: 71 73 67    Resp: 18 16 16    Temp: 98.2 °F (36.8 °C) 98.1 °F (36.7 °C) 97.9 °F (36.6 °C)    TempSrc: Oral Oral Oral    SpO2:       Weight:    62.9 kg (138 lb 9.6 oz)   Height:               Body mass index is 23.06 kg/m².    Intake/Output Summary (Last 24 hours) at 12/5/2024 0826  Last data filed at 12/5/2024 0340  Gross per 24 hour   Intake --   Output 2650 ml   Net -2650 ml       Telemetry: Personally reviewed, normal sinus rhythm, no arrhythmia     Exam:  General Appearance:   well developed  well nourished, mild distress due to constipation and abdominal pain  Neck:  thyroid not enlarged  supple  Respiratory:  no respiratory distress  normal breath sounds  no rales  Cardiovascular:  no jugular venous distention  regular rhythm  apical impulse normal  S1 normal, S2 normal  no S3, no S4   no murmur  no rub, no thrill  carotid pulses normal; no bruit  pedal pulses normal  lower extremity edema: Trace  Abdomen:  Soft, mildly tender, no rebound or guarding, positive bowel sounds in all 4 quadrants.  Skin:   warm, dry      Allergies   Allergen Reactions    Lisinopril Other (See Comments)     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    Other Nausea And Vomiting     Pt states \"all opiods make me throw up instantly\"    Percocet [Oxycodone-Acetaminophen] GI " "Intolerance    Sulfa Antibiotics Rash     Scheduled meds:       apixaban, 5 mg, Oral, Q12H  aspirin, 81 mg, Oral, Daily  bumetanide, 1 mg, Intravenous, Q12H  empagliflozin, 10 mg, Oral, Daily  valsartan, 80 mg, Oral, Q24H   And  [Held by provider] hydroCHLOROthiazide, 12.5 mg, Oral, Q24H  metoprolol succinate XL, 50 mg, Oral, Daily  pantoprazole, 40 mg, Oral, Daily  rosuvastatin, 40 mg, Oral, Daily  senna-docusate sodium, 2 tablet, Oral, BID  sodium chloride, 10 mL, Intravenous, Q12H  spironolactone, 100 mg, Oral, Daily      IV meds:                         Data Review:  Results from last 7 days   Lab Units 12/04/24  0155 12/03/24 2047 11/30/24  1823   SODIUM mmol/L 138 138 138   BUN mg/dL 15 16 23*   CREATININE mg/dL 0.70 0.69 0.80   GLUCOSE mg/dL 91 103* 88     Results from last 7 days   Lab Units 12/05/24  0429 12/04/24 0156 12/03/24 2047 12/02/24  1827 11/30/24  1823   WBC 10*3/mm3 10.61 9.96 11.02* 8.02 9.65   HEMOGLOBIN g/dL 13.8 12.7 12.9 12.7 13.1     Results from last 7 days   Lab Units 12/04/24  0156 12/03/24 2047 11/30/24  1823   INR  1.20* 1.10 1.20*     Results from last 7 days   Lab Units 12/04/24  0155 12/03/24 2047 11/30/24  1823   ALT (SGPT) U/L 22 22 19   AST (SGOT) U/L 45* 58* 38*     No results found for: \"DIGOXIN\"   Lab Results   Component Value Date    TSH 3.830 03/10/2024     Results from last 7 days   Lab Units 12/04/24  0155   CHOLESTEROL mg/dL 135   HDL CHOL mg/dL 40       Estimated Creatinine Clearance: 87 mL/min (by C-G formula based on SCr of 0.7 mg/dL).        Imaging (last 24 hr):   I personally reviewed the most recent chest x-ray and other pertinent imaging studies.  Results for orders placed during the hospital encounter of 12/03/24    XR Chest 1 View    Narrative  XR CHEST 1 VW    Date of Exam: 12/3/2024 8:49 PM EST    Indication: Chest Pain Triage Protocol    Comparison: Chest radiograph 11/23/2024    Findings:      Mediastinum: Cardiomediastinal silhouette appears unchanged " and enlarged    Lungs: Mild left basal opacities likely representing atelectasis. No focal consolidation appreciated.    Pleura: No pleural effusion or pneumothorax.    Bones and soft tissues: No acute osseous abnormality.    Impression  Impression:  No acute intrathoracic finding      Electronically Signed: Tae Kang  12/3/2024 9:10 PM EST  Workstation ID: [id number]        Last ECHO:  Results for orders placed during the hospital encounter of 09/25/24    Adult Transthoracic Echo Complete W/ Cont if Necessary Per Protocol    Interpretation Summary    Left ventricular systolic function is severely decreased. Calculated left ventricular EF = 10.8% Left ventricular ejection fraction appears to be less than 20%.    Left ventricular diastolic dysfunction is noted.    There is a thrombus present in the left ventricle.    The left atrial cavity is dilated.    Left atrial volume is mildly increased.    The right atrial cavity is dilated.    Moderate to severe mitral valve regurgitation is present.    Estimated right ventricular systolic pressure from tricuspid regurgitation is normal (<35 mmHg).        PROBLEM LIST:     NSTEMI (non-ST elevated myocardial infarction)    Coronary artery disease of native artery of native heart with stable angina pectoris    Essential hypertension    Acute on chronic HFrEF (heart failure with reduced ejection fraction)    Other mixed anxiety disorders    History of pulmonary embolism        Initial cardiac assessment: 59-year-old female with ischemic cardiomyopathy EF 10.8% 9/2024, thrombus present in the left ventricle, moderate to severe mitral valve regurgitation present with acute on chronic systolic heart failure    ASSESSMENT/PLAN:  1.  Acute on chronic systolic heart failure:  Echo 9/2024 with EF 10.8% with LV thrombus  Continue IV diuresis with Bumex today good response thus far, but more fluid remains, requiring further diuresis.  Monitor renal function and electrolytes  closely  Continue GDMT with Jardiance, Toprol, spironolactone, and valsartan  Goal net -2 L/day  Low-sodium diet and exercise also recommended    2.  Left ventricular mural thrombus:  Continue full anticoagulation with Eliquis indefinitely due to high risk for redevelopment of clot given severe cardiomyopathy.    3.  CAD:  Known history of LAD  with collaterals from cath at [facility]  We have discussed options of revascularization including CABG with the patient but she declines further procedures.  Medical management for now, continue aspirin and statin  EKG unchanged, troponin elevation due to acute on chronic systolic heart failure  No chest pain currently    4.  Abdominal pain:  Mild distress this morning due to abdominal pain, she has positive bowel sounds, I personally discussed this with Dr. Antoine, we will address constipation with appropriate medical therapy.  Discussed importance of high-fiber diet with patient  CT of the abdomen pelvis performed 12/2/2024 at  showed uncomplicated sigmoid diverticulosis no pericolonic fat stranding, moderate pancolonic stool burden moderate for constipation possible cystitis.    Discussed with  Dr. Elina Sargent MD  12/5/2024    08:26 EST

## 2024-12-05 NOTE — PROGRESS NOTES
[facility] Medicine Services  PROGRESS NOTE    Patient Name: Jill M Paladino  : 1965  MRN: [id number]    Date of Admission: 12/3/2024  Primary Care Physician: Sigrid Byers PA    Subjective   Subjective     CC:  Constipation, chest pain,     HPI:  Patient is sitting up in bed. She is moaning and having some abd pain. Feels constipated and agreeable to enema       Objective   Objective     Vital Signs:   Temp:  [97.7 °F (36.5 °C)-98.2 °F (36.8 °C)] 97.9 °F (36.6 °C)  Heart Rate:  [67-82] 67  Resp:  [14-18] 16  BP: (116-137)/() 123/92     Physical Exam:  Constitutional: No acute distress, awake, alert  HENT: NCAT, mucous membranes moist  Respiratory: Clear to auscultation bilaterally, respiratory effort normal room air   Cardiovascular: RRR, no murmurs, rubs, or gallops  Gastrointestinal: Positive bowel sounds, soft, nontender, nondistended  Musculoskeletal: No bilateral ankle edema  Psychiatric: Appropriate affect, cooperative  Neurologic: Oriented x 3, strength symmetric in all extremities, , speech clear  Skin: No rashes      Results Reviewed:  LAB RESULTS:      Lab 24  0429 24  0948 24  0156 24  0155 24  2247 24  2047 24  1827 24  1823   WBC 10.61  --  9.96  --   --  11.02* 8.02 9.65   HEMOGLOBIN 13.8  --  12.7  --   --  12.9 12.7 13.1   HEMATOCRIT 44.1  --  41.0  --   --  40.9 39.8 41.9   PLATELETS 334  --  300  --   --  319 297 304   NEUTROS ABS 7.21*  --  6.54  --   --  8.32* 5.15 6.44   IMMATURE GRANS (ABS) 0.03  --  0.03  --   --  0.01 0.02 0.02   LYMPHS ABS 2.41  --  2.55  --   --  1.96 2.1 2.58   MONOS ABS 0.65  --  0.58  --   --  0.54 0.55 0.49   EOS ABS 0.24  --  0.21  --   --  0.15 0.15 0.08   MCV 90.2  --  90.9  --   --  88.9 88 88.8   LACTATE, ARTERIAL  --   --   --   --   --   --  1.3  --    PROTIME  --   --  15.4*  --   --  14.8*  --  15.8*   APTT  --   --  24.2*  --   --  28.9  --  29.1*   HEPARIN ANTI-XA   --  0.30 0.12*  --   --   --   --   --    HSTROP T  --   --   --  307* 227* 214*  --   --          Lab 12/04/24  0156 12/04/24 0155 12/03/24 2047 11/30/24  1823   SODIUM  --  138 138 138   POTASSIUM  --  4.6 5.0 4.3   CHLORIDE  --  105 103 101   CO2  --  21.0* 22.2 26.5   ANION GAP  --  12.0 12.8 10.5   BUN  --  15 16 23*   CREATININE  --  0.70 0.69 0.80   EGFR  --  100.4 100.7 85.5   GLUCOSE  --  91 103* 88   CALCIUM  --  9.0 9.1 9.0   MAGNESIUM  --  2.2  --   --    HEMOGLOBIN A1C 6.20*  --   --   --          Lab 12/04/24 0155 12/03/24 2047 12/02/24 1827 11/30/24  1823   TOTAL PROTEIN 5.8* 6.4  --  6.3   ALBUMIN 3.2* 3.4*  --  3.7   GLOBULIN 2.6 3.0  --  2.6   ALT (SGPT) 22 22  --  19   AST (SGOT) 45* 58*  --  38*   BILIRUBIN 0.6 0.5  --  1.2   ALK PHOS 116 120*  --  124*   LIPASE  --  17 23  --          Lab 12/04/24  0156 12/04/24 0155 12/03/24 2247 12/03/24 2047 11/30/24  1823   PROBNP  --   --   --  8,588.0*  --    HSTROP T  --  307* 227* 214*  --    PROTIME 15.4*  --   --  14.8* 15.8*   INR 1.20*  --   --  1.10 1.20*         Lab 12/04/24 0155   CHOLESTEROL 135   LDL CHOL 83   HDL CHOL 40   TRIGLYCERIDES 55             Brief Urine Lab Results  (Last result in the past 365 days)        Color   Clarity   Blood   Leuk Est   Nitrite   Protein   CREAT   Urine HCG        12/04/24 2056 Yellow   Clear   Negative   Negative   Negative   Negative                   Microbiology Results Abnormal       None            XR Abdomen KUB    Result Date: 12/5/2024  XR ABDOMEN KUB Date of Exam: 12/5/2024 9:13 AM EST Indication: abd paIN Comparison: 3/12/2024 CT Findings: No evidence of bowel obstruction. Large volume colonic stool burden. Surgical changes of prior hernia repair overlies the pelvis. Lumbar scoliosis and spondylosis. No acute fracture.     Impression: Impression: Large volume colonic stool burden compatible with constipation. No obstruction. Electronically Signed: Gerard Brunson MD  12/5/2024 9:50 AM EST   Workstation ID: [id number]    XR Chest 1 View    Result Date: 12/3/2024  XR CHEST 1 VW Date of Exam: 12/3/2024 8:49 PM EST Indication: Chest Pain Triage Protocol Comparison: Chest radiograph 11/23/2024 Findings: Mediastinum: Cardiomediastinal silhouette appears unchanged and enlarged Lungs: Mild left basal opacities likely representing atelectasis. No focal consolidation appreciated. Pleura: No pleural effusion or pneumothorax. Bones and soft tissues: No acute osseous abnormality.     Impression: Impression: No acute intrathoracic finding Electronically Signed: Tae Kang  12/3/2024 9:10 PM EST  Workstation ID: [id number]     Results for orders placed during the hospital encounter of 09/25/24    Adult Transthoracic Echo Complete W/ Cont if Necessary Per Protocol    Interpretation Summary    Left ventricular systolic function is severely decreased. Calculated left ventricular EF = 10.8% Left ventricular ejection fraction appears to be less than 20%.    Left ventricular diastolic dysfunction is noted.    There is a thrombus present in the left ventricle.    The left atrial cavity is dilated.    Left atrial volume is mildly increased.    The right atrial cavity is dilated.    Moderate to severe mitral valve regurgitation is present.    Estimated right ventricular systolic pressure from tricuspid regurgitation is normal (<35 mmHg).      Current medications:  Scheduled Meds:apixaban, 5 mg, Oral, Q12H  aspirin, 81 mg, Oral, Daily  bumetanide, 1 mg, Intravenous, Q12H  empagliflozin, 10 mg, Oral, Daily  valsartan, 80 mg, Oral, Q24H   And  [Held by provider] hydroCHLOROthiazide, 12.5 mg, Oral, Q24H  lactulose, 20 g, Oral, BID  metoprolol succinate XL, 50 mg, Oral, Daily  pantoprazole, 40 mg, Oral, Daily  rosuvastatin, 40 mg, Oral, Daily  senna-docusate sodium, 2 tablet, Oral, BID  sodium chloride, 10 mL, Intravenous, Q12H  spironolactone, 100 mg, Oral, Daily      Continuous Infusions:   PRN Meds:.  acetaminophen **OR**  acetaminophen **OR** acetaminophen    senna-docusate sodium **AND** polyethylene glycol **AND** bisacodyl **AND** bisacodyl    influenza vaccine    LORazepam    sodium chloride    sodium chloride    sodium chloride    Assessment & Plan   Assessment & Plan     Active Hospital Problems    Diagnosis  POA    **NSTEMI (non-ST elevated myocardial infarction) [I21.4]  Yes    Other mixed anxiety disorders [F41.3]  Yes    History of pulmonary embolism [Z86.711]  Yes    Acute on chronic HFrEF (heart failure with reduced ejection fraction) [I50.23]  Yes    Coronary artery disease of native artery of native heart with stable angina pectoris [I25.118]  Yes    Essential hypertension [I10]  Yes      Resolved Hospital Problems   No resolved problems to display.        Brief Hospital Course to date:  Jill M Paladino is a 59 y.o. female  with history of chronic HFrEF, CAD, hypertension, anxiety, recent multiple visits to [facility] for UTI who presented to [facility] with complaint of constipation, chest pain and feeling full of fluid, admitted with NSTEMI    Plan was partially entered by my partner and I have reviewed and updated as appropriate on 12/5/24     NSTEMI  Left ventricular mural thrombus   CAD  - Troponin remains acute elevated, trending up,  - EKG unremarkable, status post aspirin, continue statin, beta-blocker  --Cardiology consulted, s/p heparin gtt.now back on eliquis   -- known hx of LAD  with collaterals from cath at [facility] but is declining any further procedures at this time   -- check labs      Acute on chronic HFrEF  - Echo completed 9/25/2024 noting EF 10.8%  - On Bumex 1 mg daily, changed to IV and continue twice daily for now  --Continue Jardiance, strict I/O's, has reportedly history of medical noncompliance  --Cardiology to evaluate as above     History of PE  - Eliquis,     Hypertension  Dyslipidemia  - Continue metoprolol and Diovan  - Continue statin     Severe anxiety  - States that she requires Wellbutrin  namebrand as generic does not work for her  - Reports taking spironolactone when not on Wellbutrin  - Ativan as needed, increased dose to 1 mg     Constipation  - Bowel regimen  -- KUB large volume colonic stool burden compatible with constipation   -- s/s enema today and start lactulose   -- no results from s/s enema will try lactulose enema,   -- increased abd pain, will try morphine and check bladder scan    Talked with nurse about 1445 and patient was screaming in pain. Unable to calm her down. She had been sleeping after her ativan. No results from enema but patient had hard time holding it. If no results from lactulose oral and enema will consult GI in am     Expected Discharge Location and Transportation: home   Expected Discharge   Expected Discharge Date: 12/6/2024; Expected Discharge Time:      VTE Prophylaxis:  Pharmacologic & mechanical VTE prophylaxis orders are present.         AM-PAC 6 Clicks Score (PT): 24 (12/05/24 0800)    CODE STATUS:   Code Status and Medical Interventions: CPR (Attempt to Resuscitate); Full Support   Ordered at: 12/04/24 0312     Code Status (Patient has no pulse and is not breathing):    CPR (Attempt to Resuscitate)     Medical Interventions (Patient has pulse or is breathing):    Full Support       Marlene Ochoa, APRN  12/05/24

## 2024-12-05 NOTE — NURSING NOTE
[facility] follow-up consult placed a few days ago for: Skin crack foot.    Visited patient and noted dry feet, apparently patient has used a callus chemical peel recently.  No major wounds open.  Recommend moisturizing feet.

## 2024-12-05 NOTE — PAYOR COMM NOTE
"Paladino, Jill M (59 y.o. Female)     [id number]     Sigrid Jones, ERIK  Utilization Review  Drocx-[phone]  Aor-[phone]        Date of Birth   1965    Social Security Number       Address   [address]  [address] [address]    Home Phone   [phone]    MRN   [id number]       Gnosticism   Non-Zoroastrianism    Marital Status                               Admission Date   12/3/24    Admission Type   Emergency    Admitting Provider   Asha Antoine MD    Attending Provider   Asha Antoine MD    Department, Room/Bed   [facility] 6A, N614/1       Discharge Date       Discharge Disposition       Discharge Destination                                 Attending Provider: Asha Antoine MD    Allergies: Lisinopril, Other, Percocet [Oxycodone-acetaminophen], Sulfa Antibiotics    Isolation: None   Infection: None   Code Status: CPR    Ht: 165.1 cm (65\")   Wt: 62.9 kg (138 lb 9.6 oz)    Admission Cmt: None   Principal Problem: NSTEMI (non-ST elevated myocardial infarction) [I21.4]                   Active Insurance as of 12/3/2024       Primary Coverage       Payor Plan Insurance Group Employer/Plan Group    ANTHEM MEDICAID ANTH MEDICAID [id number]       Payor Plan Address Payor Plan Phone Number Payor Plan Fax Number Effective Dates    [address] [address] [phone]  2023 - None Entered    [facility] [address]         Subscriber Name Subscriber Birth Date Member ID       PALADINO,JILL M 1965 [id number]                     Emergency Contacts        (Rel.) Home Phone Work Phone Mobile Phone    VIJAY ALDANA (Friend) [phone] -- [phone]                 History & Physical        Jonny Lopez III, DO at 24 0230              [facility] Medicine Services  HISTORY AND PHYSICAL    Patient Name: Jill M Paladino  : 1965  MRN: [id number]  Primary Care Physician: Modesta" TEX Lea  Date of admission: 12/3/2024    Subjective  Subjective     Chief Complaint:  Constipation, chest pain, full of fluid     HPI:  Jill M Paladino is a 59 y.o. female with PMH significant for HFrEF, HTN, PE on Eliquis, anxiety, who presents to [place] ED with complaint of constipation, chest pain, and feeling full of fluid.   She states that she has been constipated and had decreased urine output for the past 3 days. She has had multiple visits to [facility] over the past couple of weeks and was told she has UTI on 11/24/2024 and prescribed macrobid which she states she did not take secondary to her sulfa allergy. Since that time she has had knee pain, abdominal pain, and increased swelling with multiple ED visits.   Tonight, she developed sharp chest pain around 8pm. She also had continued abdominal pain and therefore called EMS for transport to the ED. She admits to having severe anxiety and panic attacks and has been out of her medications for several months.   Upon arrival to [place] ED, labs are concerning for elevated troponin. She denies current chest pain but continues to endorse constipation and feeling swollen/full of fluid. Repeat troponin continues to elevate. Decision was made to transfer to [facility] for higher level of care and concern for NSTEMI.   Upon arrival to [facility], she remains extremely anxious and is intermittently tearful during exam. She reports her last dose of Eliquis was yesterday. She continues to deny chest pain but does request something for anxiety.   She will be admitted to Hospital Medicine for further evaluation.     Review of Systems   Constitutional:  Positive for activity change. Negative for appetite change, chills, diaphoresis, fatigue, fever and unexpected weight change.   HENT: Negative.  Negative for congestion, sore throat and trouble swallowing.    Eyes: Negative.  Negative for visual disturbance.   Respiratory:  Positive for chest tightness and shortness of breath.  "Negative for cough and wheezing.    Cardiovascular:  Positive for chest pain and leg swelling. Negative for palpitations.   Gastrointestinal:  Positive for constipation. Negative for abdominal distention, abdominal pain, diarrhea, nausea and vomiting.   Endocrine: Negative.  Negative for polydipsia and polyphagia.   Genitourinary:  Positive for decreased urine volume, dysuria, frequency and urgency. Negative for difficulty urinating and pelvic pain.   Musculoskeletal: Negative.  Negative for arthralgias, back pain, gait problem and neck pain.   Skin: Negative.  Negative for color change, pallor, rash and wound.   Allergic/Immunologic: Negative.  Negative for immunocompromised state.   Neurological: Negative.  Negative for dizziness, syncope, facial asymmetry, speech difficulty, weakness, light-headedness, numbness and headaches.   Hematological: Negative.  Does not bruise/bleed easily.   Psychiatric/Behavioral:  Positive for sleep disturbance. Negative for confusion. The patient is nervous/anxious.             Personal History     Past Medical History:   Diagnosis Date    CHF (congestive heart failure)     Hypertension     Pneumonia     Pulmonary embolism without acute cor pulmonale 12/15/2023         Past Surgical History:   Procedure Laterality Date    HERNIA REPAIR      KNEE SURGERY      RHINOPLASTY         Family History:  family history includes No Known Problems in her father and mother.     Social History:  reports that she has never smoked. She has never used smokeless tobacco. She reports that she does not drink alcohol and does not use drugs.  Social History     Social History Narrative    Not on file       Medications:  apixaban, aspirin, buPROPion XL, bumetanide, empagliflozin, metoprolol succinate XL, pantoprazole, rosuvastatin, spironolactone, and valsartan-hydrochlorothiazide    Allergies   Allergen Reactions    Lisinopril Other (See Comments)     Pt states \"I am [employer] and we can't " "take that, it makes us angry\".    Other Nausea And Vomiting     Pt states \"all opiods make me throw up instantly\"    Percocet [Oxycodone-Acetaminophen] GI Intolerance    Sulfa Antibiotics Rash       Objective  Objective     Vital Signs:   Temp:  [97.7 °F (36.5 °C)-98.4 °F (36.9 °C)] 97.7 °F (36.5 °C)  Heart Rate:  [76-91] 76  Resp:  [14] 14  BP: (125-176)/() 136/106    Physical Exam   Constitutional: Awake, alert  Eyes: PERRLA, sclerae anicteric, no conjunctival injection  HENT: NCAT, mucous membranes moist  Neck: Supple, no thyromegaly, no lymphadenopathy, trachea midline  Respiratory: Clear to auscultation bilaterally, nonlabored respirations   Cardiovascular: RRR, no murmurs, rubs, or gallops, palpable pedal pulses bilaterally  Gastrointestinal: decreased Positive bowel sounds, soft, nontender, nondistended  Musculoskeletal: No bilateral ankle edema, no clubbing or cyanosis to extremities  Psychiatric: Anxoius, cooperative  Neurologic: Oriented x 3, strength symmetric in all extremities, Cranial Nerves grossly intact to confrontation, speech clear  Skin: No rashes      Result Review:  I have personally reviewed the results from the time of this admission to 12/4/2024 02:30 EST and agree with these findings:  [x]  Laboratory list / accordion  [x]  Microbiology  [x]  Radiology  [x]  EKG/Telemetry   []  Cardiology/Vascular   []  Pathology  [x]  Old records  []  Other:  Most notable findings include:     LAB RESULTS:      Lab 12/04/24  0156 12/03/24 2047 12/02/24 1827 11/30/24  1823 11/27/24  0448   WBC 9.96 11.02* 8.02 9.65 9.41   HEMOGLOBIN 12.7 12.9 12.7 13.1 14.1   HEMATOCRIT 41.0 40.9 39.8 41.9 44.8   PLATELETS 300 319 297 304 262   NEUTROS ABS 6.54 8.32* 5.15 6.44  --    IMMATURE GRANS (ABS) 0.03 0.01 0.02 0.02  --    LYMPHS ABS 2.55 1.96 2.1 2.58  --    MONOS ABS 0.58 0.54 0.55 0.49  --    EOS ABS 0.21 0.15 0.15 0.08  --    MCV 90.9 88.9 88 88.8 91   LACTATE, ARTERIAL  --   --  1.3  --   --    PROTIME  " --  14.8*  --  15.8*  --    APTT  --  28.9  --  29.1*  --          Lab 12/03/24 2047 11/30/24 1823 11/27/24  0448   SODIUM 138 138  --    POTASSIUM 5.0 4.3  --    CHLORIDE 103 101  --    CO2 22.2 26.5  --    ANION GAP 12.8 10.5  --    BUN 16 23*  --    CREATININE 0.69 0.80  --    EGFR 100.7 85.5  --    GLUCOSE 103* 88  --    CALCIUM 9.1 9.0  --    MAGNESIUM  --   --  1.9         Lab 12/03/24 2047 12/02/24 1827 11/30/24  1823   TOTAL PROTEIN 6.4  --  6.3   ALBUMIN 3.4*  --  3.7   GLOBULIN 3.0  --  2.6   ALT (SGPT) 22  --  19   AST (SGOT) 58*  --  38*   BILIRUBIN 0.5  --  1.2   ALK PHOS 120*  --  124*   LIPASE 17 23  --          Lab 12/03/24 2247 12/03/24 2047 11/30/24 1823 11/27/24 0448   PROBNP  --  8,588.0*  --  8,855*   HSTROP T 227* 214*  --   --    PROTIME  --  14.8* 15.8*  --    INR  --  1.10 1.20*  --                  Brief Urine Lab Results  (Last result in the past 365 days)        Color   Clarity   Blood   Leuk Est   Nitrite   Protein   CREAT   Urine HCG        12/02/24 1759 Yellow   Clear     Small                     Microbiology Results (last 10 days)       ** No results found for the last 240 hours. **            XR Chest 1 View    Result Date: 12/3/2024  XR CHEST 1 VW Date of Exam: 12/3/2024 8:49 PM EST Indication: Chest Pain Triage Protocol Comparison: Chest radiograph 11/23/2024 Findings: Mediastinum: Cardiomediastinal silhouette appears unchanged and enlarged Lungs: Mild left basal opacities likely representing atelectasis. No focal consolidation appreciated. Pleura: No pleural effusion or pneumothorax. Bones and soft tissues: No acute osseous abnormality.     Impression: Impression: No acute intrathoracic finding Electronically Signed: Tae Kang  12/3/2024 9:10 PM EST  Workstation ID: [id number]    CT Abdomen Pelvis With Contrast    Result Date: 12/2/2024  CLINICAL INDICATION: diffuse abd pain, primarily lower TECHNIQUE: Imaging of the abdomen and pelvis was performed, from lung bases  through pubic symphysis, using spiral technique, following administration of IV contrast, Omnipaque 300, 100 mL. Delayed (excretory phase) images were performed through the kidneys. Reformatted images in the coronal and sagittal planes were generated from the axial data set to facilitate diagnostic accuracy. Total DLP (Dose-Length Product): 770.97 mGy.cm. Please note: The reported value represents the total of one or more individual components during the CT acquisition on this date and at this time, and as such, the same value may appear in more than one CT report depending on the interpreting/reporting physicians. COMPARISON: July 29, 2022 CT abdomen and pelvis. FINDINGS: Lung Bases: Cardiomegaly. Left basilar atelectasis. Liver/Gallbladder/Biliary system: The liver demonstrates homogeneous enhancement. Normal Gallbladder. No intra- or extra-hepatic biliary ductal dilatation. Spleen: The spleen enhances homogeneously. Pancreas: The pancreas enhances homogeneously. Adrenals: The adrenals are morphologically unremarkable. Kidneys: Right interpolar region scar. Multiple left kidney simple cysts with the largest measuring 16 mm. Symmetric nephrogram and excretion. No renal or ureteral calculi. No hydronephrosis. Bowel/Mesentery: The small bowel loops are not dilated. Significant sigmoid diverticulosis. No pericolonic fluid collections or inflammation. Moderate pancolonic stool burden.Fecalization of small bowel contents could be secondary to delayed bowel transit. The appendix is not clearly visualized. Vessels/Lymph Nodes: No abdominal aortic aneurysm. Mild atherosclerosis of the infrarenal aorta without aneurysm. No lymphadenopathy within the abdomen or pelvis. Fluid Survey: No free fluid in the abdomen. No free fluid in the pelvis. Pelvis: No pelvic masses. Mildly thickened urinary bladder with perivesical fat stranding.   Body Wall: Surgical clips from prior right inguinal hernia repair. Small fat-containing left  inguinal hernia. Dependent edema of the back soft tissue. Bones: Grade 1 retrolisthesis of L2 on L3. No aggressive osseous lesion. Similar dextrocurvature of the lumbar spine.    Impression: Uncomplicated sigmoid diverticulosis. No pericolonic fat stranding or fluid collections. Moderate pancolonic stool burden. Moderate for constipation. Fecalization of small bowel contents could be secondary to delayed bowel transit. Mildly thickened urinary bladder with perivesical fat stranding suggest cystitis. No acute abnormality within the abdomen and pelvis. CRITICAL RESULT:   No. COMMUNICATION: Per this written report  contrast By electronically signing this report, I, the attending physician, attest that I have personally reviewed the images/data for the above examination(s) and agree with the final edited report. Drafted by John Stovall MD on 12/2/2024 7:00 PM Final report signed by Annette Dexter MD on 12/2/2024 7:24 PM     Results for orders placed during the hospital encounter of 09/25/24    Adult Transthoracic Echo Complete W/ Cont if Necessary Per Protocol    Interpretation Summary    Left ventricular systolic function is severely decreased. Calculated left ventricular EF = 10.8% Left ventricular ejection fraction appears to be less than 20%.    Left ventricular diastolic dysfunction is noted.    There is a thrombus present in the left ventricle.    The left atrial cavity is dilated.    Left atrial volume is mildly increased.    The right atrial cavity is dilated.    Moderate to severe mitral valve regurgitation is present.    Estimated right ventricular systolic pressure from tricuspid regurgitation is normal (<35 mmHg).      Assessment & Plan  Assessment & Plan       NSTEMI (non-ST elevated myocardial infarction)    Coronary artery disease of native artery of native heart with stable angina pectoris    Essential hypertension    Acute on chronic HFrEF (heart failure with reduced ejection fraction)     Other mixed anxiety disorders    History of pulmonary embolism    59 y.o. female with PMH significant for HFrEF, HTN, PE on Eliquis, anxiety, who presents to [facility] ED with complaint of constipation, chest pain, and feeling full of fluid who is found to have concern for NSTEMI.     NSTEMI  - Troponin trending up  - Trend EKG  - Cardiology consult in the a.m.  - N.p.o.  - Heparin drip  -  mg given at OSH, continue daily dose  - Continue statin, beta-blocker  - Lipid panel, hemoglobin A1c    Acute on chronic HFrEF  - Echo completed 9/25/2024 noting EF 10.8%  - On Bumex 1 mg daily, changed to IV and continue twice daily for now  - Daily weight  - History of medication noncompliance  - Continue Jardiance  - Cardiology consult in the a.m.    History of PE  - Reports taking Eliquis, hold while on heparin drip    Hypertension  Dyslipidemia  - Continue metoprolol and Diovan  - Continue statin    Severe anxiety  - States that she requires Wellbutrin namebrand as generic does not work for her  - Reports taking spironolactone when not on Wellbutrin  - Ativan as needed    Constipation  - Bowel regimen    DVT prophylaxis: Heparin gtt, previously on Eliquis     CODE STATUS:         Expected Discharge  Expected discharge date/ time has not been documented.      This note has been completed as part of a split-shared workflow.     Signature: Electronically signed by KAYLA Barnes, 12/04/24, 3:13 AM EST.      Attending   Admission Attestation       I have performed an independent face-to-face diagnostic evaluation including performing an independent physical examination.  I approve of the documented plan of care above that was reviewed and developed with the advanced practice clinician (APC) and take responsibility for that plan along with its associated risks.  I have updated the HPI as appropriate.    Brief HPI    59F transferred here earlier tonight (Tuesday 12/3) from the [facility] facility.  She states that earlier  "tonight she noticed onset of chest pain at approximately 8 PM.  She locates the pain in the center of the chest, no radiation, and no exacerbating or alleviating factors that she can cite.  She describes as occasionally sharp, and had been constant since its onset, though it is currently resolved after lasting approximately 30 minutes total.  No dyspnea, nausea, or diaphoresis.  She adds that for the last 2 weeks she has been constipated, has noticed increased knee pain, occasional abdominal pain, and has gone to the  ER several times for these symptoms.  She states that 10 days ago she was diagnosed with a UTI but she did not take the prescribed Macrobid, citing her allergy to sulfa drugs.  Troponin trend was started at [facility] earlier tonight, and her studies have gone from 214 to 227 to 307.  She was then transferred here.  She is on Eliquis at home and did not arrive on a heparin drip, but she states she did not take her Eliquis on Tuesday, so heparin drip was started shortly after she arrived.  She also confirms feeling very anxious at this time, states she has a history of anxiety with panic attacks, and has been out of her antianxiety medications \"for months, now.\"    Attending Physical Exam:  Temp:  [97.7 °F (36.5 °C)-98.4 °F (36.9 °C)] 97.7 °F (36.5 °C)  Heart Rate:  [75-91] 82  Resp:  [14] 14  BP: (125-176)/() 142/101    Constitutional: Awake, alert; anxious but NAD.  Eyes: PERRLA, sclerae anicteric, no conjunctival injection  HENT: NCAT, mucous membranes moist  Neck: Supple, no thyromegaly, no lymphadenopathy, trachea midline  Respiratory: Clear to auscultation bilaterally, nonlabored respirations   Cardiovascular: RRR, no murmurs, rubs, or gallops, palpable pedal pulses bilaterally  Gastrointestinal: Positive bowel sounds, soft, nontender, nondistended  Musculoskeletal: No bilateral ankle edema, no clubbing or cyanosis to extremities  Psychiatric: Anxious but cooperative  Neurologic: Oriented x " 3, strength symmetric in all extremities, Cranial Nerves grossly intact to confrontation, speech clear  Skin: No rashes, normal turgor.    Result Review:  I have personally reviewed the results from the time of this admission to 12/4/2024 04:39 EST and agree with these findings:  [x]  Laboratory list / accordion  []  Microbiology  [x]  Radiology  [x]  EKG/Telemetry   []  Cardiology/Vascular   []  Pathology  []  Old records  []  Other:  Most notable findings include: I reviewed EKG which by my read shows sinus rhythm, ventricular rate approximately 75 bpm, left axis, agree with machine read of prolonged QT, otherwise nonspecific ST/T wave changes.  I reviewed chest x-ray which is a single AP view and by my read shows cardiomegaly but nothing else acute.    Assessment and Plan:    See assessment and plan documented by APC above and updated/edited by me as appropriate.      Total time spent: 42 minutes  Time spent includes time reviewing chart, face-to-face time, counseling patient/family/caregiver, ordering medications/tests/procedures, communicating with other health care professionals, documenting clinical information in the electronic health record, and coordination of care.       Jonny LopezIII, DO  12/04/24                         Electronically signed by Jonny Lopez III, DO at 12/04/24 0517          Emergency Department Notes        Anderson Howard RN at 12/03/24 9261           Jill M Paladino    Nursing Report ED to Floor:  Mental status: a&ox4  Ambulatory status: up ad rajiv  Oxygen Therapy:  RA  Cardiac Rhythm: NSR  Admitted from: home/ed  Safety Concerns:  n/a  Social Issues: anxiety  ED Room #:  12    ED Nurse Phone Extension - [phone] or may call [phone].      HPI:   Chief Complaint   Patient presents with    Chest Pain       Past Medical History:  Past Medical History:   Diagnosis Date    CHF (congestive heart failure)     Hypertension     Pneumonia     Pulmonary embolism without acute cor  pulmonale 12/15/2023        Past Surgical History:  Past Surgical History:   Procedure Laterality Date    HERNIA REPAIR      KNEE SURGERY      RHINOPLASTY          Admitting Doctor:   No admitting provider for patient encounter.    Consulting Provider(s):  Consults       No orders found from 11/4/2024 to 12/4/2024.             Admitting Diagnosis:   The encounter diagnosis was NSTEMI (non-ST elevated myocardial infarction).    Most Recent Vitals:   Vitals:    12/03/24 2251 12/03/24 2300 12/03/24 2315 12/03/24 2330   BP:  (!) 148/102 (!) 154/109 125/91   BP Location:       Patient Position:       Pulse: 88 89 79 77   Resp:       Temp:       TempSrc:       SpO2: 100% 97% 97% 96%   Weight:       Height:           Active LDAs/IV Access:   Lines, Drains & Airways       Active LDAs       Name Placement date Placement time Site Days    Peripheral IV 12/03/24 2041 Left Antecubital 12/03/24 2041  Antecubital  less than 1    Peripheral IV 12/03/24 2139 Right Antecubital 12/03/24 2139  Antecubital  less than 1                    Labs (abnormal labs have a star):   Labs Reviewed   TROPONIN - Abnormal; Notable for the following components:       Result Value    HS Troponin T 214 (*)     All other components within normal limits   COMPREHENSIVE METABOLIC PANEL - Abnormal; Notable for the following components:    Glucose 103 (*)     Albumin 3.4 (*)     AST (SGOT) 58 (*)     Alkaline Phosphatase 120 (*)     All other components within normal limits    Narrative:     GFR Normal >60  Chronic Kidney Disease <60  Kidney Failure <15     BNP (IN-HOUSE) - Abnormal; Notable for the following components:    proBNP 8,588.0 (*)     All other components within normal limits    Narrative:     This assay is used as an aid in the diagnosis of individuals suspected of having heart failure. It can be used as an aid in the diagnosis of acute decompensated heart failure (ADHF) in patients presenting with signs and symptoms of ADHF to the emergency  department (ED). In addition, NT-proBNP of <300 pg/mL indicates ADHF is not likely.    Age Range Result Interpretation  NT-proBNP Concentration (pg/mL:      <50             Positive            >450                   Gray                 300-450                    Negative             <300    50-75           Positive            >900                  Gray                300-900                  Negative            <300      >75             Positive            >1800                  Gray                300-1800                  Negative            <300   CBC WITH AUTO DIFFERENTIAL - Abnormal; Notable for the following components:    WBC 11.02 (*)     RDW 15.8 (*)     Lymphocyte % 17.8 (*)     Monocyte % 4.9 (*)     Neutrophils, Absolute 8.32 (*)     All other components within normal limits   HIGH SENSITIVITIY TROPONIN T 2HR - Abnormal; Notable for the following components:    HS Troponin T 227 (*)     Troponin T Delta 13 (*)     All other components within normal limits   PROTIME-INR - Abnormal; Notable for the following components:    Protime 14.8 (*)     All other components within normal limits   LIPASE - Normal   APTT - Normal    Narrative:     PTT = The equivalent PTT values for the therapeutic range of heparin levels at 0.3 to 0.5 U/ml are 60 to 70 seconds.   RAINBOW DRAW    Narrative:     The following orders were created for panel order [place] Draw.  Procedure                               Abnormality         Status                     ---------                               -----------         ------                     Green Top (Gel)[[id number]]                                  Final result               Lavender Top[[id number]]                                     Final result               Gold Top - SST[[id number]]                                   Final result               Garcia Top[[id number]]                                         Final result               Light Blue Top[[id number]]                                    Final result                 Please view results for these tests on the individual orders.   CBC AND DIFFERENTIAL    Narrative:     The following orders were created for panel order CBC & Differential.  Procedure                               Abnormality         Status                     ---------                               -----------         ------                     CBC Auto Differential[[id number]]        Abnormal            Final result                 Please view results for these tests on the individual orders.   GREEN TOP   LAVENDER TOP   GOLD TOP - SST   GRAY TOP   LIGHT BLUE TOP       Meds Given in ED:   Medications   sodium chloride 0.9 % flush 10 mL (has no administration in time range)   losartan (COZAAR) tablet 50 mg (50 mg Oral Given 12/3/24 2309)   hydrOXYzine (ATARAX) tablet 25 mg (25 mg Oral Given 12/3/24 2116)   aspirin chewable tablet 324 mg (324 mg Oral Given 12/3/24 2219)   metoprolol tartrate (LOPRESSOR) injection 5 mg (5 mg Intravenous Given 12/3/24 2309)           Last NIH score:                                                          Dysphagia screening results:        Flower Coma Scale:  No data recorded     CIWA:        Restraint Type:            Isolation Status:  No active isolations          Electronically signed by Anderson Howard RN at 12/03/24 2335       Skylar Blanton PA-C at 12/03/24 2106       Attestation signed by Won Greenwood MD at 12/03/24 7114        SHARED APC NON FACE TO FACE: I performed a substantive part of the MDM during the patient's E/M visit. I personally made or approved the documented management plan and acknowledge its risk of complications.  and I personally discussed management/interpretation of the data with: Skylar Greenwood MD 12/3/2024 23:56 EST                         Subjective  History of Present Illness:    Patient is a 59-year-old female past medical history of CHF, hypertension, pulmonary embolism on Eliquis,  "schizophrenia, presents with concern for fluid overload per patient.  Patient denies any pain in the chest currently but states she did earlier.  Patient states \"her fluid pills are not working on her\" patient is crying but will not say why initially  Patient is well-known to this emergency department and other emergency departments per chart review.  Patient is anxious appearing with crying.  Patient complaining of difficulty urinating, states only little bit comes out.  Per chart review patient has been complaining of this, had a CT scan done at  yesterday which showed mild constipation, also urinalysis showed urinary tract infection.  Patient states she also has not had a bowel movement in 2 days which is irregular for her.  Patient denies nausea or vomiting.  Patient recently had a CT of the lower extremity as well due to concern for swelling, did not show any acute abnormalities.  Nurses Notes reviewed and agree, including vitals, allergies, social history and prior medical history.     REVIEW OF SYSTEMS: All systems reviewed and not pertinent unless noted.  Review of Systems    Past Medical History:   Diagnosis Date    CHF (congestive heart failure)     Hypertension     Pneumonia     Pulmonary embolism without acute cor pulmonale 12/15/2023       Allergies:    Lisinopril, Other, Percocet [oxycodone-acetaminophen], and Sulfa antibiotics      Past Surgical History:   Procedure Laterality Date    HERNIA REPAIR      KNEE SURGERY      RHINOPLASTY           Social History     Socioeconomic History    Marital status:    Tobacco Use    Smoking status: Never    Smokeless tobacco: Never   Vaping Use    Vaping status: Never Used   Substance and Sexual Activity    Alcohol use: No    Drug use: No    Sexual activity: Defer         Family History   Problem Relation Age of Onset    No Known Problems Mother     No Known Problems Father        Objective  Physical Exam:  /99   Pulse 77   Temp 98.4 °F (36.9 °C) " "(Oral)   Resp 14   Ht 165.1 cm (65\")   Wt 63.5 kg (140 lb)   SpO2 96%   BMI 23.30 kg/m²      Physical Exam  Constitutional:       Appearance: Well-developed. No acute distress  HENT:      Head: Normocephalic and atraumatic.      Mouth/Throat:      Mouth: Mucous membranes are moist.      Eyes:      Extraocular Movements: Extraocular movements intact.   Cardiovascular:      Rate and Rhythm: Normal rate and regular rhythm.      Heart sounds: Normal heart sounds.   No peripheral edema noted   Pulmonary:      Effort: Pulmonary effort is normal. No respiratory distress.      Breath sounds: Normal breath sounds.   Abdominal:      General: There is no distension.      Palpations: Abdomen is soft and nontender. No rebound tenderness or guarding noted  Musculoskeletal:         General: No swelling or tenderness.       Extremities: Moves all 4s   Skin:     General: Skin is warm and dry.      Capillary Refill: Capillary refill takes less than 2 seconds.   Neurological:      Mental Status:Alert and oriented to person, place, and time.   Mentation is normal   Psychiatric:         Mood and Affect: Mood normal.         Behavior: Behavior normal.     Procedures    ED Course:    ED Course as of 12/03/24 2355   Tue Dec 03, 2024   2118 BNP is elevated, this appears chronic for patient 6 days ago was higher at 8811 days ago was at 13,000 [OM]   2118 Troponin significantly elevated to 214 today which is abnormal for patient. [OM]   2129 Upon recheck patient resting comfortably, not tearful, denies any pain.  Discussed with patient that due to the elevated troponin will need to transfer her to [facility] for further evaluation and treatment.  Patient is agreeable with this plan.Contacted [facility] to transfer patient [OM]   1863 Alerted by nurse that patient's blood pressure has increased again.  Patient takes metoprolol and valsartan-hydrochlorothiazide at home.  We have metoprolol and losartan so will put that in for " patient. [OM]   2255 Spoke with hospitalist who would like the repeat 2-hour troponin prior to accepting [OM]   2316 Patient still resting comfortably.  Troponin increased 13 to 227. Messaged hospitalist as requested  [OM]   2358 Hospitalist was agreeable to admit the patient.  Patient agreeable with this plan. [OM]      ED Course User Index  [OM] Skylar Blanton PA-C       Lab Results (last 24 hours)       Procedure Component Value Units Date/Time    High Sensitivity Troponin T [[id number]]  (Abnormal) Collected: 12/03/24 2047    Specimen: Blood Updated: 12/03/24 2118     HS Troponin T 214 ng/L     CBC & Differential [[id number]]  (Abnormal) Collected: 12/03/24 2047    Specimen: Blood Updated: 12/03/24 2054    Narrative:      The following orders were created for panel order CBC & Differential.  Procedure                               Abnormality         Status                     ---------                               -----------         ------                     CBC Auto Differential[[id number]]        Abnormal            Final result                 Please view results for these tests on the individual orders.    Comprehensive Metabolic Panel [[id number]]  (Abnormal) Collected: 12/03/24 2047    Specimen: Blood Updated: 12/03/24 2116     Glucose 103 mg/dL      BUN 16 mg/dL      Creatinine 0.69 mg/dL      Sodium 138 mmol/L      Potassium 5.0 mmol/L      Comment: Specimen hemolyzed.  Result may be falsely elevated.        Chloride 103 mmol/L      CO2 22.2 mmol/L      Calcium 9.1 mg/dL      Total Protein 6.4 g/dL      Albumin 3.4 g/dL      ALT (SGPT) 22 U/L      AST (SGOT) 58 U/L      Comment: Specimen hemolyzed.  Result may be falsely elevated.        Alkaline Phosphatase 120 U/L      Total Bilirubin 0.5 mg/dL      Globulin 3.0 gm/dL      A/G Ratio 1.1 g/dL      BUN/Creatinine Ratio 23.2     Anion Gap 12.8 mmol/L      eGFR 100.7 mL/min/1.73     Narrative:      GFR Normal >60  Chronic Kidney Disease <60  Kidney Failure  <15      Lipase [[id number]]  (Normal) Collected: 12/03/24 2047    Specimen: Blood Updated: 12/03/24 2112     Lipase 17 U/L     BNP [[id number]]  (Abnormal) Collected: 12/03/24 2047    Specimen: Blood Updated: 12/03/24 2110     proBNP 8,588.0 pg/mL     Narrative:      This assay is used as an aid in the diagnosis of individuals suspected of having heart failure. It can be used as an aid in the diagnosis of acute decompensated heart failure (ADHF) in patients presenting with signs and symptoms of ADHF to the emergency department (ED). In addition, NT-proBNP of <300 pg/mL indicates ADHF is not likely.    Age Range Result Interpretation  NT-proBNP Concentration (pg/mL:      <50             Positive            >450                   Gray                 300-450                    Negative             <300    50-75           Positive            >900                  Gray                300-900                  Negative            <300      >75             Positive            >1800                  Gray                300-1800                  Negative            <300    CBC Auto Differential [[id number]]  (Abnormal) Collected: 12/03/24 2047    Specimen: Blood Updated: 12/03/24 2054     WBC 11.02 10*3/mm3      RBC 4.60 10*6/mm3      Hemoglobin 12.9 g/dL      Hematocrit 40.9 %      MCV 88.9 fL      MCH 28.0 pg      MCHC 31.5 g/dL      RDW 15.8 %      RDW-SD 51.9 fl      MPV 9.8 fL      Platelets 319 10*3/mm3      Neutrophil % 75.4 %      Lymphocyte % 17.8 %      Monocyte % 4.9 %      Eosinophil % 1.4 %      Basophil % 0.4 %      Immature Grans % 0.1 %      Neutrophils, Absolute 8.32 10*3/mm3      Lymphocytes, Absolute 1.96 10*3/mm3      Monocytes, Absolute 0.54 10*3/mm3      Eosinophils, Absolute 0.15 10*3/mm3      Basophils, Absolute 0.04 10*3/mm3      Immature Grans, Absolute 0.01 10*3/mm3     aPTT [[id number]]  (Normal) Collected: 12/03/24 2047    Specimen: Blood Updated: 12/03/24 2217     PTT 28.9 seconds     Narrative:       PTT = The equivalent PTT values for the therapeutic range of heparin levels at 0.3 to 0.5 U/ml are 60 to 70 seconds.    Protime-INR [[id number]]  (Abnormal) Collected: 12/03/24 2047    Specimen: Blood Updated: 12/03/24 2204     Protime 14.8 Seconds      INR 1.10    High Sensitivity Troponin T 2Hr [[id number]]  (Abnormal) Collected: 12/03/24 2247    Specimen: Blood Updated: 12/03/24 2311     HS Troponin T 227 ng/L      Troponin T Delta 13 ng/L              XR Chest 1 View    Result Date: 12/3/2024  XR CHEST 1 VW Date of Exam: 12/3/2024 8:49 PM EST Indication: Chest Pain Triage Protocol Comparison: Chest radiograph 11/23/2024 Findings: Mediastinum: Cardiomediastinal silhouette appears unchanged and enlarged Lungs: Mild left basal opacities likely representing atelectasis. No focal consolidation appreciated. Pleura: No pleural effusion or pneumothorax. Bones and soft tissues: No acute osseous abnormality.     Impression: Impression: No acute intrathoracic finding Electronically Signed: Tea Kang  12/3/2024 9:10 PM EST  Workstation ID: [id number]    CT Abdomen Pelvis With Contrast    Result Date: 12/2/2024  CLINICAL INDICATION: diffuse abd pain, primarily lower TECHNIQUE: Imaging of the abdomen and pelvis was performed, from lung bases through pubic symphysis, using spiral technique, following administration of IV contrast, Omnipaque 300, 100 mL. Delayed (excretory phase) images were performed through the kidneys. Reformatted images in the coronal and sagittal planes were generated from the axial data set to facilitate diagnostic accuracy. Total DLP (Dose-Length Product): 770.97 mGy.cm. Please note: The reported value represents the total of one or more individual components during the CT acquisition on this date and at this time, and as such, the same value may appear in more than one CT report depending on the interpreting/reporting physicians. COMPARISON: July 29, 2022 CT abdomen and pelvis. FINDINGS: Lung  Bases: Cardiomegaly. Left basilar atelectasis. Liver/Gallbladder/Biliary system: The liver demonstrates homogeneous enhancement. Normal Gallbladder. No intra- or extra-hepatic biliary ductal dilatation. Spleen: The spleen enhances homogeneously. Pancreas: The pancreas enhances homogeneously. Adrenals: The adrenals are morphologically unremarkable. Kidneys: Right interpolar region scar. Multiple left kidney simple cysts with the largest measuring 16 mm. Symmetric nephrogram and excretion. No renal or ureteral calculi. No hydronephrosis. Bowel/Mesentery: The small bowel loops are not dilated. Significant sigmoid diverticulosis. No pericolonic fluid collections or inflammation. Moderate pancolonic stool burden.Fecalization of small bowel contents could be secondary to delayed bowel transit. The appendix is not clearly visualized. Vessels/Lymph Nodes: No abdominal aortic aneurysm. Mild atherosclerosis of the infrarenal aorta without aneurysm. No lymphadenopathy within the abdomen or pelvis. Fluid Survey: No free fluid in the abdomen. No free fluid in the pelvis. Pelvis: No pelvic masses. Mildly thickened urinary bladder with perivesical fat stranding.   Body Wall: Surgical clips from prior right inguinal hernia repair. Small fat-containing left inguinal hernia. Dependent edema of the back soft tissue. Bones: Grade 1 retrolisthesis of L2 on L3. No aggressive osseous lesion. Similar dextrocurvature of the lumbar spine.    Impression: Uncomplicated sigmoid diverticulosis. No pericolonic fat stranding or fluid collections. Moderate pancolonic stool burden. Moderate for constipation. Fecalization of small bowel contents could be secondary to delayed bowel transit. Mildly thickened urinary bladder with perivesical fat stranding suggest cystitis. No acute abnormality within the abdomen and pelvis. CRITICAL RESULT:   No. COMMUNICATION: Per this written report  contrast By electronically signing this report, I, the attending  physician, attest that I have personally reviewed the images/data for the above examination(s) and agree with the final edited report. Drafted by John Stovall MD on 12/2/2024 7:00 PM Final report signed by Annette Dexter MD on 12/2/2024 7:24 PM        MDM      Initial impression of presenting illness: Patient presents with concern for fluid overload, per chart review patient has been seen a multitude of times in the emergency departments for concern for this, patient does have past history of schizophrenia.  No peripheral edema noted upon exam.  Patient moving all limbs appropriately.  Patient not short of breath, no respiratory distress noted.   Upon re check patient resting comfortably in no acute distress, no longer crying.   DDX: includes but is not limited to: Schizophrenia, anxiety, CHF exacerbation, pneumonia.  Patient states she has not missed any doses of her Eliquis.    Patient arrives crying, hypertensive with vitals interpreted by myself.     Pertinent results: Elevated BNP, appears chronically elevated, slightly less than that has been recently.  Troponin elevated to 214.  Most recently troponin 43 and 45 approximately 10 days ago.    Diagnostic information from other sources: Chart review  Patient had a recent echo in September 2024 that shows     Left ventricular systolic function is severely decreased. Calculated left ventricular EF = 10.8% Left ventricular ejection fraction appears to be less than 20%.        Interventions / Re-evaluation: Hydroxyzine    Medications   sodium chloride 0.9 % flush 10 mL (has no administration in time range)   losartan (COZAAR) tablet 50 mg (50 mg Oral Given 12/3/24 2309)   hydrOXYzine (ATARAX) tablet 25 mg (25 mg Oral Given 12/3/24 2116)   aspirin chewable tablet 324 mg (324 mg Oral Given 12/3/24 2219)   metoprolol tartrate (LOPRESSOR) injection 5 mg (5 mg Intravenous Given 12/3/24 2309)       Results/clinical rationale were discussed with  patient    Consultations/Discussion of results with other physicians: Attedning    Data interpreted: Nursing notes reviewed, vital signs reviewed.  Labs independently interpreted by me     Counseling: Discussed the results above with the patient regarding need for admission or discharge.  Patient understands and agrees plan of care.  Patient the need to go to [facility] and she is agreeable with this plan,  Hospitalist Dr. Jovel agreeable to except the patient to [facility].  Patient was given home medications here, IV metoprolol that she takes orally every day, oral losartan here.  As we do not have valsartan here or valsartan hydrochlorothiazide, equivalent of losartan given.  Patient denies headache, vision changes, dizziness, chest pain.  -----  ED Disposition       ED Disposition   Decision to Admit    Condition   --    Comment   Level of Care: Telemetry [5]   Accepting Provider:: MERISSA JOVEL [[id number]]               Final diagnoses:   NSTEMI (non-ST elevated myocardial infarction)     Your Follow-Up Providers    Follow-up information has not been specified.       Contact information for after-discharge care    Follow-up information has not been specified.          Your medication list        CONTINUE taking these medications        Instructions Last Dose Given Next Dose Due   Aspirin Low Dose 81 MG EC tablet  Generic drug: aspirin      Take 1 tablet by mouth Daily.       bumetanide 1 MG tablet  Commonly known as: BUMEX      Take 1 tablet by mouth Daily.       buPROPion  MG 24 hr tablet  Commonly known as: WELLBUTRIN XL      Take 1 tablet by mouth Daily.       Eliquis 5 MG tablet tablet  Generic drug: apixaban      Take 1 tablet by mouth 2 (Two) Times a Day.       Jardiance 10 MG tablet tablet  Generic drug: empagliflozin      Take 1 tablet by mouth Daily.       metoprolol succinate XL 50 MG 24 hr tablet  Commonly known as: TOPROL-XL      Take 1 tablet by mouth Daily.        pantoprazole 40 MG EC tablet  Commonly known as: PROTONIX      Take 1 tablet by mouth Daily.       rosuvastatin 40 MG tablet  Commonly known as: CRESTOR      Take 1 tablet by mouth Daily.       spironolactone 100 MG tablet  Commonly known as: ALDACTONE      Take 1 tablet by mouth Daily.       valsartan-hydrochlorothiazide 80-12.5 MG per tablet  Commonly known as: DIOVAN-HCT      Take 0.5 tablets by mouth Daily for 30 days.       valsartan-hydrochlorothiazide 80-12.5 MG per tablet  Commonly known as: DIOVAN-HCT      Take 1 tablet by mouth Daily.                  Electronically signed by Won Greenwood MD at 12/03/24 3983       Vital Signs (last 2 days)       Date/Time Temp Temp src Pulse Resp BP Patient Position SpO2    12/05/24 0340 97.9 (36.6) Oral 67 16 128/91 Lying --    12/05/24 0032 98.1 (36.7) Oral 73 16 136/96 Lying --    12/04/24 2126 98.2 (36.8) Oral 71 18 122/89 Lying --    12/04/24 2017 -- -- 71 -- 125/90 -- --    12/04/24 1535 97.8 (36.6) Oral 80 14 116/90 Lying 95    12/04/24 1151 97.7 (36.5) Oral 82 14 137/100 Lying 94    12/04/24 0800 97.6 (36.4) Oral 77 14 120/81 Lying 92    12/04/24 0642 -- -- -- -- 143/103 -- --    12/04/24 0600 -- -- -- -- 129/93 -- --    12/04/24 0453 97.6 (36.4) Oral 79 14 143/95 Lying 95    12/04/24 0400 -- -- 77 -- 119/83 -- 95    12/04/24 0324 -- -- 82 -- -- -- 93    12/04/24 0300 -- -- 75 -- 142/101 -- 96    12/04/24 0230 -- -- 77 -- 127/85 -- 96    12/04/24 0130 97.7 (36.5) Oral 76 -- 136/106 Lying 97    12/04/24 00:45:33 -- -- 76 -- -- -- 98    12/04/24 0039 -- -- 81 -- 149/102 -- 96    12/04/24 0030 -- -- 77 -- 139/97 -- 98    12/04/24 0015 -- -- 78 -- 133/85 -- 98    12/04/24 0006 -- -- 78 -- -- -- --    12/04/24 0000 -- -- 80 -- 129/100 -- 94    12/03/24 2345 -- -- 77 -- 134/99 -- 96    12/03/24 2343 -- -- 79 -- -- -- --    12/03/24 2330 -- -- 77 -- 125/91 -- 96    12/03/24 2315 -- -- 79 -- 154/109 -- 97    12/03/24 2308 -- -- 90 -- 140/108 -- 99    12/03/24 2300 --  -- 89 -- 148/102 -- 97    12/03/24 22:51:39 -- -- 88 -- -- -- 100    12/03/24 2247 -- -- 86 -- 168/113 -- 100    12/03/24 2230 -- -- 88 -- 162/108 -- 97    12/03/24 2220 -- -- 89 -- 166/118 -- 100    12/03/24 21:40:11 -- -- 89 -- -- -- 100    12/03/24 2122 -- -- 86 -- 146/91 -- 95    12/03/24 2100 -- -- 90 -- 158/114 -- 97    12/03/24 2047 -- -- 91 -- -- -- --    12/03/24 2042 98.4 (36.9) Oral 90 14 176/123 Sitting 97          Oxygen Therapy (last 2 days)       Date/Time SpO2 Device (Oxygen Therapy) Flow (L/min) (Oxygen Therapy) Oxygen Concentration (%) ETCO2 (mmHg)    12/05/24 0605 -- room air -- -- --    12/05/24 0443 -- room air -- -- --    12/05/24 0222 -- room air -- -- --    12/05/24 0011 -- room air -- -- --    12/04/24 2215 -- room air -- -- --    12/04/24 2017 -- room air -- -- --    12/04/24 1800 -- room air -- -- --    12/04/24 1600 -- room air -- -- --    12/04/24 1535 95 room air -- -- --    12/04/24 1400 -- room air -- -- --    12/04/24 1200 -- room air -- -- --    12/04/24 1151 94 room air -- -- --    12/04/24 1000 -- room air -- -- --    12/04/24 0800 92 room air -- -- --    12/04/24 0600 -- room air -- -- --    12/04/24 0453 95 -- -- -- --    12/04/24 0400 95 -- -- -- --    12/04/24 0353 -- room air -- -- --    12/04/24 0324 93 -- -- -- --    12/04/24 0300 96 -- -- -- --    12/04/24 0230 96 -- -- -- --    12/04/24 0130 97 -- -- -- --    12/04/24 0126 -- room air -- -- --    12/04/24 00:45:33 98 -- -- -- --    12/04/24 0039 96 -- -- -- --    12/04/24 0030 98 -- -- -- --    12/04/24 0015 98 -- -- -- --    12/04/24 0000 94 -- -- -- --    12/03/24 2345 96 -- -- -- --    12/03/24 2330 96 -- -- -- --    12/03/24 2315 97 -- -- -- --    12/03/24 2308 99 -- -- -- --    12/03/24 2300 97 -- -- -- --    12/03/24 22:51:39 100 -- -- -- --    12/03/24 2247 100 -- -- -- --    12/03/24 2230 97 -- -- -- --    12/03/24 2220 100 -- -- -- --    12/03/24 21:40:11 100 -- -- -- --    12/03/24 2122 95 -- -- -- --     12/03/24 2100 97 -- -- -- --    12/03/24 2042 97 room air -- -- --          Lines, Drains & Airways       Active LDAs       Name Placement date Placement time Site Days    Peripheral IV 12/03/24 2139 Right Antecubital 12/03/24 2139  Antecubital  1    Peripheral IV 12/04/24 0210 Anterior;Left Forearm 12/04/24 0210  Forearm  1                  Current Facility-Administered Medications   Medication Dose Route Frequency Provider Last Rate Last Admin    acetaminophen (TYLENOL) tablet 650 mg  650 mg Oral Q4H PRN Sandy Jovel APRN   650 mg at 12/05/24 0828    Or    acetaminophen (TYLENOL) 160 MG/5ML oral solution 650 mg  650 mg Oral Q4H PRN Sandy Jovel APRN        Or    acetaminophen (TYLENOL) suppository 650 mg  650 mg Rectal Q4H PRN Sandy Jovel APRN        apixaban (ELIQUIS) tablet 5 mg  5 mg Oral Q12H Shea Mojica PA-C   5 mg at 12/05/24 0828    aspirin EC tablet 81 mg  81 mg Oral Daily Sandy Jovel APRN   81 mg at 12/05/24 0828    sennosides-docusate (PERICOLACE) 8.6-50 MG per tablet 2 tablet  2 tablet Oral BID Sandy Jovel APRN   2 tablet at 12/05/24 0828    And    polyethylene glycol (MIRALAX) packet 17 g  17 g Oral Daily PRN Sandy Jovel APRN   17 g at 12/04/24 2018    And    bisacodyl (DULCOLAX) EC tablet 5 mg  5 mg Oral Daily PRN Sandy Jovel APRN        And    bisacodyl (DULCOLAX) suppository 10 mg  10 mg Rectal Daily PRN Sandy Jovel APRN   10 mg at 12/04/24 2215    bumetanide (BUMEX) injection 1 mg  1 mg Intravenous Q12H Sandy Jovel APRN   1 mg at 12/05/24 0829    empagliflozin (JARDIANCE) tablet 10 mg  10 mg Oral Daily Sandy Jovel APRN        valsartan (DIOVAN) tablet 80 mg  80 mg Oral Q24H Sandy Jovel APRN   80 mg at 12/05/24 0828    And    [Held by provider] hydroCHLOROthiazide tablet 12.5 mg  12.5 mg Oral Q24H Sandy Jovel, APRN   12.5 mg at 12/04/24 0906    influenza virus vacc split PF FLUZONE 0.5 mL  0.5 mL Intramuscular During  Hospitalization John Jonnygloria Finney III, DO        lactulose (CHRONULAC) 10 GM/15ML solution 20 g  20 g Oral BID Marlene Ochoa APRN        LORazepam (ATIVAN) tablet 0.5 mg  0.5 mg Oral Q6H PRN Sandy Jovel APRN   0.5 mg at 12/04/24 2147    metoprolol succinate XL (TOPROL-XL) 24 hr tablet 50 mg  50 mg Oral Daily Sandy Jovel APRN   50 mg at 12/05/24 0828    pantoprazole (PROTONIX) EC tablet 40 mg  40 mg Oral Daily Sandy Jovel APRN   40 mg at 12/05/24 0522    rosuvastatin (CRESTOR) tablet 40 mg  40 mg Oral Daily Sandy Jovel APRN        sodium chloride 0.9 % flush 10 mL  10 mL Intravenous PRN Won Greenwood MD        sodium chloride 0.9 % flush 10 mL  10 mL Intravenous Q12H Sandy Jovel APRN   10 mL at 12/05/24 0831    sodium chloride 0.9 % flush 10 mL  10 mL Intravenous PRN Sandy Jovel APRN        sodium chloride 0.9 % infusion 40 mL  40 mL Intravenous PRN Sandy Jovel APRN        spironolactone (ALDACTONE) tablet 100 mg  100 mg Oral Daily Sandy Jovel APRN   100 mg at 12/05/24 0828     Lab Results (last 48 hours)       Procedure Component Value Units Date/Time    CBC & Differential [[id number]]  (Abnormal) Collected: 12/05/24 0429    Specimen: Blood Updated: 12/05/24 0549    Narrative:      The following orders were created for panel order CBC & Differential.  Procedure                               Abnormality         Status                     ---------                               -----------         ------                     CBC Auto Differential[[id number]]        Abnormal            Final result                 Please view results for these tests on the individual orders.    CBC Auto Differential [[id number]]  (Abnormal) Collected: 12/05/24 0429    Specimen: Blood Updated: 12/05/24 0549     WBC 10.61 10*3/mm3      RBC 4.89 10*6/mm3      Hemoglobin 13.8 g/dL      Hematocrit 44.1 %      MCV 90.2 fL      MCH 28.2 pg      MCHC 31.3 g/dL      RDW 16.0 %       RDW-SD 52.6 fl      MPV 10.1 fL      Platelets 334 10*3/mm3      Neutrophil % 67.9 %      Lymphocyte % 22.7 %      Monocyte % 6.1 %      Eosinophil % 2.3 %      Basophil % 0.7 %      Immature Grans % 0.3 %      Neutrophils, Absolute 7.21 10*3/mm3      Lymphocytes, Absolute 2.41 10*3/mm3      Monocytes, Absolute 0.65 10*3/mm3      Eosinophils, Absolute 0.24 10*3/mm3      Basophils, Absolute 0.07 10*3/mm3      Immature Grans, Absolute 0.03 10*3/mm3      nRBC 0.0 /100 WBC     Urinalysis With Culture If Indicated - Urine, Clean Catch [[id number]]  (Normal) Collected: 12/04/24 2056    Specimen: Urine, Clean Catch Updated: 12/04/24 2100     Color, UA Yellow     Appearance, UA Clear     pH, UA 6.5     Specific Gravity, UA 1.013     Glucose, UA Negative     Ketones, UA Negative     Bilirubin, UA Negative     Blood, UA Negative     Protein, UA Negative     Leuk Esterase, UA Negative     Nitrite, UA Negative     Urobilinogen, UA 0.2 E.U./dL    Narrative:      In absence of clinical symptoms, the presence of pyuria, bacteria, and/or nitrites on the urinalysis result does not correlate with infection.  Urine microscopic not indicated.    Heparin Anti-Xa [[id number]]  (Normal) Collected: 12/04/24 0948    Specimen: Blood Updated: 12/04/24 1027     Heparin Anti-Xa (UFH) 0.30 IU/ml     Hemoglobin A1c [[id number]]  (Abnormal) Collected: 12/04/24 0156    Specimen: Blood Updated: 12/04/24 0318     Hemoglobin A1C 6.20 %     Narrative:      Hemoglobin A1C Ranges:    Increased Risk for Diabetes  5.7% to 6.4%  Diabetes                     >= 6.5%  Diabetic Goal                < 7.0%    Lipid Panel [[id number]] Collected: 12/04/24 0155    Specimen: Blood Updated: 12/04/24 0317     Total Cholesterol 135 mg/dL      Triglycerides 55 mg/dL      HDL Cholesterol 40 mg/dL      LDL Cholesterol  83 mg/dL      VLDL Cholesterol 12 mg/dL      LDL/HDL Ratio 2.10    Narrative:      Cholesterol Reference Ranges  (U.S. Department of Health and Human Services  ATP III Classifications)    Desirable          <200 mg/dL  Borderline High    200-239 mg/dL  High Risk          >240 mg/dL      Triglyceride Reference Ranges  (U.S. Department of Health and Human Services ATP III Classifications)    Normal           <150 mg/dL  Borderline High  150-199 mg/dL  High             200-499 mg/dL  Very High        >500 mg/dL    HDL Reference Ranges  (U.S. Department of Health and Human Services ATP III Classifications)    Low     <40 mg/dl (major risk factor for CHD)  High    >60 mg/dl ('negative' risk factor for CHD)        LDL Reference Ranges  (U.S. Department of Health and Human Services ATP III Classifications)    Optimal          <100 mg/dL  Near Optimal     100-129 mg/dL  Borderline High  130-159 mg/dL  High             160-189 mg/dL  Very High        >189 mg/dL    High Sensitivity Troponin T [[id number]]  (Abnormal) Collected: 12/04/24 0155    Specimen: Blood Updated: 12/04/24 0250     HS Troponin T 307 ng/L     Narrative:      High Sensitive Troponin T Reference Range:  <14.0 ng/L- Negative Female for AMI  <22.0 ng/L- Negative Male for AMI  >=14 - Abnormal Female indicating possible myocardial injury.  >=22 - Abnormal Male indicating possible myocardial injury.   Clinicians would have to utilize clinical acumen, EKG, Troponin, and serial changes to determine if it is an Acute Myocardial Infarction or myocardial injury due to an underlying chronic condition.         Magnesium [[id number]]  (Normal) Collected: 12/04/24 0155    Specimen: Blood Updated: 12/04/24 0247     Magnesium 2.2 mg/dL     Comprehensive Metabolic Panel [[id number]]  (Abnormal) Collected: 12/04/24 0155    Specimen: Blood Updated: 12/04/24 0247     Glucose 91 mg/dL      BUN 15 mg/dL      Creatinine 0.70 mg/dL      Sodium 138 mmol/L      Potassium 4.6 mmol/L      Chloride 105 mmol/L      CO2 21.0 mmol/L      Calcium 9.0 mg/dL      Total Protein 5.8 g/dL      Albumin 3.2 g/dL      ALT (SGPT) 22 U/L      AST (SGOT) 45  U/L      Alkaline Phosphatase 116 U/L      Total Bilirubin 0.6 mg/dL      Globulin 2.6 gm/dL      Comment: Calculated Result        A/G Ratio 1.2 g/dL      BUN/Creatinine Ratio 21.4     Anion Gap 12.0 mmol/L      eGFR 100.4 mL/min/1.73     Narrative:      GFR Normal >60  Chronic Kidney Disease <60  Kidney Failure <15      Heparin Anti-Xa [[id number]]  (Abnormal) Collected: 12/04/24 0156    Specimen: Blood Updated: 12/04/24 0237     Heparin Anti-Xa (UFH) 0.12 IU/ml     Protime-INR [[id number]]  (Abnormal) Collected: 12/04/24 0156    Specimen: Blood Updated: 12/04/24 0237     Protime 15.4 Seconds      INR 1.20    aPTT [[id number]]  (Abnormal) Collected: 12/04/24 0156    Specimen: Blood Updated: 12/04/24 0237     PTT 24.2 seconds     Narrative:      PTT = The equivalent PTT values for the therapeutic range of heparin levels at 0.3 to 0.5 U/ml are 60 to 70 seconds.    CBC & Differential [[id number]]  (Abnormal) Collected: 12/04/24 0156    Specimen: Blood Updated: 12/04/24 0225    Narrative:      The following orders were created for panel order CBC & Differential.  Procedure                               Abnormality         Status                     ---------                               -----------         ------                     CBC Auto Differential[[id number]]        Abnormal            Final result                 Please view results for these tests on the individual orders.    CBC Auto Differential [[id number]]  (Abnormal) Collected: 12/04/24 0156    Specimen: Blood Updated: 12/04/24 0225     WBC 9.96 10*3/mm3      RBC 4.51 10*6/mm3      Hemoglobin 12.7 g/dL      Hematocrit 41.0 %      MCV 90.9 fL      MCH 28.2 pg      MCHC 31.0 g/dL      RDW 16.0 %      RDW-SD 52.8 fl      MPV 10.2 fL      Platelets 300 10*3/mm3      Neutrophil % 65.7 %      Lymphocyte % 25.6 %      Monocyte % 5.8 %      Eosinophil % 2.1 %      Basophil % 0.5 %      Immature Grans % 0.3 %      Neutrophils, Absolute 6.54 10*3/mm3      Lymphocytes,  Absolute 2.55 10*3/mm3      Monocytes, Absolute 0.58 10*3/mm3      Eosinophils, Absolute 0.21 10*3/mm3      Basophils, Absolute 0.05 10*3/mm3      Immature Grans, Absolute 0.03 10*3/mm3      nRBC 0.0 /100 WBC     High Sensitivity Troponin T 2Hr [[id number]]  (Abnormal) Collected: 12/03/24 2247    Specimen: Blood Updated: 12/03/24 2311     HS Troponin T 227 ng/L      Troponin T Delta 13 ng/L     aPTT [[id number]]  (Normal) Collected: 12/03/24 2047    Specimen: Blood Updated: 12/03/24 2217     PTT 28.9 seconds     Narrative:      PTT = The equivalent PTT values for the therapeutic range of heparin levels at 0.3 to 0.5 U/ml are 60 to 70 seconds.    Protime-INR [[id number]]  (Abnormal) Collected: 12/03/24 2047    Specimen: Blood Updated: 12/03/24 2204     Protime 14.8 Seconds      INR 1.10    High Sensitivity Troponin T [[id number]]  (Abnormal) Collected: 12/03/24 2047    Specimen: Blood Updated: 12/03/24 2118     HS Troponin T 214 ng/L     Comprehensive Metabolic Panel [[id number]]  (Abnormal) Collected: 12/03/24 2047    Specimen: Blood Updated: 12/03/24 2116     Glucose 103 mg/dL      BUN 16 mg/dL      Creatinine 0.69 mg/dL      Sodium 138 mmol/L      Potassium 5.0 mmol/L      Comment: Specimen hemolyzed.  Result may be falsely elevated.        Chloride 103 mmol/L      CO2 22.2 mmol/L      Calcium 9.1 mg/dL      Total Protein 6.4 g/dL      Albumin 3.4 g/dL      ALT (SGPT) 22 U/L      AST (SGOT) 58 U/L      Comment: Specimen hemolyzed.  Result may be falsely elevated.        Alkaline Phosphatase 120 U/L      Total Bilirubin 0.5 mg/dL      Globulin 3.0 gm/dL      A/G Ratio 1.1 g/dL      BUN/Creatinine Ratio 23.2     Anion Gap 12.8 mmol/L      eGFR 100.7 mL/min/1.73     Narrative:      GFR Normal >60  Chronic Kidney Disease <60  Kidney Failure <15      Lipase [[id number]]  (Normal) Collected: 12/03/24 2047    Specimen: Blood Updated: 12/03/24 2112     Lipase 17 U/L     BNP [[id number]]  (Abnormal) Collected: 12/03/24 2047     Specimen: Blood Updated: 12/03/24 2110     proBNP 8,588.0 pg/mL     Narrative:      This assay is used as an aid in the diagnosis of individuals suspected of having heart failure. It can be used as an aid in the diagnosis of acute decompensated heart failure (ADHF) in patients presenting with signs and symptoms of ADHF to the emergency department (ED). In addition, NT-proBNP of <300 pg/mL indicates ADHF is not likely.    Age Range Result Interpretation  NT-proBNP Concentration (pg/mL:      <50             Positive            >450                   Gray                 300-450                    Negative             <300    50-75           Positive            >900                  Gray                300-900                  Negative            <300      >75             Positive            >1800                  Gray                300-1800                  Negative            <300    [place] Draw [[id number]] Collected: 12/03/24 2047    Specimen: Blood Updated: 12/03/24 2101    Narrative:      The following orders were created for panel order [place] Draw.  Procedure                               Abnormality         Status                     ---------                               -----------         ------                     Green Top (Gel)[[id number]]                                  Final result               Lavender Top[[id number]]                                     Final result               Gold Top - SST[[id number]]                                   Final result               Garcia Top[[id number]]                                         Final result               Light Blue Top[[id number]]                                   Final result                 Please view results for these tests on the individual orders.    Green Top (Gel) [[id number]] Collected: 12/03/24 2047    Specimen: Blood Updated: 12/03/24 2101     Extra Tube Hold for add-ons.     Comment: Auto resulted.       Lavender Top [[id number]] Collected:  12/03/24 2047    Specimen: Blood Updated: 12/03/24 2101     Extra Tube hold for add-on     Comment: Auto resulted       Gold Top - SST [[id number]] Collected: 12/03/24 2047    Specimen: Blood Updated: 12/03/24 2101     Extra Tube Hold for add-ons.     Comment: Auto resulted.       Garcia Top [[id number]] Collected: 12/03/24 2047    Specimen: Blood Updated: 12/03/24 2101     Extra Tube Hold for add-ons.     Comment: Auto resulted.       Light Blue Top [[id number]] Collected: 12/03/24 2047    Specimen: Blood Updated: 12/03/24 2101     Extra Tube Hold for add-ons.     Comment: Auto resulted       CBC & Differential [[id number]]  (Abnormal) Collected: 12/03/24 2047    Specimen: Blood Updated: 12/03/24 2054    Narrative:      The following orders were created for panel order CBC & Differential.  Procedure                               Abnormality         Status                     ---------                               -----------         ------                     CBC Auto Differential[[id number]]        Abnormal            Final result                 Please view results for these tests on the individual orders.    CBC Auto Differential [[id number]]  (Abnormal) Collected: 12/03/24 2047    Specimen: Blood Updated: 12/03/24 2054     WBC 11.02 10*3/mm3      RBC 4.60 10*6/mm3      Hemoglobin 12.9 g/dL      Hematocrit 40.9 %      MCV 88.9 fL      MCH 28.0 pg      MCHC 31.5 g/dL      RDW 15.8 %      RDW-SD 51.9 fl      MPV 9.8 fL      Platelets 319 10*3/mm3      Neutrophil % 75.4 %      Lymphocyte % 17.8 %      Monocyte % 4.9 %      Eosinophil % 1.4 %      Basophil % 0.4 %      Immature Grans % 0.1 %      Neutrophils, Absolute 8.32 10*3/mm3      Lymphocytes, Absolute 1.96 10*3/mm3      Monocytes, Absolute 0.54 10*3/mm3      Eosinophils, Absolute 0.15 10*3/mm3      Basophils, Absolute 0.04 10*3/mm3      Immature Grans, Absolute 0.01 10*3/mm3           Imaging Results (Last 48 Hours)       Procedure Component Value Units Date/Time     XR Abdomen KUB [[id number]] Resulted: 12/05/24 0913     Updated: 12/05/24 0913    XR Chest 1 View [[id number]] Collected: 12/03/24 2110     Updated: 12/03/24 2113    Narrative:      XR CHEST 1 VW    Date of Exam: 12/3/2024 8:49 PM EST    Indication: Chest Pain Triage Protocol    Comparison: Chest radiograph 11/23/2024    Findings:      Mediastinum: Cardiomediastinal silhouette appears unchanged and enlarged    Lungs: Mild left basal opacities likely representing atelectasis. No focal consolidation appreciated.    Pleura: No pleural effusion or pneumothorax.    Bones and soft tissues: No acute osseous abnormality.        Impression:      Impression:  No acute intrathoracic finding      Electronically Signed: Tae Kang    12/3/2024 9:10 PM EST    Workstation ID: [id number]          ECG/EMG Results (last 48 hours)       Procedure Component Value Units Date/Time    Telemetry Scan [[id number]] Resulted: 12/03/24     Updated: 12/03/24 2104    Telemetry Scan [[id number]] Resulted: 12/03/24     Updated: 12/04/24 0035    Telemetry Scan [[id number]] Resulted: 12/03/24     Updated: 12/04/24 0105    ECG 12 Lead Chest Pain [[id number]] Collected: 12/04/24 0614     Updated: 12/04/24 0745     QT Interval 444 ms      QTC Interval 492 ms     Narrative:      Test Reason : Chest Pain  Blood Pressure :   */*   mmHG  Vent. Rate :  74 BPM     Atrial Rate :  74 BPM     P-R Int : 158 ms          QRS Dur :  90 ms      QT Int : 444 ms       P-R-T Axes :  46 -51  76 degrees    QTcB Int : 492 ms    Normal sinus rhythm  Left atrial enlargement  Left axis deviation  Left ventricular hypertrophy  Inferior infarct (cited on or before 10-Mar-2024)  Anteroseptal infarct (cited on or before 14-Dec-2023)  Abnormal ECG  When compared with ECG of 04-Dec-2024 03:39, (Unconfirmed)  No significant change was found    Referred By: jereli           Confirmed By:     ECG 12 Lead ED Triage Standing Order; Chest Pain [[id number]] Collected: 12/03/24 2044      Updated: 12/04/24 0901     QT Interval 386 ms      QTC Interval 474 ms     Narrative:      Test Reason : ED Triage Standing Order~  Blood Pressure :   */*   mmHG  Vent. Rate :  91 BPM     Atrial Rate :  91 BPM     P-R Int : 150 ms          QRS Dur :  90 ms      QT Int : 386 ms       P-R-T Axes :  41 -49  82 degrees    QTcB Int : 474 ms    Normal sinus rhythm  Possible Left atrial enlargement  Left axis deviation  Left ventricular hypertrophy ( R in aVL , [place] product )  Inferior infarct , age undetermined  Anteroseptal infarct (cited on or before 14-Dec-2023)  Abnormal ECG  When compared with ECG of 23-Nov-2024 20:19,  No significant change was found    Referred By:            Confirmed By:     ECG 12 Lead ED Triage Standing Order; Chest Pain [[id number]] Collected: 12/03/24 2238     Updated: 12/04/24 0912     QT Interval 404 ms      QTC Interval 488 ms     Narrative:      Test Reason : ED Triage Standing Order~  Blood Pressure :   */*   mmHG  Vent. Rate :  88 BPM     Atrial Rate :  88 BPM     P-R Int : 156 ms          QRS Dur :  86 ms      QT Int : 404 ms       P-R-T Axes :  43 -47  68 degrees    QTcB Int : 488 ms    Normal sinus rhythm  Possible Left atrial enlargement  Left anterior fascicular block  Left ventricular hypertrophy ( R in aVL , [place] product )  Inferior infarct (cited on or before 10-Mar-2024)  Anteroseptal infarct (cited on or before 14-Dec-2023)  QTcB >= 480 msec  Abnormal ECG  When compared with ECG of 03-Dec-2024 20:44, (Unconfirmed)  No significant change was found    Referred By:            Confirmed By:     ECG 12 Lead Chest Pain [[id number]] Collected: 12/04/24 0339     Updated: 12/04/24 2124     QT Interval 450 ms      QTC Interval 506 ms     Narrative:      Test Reason : Chest Pain  Blood Pressure :   */*   mmHG  Vent. Rate :  76 BPM     Atrial Rate :  76 BPM     P-R Int : 154 ms          QRS Dur :  98 ms      QT Int : 450 ms       P-R-T Axes :  47 -46  84 degrees    QTcB Int : 506  ms    Normal sinus rhythm  Possible Left atrial enlargement  Left anterior fascicular block  Left ventricular hypertrophy  Inferior infarct (cited on or before 10-Mar-2024)  Anteroseptal infarct (cited on or before 14-Dec-2023)  Prolonged QT  Abnormal ECG  When compared with ECG of 03-Dec-2024 22:38, (Unconfirmed)  No significant change was found  Confirmed by dEin Coats ([id number]) on 12/4/2024 9:24:43 PM    Referred By:            Confirmed By: Edin Coats             Physician Progress Notes (last 48 hours)        Heath Sargent MD at 12/05/24 0826          [facility]  INPATIENT PROGRESS NOTE         [facility] 6A    12/5/2024      PATIENT IDENTIFICATION:   Name:  Jill M Paladino      MRN:  [id number]     59 y.o.  female             Reason for visit: Acute on chronic systolic heart failure, CAD      SUBJECTIVE:   She states her breathing is much improved but she is in mild distress due to abdominal pain and constipation.     OBJECTIVE:  Vitals:    12/04/24 2126 12/05/24 0032 12/05/24 0340 12/05/24 0532   BP: 122/89 136/96 128/91    BP Location: Left arm Left arm Left arm    Patient Position: Lying Lying Lying    Pulse: 71 73 67    Resp: 18 16 16    Temp: 98.2 °F (36.8 °C) 98.1 °F (36.7 °C) 97.9 °F (36.6 °C)    TempSrc: Oral Oral Oral    SpO2:       Weight:    62.9 kg (138 lb 9.6 oz)   Height:               Body mass index is 23.06 kg/m².    Intake/Output Summary (Last 24 hours) at 12/5/2024 0826  Last data filed at 12/5/2024 0340  Gross per 24 hour   Intake --   Output 2650 ml   Net -2650 ml       Telemetry: Personally reviewed, normal sinus rhythm, no arrhythmia     Exam:  General Appearance:   well developed  well nourished, mild distress due to constipation and abdominal pain  Neck:  thyroid not enlarged  supple  Respiratory:  no respiratory distress  normal breath sounds  no rales  Cardiovascular:  no jugular venous distention  regular rhythm  apical  "impulse normal  S1 normal, S2 normal  no S3, no S4   no murmur  no rub, no thrill  carotid pulses normal; no bruit  pedal pulses normal  lower extremity edema: Trace  Abdomen:  Soft, mildly tender, no rebound or guarding, positive bowel sounds in all 4 quadrants.  Skin:   warm, dry      Allergies   Allergen Reactions    Lisinopril Other (See Comments)     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    Other Nausea And Vomiting     Pt states \"all opiods make me throw up instantly\"    Percocet [Oxycodone-Acetaminophen] GI Intolerance    Sulfa Antibiotics Rash     Scheduled meds:       apixaban, 5 mg, Oral, Q12H  aspirin, 81 mg, Oral, Daily  bumetanide, 1 mg, Intravenous, Q12H  empagliflozin, 10 mg, Oral, Daily  valsartan, 80 mg, Oral, Q24H   And  [Held by provider] hydroCHLOROthiazide, 12.5 mg, Oral, Q24H  metoprolol succinate XL, 50 mg, Oral, Daily  pantoprazole, 40 mg, Oral, Daily  rosuvastatin, 40 mg, Oral, Daily  senna-docusate sodium, 2 tablet, Oral, BID  sodium chloride, 10 mL, Intravenous, Q12H  spironolactone, 100 mg, Oral, Daily      IV meds:                         Data Review:  Results from last 7 days   Lab Units 12/04/24 0155 12/03/24 2047 11/30/24  1823   SODIUM mmol/L 138 138 138   BUN mg/dL 15 16 23*   CREATININE mg/dL 0.70 0.69 0.80   GLUCOSE mg/dL 91 103* 88     Results from last 7 days   Lab Units 12/05/24  0429 12/04/24 0156 12/03/24 2047 12/02/24 1827 11/30/24  1823   WBC 10*3/mm3 10.61 9.96 11.02* 8.02 9.65   HEMOGLOBIN g/dL 13.8 12.7 12.9 12.7 13.1     Results from last 7 days   Lab Units 12/04/24 0156 12/03/24 2047 11/30/24  1823   INR  1.20* 1.10 1.20*     Results from last 7 days   Lab Units 12/04/24 0155 12/03/24 2047 11/30/24  1823   ALT (SGPT) U/L 22 22 19   AST (SGOT) U/L 45* 58* 38*     No results found for: \"DIGOXIN\"   Lab Results   Component Value Date    TSH 3.830 03/10/2024     Results from last 7 days   Lab Units 12/04/24  0155 "   CHOLESTEROL mg/dL 135   HDL CHOL mg/dL 40       Estimated Creatinine Clearance: 87 mL/min (by C-G formula based on SCr of 0.7 mg/dL).        Imaging (last 24 hr):   I personally reviewed the most recent chest x-ray and other pertinent imaging studies.  Results for orders placed during the hospital encounter of 12/03/24    XR Chest 1 View    Narrative  XR CHEST 1 VW    Date of Exam: 12/3/2024 8:49 PM EST    Indication: Chest Pain Triage Protocol    Comparison: Chest radiograph 11/23/2024    Findings:      Mediastinum: Cardiomediastinal silhouette appears unchanged and enlarged    Lungs: Mild left basal opacities likely representing atelectasis. No focal consolidation appreciated.    Pleura: No pleural effusion or pneumothorax.    Bones and soft tissues: No acute osseous abnormality.    Impression  Impression:  No acute intrathoracic finding      Electronically Signed: Tae Kang  12/3/2024 9:10 PM EST  Workstation ID: [id number]        Last ECHO:  Results for orders placed during the hospital encounter of 09/25/24    Adult Transthoracic Echo Complete W/ Cont if Necessary Per Protocol    Interpretation Summary    Left ventricular systolic function is severely decreased. Calculated left ventricular EF = 10.8% Left ventricular ejection fraction appears to be less than 20%.    Left ventricular diastolic dysfunction is noted.    There is a thrombus present in the left ventricle.    The left atrial cavity is dilated.    Left atrial volume is mildly increased.    The right atrial cavity is dilated.    Moderate to severe mitral valve regurgitation is present.    Estimated right ventricular systolic pressure from tricuspid regurgitation is normal (<35 mmHg).        PROBLEM LIST:     NSTEMI (non-ST elevated myocardial infarction)    Coronary artery disease of native artery of native heart with stable angina pectoris    Essential hypertension    Acute on chronic HFrEF (heart failure with reduced ejection fraction)    Other  mixed anxiety disorders    History of pulmonary embolism        Initial cardiac assessment: 59-year-old female with ischemic cardiomyopathy EF 10.8% 9/2024, thrombus present in the left ventricle, moderate to severe mitral valve regurgitation present with acute on chronic systolic heart failure    ASSESSMENT/PLAN:  1.  Acute on chronic systolic heart failure:  Echo 9/2024 with EF 10.8% with LV thrombus  Continue IV diuresis with Bumex today good response thus far, but more fluid remains, requiring further diuresis.  Monitor renal function and electrolytes closely  Continue GDMT with Jardiance, Toprol, spironolactone, and valsartan  Goal net -2 L/day  Low-sodium diet and exercise also recommended    2.  Left ventricular mural thrombus:  Continue full anticoagulation with Eliquis indefinitely due to high risk for redevelopment of clot given severe cardiomyopathy.    3.  CAD:  Known history of LAD  with collaterals from cath at [facility]  We have discussed options of revascularization including CABG with the patient but she declines further procedures.  Medical management for now, continue aspirin and statin  EKG unchanged, troponin elevation due to acute on chronic systolic heart failure  No chest pain currently    4.  Abdominal pain:  Mild distress this morning due to abdominal pain, she has positive bowel sounds, I personally discussed this with Dr. Antoine, we will address constipation with appropriate medical therapy.  Discussed importance of high-fiber diet with patient  CT of the abdomen pelvis performed 12/2/2024 at  showed uncomplicated sigmoid diverticulosis no pericolonic fat stranding, moderate pancolonic stool burden moderate for constipation possible cystitis.    Discussed with  Dr. Elina Sargent MD  12/5/2024    08:26 EST       Electronically signed by Heath Sargent MD at 12/05/24 0832       Asha Antoine MD at 12/04/24 0958                [facility]  Services  ADMISSION FOLLOW-UP NOTE          Patient admitted after midnight, H&P by my partner performed earlier on today's date reviewed.  Interim findings, labs, and charting also reviewed.        The [facility] Problem List has been managed and updated to include any new diagnoses:  Active Hospital Problems    Diagnosis  POA    **NSTEMI (non-ST elevated myocardial infarction) [I21.4]  Yes    Other mixed anxiety disorders [F41.3]  Yes    History of pulmonary embolism [Z86.711]  Yes    Acute on chronic HFrEF (heart failure with reduced ejection fraction) [I50.23]  Yes    Coronary artery disease of native artery of native heart with stable angina pectoris [I25.118]  Yes    Essential hypertension [I10]  Yes      Resolved Hospital Problems   No resolved problems to display.     59-year-old female with history of chronic HFrEF, CAD, hypertension, anxiety, recent multiple visits to [facility] for UTI who presented to [place] ED with complaint of constipation, chest pain and feeling full of fluid, admitted with NSTEMI    NSTEMI  - Troponin remains acute elevated, trending up,  - EKG unremarkable, status post aspirin, continue statin, beta-blocker  --Cardiology consulted, currently n.p.o., heparin gtt. ongoing     Acute on chronic HFrEF  - Echo completed 9/25/2024 noting EF 10.8%  - On Bumex 1 mg daily, changed to IV and continue twice daily for now  --Continue Jardiance, strict I/O's, has reportedly history of medical noncompliance  --Cardiology to evaluate as above     History of PE  - Reports taking Eliquis, hold while on heparin drip     Hypertension  Dyslipidemia  - Continue metoprolol and Diovan  - Continue statin     Severe anxiety  - States that she requires Wellbutrin namebrand as generic does not work for her  - Reports taking spironolactone when not on Wellbutrin  - Ativan as needed     Constipation  - Bowel regimen     Otherwise continue plan of care per admission H&P    Expected Discharge   Expected Discharge  Date: 2024; Expected Discharge Time:      Asha Antoine MD  24        Electronically signed by Asha Antione MD at 24 1041          Consult Notes (last 48 hours)        Imtiaz Fontenot MD at 24 0842        Consult Orders    1. Inpatient Cardiology Consult [[id number]] ordered by Sandy Jovel APRN at 24 0312                   Date of Hospital Visit: 24  Encounter Provider: Shea Mojica PA-C  Place of Service: [facility]  Patient Name: Jill M Paladino  :1965  Referral Provider: No ref. provider found  Primary Care Provider: Sigrid Byers PA    Chief complaint/Reason for Consultation: Elevated troponin    History of Present Illness:  Jill M Paladino  Is a 59 y.o. female with medical history including chronic systolic congestive heart failure.  She has a heart catheterization done at [facility] in  in December that reported 100% LAD occlusion with nonobstructive disease in the circumflex and RCA with collaterals to the LAD.  Echocardiogram at that time showed an EF of 20 to 25% with moderate MR.  CT surgery consult was planned but the patient has some delusions and paranoia related to these results and thought that these test were not actually reflective of her heart.    Today, patient reports that she woke up last night with some mild chest discomfort. She characterizes this as a burning sensation and states that as soon as she got up, the pain went away and never returned. She states that up until 2 weeks ago, she was doing great. She has a bike which she has been riding and has not had any chest pain or shortness of breath with exertion. Did feel like she started to retain fluid 2 weeks ago and felt like her bumex was no longer working. Also has been to  and was diagnosed with UTI but did not complete antibiotic. Main complaint this morning is not having had a bowel movement in more than three days with associated  abdominal pain and distention. Has responded well to IV diuretics.       Past Medical History:   Diagnosis Date    CHF (congestive heart failure)     Hypertension     Pneumonia     Pulmonary embolism without acute cor pulmonale 12/15/2023       Past Surgical History:   Procedure Laterality Date    HERNIA REPAIR      KNEE SURGERY      RHINOPLASTY         Medications Prior to Admission   Medication Sig Dispense Refill Last Dose/Taking    spironolactone (ALDACTONE) 100 MG tablet Take 1 tablet by mouth Daily. 90 tablet 0 12/3/2024 Morning    apixaban (ELIQUIS) 5 MG tablet tablet Take 1 tablet by mouth 2 (Two) Times a Day. 60 tablet 0 12/2/2024 Morning    aspirin 81 MG EC tablet Take 1 tablet by mouth Daily. 30 tablet 0     bumetanide (BUMEX) 1 MG tablet Take 1 tablet by mouth Daily. 30 tablet 0 12/1/2024 Morning    buPROPion XL (WELLBUTRIN XL) 300 MG 24 hr tablet Take 1 tablet by mouth Daily. 30 tablet 0 More than a month    empagliflozin (JARDIANCE) 10 MG tablet tablet Take 1 tablet by mouth Daily. 30 tablet 0     metoprolol succinate XL (TOPROL-XL) 50 MG 24 hr tablet Take 1 tablet by mouth Daily. 30 tablet 0     pantoprazole (PROTONIX) 40 MG EC tablet Take 1 tablet by mouth Daily. (Patient taking differently: Take 1 tablet by mouth Daily. Pt does not have refills and could not get it) 30 tablet 0 More than a month    rosuvastatin (CRESTOR) 40 MG tablet Take 1 tablet by mouth Daily. 90 tablet 0     valsartan-hydrochlorothiazide (DIOVAN-HCT) 80-12.5 MG per tablet Take 0.5 tablets by mouth Daily for 30 days. 15 tablet 0     valsartan-hydrochlorothiazide (DIOVAN-HCT) 80-12.5 MG per tablet Take 1 tablet by mouth Daily. 30 tablet 0 More than a month       Social History     Socioeconomic History    Marital status:    Tobacco Use    Smoking status: Never    Smokeless tobacco: Never   Vaping Use    Vaping status: Never Used   Substance and Sexual Activity    Alcohol use: No    Drug use: No    Sexual activity: Defer  "      Family History   Problem Relation Age of Onset    No Known Problems Mother     No Known Problems Father        REVIEW OF SYSTEMS:     12 point ROS was performed and is Negative except as outlined in HPI     Objective:     Vitals:    12/04/24 0400 12/04/24 0453 12/04/24 0600 12/04/24 0642   BP: 119/83 143/95 129/93 (!) 143/103   BP Location:  Left arm     Patient Position:  Lying     Pulse: 77 79     Resp:  14     Temp:  97.6 °F (36.4 °C)     TempSrc:  Oral     SpO2: 95% 95%     Weight:  66.2 kg (146 lb)     Height:         Body mass index is 24.3 kg/m².  Flowsheet Rows      Flowsheet Row First Filed Value   Admission Height 165.1 cm (65\") Documented at 12/03/2024 2042   Admission Weight 63.5 kg (140 lb) Documented at 12/03/2024 2042            Physical Exam   General:  well developed and well nourished.    Skin: Skin is warm and dry. No obvious cyanosis, erythema or pallor.   Neck: Supple, no JVD. Normal carotid upstrokes, no bruits.    Chest:No respiratory distress. No chest wall tenderness. Breath sounds are normal. No wheezes, rhonchi or rales.  Cardiovascular: Normal S1 and S2,cardiac murmur present  Pulses:Radial and pedal pulses are 2+ and symmetric.    Musculoskeletal/Extremities: 1+ BLE edema.   Neurological: Alert and oriented to person, place, and time, no gross focal deficits.   Psychiatric: Normal mood and affect.Speech and behavior is normal.    Lab Review:                Results from last 7 days   Lab Units 12/04/24  0155   SODIUM mmol/L 138   POTASSIUM mmol/L 4.6   CHLORIDE mmol/L 105   CO2 mmol/L 21.0*   BUN mg/dL 15   CREATININE mg/dL 0.70   GLUCOSE mg/dL 91   CALCIUM mg/dL 9.0     Results from last 7 days   Lab Units 12/04/24  0155 12/03/24 2247 12/03/24 2047   HSTROP T ng/L 307* 227* 214*     Results from last 7 days   Lab Units 12/04/24  0156   WBC 10*3/mm3 9.96   HEMOGLOBIN g/dL 12.7   HEMATOCRIT % 41.0   PLATELETS 10*3/mm3 300     Results from last 7 days   Lab Units 12/04/24  0156 " 12/03/24 2047 11/30/24  1823   INR  1.20* 1.10 1.20*   APTT seconds 24.2* 28.9 29.1*     Results from last 7 days   Lab Units 12/04/24  0155   MAGNESIUM mg/dL 2.2     Results from last 7 days   Lab Units 12/04/24  0155   CHOLESTEROL mg/dL 135   TRIGLYCERIDES mg/dL 55   HDL CHOL mg/dL 40   LDL CHOL mg/dL 83     @LABRCNT(bnp)@  Imaging Results (Last 24 Hours)       Procedure Component Value Units Date/Time    XR Chest 1 View [[id number]] Collected: 12/03/24 2110     Updated: 12/03/24 2113    Narrative:      XR CHEST 1 VW    Date of Exam: 12/3/2024 8:49 PM EST    Indication: Chest Pain Triage Protocol    Comparison: Chest radiograph 11/23/2024    Findings:      Mediastinum: Cardiomediastinal silhouette appears unchanged and enlarged    Lungs: Mild left basal opacities likely representing atelectasis. No focal consolidation appreciated.    Pleura: No pleural effusion or pneumothorax.    Bones and soft tissues: No acute osseous abnormality.        Impression:      Impression:  No acute intrathoracic finding      Electronically Signed: Tae Kang    12/3/2024 9:10 PM EST    Workstation ID: [id number]             EKG: NSR. Left axis deviation. LVH.    Results for orders placed during the hospital encounter of 09/25/24    Adult Transthoracic Echo Complete W/ Cont if Necessary Per Protocol    Interpretation Summary    Left ventricular systolic function is severely decreased. Calculated left ventricular EF = 10.8% Left ventricular ejection fraction appears to be less than 20%.    Left ventricular diastolic dysfunction is noted.    There is a thrombus present in the left ventricle.    The left atrial cavity is dilated.    Left atrial volume is mildly increased.    The right atrial cavity is dilated.    Moderate to severe mitral valve regurgitation is present.    Estimated right ventricular systolic pressure from tricuspid regurgitation is normal (<35 mmHg).      Initial cardiac assessment: 59-year-old homeless female  presenting with acute on chronic systolic heart failure previous EF 20 to 25%, persistent LV thrombus, multiple ER visits over the past several months, history of 100%  of the LAD from cath at [facility] 2023, declined revascularization.        Assessment and plan:   Acute on chronic systolic heart failure and severe MR  EF < 20% on echo from 9/25/2024. Updated echocardiogram pending  Would continue IV diuresis with Bumex today  Continue Jardiance, Toprol, aldactone and valsartan.  Hold HCTZ as she is receiving IV Bumex. Will likely uptitrate valsartan if BP and renal function allow.   Elevated troponin, likely type II in the setting of #1  Patient remains chest pain free and ECG does not show any acute ST-T changes  History of left ventricular thrombus  LV thrombus still present on echo 9/25/2024.  Eliquis has been on hold as she was started on heparin gtt for NSTEMI.   Will go ahead and DC heparin as patient has declined procedure and will resume eliquis.   Coronary artery disease and ischemic cardiomyopathy  Previous catheterization results from outside hospital reviewed from 2023. 100% LAD occlusion with left to right and right to right collaterals  No obstructive disease in the circumflex or RCA that required intervention  CT surgery was recommended but patient did not want follow-up.  At this time with focus on medical optimization.  She states she has been doing well and does not wish to proceed with cardiac intervention.  Continue aspirin and statin for known CAD.     Will allow patient to eat as there is no plan for cardiac intervention. Cardiac diet ordered. Cardiology will follow.     Shea Mojica PA-C      Discussed plan of care with patient.  She does not want to undergo a repeat heart catheterization.  Discussed outside cardiac catheterization findings with occluded LAD and severe ischemic cardiomyopathy.  With her declining procedures we will continue medical management.  Eliquis resumed given her  LV thrombus.  Continue IV diuresis today.  Will plan to uptitrate valsartan tomorrow.  Reports she been out of her medications for about a month.    I, Imtiaz Fontenot M.D.,  have reviewed the notes, assessments, and/or procedures performed by APRN/PA, I CONCUR with the documentation of Jill M Paladino.    Electronically signed by Imtiaz Fontenot MD at 12/04/24 1054

## 2024-12-05 NOTE — PLAN OF CARE
Goal Outcome Evaluation:              Outcome Evaluation: Patient is alert and oriented times four. VSS. RA with sats greater than 92%. Had not had bm in 5 days - see MAR. Given two enemas, see MAR. Complained of constant pain throughout shift. Resting at this time. Care ongoing.

## 2024-12-06 ENCOUNTER — APPOINTMENT (OUTPATIENT)
Dept: GENERAL RADIOLOGY | Facility: HOSPITAL | Age: 59
DRG: 280 | End: 2024-12-06
Payer: MEDICAID

## 2024-12-06 PROCEDURE — 25010000002 BUMETANIDE PER 0.5 MG: Performed by: NURSE PRACTITIONER

## 2024-12-06 PROCEDURE — 99232 SBSQ HOSP IP/OBS MODERATE 35: CPT | Performed by: PHYSICIAN ASSISTANT

## 2024-12-06 PROCEDURE — 99232 SBSQ HOSP IP/OBS MODERATE 35: CPT | Performed by: NURSE PRACTITIONER

## 2024-12-06 PROCEDURE — 25010000002 ONDANSETRON PER 1 MG: Performed by: INTERNAL MEDICINE

## 2024-12-06 PROCEDURE — 25010000002 MORPHINE PER 10 MG: Performed by: NURSE PRACTITIONER

## 2024-12-06 PROCEDURE — 74018 RADEX ABDOMEN 1 VIEW: CPT

## 2024-12-06 PROCEDURE — 99222 1ST HOSP IP/OBS MODERATE 55: CPT | Performed by: PHYSICIAN ASSISTANT

## 2024-12-06 RX ORDER — ONDANSETRON 2 MG/ML
4 INJECTION INTRAMUSCULAR; INTRAVENOUS EVERY 6 HOURS PRN
Status: DISCONTINUED | OUTPATIENT
Start: 2024-12-06 | End: 2024-12-10 | Stop reason: HOSPADM

## 2024-12-06 RX ORDER — POTASSIUM CHLORIDE 750 MG/1
10 TABLET, EXTENDED RELEASE ORAL DAILY PRN
COMMUNITY
End: 2024-12-10 | Stop reason: HOSPADM

## 2024-12-06 RX ADMIN — VALSARTAN 80 MG: 80 TABLET, FILM COATED ORAL at 08:02

## 2024-12-06 RX ADMIN — LORAZEPAM 0.5 MG: 0.5 TABLET ORAL at 20:19

## 2024-12-06 RX ADMIN — NALOXEGOL OXALATE 25 MG: 25 TABLET, FILM COATED ORAL at 12:36

## 2024-12-06 RX ADMIN — MORPHINE SULFATE 2 MG: 2 INJECTION, SOLUTION INTRAMUSCULAR; INTRAVENOUS at 14:42

## 2024-12-06 RX ADMIN — SPIRONOLACTONE 100 MG: 25 TABLET ORAL at 08:02

## 2024-12-06 RX ADMIN — Medication 10 ML: at 20:19

## 2024-12-06 RX ADMIN — MINERAL OIL 1 ENEMA: 100 ENEMA RECTAL at 17:41

## 2024-12-06 RX ADMIN — MORPHINE SULFATE 2 MG: 2 INJECTION, SOLUTION INTRAMUSCULAR; INTRAVENOUS at 21:35

## 2024-12-06 RX ADMIN — SENNOSIDES AND DOCUSATE SODIUM 2 TABLET: 50; 8.6 TABLET ORAL at 08:02

## 2024-12-06 RX ADMIN — APIXABAN 5 MG: 5 TABLET, FILM COATED ORAL at 20:19

## 2024-12-06 RX ADMIN — MORPHINE SULFATE 2 MG: 2 INJECTION, SOLUTION INTRAMUSCULAR; INTRAVENOUS at 01:12

## 2024-12-06 RX ADMIN — MORPHINE SULFATE 2 MG: 2 INJECTION, SOLUTION INTRAMUSCULAR; INTRAVENOUS at 04:40

## 2024-12-06 RX ADMIN — MORPHINE SULFATE 2 MG: 2 INJECTION, SOLUTION INTRAMUSCULAR; INTRAVENOUS at 17:57

## 2024-12-06 RX ADMIN — ONDANSETRON 4 MG: 2 INJECTION INTRAMUSCULAR; INTRAVENOUS at 18:36

## 2024-12-06 RX ADMIN — MORPHINE SULFATE 2 MG: 2 INJECTION, SOLUTION INTRAMUSCULAR; INTRAVENOUS at 08:02

## 2024-12-06 RX ADMIN — BISACODYL 10 MG: 10 SUPPOSITORY RECTAL at 01:13

## 2024-12-06 RX ADMIN — ASPIRIN 81 MG: 81 TABLET, COATED ORAL at 08:02

## 2024-12-06 RX ADMIN — Medication 10 ML: at 08:09

## 2024-12-06 RX ADMIN — BUMETANIDE 1 MG: 0.25 INJECTION INTRAMUSCULAR; INTRAVENOUS at 20:17

## 2024-12-06 RX ADMIN — LACTULOSE 20 G: 20 SOLUTION ORAL at 08:02

## 2024-12-06 RX ADMIN — POLYETHYLENE GLYCOL 3350, SODIUM SULFATE ANHYDROUS, SODIUM BICARBONATE, SODIUM CHLORIDE, POTASSIUM CHLORIDE 4000 ML: 236; 22.74; 6.74; 5.86; 2.97 POWDER, FOR SOLUTION ORAL at 17:41

## 2024-12-06 RX ADMIN — BUMETANIDE 1 MG: 0.25 INJECTION INTRAMUSCULAR; INTRAVENOUS at 08:03

## 2024-12-06 RX ADMIN — APIXABAN 5 MG: 5 TABLET, FILM COATED ORAL at 08:02

## 2024-12-06 RX ADMIN — ROSUVASTATIN 40 MG: 20 TABLET, FILM COATED ORAL at 08:02

## 2024-12-06 RX ADMIN — METOPROLOL SUCCINATE 50 MG: 50 TABLET, EXTENDED RELEASE ORAL at 08:02

## 2024-12-06 RX ADMIN — PANTOPRAZOLE SODIUM 40 MG: 40 TABLET, DELAYED RELEASE ORAL at 05:09

## 2024-12-06 RX ADMIN — MORPHINE SULFATE 2 MG: 2 INJECTION, SOLUTION INTRAMUSCULAR; INTRAVENOUS at 12:36

## 2024-12-06 RX ADMIN — POLYETHYLENE GLYCOL 3350 17 G: 17 POWDER, FOR SOLUTION ORAL at 08:02

## 2024-12-06 NOTE — PROGRESS NOTES
"  [facility]  PROGRESS NOTE    Date of Admission: 12/3/2024  Date of Service: 12/06/24    Primary Care Physician: Sigrid Byers PA    Chief Complaint: Acute on chronic systolic heart failure, CAD with elevated troponin  Problem List:   NSTEMI (non-ST elevated myocardial infarction)    Coronary artery disease of native artery of native heart with stable angina pectoris    Essential hypertension    Acute on chronic HFrEF (heart failure with reduced ejection fraction)    Other mixed anxiety disorders    History of pulmonary embolism      Subjective      Still with abdominal pain and constipation. No cardiac complaints currently.       Objective   Vitals: /84 (BP Location: Left arm, Patient Position: Lying)   Pulse 81   Temp 98 °F (36.7 °C) (Oral)   Resp 20   Ht 165.1 cm (65\")   Wt 62.9 kg (138 lb 9.6 oz)   SpO2 95%   BMI 23.06 kg/m²     Physical Exam:  GENERAL: Alert, cooperative, in no acute distress.   HEENT: Normocephalic, no jugular venous distention  HEART: Regular rhythm, normal rate, and no murmurs, gallops, or rubs.   LUNGS: No wheezing, rales or rhonchi.   NEUROLOGIC: No focal abnormalities involving strength or sensation are noted.   EXTREMITIES: No clubbing, cyanosis, or edema noted.     Results:  Results from last 7 days   Lab Units 12/05/24  0429 12/04/24  0156 12/03/24 2047   WBC 10*3/mm3 10.61 9.96 11.02*   HEMOGLOBIN g/dL 13.8 12.7 12.9   HEMATOCRIT % 44.1 41.0 40.9   PLATELETS 10*3/mm3 334 300 319     Results from last 7 days   Lab Units 12/05/24  1607 12/04/24  0155 12/03/24  2047   SODIUM mmol/L 136 138 138   POTASSIUM mmol/L 5.0 4.6 5.0   CHLORIDE mmol/L 101 105 103   CO2 mmol/L 24.0 21.0* 22.2   BUN mg/dL 19 15 16   CREATININE mg/dL 0.94 0.70 0.69   GLUCOSE mg/dL 104* 91 103*      Lab Results   Component Value Date    CHOL 135 12/04/2024    TRIG 55 12/04/2024    HDL 40 12/04/2024    LDL 83 12/04/2024    AST 45 (H) 12/04/2024    ALT 22 " 12/04/2024     Results from last 7 days   Lab Units 12/04/24  0156   HEMOGLOBIN A1C % 6.20*     Results from last 7 days   Lab Units 12/04/24  0155   CHOLESTEROL mg/dL 135   TRIGLYCERIDES mg/dL 55   HDL CHOL mg/dL 40   LDL CHOL mg/dL 83         Results from last 7 days   Lab Units 12/04/24  0156 12/03/24 2047 11/30/24  1823   PROTIME Seconds 15.4* 14.8* 15.8*   INR  1.20* 1.10 1.20*   APTT seconds 24.2* 28.9 29.1*     Results from last 7 days   Lab Units 12/04/24  0155 12/03/24  2247 12/03/24 2047   HSTROP T ng/L 307* 227* 214*     Results from last 7 days   Lab Units 12/03/24 2047   PROBNP pg/mL 8,588.0*       Intake/Output Summary (Last 24 hours) at 12/6/2024 0929  Last data filed at 12/6/2024 0442  Gross per 24 hour   Intake --   Output 500 ml   Net -500 ml     I personally reviewed the patient's EKG/Telemetry data    Radiology Data:   XR Abdomen KUB    Result Date: 12/5/2024  Impression: Large volume colonic stool burden compatible with constipation. No obstruction. Electronically Signed: Gerard Brunson MD  12/5/2024 9:50 AM EST  Workstation ID: [id number]     Results for orders placed during the hospital encounter of 09/25/24    Adult Transthoracic Echo Complete W/ Cont if Necessary Per Protocol    Interpretation Summary    Left ventricular systolic function is severely decreased. Calculated left ventricular EF = 10.8% Left ventricular ejection fraction appears to be less than 20%.    Left ventricular diastolic dysfunction is noted.    There is a thrombus present in the left ventricle.    The left atrial cavity is dilated.    Left atrial volume is mildly increased.    The right atrial cavity is dilated.    Moderate to severe mitral valve regurgitation is present.    Estimated right ventricular systolic pressure from tricuspid regurgitation is normal (<35 mmHg).    Current Medications:  apixaban, 5 mg, Oral, Q12H  aspirin, 81 mg, Oral, Daily  bumetanide, 1 mg, Intravenous, Q12H  empagliflozin, 10 mg, Oral,  Daily  valsartan, 80 mg, Oral, Q24H   And  [Held by provider] hydroCHLOROthiazide, 12.5 mg, Oral, Q24H  lactulose, 20 g, Oral, BID  metoprolol succinate XL, 50 mg, Oral, Daily  pantoprazole, 40 mg, Oral, Daily  pharmacy consult - MTM, , Not Applicable, Daily  senna-docusate sodium, 2 tablet, Oral, BID   And  polyethylene glycol, 17 g, Oral, Daily  prochlorperazine, 5 mg, Intravenous, Once  rosuvastatin, 40 mg, Oral, Daily  sodium chloride, 10 mL, Intravenous, Q12H  spironolactone, 100 mg, Oral, Daily           Initial cardiac assessment: 59-year-old female with ischemic cardiomyopathy EF 10.8% 9/2024, thrombus present in the left ventricle, moderate to severe mitral valve regurgitation present with acute on chronic systolic heart failure     ASSESSMENT/PLAN:  1.  Acute on chronic systolic heart failure:  Echo 9/2024 with EF 10.8% with LV thrombus  Continue IV diuresis with Bumex today good response thus far  Consider switching to PO Bumex tomorrow  Monitor renal function and electrolytes closely  Continue GDMT with Jardiance, Toprol, spironolactone, and valsartan  Goal net -2 L/day  Low-sodium diet and exercise also recommended     2.  Left ventricular mural thrombus:  Continue full anticoagulation with Eliquis indefinitely due to high risk for redevelopment of clot given severe cardiomyopathy.     3.  CAD:  Known history of LAD  with collaterals from cath at [facility]  We have discussed options of revascularization including CABG with the patient but she declines further procedures.  Medical management for now, continue aspirin and statin  EKG unchanged, troponin elevation due to acute on chronic systolic heart failure  No chest pain currently     4.  Abdominal pain/Constipation:  CT of the abdomen pelvis performed 12/2/2024 at  showed uncomplicated sigmoid diverticulosis no pericolonic fat stranding, moderate pancolonic stool burden moderate for constipation possible cystitis.  Per primary  service      Electronically signed by Brenda Crawford PA-C, 12/06/24, 9:42 AM EST.

## 2024-12-06 NOTE — PROGRESS NOTES
[facility] Medicine Services  PROGRESS NOTE    Patient Name: Jill M Paladino  : 1965  MRN: [id number]    Date of Admission: 12/3/2024  Primary Care Physician: Sigrid Byers PA    Subjective   Subjective     CC:  Constipation, chest pain,     HPI:  Tearful about abdominal pain  UA clear 24      Objective   Objective     Vital Signs:   Temp:  [98 °F (36.7 °C)-99 °F (37.2 °C)] 98 °F (36.7 °C)  Heart Rate:  [81-97] 81  Resp:  [16-20] 20  BP: (115-151)/(81-93) 121/84     Physical Exam:  Constitutional: No acute distress, awake, alert  HENT: NCAT, mucous membranes moist  Respiratory: Clear to auscultation bilaterally, respiratory effort normal room air   Cardiovascular: RRR, no murmurs, rubs, or gallops  Gastrointestinal: Positive bowel sounds, distended, tender  Musculoskeletal: Trace bilateral ankle edema  Psychiatric: Appropriate affect, cooperative  Neurologic: Oriented x 3, RAO, speech clear  Skin: No rashes      Results Reviewed:  LAB RESULTS:      Lab 24  0429 24  0948 24  0156 24  0155 24  2247 24  2047 24  1827 24  1823   WBC 10.61  --  9.96  --   --  11.02* 8.02 9.65   HEMOGLOBIN 13.8  --  12.7  --   --  12.9 12.7 13.1   HEMATOCRIT 44.1  --  41.0  --   --  40.9 39.8 41.9   PLATELETS 334  --  300  --   --  319 297 304   NEUTROS ABS 7.21*  --  6.54  --   --  8.32* 5.15 6.44   IMMATURE GRANS (ABS) 0.03  --  0.03  --   --  0.01 0.02 0.02   LYMPHS ABS 2.41  --  2.55  --   --  1.96 2.1 2.58   MONOS ABS 0.65  --  0.58  --   --  0.54 0.55 0.49   EOS ABS 0.24  --  0.21  --   --  0.15 0.15 0.08   MCV 90.2  --  90.9  --   --  88.9 88 88.8   LACTATE, ARTERIAL  --   --   --   --   --   --  1.3  --    PROTIME  --   --  15.4*  --   --  14.8*  --  15.8*   APTT  --   --  24.2*  --   --  28.9  --  29.1*   HEPARIN ANTI-XA  --  0.30 0.12*  --   --   --   --   --    HSTROP T  --   --   --  307* 227* 214*  --   --          Lab  12/05/24  1607 12/04/24 0156 12/04/24 0155 12/03/24 2047 11/30/24  1823   SODIUM 136  --  138 138 138   POTASSIUM 5.0  --  4.6 5.0 4.3   CHLORIDE 101  --  105 103 101   CO2 24.0  --  21.0* 22.2 26.5   ANION GAP 11.0  --  12.0 12.8 10.5   BUN 19  --  15 16 23*   CREATININE 0.94  --  0.70 0.69 0.80   EGFR 70.5  --  100.4 100.7 85.5   GLUCOSE 104*  --  91 103* 88   CALCIUM 9.4  --  9.0 9.1 9.0   MAGNESIUM 2.2  --  2.2  --   --    HEMOGLOBIN A1C  --  6.20*  --   --   --          Lab 12/04/24  0155 12/03/24 2047 12/02/24 1827 11/30/24  1823   TOTAL PROTEIN 5.8* 6.4  --  6.3   ALBUMIN 3.2* 3.4*  --  3.7   GLOBULIN 2.6 3.0  --  2.6   ALT (SGPT) 22 22  --  19   AST (SGOT) 45* 58*  --  38*   BILIRUBIN 0.6 0.5  --  1.2   ALK PHOS 116 120*  --  124*   LIPASE  --  17 23  --          Lab 12/04/24  0156 12/04/24 0155 12/03/24 2247 12/03/24 2047 11/30/24  1823   PROBNP  --   --   --  8,588.0*  --    HSTROP T  --  307* 227* 214*  --    PROTIME 15.4*  --   --  14.8* 15.8*   INR 1.20*  --   --  1.10 1.20*         Lab 12/04/24 0155   CHOLESTEROL 135   LDL CHOL 83   HDL CHOL 40   TRIGLYCERIDES 55             Brief Urine Lab Results  (Last result in the past 365 days)        Color   Clarity   Blood   Leuk Est   Nitrite   Protein   CREAT   Urine HCG        12/04/24 2056 Yellow   Clear   Negative   Negative   Negative   Negative                   Microbiology Results Abnormal       None            XR Abdomen KUB    Result Date: 12/5/2024  XR ABDOMEN KUB Date of Exam: 12/5/2024 9:13 AM EST Indication: abd paIN Comparison: 3/12/2024 CT Findings: No evidence of bowel obstruction. Large volume colonic stool burden. Surgical changes of prior hernia repair overlies the pelvis. Lumbar scoliosis and spondylosis. No acute fracture.     Impression: Impression: Large volume colonic stool burden compatible with constipation. No obstruction. Electronically Signed: Gerard Brunson MD  12/5/2024 9:50 AM EST  Workstation ID: [id number]     Results  for orders placed during the hospital encounter of 09/25/24    Adult Transthoracic Echo Complete W/ Cont if Necessary Per Protocol    Interpretation Summary    Left ventricular systolic function is severely decreased. Calculated left ventricular EF = 10.8% Left ventricular ejection fraction appears to be less than 20%.    Left ventricular diastolic dysfunction is noted.    There is a thrombus present in the left ventricle.    The left atrial cavity is dilated.    Left atrial volume is mildly increased.    The right atrial cavity is dilated.    Moderate to severe mitral valve regurgitation is present.    Estimated right ventricular systolic pressure from tricuspid regurgitation is normal (<35 mmHg).      Current medications:  Scheduled Meds:apixaban, 5 mg, Oral, Q12H  aspirin, 81 mg, Oral, Daily  bumetanide, 1 mg, Intravenous, Q12H  empagliflozin, 10 mg, Oral, Daily  valsartan, 80 mg, Oral, Q24H   And  [Held by provider] hydroCHLOROthiazide, 12.5 mg, Oral, Q24H  metoprolol succinate XL, 50 mg, Oral, Daily  Naloxegol Oxalate, 25 mg, Oral, QAM  pantoprazole, 40 mg, Oral, Daily  pharmacy consult - MTM, , Not Applicable, Daily  prochlorperazine, 5 mg, Intravenous, Once  rosuvastatin, 40 mg, Oral, Daily  sodium chloride, 10 mL, Intravenous, Q12H  spironolactone, 100 mg, Oral, Daily      Continuous Infusions:   PRN Meds:.  acetaminophen **OR** acetaminophen **OR** acetaminophen    influenza vaccine    LORazepam    Morphine    sodium chloride    sodium chloride    sodium chloride    Assessment & Plan   Assessment & Plan     Active Hospital Problems    Diagnosis  POA    **NSTEMI (non-ST elevated myocardial infarction) [I21.4]  Yes    Other mixed anxiety disorders [F41.3]  Yes    History of pulmonary embolism [Z86.711]  Yes    Acute on chronic HFrEF (heart failure with reduced ejection fraction) [I50.23]  Yes    Coronary artery disease of native artery of native heart with stable angina pectoris [I25.118]  Yes    Essential  hypertension [I10]  Yes      Resolved Hospital Problems   No resolved problems to display.        Brief Hospital Course to date:  Jill M Paladino is a 59 y.o. female  with history of chronic HFrEF, CAD, hypertension, anxiety, recent multiple visits to [facility] for UTI who presented to [facility] with complaint of constipation, chest pain and feeling full of fluid, admitted with NSTEMI    Plan was partially entered by my partner and I have reviewed and updated as appropriate on 12/5/24     NSTEMI  Left ventricular mural thrombus   CAD  - Troponin remains acute elevated, trending up,  - EKG unremarkable, status post aspirin, continue statin, beta-blocker  --Cardiology consulted, s/p heparin gtt.now back on eliquis   -- known hx of LAD  with collaterals from cath at [facility] but is declining any further procedures at this time      Acute on chronic HFrEF  - Echo completed 9/25/2024 noting EF 10.8%  - On Bumex 1 mg daily, changed to IV and continue twice daily for now  --Continue Jardiance, strict I/O's, has reportedly history of medical noncompliance  --Cardiology to evaluate as above     History of PE  - Eliquis,     Hypertension  Dyslipidemia  - Continue metoprolol and Diovan  - Continue statin     Severe anxiety  - States that she requires Wellbutrin namebrand as generic does not work for her  - Reports taking spironolactone when not on Wellbutrin  - Ativan as needed     Constipation  -- KUB large volume colonic stool burden compatible with constipation   -- no results from s/s enema or lactulose enema   -- increased abd pain, will try morphine and check bladder scan  -- start movantik      Expected Discharge Location and Transportation: home   Expected Discharge   Expected Discharge Date: 12/6/2024; Expected Discharge Time:      VTE Prophylaxis:  Pharmacologic & mechanical VTE prophylaxis orders are present.         AM-PAC 6 Clicks Score (PT): 24 (12/05/24 0800)    CODE STATUS:   Code Status and Medical Interventions:  CPR (Attempt to Resuscitate); Full Support   Ordered at: 12/04/24 0312     Code Status (Patient has no pulse and is not breathing):    CPR (Attempt to Resuscitate)     Medical Interventions (Patient has pulse or is breathing):    Full Support       KAYLA Adler  12/06/24

## 2024-12-06 NOTE — CONSULTS
[facility]ology Consult    Referring Provider:  Asha Antoine MD     PCP: Sigrid Byers PA    Reason for Consultation:  Constipation, unable to tolerate enemas given     Chief complaint: Shortness of breath     History of present illness:    Jill M Paladino is a 59 y.o. female who is admitted with NSTEMI and acute on chronic HF with previous EF 10%.    She has history of CAD with ischemic cardiomyopathy and previous left ventricular thrombus.    GI is consulted due to concerns of obstipation.    She has not had a bowel movement in seven days.    She denies any history of constipation.   She previously would have a bowel movement once daily.    She has never had a colonoscopy.    She has significant abdominal pain associated with her constipation.        She had a CT abdomen on 12/2 at [facility] that showed moderate stool burden with fecalization of the small bowel contents.   No obstruction.   Subsequent KUB at our facility continues to show significant stool burden throughout.       She has not had response to Lactulose PO, Lactulose enema, Dulcolax suppository, PO Mirakax nor PO pericolace.    She is receiving IV Morphine for abdominal pain but denies chronic opiate use.       Allergies:  Lisinopril, Other, Percocet [oxycodone-acetaminophen], and Sulfa antibiotics    Scheduled Meds:  apixaban, 5 mg, Oral, Q12H  aspirin, 81 mg, Oral, Daily  bumetanide, 1 mg, Intravenous, Q12H  empagliflozin, 10 mg, Oral, Daily  valsartan, 80 mg, Oral, Q24H   And  [Held by provider] hydroCHLOROthiazide, 12.5 mg, Oral, Q24H  metoprolol succinate XL, 50 mg, Oral, Daily  Naloxegol Oxalate, 25 mg, Oral, QAM  pantoprazole, 40 mg, Oral, Daily  pharmacy consult - [place], , Not Applicable, Daily  prochlorperazine, 5 mg, Intravenous, Once  rosuvastatin, 40 mg, Oral, Daily  sodium chloride, 10 mL, Intravenous, Q12H  spironolactone, 100 mg, Oral, Daily         Infusions:       PRN Meds:    acetaminophen **OR** acetaminophen **OR**  acetaminophen    influenza vaccine    LORazepam    Morphine    sodium chloride    sodium chloride    sodium chloride    Home Meds:  Medications Prior to Admission   Medication Sig Dispense Refill Last Dose/Taking    potassium chloride 10 MEQ CR tablet Take 1 tablet by mouth Daily As Needed.   Past Month    spironolactone (ALDACTONE) 100 MG tablet Take 1 tablet by mouth Daily. (Patient taking differently: Take 1 tablet by mouth 2 (Two) Times a Day.) 90 tablet 0 12/3/2024 Morning    apixaban (ELIQUIS) 5 MG tablet tablet Take 1 tablet by mouth 2 (Two) Times a Day. 60 tablet 0 12/2/2024 Morning    aspirin 81 MG EC tablet Take 1 tablet by mouth Daily. 30 tablet 0 Past Week    bumetanide (BUMEX) 1 MG tablet Take 1 tablet by mouth Daily. 30 tablet 0 12/1/2024 Morning    buPROPion XL (WELLBUTRIN XL) 300 MG 24 hr tablet Take 1 tablet by mouth Daily. 30 tablet 0 More than a month    metoprolol succinate XL (TOPROL-XL) 50 MG 24 hr tablet Take 1 tablet by mouth Daily. 30 tablet 0 Past Week    pantoprazole (PROTONIX) 40 MG EC tablet Take 1 tablet by mouth Daily. (Patient taking differently: Take 1 tablet by mouth Daily. Pt does not have refills and could not get it) 30 tablet 0 More than a month    valsartan-hydrochlorothiazide (DIOVAN-HCT) 80-12.5 MG per tablet Take 0.5 tablets by mouth Daily for 30 days. (Patient taking differently: Take 1 tablet by mouth Daily.) 15 tablet 0 Past Week       ROS: Review of Systems   Constitutional:  Positive for fatigue.   Eyes: Negative.    Respiratory:  Positive for shortness of breath.    Gastrointestinal:  Positive for abdominal pain and constipation.   Genitourinary: Negative.    Musculoskeletal: Negative.    Skin: Negative.      PAST MED HX:  Past Medical History:   Diagnosis Date    CHF (congestive heart failure)     Hypertension     Pneumonia     Pulmonary embolism without acute cor pulmonale 12/15/2023     PAST SURG HX:  Past Surgical History:   Procedure Laterality Date    HERNIA  "REPAIR      KNEE SURGERY      RHINOPLASTY       FAM HX:  Family History   Problem Relation Age of Onset    No Known Problems Mother     No Known Problems Father      SOC HX:  Social History     Socioeconomic History    Marital status:    Tobacco Use    Smoking status: Never    Smokeless tobacco: Never   Vaping Use    Vaping status: Never Used   Substance and Sexual Activity    Alcohol use: No    Drug use: No    Sexual activity: Defer       PHYSICAL EXAM  /84 (BP Location: Left arm, Patient Position: Lying)   Pulse 81   Temp 98 °F (36.7 °C) (Oral)   Resp 20   Ht 165.1 cm (65\")   Wt 62.9 kg (138 lb 9.6 oz)   SpO2 95%   BMI 23.06 kg/m²   Wt Readings from Last 3 Encounters:   12/05/24 62.9 kg (138 lb 9.6 oz)   12/02/24 59 kg (130 lb)   11/30/24 59 kg (130 lb)   ,body mass index is 23.06 kg/m².  Physical Exam  Constitutional:       Appearance: She is ill-appearing.   Cardiovascular:      Rate and Rhythm: Normal rate and regular rhythm.   Pulmonary:      Effort: Pulmonary effort is normal. No respiratory distress.   Abdominal:      General: There is distension.      Tenderness: There is abdominal tenderness. There is no guarding or rebound.      Comments: Abdomen is firm but not rigid.  No guarding.  No peritoneal signs.   Bowel sounds are present.      Neurological:      Mental Status: She is alert and oriented to person, place, and time.   Psychiatric:         Mood and Affect: Mood is anxious.       Results Review:   I reviewed the patient's new clinical results.    Lab Results   Component Value Date    WBC 10.61 12/05/2024    HGB 13.8 12/05/2024    HGB 12.7 12/04/2024    HGB 12.9 12/03/2024    HCT 44.1 12/05/2024    MCV 90.2 12/05/2024     12/05/2024     Lab Results   Component Value Date    INR 1.20 (H) 12/04/2024    INR 1.10 12/03/2024    INR 1.20 (H) 11/30/2024     Lab Results   Component Value Date    GLUCOSE 104 (H) 12/05/2024    BUN 19 12/05/2024    CREATININE 0.94 12/05/2024    " EGFRIFNONA >60 06/14/2022    EGFRIFAFRI >60 06/14/2022    BCR 20.2 12/05/2024     12/05/2024    K 5.0 12/05/2024    CO2 24.0 12/05/2024    CALCIUM 9.4 12/05/2024    ALBUMIN 3.2 (L) 12/04/2024    ALKPHOS 116 12/04/2024    BILITOT 0.6 12/04/2024    ALT 22 12/04/2024    AST 45 (H) 12/04/2024     KUB:  Findings:  No evidence of bowel obstruction. Large volume colonic stool burden. Surgical changes of prior hernia repair overlies the pelvis. Lumbar scoliosis and spondylosis. No acute fracture.   IMPRESSION:  Impression:  Large volume colonic stool burden compatible with constipation. No obstruction.    ASSESSMENTS/PLANS    Constipation   Acute on chronic systolic heart failure   CAD and ischemic cardiomyopathy.  Patient declined repeat heart catheterization.      >> Recommend mineral oil enema x 1  >> Begin slow bowel cleanse with Golytely 6-8 oz every 15 minutes for total of 1 liter (32 oz).   Wait 6 hours then repeat    >> TSH was within normal limits in the spring   >> Continue PO Movantik due to significant opiate use inpatient   >> Not a candidate for elective colonoscopy with her significant cardiac disease     I discussed the patient's findings and my recommendations with patient    TEX Marie  12/06/24  13:54 EST

## 2024-12-06 NOTE — CASE MANAGEMENT/SOCIAL WORK
Continued Stay Note  [facility]     Patient Name: Jill M Paladino  MRN: [id number]  Today's Date: 12/6/2024    Admit Date: 12/3/2024    Plan: discharge plan   Discharge Plan       Row Name 12/06/24 1615       Plan    Plan discharge plan    Plan Comments Per provider note, still has constipation.  GI following. DIscharge plan is home. Pt may need transportation at discharge. CM will cont to follow    Final Discharge Disposition Code 01 - home or self-care                   Discharge Codes    No documentation.                 Expected Discharge Date and Time       Expected Discharge Date Expected Discharge Time    Dec 8, 2024               Whit Escobedo RN

## 2024-12-07 ENCOUNTER — APPOINTMENT (OUTPATIENT)
Dept: GENERAL RADIOLOGY | Facility: HOSPITAL | Age: 59
DRG: 280 | End: 2024-12-07
Payer: MEDICAID

## 2024-12-07 LAB
ANION GAP SERPL CALCULATED.3IONS-SCNC: 15 MMOL/L (ref 5–15)
BUN SERPL-MCNC: 31 MG/DL (ref 6–20)
BUN/CREAT SERPL: 28.7 (ref 7–25)
CALCIUM SPEC-SCNC: 9.1 MG/DL (ref 8.6–10.5)
CHLORIDE SERPL-SCNC: 95 MMOL/L (ref 98–107)
CO2 SERPL-SCNC: 23 MMOL/L (ref 22–29)
CREAT SERPL-MCNC: 1.08 MG/DL (ref 0.57–1)
EGFRCR SERPLBLD CKD-EPI 2021: 59.7 ML/MIN/1.73
GLUCOSE SERPL-MCNC: 93 MG/DL (ref 65–99)
NT-PROBNP SERPL-MCNC: 4682 PG/ML (ref 0–900)
POTASSIUM SERPL-SCNC: 3.7 MMOL/L (ref 3.5–5.2)
SODIUM SERPL-SCNC: 133 MMOL/L (ref 136–145)

## 2024-12-07 PROCEDURE — 25010000002 MORPHINE PER 10 MG: Performed by: NURSE PRACTITIONER

## 2024-12-07 PROCEDURE — 80048 BASIC METABOLIC PNL TOTAL CA: CPT | Performed by: PHYSICIAN ASSISTANT

## 2024-12-07 PROCEDURE — 74018 RADEX ABDOMEN 1 VIEW: CPT

## 2024-12-07 PROCEDURE — 99232 SBSQ HOSP IP/OBS MODERATE 35: CPT | Performed by: NURSE PRACTITIONER

## 2024-12-07 PROCEDURE — 83880 ASSAY OF NATRIURETIC PEPTIDE: CPT | Performed by: STUDENT IN AN ORGANIZED HEALTH CARE EDUCATION/TRAINING PROGRAM

## 2024-12-07 PROCEDURE — 99232 SBSQ HOSP IP/OBS MODERATE 35: CPT | Performed by: STUDENT IN AN ORGANIZED HEALTH CARE EDUCATION/TRAINING PROGRAM

## 2024-12-07 RX ORDER — SIMETHICONE 80 MG
80 TABLET,CHEWABLE ORAL
Status: DISCONTINUED | OUTPATIENT
Start: 2024-12-07 | End: 2024-12-10 | Stop reason: HOSPADM

## 2024-12-07 RX ORDER — BUMETANIDE 1 MG/1
1 TABLET ORAL DAILY
Status: DISCONTINUED | OUTPATIENT
Start: 2024-12-07 | End: 2024-12-07

## 2024-12-07 RX ORDER — POLYETHYLENE GLYCOL 3350 17 G/17G
17 POWDER, FOR SOLUTION ORAL 2 TIMES DAILY
Status: DISCONTINUED | OUTPATIENT
Start: 2024-12-07 | End: 2024-12-10 | Stop reason: HOSPADM

## 2024-12-07 RX ORDER — BUMETANIDE 1 MG/1
1 TABLET ORAL
Status: DISCONTINUED | OUTPATIENT
Start: 2024-12-07 | End: 2024-12-10 | Stop reason: HOSPADM

## 2024-12-07 RX ADMIN — MORPHINE SULFATE 2 MG: 2 INJECTION, SOLUTION INTRAMUSCULAR; INTRAVENOUS at 21:41

## 2024-12-07 RX ADMIN — NALOXEGOL OXALATE 25 MG: 25 TABLET, FILM COATED ORAL at 06:08

## 2024-12-07 RX ADMIN — VALSARTAN 80 MG: 80 TABLET, FILM COATED ORAL at 10:33

## 2024-12-07 RX ADMIN — POLYETHYLENE GLYCOL 3350 17 G: 17 POWDER, FOR SOLUTION ORAL at 21:31

## 2024-12-07 RX ADMIN — ROSUVASTATIN 40 MG: 20 TABLET, FILM COATED ORAL at 10:33

## 2024-12-07 RX ADMIN — SPIRONOLACTONE 100 MG: 25 TABLET ORAL at 10:33

## 2024-12-07 RX ADMIN — MORPHINE SULFATE 2 MG: 2 INJECTION, SOLUTION INTRAMUSCULAR; INTRAVENOUS at 16:29

## 2024-12-07 RX ADMIN — BUMETANIDE 1 MG: 1 TABLET ORAL at 10:33

## 2024-12-07 RX ADMIN — APIXABAN 5 MG: 5 TABLET, FILM COATED ORAL at 21:29

## 2024-12-07 RX ADMIN — MORPHINE SULFATE 2 MG: 2 INJECTION, SOLUTION INTRAMUSCULAR; INTRAVENOUS at 00:13

## 2024-12-07 RX ADMIN — POLYETHYLENE GLYCOL 3350 17 G: 17 POWDER, FOR SOLUTION ORAL at 13:20

## 2024-12-07 RX ADMIN — SIMETHICONE 80 MG: 80 TABLET, CHEWABLE ORAL at 13:20

## 2024-12-07 RX ADMIN — MORPHINE SULFATE 2 MG: 2 INJECTION, SOLUTION INTRAMUSCULAR; INTRAVENOUS at 04:31

## 2024-12-07 RX ADMIN — LORAZEPAM 0.5 MG: 0.5 TABLET ORAL at 04:50

## 2024-12-07 RX ADMIN — Medication 10 ML: at 21:31

## 2024-12-07 RX ADMIN — MORPHINE SULFATE 2 MG: 2 INJECTION, SOLUTION INTRAMUSCULAR; INTRAVENOUS at 18:47

## 2024-12-07 RX ADMIN — ASPIRIN 81 MG: 81 TABLET, COATED ORAL at 10:33

## 2024-12-07 RX ADMIN — SIMETHICONE 80 MG: 80 TABLET, CHEWABLE ORAL at 16:31

## 2024-12-07 RX ADMIN — SIMETHICONE 80 MG: 80 TABLET, CHEWABLE ORAL at 21:29

## 2024-12-07 RX ADMIN — PANTOPRAZOLE SODIUM 40 MG: 40 TABLET, DELAYED RELEASE ORAL at 06:08

## 2024-12-07 RX ADMIN — BUMETANIDE 1 MG: 1 TABLET ORAL at 16:31

## 2024-12-07 RX ADMIN — APIXABAN 5 MG: 5 TABLET, FILM COATED ORAL at 10:33

## 2024-12-07 RX ADMIN — Medication 10 ML: at 10:33

## 2024-12-07 RX ADMIN — METOPROLOL SUCCINATE 50 MG: 50 TABLET, EXTENDED RELEASE ORAL at 10:33

## 2024-12-07 RX ADMIN — MORPHINE SULFATE 2 MG: 2 INJECTION, SOLUTION INTRAMUSCULAR; INTRAVENOUS at 10:37

## 2024-12-07 NOTE — PROGRESS NOTES
"  [facility]  PROGRESS NOTE    Date of Admission: 12/3/2024  Date of Service: 12/07/24    Primary Care Physician: Sigrid Byers PA    Chief Complaint: Acute on chronic systolic heart failure, CAD with elevated troponin  Problem List:   NSTEMI (non-ST elevated myocardial infarction)    Coronary artery disease of native artery of native heart with stable angina pectoris    Essential hypertension    Acute on chronic HFrEF (heart failure with reduced ejection fraction)    Other mixed anxiety disorders    History of pulmonary embolism      Subjective      No events overnight.  Sleeping this morning.  Denies any complaints other than being tired.      Objective   Vitals: /80 (BP Location: Left arm, Patient Position: Lying)   Pulse 82   Temp 97.9 °F (36.6 °C) (Oral)   Resp 18   Ht 165.1 cm (65\")   Wt 65 kg (143 lb 6.4 oz)   SpO2 95%   BMI 23.86 kg/m²     Physical Exam:  GENERAL: Alert, cooperative, in no acute distress.   HEENT: Normocephalic, no jugular venous distention  HEART: Regular rhythm, normal rate, and no murmurs, gallops, or rubs.   LUNGS: No wheezing, rales or rhonchi.   NEUROLOGIC: No focal abnormalities involving strength or sensation are noted.   EXTREMITIES: No clubbing, cyanosis, or edema noted.     Results:  Results from last 7 days   Lab Units 12/05/24  0429 12/04/24  0156 12/03/24  2047   WBC 10*3/mm3 10.61 9.96 11.02*   HEMOGLOBIN g/dL 13.8 12.7 12.9   HEMATOCRIT % 44.1 41.0 40.9   PLATELETS 10*3/mm3 334 300 319     Results from last 7 days   Lab Units 12/07/24  0410 12/05/24  1607 12/04/24  0155   SODIUM mmol/L 133* 136 138   POTASSIUM mmol/L 3.7 5.0 4.6   CHLORIDE mmol/L 95* 101 105   CO2 mmol/L 23.0 24.0 21.0*   BUN mg/dL 31* 19 15   CREATININE mg/dL 1.08* 0.94 0.70   GLUCOSE mg/dL 93 104* 91      Lab Results   Component Value Date    CHOL 135 12/04/2024    TRIG 55 12/04/2024    HDL 40 12/04/2024    LDL 83 12/04/2024    AST 45 (H) 12/04/2024 "    ALT 22 12/04/2024     Results from last 7 days   Lab Units 12/04/24  0156   HEMOGLOBIN A1C % 6.20*     Results from last 7 days   Lab Units 12/04/24  0155   CHOLESTEROL mg/dL 135   TRIGLYCERIDES mg/dL 55   HDL CHOL mg/dL 40   LDL CHOL mg/dL 83         Results from last 7 days   Lab Units 12/04/24  0156 12/03/24  2047 11/30/24  1823   PROTIME Seconds 15.4* 14.8* 15.8*   INR  1.20* 1.10 1.20*   APTT seconds 24.2* 28.9 29.1*     Results from last 7 days   Lab Units 12/04/24  0155 12/03/24  2247 12/03/24 2047   HSTROP T ng/L 307* 227* 214*     Results from last 7 days   Lab Units 12/03/24 2047   PROBNP pg/mL 8,588.0*       Intake/Output Summary (Last 24 hours) at 12/7/2024 1014  Last data filed at 12/6/2024 1743  Gross per 24 hour   Intake --   Output 400 ml   Net -400 ml     I personally reviewed the patient's EKG/Telemetry data    Radiology Data:   XR Abdomen KUB    Result Date: 12/6/2024  Impression: There is more prominent distention of what may reflect large bowel in the right quadrant. There remains a moderate stool burden that could relate to constipation. Electronically Signed: Bernardino Granados MD  12/6/2024 7:31 PM EST  Workstation ID: [id number]     Results for orders placed during the hospital encounter of 09/25/24    Adult Transthoracic Echo Complete W/ Cont if Necessary Per Protocol    Interpretation Summary    Left ventricular systolic function is severely decreased. Calculated left ventricular EF = 10.8% Left ventricular ejection fraction appears to be less than 20%.    Left ventricular diastolic dysfunction is noted.    There is a thrombus present in the left ventricle.    The left atrial cavity is dilated.    Left atrial volume is mildly increased.    The right atrial cavity is dilated.    Moderate to severe mitral valve regurgitation is present.    Estimated right ventricular systolic pressure from tricuspid regurgitation is normal (<35 mmHg).    Current Medications:  apixaban, 5 mg, Oral,  Q12H  aspirin, 81 mg, Oral, Daily  bumetanide, 1 mg, Oral, Daily  empagliflozin, 10 mg, Oral, Daily  valsartan, 80 mg, Oral, Q24H   And  [Held by provider] hydroCHLOROthiazide, 12.5 mg, Oral, Q24H  metoprolol succinate XL, 50 mg, Oral, Daily  Naloxegol Oxalate, 25 mg, Oral, QAM  pantoprazole, 40 mg, Oral, Daily  pharmacy consult - MTM, , Not Applicable, Daily  prochlorperazine, 5 mg, Intravenous, Once  rosuvastatin, 40 mg, Oral, Daily  sodium chloride, 10 mL, Intravenous, Q12H  spironolactone, 100 mg, Oral, Daily           Initial cardiac assessment: 59-year-old female with ischemic cardiomyopathy EF 10.8% 9/2024, thrombus present in the left ventricle, moderate to severe mitral valve regurgitation present with acute on chronic systolic heart failure     ASSESSMENT/PLAN:  1.  Acute on chronic systolic heart failure:  Echo 9/2024 with EF 10.8% with LV thrombus  Appears more euvolemic today.  Repeat BNP today.  Switch to PO Bumex 1 mg BID  Monitor renal function and electrolytes closely  Continue GDMT with Jardiance, Toprol, spironolactone, and valsartan  Low-sodium diet and exercise also recommended     2.  Left ventricular mural thrombus:  Continue full anticoagulation with Eliquis indefinitely due to high risk for redevelopment of clot given severe cardiomyopathy.     3.  CAD:  Known history of LAD  with collaterals from cath at [facility]  We have discussed options of revascularization including CABG with the patient but she declines further procedures.  Medical management for now, continue aspirin and statin  EKG unchanged, troponin elevation due to acute on chronic systolic heart failure  No chest pain currently     4.  Abdominal pain/Constipation:  CT of the abdomen pelvis performed 12/2/2024 at  showed uncomplicated sigmoid diverticulosis no pericolonic fat stranding, moderate pancolonic stool burden moderate for constipation possible cystitis.  Appreciate GI assistance    5. NSVT  Nonsustained VT on telemetry.   Not currently a candidate for ICD implantation due to medical noncompliance.  Also unsure of her expected survival will be more than 1 year at this point.  Can readdress as an outpatient if she demonstrates medical compliance.

## 2024-12-07 NOTE — PROGRESS NOTES
"GI Daily Progress Note  Subjective:    Chief Complaint: Follow-up abdominal pain, constipation    Patient resting in bed.  Notes she had 3 BMs overnight.  Still with some abdominal discomfort and pain though improving following BMs.    Objective:    /80 (BP Location: Left arm, Patient Position: Lying)   Pulse 82   Temp 97.9 °F (36.6 °C) (Oral)   Resp 18   Ht 165.1 cm (65\")   Wt 65 kg (143 lb 6.4 oz)   SpO2 95%   BMI 23.86 kg/m²     Physical Exam  Vitals and nursing note reviewed.   Constitutional:       General: She is not in acute distress.     Appearance: Normal appearance. She is normal weight. She is not ill-appearing or toxic-appearing.   Cardiovascular:      Rate and Rhythm: Normal rate and regular rhythm.      Pulses: Normal pulses.      Heart sounds: Normal heart sounds.   Pulmonary:      Effort: Pulmonary effort is normal. No respiratory distress.      Breath sounds: Normal breath sounds.   Abdominal:      General: Abdomen is flat. Bowel sounds are normal. There is no distension.      Palpations: Abdomen is soft. There is no mass.      Tenderness: There is abdominal tenderness. There is no guarding or rebound.      Hernia: No hernia is present.   Neurological:      General: No focal deficit present.      Mental Status: She is alert and oriented to person, place, and time.         Lab  I have personally reviewed most recent cardiac tracings, lab results, and radiology images and interpretations and agree with findings.    Lab Results   Component Value Date    WBC 10.61 12/05/2024    HGB 13.8 12/05/2024    HGB 12.7 12/04/2024    HGB 12.9 12/03/2024    MCV 90.2 12/05/2024     12/05/2024    INR 1.20 (H) 12/04/2024    INR 1.10 12/03/2024    INR 1.20 (H) 11/30/2024    INR 2.8 (H) 09/06/2024    INR 1.28 (H) 12/15/2023       Lab Results   Component Value Date    GLUCOSE 93 12/07/2024    BUN 31 (H) 12/07/2024    CREATININE 1.08 (H) 12/07/2024    EGFRIFNONA >60 06/14/2022    EGFRIFAFRI >60 " 06/14/2022    BCR 28.7 (H) 12/07/2024     (L) 12/07/2024    K 3.7 12/07/2024    CO2 23.0 12/07/2024    CALCIUM 9.1 12/07/2024    ALBUMIN 3.2 (L) 12/04/2024    ALKPHOS 116 12/04/2024    BILITOT 0.6 12/04/2024    ALT 22 12/04/2024    AST 45 (H) 12/04/2024       Assessment:      NSTEMI (non-ST elevated myocardial infarction)    Coronary artery disease of native artery of native heart with stable angina pectoris    Essential hypertension    Acute on chronic HFrEF (heart failure with reduced ejection fraction)    Other mixed anxiety disorders    History of pulmonary embolism    Constipation   Acute on chronic systolic heart failure   CAD and ischemic cardiomyopathy.  Patient declined repeat heart catheterization.      Plan:  >>> Repeat KUB to assess response to bowel regimen  Pending KUB findings, patient will likely need to drink additional liter of GoLytely with additional mineral oil enema.  >>> Movantik 25 mg p.o. daily  >>> MiraLAX 17 g p.o. BID  Goal is for stool to be texture peanut butter; may titrate dose to achieve this.  >>> If no bowel movement every 3 days, recommend use of Dulcolax suppository.    Patient will need to follow-up with PCP as well as GI for further refinement of bowel regimen going forward.  Remains a poor candidate for endoscopic procedure secondary to significantly diminished EF.    Ramon Turner, KAYLA  12/07/24  12:32 EST

## 2024-12-07 NOTE — PROGRESS NOTES
[facility] Medicine Services  PROGRESS NOTE    Patient Name: Jill M Paladino  : 1965  MRN: [id number]    Date of Admission: 12/3/2024  Primary Care Physician: Sigrid Byers PA    Subjective   Subjective     CC:  Constipation, chest pain,     HPI:  Seen resting up in bed awake and alert.  Appears uncomfortable.  States she had a small bowel movement yesterday but still feels very bloated, distended and full.  Denies shortness of breath or chest pain.  No nausea or vomiting.  Poor appetite because she feels so full.  Already bowel prepped, started on Movantik, movie prep, and given enema yesterday.  Pushing p.o. fluids today, adding Gas-X.  Awaiting further GI recommendations. Pt agrees       Objective   Objective     Vital Signs:   Temp:  [97.9 °F (36.6 °C)-98.6 °F (37 °C)] 97.9 °F (36.6 °C)  Heart Rate:  [81-84] 82  Resp:  [18-20] 18  BP: (107-118)/(75-88) 111/80     Physical Exam:  Constitutional: No acute distress, awake, alert.  Resting back in bed.  Appears uncomfortable.   HENT: NCAT, mucous membranes moist  Respiratory: Clear to auscultation bilaterally, respiratory effort normal room air with sats WNL.  Cardiovascular: RRR, no murmurs, rubs, or gallops  Gastrointestinal: Positive bowel sounds, moderately distended, mildly tender but no guarding or rebound.  Musculoskeletal: Trace bilateral ankle edema.  RAO spontaneously.  Psychiatric: flat affect, cooperative and calm   Neurologic: Oriented x 3, nonfocal, speech clear and appropriate.  Follows commands.  Skin: No rashes      Results Reviewed:  LAB RESULTS:      Lab 24  0429 24  0948 24  0156 24  0155 24  2247 24  1823   WBC 10.61  --  9.96  --   --  11.02* 8.02 9.65   HEMOGLOBIN 13.8  --  12.7  --   --  12.9 12.7 13.1   HEMATOCRIT 44.1  --  41.0  --   --  40.9 39.8 41.9   PLATELETS 334  --  300  --   --  319 297 304   NEUTROS ABS 7.21*  --  6.54  --    --  8.32* 5.15 6.44   IMMATURE GRANS (ABS) 0.03  --  0.03  --   --  0.01 0.02 0.02   LYMPHS ABS 2.41  --  2.55  --   --  1.96 2.1 2.58   MONOS ABS 0.65  --  0.58  --   --  0.54 0.55 0.49   EOS ABS 0.24  --  0.21  --   --  0.15 0.15 0.08   MCV 90.2  --  90.9  --   --  88.9 88 88.8   LACTATE, ARTERIAL  --   --   --   --   --   --  1.3  --    PROTIME  --   --  15.4*  --   --  14.8*  --  15.8*   APTT  --   --  24.2*  --   --  28.9  --  29.1*   HEPARIN ANTI-XA  --  0.30 0.12*  --   --   --   --   --    HSTROP T  --   --   --  307* 227* 214*  --   --          Lab 12/07/24  0410 12/05/24  1607 12/04/24  0156 12/04/24 0155 12/03/24 2047 11/30/24  1823   SODIUM 133* 136  --  138 138 138   POTASSIUM 3.7 5.0  --  4.6 5.0 4.3   CHLORIDE 95* 101  --  105 103 101   CO2 23.0 24.0  --  21.0* 22.2 26.5   ANION GAP 15.0 11.0  --  12.0 12.8 10.5   BUN 31* 19  --  15 16 23*   CREATININE 1.08* 0.94  --  0.70 0.69 0.80   EGFR 59.7* 70.5  --  100.4 100.7 85.5   GLUCOSE 93 104*  --  91 103* 88   CALCIUM 9.1 9.4  --  9.0 9.1 9.0   MAGNESIUM  --  2.2  --  2.2  --   --    HEMOGLOBIN A1C  --   --  6.20*  --   --   --          Lab 12/04/24  0155 12/03/24 2047 12/02/24 1827 11/30/24  1823   TOTAL PROTEIN 5.8* 6.4  --  6.3   ALBUMIN 3.2* 3.4*  --  3.7   GLOBULIN 2.6 3.0  --  2.6   ALT (SGPT) 22 22  --  19   AST (SGOT) 45* 58*  --  38*   BILIRUBIN 0.6 0.5  --  1.2   ALK PHOS 116 120*  --  124*   LIPASE  --  17 23  --          Lab 12/07/24  0410 12/04/24  0156 12/04/24  0155 12/03/24 2247 12/03/24 2047 11/30/24  1823   PROBNP 4,682.0*  --   --   --  8,588.0*  --    HSTROP T  --   --  307* 227* 214*  --    PROTIME  --  15.4*  --   --  14.8* 15.8*   INR  --  1.20*  --   --  1.10 1.20*         Lab 12/04/24  0155   CHOLESTEROL 135   LDL CHOL 83   HDL CHOL 40   TRIGLYCERIDES 55             Brief Urine Lab Results  (Last result in the past 365 days)        Color   Clarity   Blood   Leuk Est   Nitrite   Protein   CREAT   Urine HCG        12/04/24  2056 Yellow   Clear   Negative   Negative   Negative   Negative                   Microbiology Results Abnormal       None            XR Abdomen KUB    Result Date: 12/6/2024  XR ABDOMEN KUB Date of Exam: 12/6/2024 6:55 PM EST Indication: Increased abd pain and distention Comparison: December 5, 2024 Findings: There is more prominent distention of what may reflect large bowel in the right quadrant. There remains a moderate stool burden that could relate to constipation. This could be resulting in some obstructive changes. There is a scoliosis. There is some atelectasis in the right basilar area.     Impression: Impression: There is more prominent distention of what may reflect large bowel in the right quadrant. There remains a moderate stool burden that could relate to constipation. Electronically Signed: Bernardino Granados MD  12/6/2024 7:31 PM EST  Workstation ID: [id number]     Results for orders placed during the hospital encounter of 09/25/24    Adult Transthoracic Echo Complete W/ Cont if Necessary Per Protocol    Interpretation Summary    Left ventricular systolic function is severely decreased. Calculated left ventricular EF = 10.8% Left ventricular ejection fraction appears to be less than 20%.    Left ventricular diastolic dysfunction is noted.    There is a thrombus present in the left ventricle.    The left atrial cavity is dilated.    Left atrial volume is mildly increased.    The right atrial cavity is dilated.    Moderate to severe mitral valve regurgitation is present.    Estimated right ventricular systolic pressure from tricuspid regurgitation is normal (<35 mmHg).      Current medications:  Scheduled Meds:apixaban, 5 mg, Oral, Q12H  aspirin, 81 mg, Oral, Daily  bumetanide, 1 mg, Oral, BID  empagliflozin, 10 mg, Oral, Daily  valsartan, 80 mg, Oral, Q24H   And  [Held by provider] hydroCHLOROthiazide, 12.5 mg, Oral, Q24H  metoprolol succinate XL, 50 mg, Oral, Daily  Naloxegol Oxalate, 25 mg, Oral,  QAM  pantoprazole, 40 mg, Oral, Daily  pharmacy consult - MTM, , Not Applicable, Daily  polyethylene glycol, 17 g, Oral, BID  prochlorperazine, 5 mg, Intravenous, Once  rosuvastatin, 40 mg, Oral, Daily  simethicone, 80 mg, Oral, 4x Daily AC & at Bedtime  sodium chloride, 10 mL, Intravenous, Q12H  spironolactone, 100 mg, Oral, Daily      Continuous Infusions:   PRN Meds:.  acetaminophen **OR** acetaminophen **OR** acetaminophen    influenza vaccine    LORazepam    Morphine    ondansetron    sodium chloride    sodium chloride    sodium chloride    Assessment & Plan   Assessment & Plan     Active Hospital Problems    Diagnosis  POA    **NSTEMI (non-ST elevated myocardial infarction) [I21.4]  Yes    Other mixed anxiety disorders [F41.3]  Yes    History of pulmonary embolism [Z86.711]  Yes    Acute on chronic HFrEF (heart failure with reduced ejection fraction) [I50.23]  Yes    Coronary artery disease of native artery of native heart with stable angina pectoris [I25.118]  Yes    Essential hypertension [I10]  Yes      Resolved Hospital Problems   No resolved problems to display.        Brief Hospital Course to date:  Jill M Paladino is a 59 y.o. female  with history of chronic HFrEF, CAD, hypertension, anxiety, recent multiple visits to [facility] for UTI who presented to [place] ED with complaint of constipation, chest pain and feeling full of fluid, admitted with NSTEMI    This patient's problems and plans were partially entered by my partner and updated as appropriate by me 12/07/24.    Assessment/plan:  Pt is new to me today      NSTEMI  Left ventricular mural thrombus   CAD  - Troponin remains acute elevated, trending up,  - EKG unremarkable, status post aspirin, continue statin, beta-blocker  --Cardiology consulted, s/p heparin gtt.now back on eliquis   -- known hx of LAD  with collaterals from cath at [facility] but is declining any further procedures at this time      Acute on chronic HFrEF  - Echo completed 9/25/2024  noting EF 10.8%  - On Bumex 1 mg daily, changed to IV and continue twice daily for now  --Continue Jardiance, strict I/O's, has reportedly history of medical noncompliance  --Cardiology consulted     History of PE  - Eliquis     Hypertension  Dyslipidemia  - Continue metoprolol and Diovan  - Continue statin  -- Stable      Severe anxiety  - States that she requires Wellbutrin namebrand as generic does not work for her  - Reports taking spironolactone when not on Wellbutrin  - Ativan as needed  --Generally stable.  States the Ativan helps well.     Constipation  -- Prior KUB showed large volume colonic stool burden compatible with constipation   -- no results from s/s enema or lactulose enema   -- GI consulted.    --Increased abd pain-- started movantik  Addendum: Seen by GI today.  Checking KUB.  Ending findings may drink additional liter of GoLytely and an additional mineral oil enema.  MiraLAX twice daily.  Goal is for stool to be texture peanut butter; may titrate dose to achieve this.  >>> If no bowel movement every 3 days, recommend use of Dulcolax suppository  -- Follow-up with PCP and GI on discharge.  Notes --remains a poor candidate for endoscopic procedure due to significantly diminished EF.      Expected Discharge Location and Transportation: home once medically stable.  Expected Discharge   Expected Discharge Date: 12/9/2024; Expected Discharge Time:      VTE Prophylaxis:  Pharmacologic & mechanical VTE prophylaxis orders are present.         AM-PAC 6 Clicks Score (PT): 24 (12/05/24 0800)    CODE STATUS:   Code Status and Medical Interventions: CPR (Attempt to Resuscitate); Full Support   Ordered at: 12/04/24 0312     Code Status (Patient has no pulse and is not breathing):    CPR (Attempt to Resuscitate)     Medical Interventions (Patient has pulse or is breathing):    Full Support       Tanja Morales, APRN  12/07/24

## 2024-12-08 LAB
ANION GAP SERPL CALCULATED.3IONS-SCNC: 15 MMOL/L (ref 5–15)
BASOPHILS # BLD AUTO: 0.05 10*3/MM3 (ref 0–0.2)
BASOPHILS NFR BLD AUTO: 0.4 % (ref 0–1.5)
BUN SERPL-MCNC: 31 MG/DL (ref 6–20)
BUN/CREAT SERPL: 27.7 (ref 7–25)
CALCIUM SPEC-SCNC: 8.8 MG/DL (ref 8.6–10.5)
CHLORIDE SERPL-SCNC: 94 MMOL/L (ref 98–107)
CO2 SERPL-SCNC: 24 MMOL/L (ref 22–29)
CREAT SERPL-MCNC: 1.12 MG/DL (ref 0.57–1)
DEPRECATED RDW RBC AUTO: 49 FL (ref 37–54)
EGFRCR SERPLBLD CKD-EPI 2021: 57.1 ML/MIN/1.73
EOSINOPHIL # BLD AUTO: 0.19 10*3/MM3 (ref 0–0.4)
EOSINOPHIL NFR BLD AUTO: 1.5 % (ref 0.3–6.2)
ERYTHROCYTE [DISTWIDTH] IN BLOOD BY AUTOMATED COUNT: 15.5 % (ref 12.3–15.4)
GLUCOSE SERPL-MCNC: 111 MG/DL (ref 65–99)
HCT VFR BLD AUTO: 43.9 % (ref 34–46.6)
HGB BLD-MCNC: 14.4 G/DL (ref 12–15.9)
IMM GRANULOCYTES # BLD AUTO: 0.04 10*3/MM3 (ref 0–0.05)
IMM GRANULOCYTES NFR BLD AUTO: 0.3 % (ref 0–0.5)
LYMPHOCYTES # BLD AUTO: 2.16 10*3/MM3 (ref 0.7–3.1)
LYMPHOCYTES NFR BLD AUTO: 17 % (ref 19.6–45.3)
MCH RBC QN AUTO: 28.3 PG (ref 26.6–33)
MCHC RBC AUTO-ENTMCNC: 32.8 G/DL (ref 31.5–35.7)
MCV RBC AUTO: 86.2 FL (ref 79–97)
MONOCYTES # BLD AUTO: 1.25 10*3/MM3 (ref 0.1–0.9)
MONOCYTES NFR BLD AUTO: 9.8 % (ref 5–12)
NEUTROPHILS NFR BLD AUTO: 71 % (ref 42.7–76)
NEUTROPHILS NFR BLD AUTO: 9.05 10*3/MM3 (ref 1.7–7)
NRBC BLD AUTO-RTO: 0 /100 WBC (ref 0–0.2)
PLATELET # BLD AUTO: 423 10*3/MM3 (ref 140–450)
PMV BLD AUTO: 10 FL (ref 6–12)
POTASSIUM SERPL-SCNC: 4.7 MMOL/L (ref 3.5–5.2)
RBC # BLD AUTO: 5.09 10*6/MM3 (ref 3.77–5.28)
SODIUM SERPL-SCNC: 133 MMOL/L (ref 136–145)
WBC NRBC COR # BLD AUTO: 12.74 10*3/MM3 (ref 3.4–10.8)

## 2024-12-08 PROCEDURE — 25010000002 MORPHINE PER 10 MG: Performed by: NURSE PRACTITIONER

## 2024-12-08 PROCEDURE — 80048 BASIC METABOLIC PNL TOTAL CA: CPT | Performed by: NURSE PRACTITIONER

## 2024-12-08 PROCEDURE — 99232 SBSQ HOSP IP/OBS MODERATE 35: CPT | Performed by: NURSE PRACTITIONER

## 2024-12-08 PROCEDURE — 85025 COMPLETE CBC W/AUTO DIFF WBC: CPT | Performed by: INTERNAL MEDICINE

## 2024-12-08 RX ADMIN — APIXABAN 5 MG: 5 TABLET, FILM COATED ORAL at 20:58

## 2024-12-08 RX ADMIN — MORPHINE SULFATE 2 MG: 2 INJECTION, SOLUTION INTRAMUSCULAR; INTRAVENOUS at 10:55

## 2024-12-08 RX ADMIN — SPIRONOLACTONE 100 MG: 25 TABLET ORAL at 08:41

## 2024-12-08 RX ADMIN — SIMETHICONE 80 MG: 80 TABLET, CHEWABLE ORAL at 20:58

## 2024-12-08 RX ADMIN — VALSARTAN 80 MG: 80 TABLET, FILM COATED ORAL at 08:40

## 2024-12-08 RX ADMIN — Medication 10 ML: at 20:58

## 2024-12-08 RX ADMIN — APIXABAN 5 MG: 5 TABLET, FILM COATED ORAL at 08:40

## 2024-12-08 RX ADMIN — SIMETHICONE 80 MG: 80 TABLET, CHEWABLE ORAL at 06:31

## 2024-12-08 RX ADMIN — NALOXEGOL OXALATE 25 MG: 25 TABLET, FILM COATED ORAL at 06:31

## 2024-12-08 RX ADMIN — PANTOPRAZOLE SODIUM 40 MG: 40 TABLET, DELAYED RELEASE ORAL at 06:31

## 2024-12-08 RX ADMIN — BUMETANIDE 1 MG: 1 TABLET ORAL at 08:40

## 2024-12-08 RX ADMIN — Medication 10 ML: at 08:41

## 2024-12-08 RX ADMIN — ASPIRIN 81 MG: 81 TABLET, COATED ORAL at 08:41

## 2024-12-08 RX ADMIN — SIMETHICONE 80 MG: 80 TABLET, CHEWABLE ORAL at 17:49

## 2024-12-08 RX ADMIN — BUMETANIDE 1 MG: 1 TABLET ORAL at 17:49

## 2024-12-08 RX ADMIN — LORAZEPAM 0.5 MG: 0.5 TABLET ORAL at 09:43

## 2024-12-08 RX ADMIN — POLYETHYLENE GLYCOL 3350 17 G: 17 POWDER, FOR SOLUTION ORAL at 08:41

## 2024-12-08 RX ADMIN — POLYETHYLENE GLYCOL 3350 17 G: 17 POWDER, FOR SOLUTION ORAL at 20:58

## 2024-12-08 RX ADMIN — MORPHINE SULFATE 2 MG: 2 INJECTION, SOLUTION INTRAMUSCULAR; INTRAVENOUS at 20:43

## 2024-12-08 RX ADMIN — MORPHINE SULFATE 2 MG: 2 INJECTION, SOLUTION INTRAMUSCULAR; INTRAVENOUS at 15:43

## 2024-12-08 RX ADMIN — LORAZEPAM 0.5 MG: 0.5 TABLET ORAL at 21:19

## 2024-12-08 RX ADMIN — SIMETHICONE 80 MG: 80 TABLET, CHEWABLE ORAL at 10:55

## 2024-12-08 NOTE — PROGRESS NOTES
"  [facility]  PROGRESS NOTE    Date of Admission: 12/3/2024  Date of Service: 12/08/24    Primary Care Physician: Sigrid Byers PA    Chief Complaint: Acute on chronic systolic heart failure, CAD with elevated troponin  Problem List:   NSTEMI (non-ST elevated myocardial infarction)    Coronary artery disease of native artery of native heart with stable angina pectoris    Essential hypertension    Acute on chronic HFrEF (heart failure with reduced ejection fraction)    Other mixed anxiety disorders    History of pulmonary embolism      Subjective      No events overnight.  Sleeping this morning.  Denies any complaints other than being tired.  Did have 3 bowel movements yesterday on GoLytely      Objective   Vitals: /67 (BP Location: Right arm, Patient Position: Lying)   Pulse 73   Temp 97.8 °F (36.6 °C) (Oral)   Resp 18   Ht 165.1 cm (65\")   Wt 62.1 kg (137 lb)   SpO2 95%   BMI 22.80 kg/m²     Physical Exam:  GENERAL: Alert, cooperative, in no acute distress.   HEENT: Normocephalic, no jugular venous distention  HEART: Regular rhythm, normal rate, and no murmurs, gallops, or rubs.   LUNGS: No wheezing, rales or rhonchi.   NEUROLOGIC: No focal abnormalities involving strength or sensation are noted.   EXTREMITIES: No clubbing, cyanosis, or edema noted.     Results:  Results from last 7 days   Lab Units 12/08/24  0504 12/05/24  0429 12/04/24  0156   WBC 10*3/mm3 12.74* 10.61 9.96   HEMOGLOBIN g/dL 14.4 13.8 12.7   HEMATOCRIT % 43.9 44.1 41.0   PLATELETS 10*3/mm3 423 334 300     Results from last 7 days   Lab Units 12/08/24  0504 12/07/24  0410 12/05/24  1607   SODIUM mmol/L 133* 133* 136   POTASSIUM mmol/L 4.7 3.7 5.0   CHLORIDE mmol/L 94* 95* 101   CO2 mmol/L 24.0 23.0 24.0   BUN mg/dL 31* 31* 19   CREATININE mg/dL 1.12* 1.08* 0.94   GLUCOSE mg/dL 111* 93 104*      Lab Results   Component Value Date    CHOL 135 12/04/2024    TRIG 55 12/04/2024    HDL 40 " 12/04/2024    LDL 83 12/04/2024    AST 45 (H) 12/04/2024    ALT 22 12/04/2024     Results from last 7 days   Lab Units 12/04/24  0156   HEMOGLOBIN A1C % 6.20*     Results from last 7 days   Lab Units 12/04/24  0155   CHOLESTEROL mg/dL 135   TRIGLYCERIDES mg/dL 55   HDL CHOL mg/dL 40   LDL CHOL mg/dL 83         Results from last 7 days   Lab Units 12/04/24  0156 12/03/24  2047   PROTIME Seconds 15.4* 14.8*   INR  1.20* 1.10   APTT seconds 24.2* 28.9     Results from last 7 days   Lab Units 12/04/24  0155 12/03/24  2247 12/03/24  2047   HSTROP T ng/L 307* 227* 214*     Results from last 7 days   Lab Units 12/07/24  0410   PROBNP pg/mL 4,682.0*     No intake or output data in the 24 hours ending 12/08/24 1114    I personally reviewed the patient's EKG/Telemetry data    Radiology Data:   XR Abdomen KUB    Result Date: 12/7/2024  Impression: Persistent mildly distended loop of presumed colon noted extending up into the mid abdomen with likely somewhat decreased stool burden present. There is no evidence of small bowel obstruction or overt pneumoperitoneum. Electronically Signed: Ifeanyi Chavez MD  12/7/2024 4:56 PM EST  Workstation ID: [id number]    XR Abdomen KUB    Result Date: 12/6/2024  Impression: There is more prominent distention of what may reflect large bowel in the right quadrant. There remains a moderate stool burden that could relate to constipation. Electronically Signed: Bernardino Granados MD  12/6/2024 7:31 PM EST  Workstation ID: [id number]     Results for orders placed during the hospital encounter of 09/25/24    Adult Transthoracic Echo Complete W/ Cont if Necessary Per Protocol    Interpretation Summary    Left ventricular systolic function is severely decreased. Calculated left ventricular EF = 10.8% Left ventricular ejection fraction appears to be less than 20%.    Left ventricular diastolic dysfunction is noted.    There is a thrombus present in the left ventricle.    The left atrial cavity is dilated.     Left atrial volume is mildly increased.    The right atrial cavity is dilated.    Moderate to severe mitral valve regurgitation is present.    Estimated right ventricular systolic pressure from tricuspid regurgitation is normal (<35 mmHg).    Current Medications:  apixaban, 5 mg, Oral, Q12H  aspirin, 81 mg, Oral, Daily  bumetanide, 1 mg, Oral, BID  empagliflozin, 10 mg, Oral, Daily  valsartan, 80 mg, Oral, Q24H   And  [Held by provider] hydroCHLOROthiazide, 12.5 mg, Oral, Q24H  metoprolol succinate XL, 50 mg, Oral, Daily  Naloxegol Oxalate, 25 mg, Oral, QAM  pantoprazole, 40 mg, Oral, Daily  pharmacy consult - MTM, , Not Applicable, Daily  polyethylene glycol, 17 g, Oral, BID  prochlorperazine, 5 mg, Intravenous, Once  rosuvastatin, 40 mg, Oral, Daily  simethicone, 80 mg, Oral, 4x Daily AC & at Bedtime  sodium chloride, 10 mL, Intravenous, Q12H  spironolactone, 100 mg, Oral, Daily           Initial cardiac assessment: 59-year-old female with ischemic cardiomyopathy EF 10.8% 9/2024, thrombus present in the left ventricle, moderate to severe mitral valve regurgitation present with acute on chronic systolic heart failure     ASSESSMENT/PLAN:  1.  Acute on chronic systolic heart failure:  Echo 9/2024 with EF 10.8% with LV thrombus  Appears more euvolemic today.  Repeat BNP down around 4000.  Switch to p.o. Bumex 1 mg twice daily on 12/7/2024  Unfortunately, no accurate I's and O's recorded over the past 24 hours.  Weight does continue to come down however.  Continue current Bumex dose.  Will reassess tomorrow.Continue GDMT with Jardiance, Toprol, spironolactone, and valsartan  Low-sodium diet and exercise also recommended     2.  Left ventricular mural thrombus:  Continue full anticoagulation with Eliquis indefinitely due to high risk for redevelopment of clot given severe cardiomyopathy.     3.  CAD:  Known history of LAD  with collaterals from cath at [facility]  We have discussed options of revascularization including  CABG with the patient but she declines further procedures.  Medical management for now, continue aspirin and statin  EKG unchanged, troponin elevation due to acute on chronic systolic heart failure  No chest pain currently     4.  Abdominal pain/Constipation:  CT of the abdomen pelvis performed 12/2/2024 at  showed uncomplicated sigmoid diverticulosis no pericolonic fat stranding, moderate pancolonic stool burden moderate for constipation possible cystitis.  Appreciate GI assistance    5. NSVT  Nonsustained VT on telemetry.  Not currently a candidate for ICD implantation due to medical noncompliance.  Also unsure of her expected survival will be more than 1 year at this point.  Can readdress as an outpatient if she demonstrates medical compliance.

## 2024-12-08 NOTE — PROGRESS NOTES
Obstipation resolved.  Recommend bowel regimen (Movantik, MiraLAX) as outlined in progress note yesterday.    Please call with questions or concerns.

## 2024-12-08 NOTE — PLAN OF CARE
Goal Outcome Evaluation:      Pt is alert and oriented. RA/1 L NC, NSR. VSS. No acute episodes overnight. Bed in lowest position, call light within reach, and no other requests at this time. Care on-going.

## 2024-12-08 NOTE — PROGRESS NOTES
[facility] Medicine Services  PROGRESS NOTE    Patient Name: Jill M Paladino  : 1965  MRN: [id number]    Date of Admission: 12/3/2024  Primary Care Physician: Sigrid Byers PA    Subjective   Subjective     CC:  Constipation, chest pain,     HPI:  Patient seen resting in bed in no acute distress.  Awake and alert.  Appears more comfortable compared to yesterday.  Abdominal pain 6/10 scale.  Denies nausea or vomiting.  Having bowel movements.  Confirmed with nurse.  Patient initially forgot she had had BMs yesterday.  Also 1 this morning.  Encouraged increased p.o. fluids with mildly increased creatinine.  Push pulmonary toileting.  Does not feel ready for discharge home.      Objective   Objective     Vital Signs:   Temp:  [97.6 °F (36.4 °C)-98.6 °F (37 °C)] 98.6 °F (37 °C)  Heart Rate:  [73-81] 78  Resp:  [18] 18  BP: ()/(67-78) 101/71  Flow (L/min) (Oxygen Therapy):  [1] 1     Physical Exam:  Constitutional: No acute distress, awake, alert.  Resting back in bed.  Appears fairly comfortable at this time.  No visitors at bedside.  HENT: NCAT, mucous membranes moist  Respiratory: Clear to auscultation bilaterally, respiratory effort normal with sats WNL.  Cardiovascular: RRR, no murmurs, rubs, or gallops  Gastrointestinal: Positive bowel sounds, moderately distended but softer to palpation today, mildly tender but no guarding or rebound.  Musculoskeletal: Trace bilateral ankle edema.  RAO spontaneously.  Psychiatric: flat affect, cooperative and calm   Neurologic: Oriented x 3, nonfocal, speech clear and appropriate.  Follows commands.  Skin: No rashes      Results Reviewed:  LAB RESULTS:      Lab 24  0504 24  0429 24  0948 24  0156 24  0155 24  2247 247 24  1827   WBC 12.74* 10.61  --  9.96  --   --  11.02* 8.02   HEMOGLOBIN 14.4 13.8  --  12.7  --   --  12.9 12.7   HEMATOCRIT 43.9 44.1  --  41.0  --   --  40.9 39.8    PLATELETS 423 334  --  300  --   --  319 297   NEUTROS ABS 9.05* 7.21*  --  6.54  --   --  8.32* 5.15   IMMATURE GRANS (ABS) 0.04 0.03  --  0.03  --   --  0.01 0.02   LYMPHS ABS 2.16 2.41  --  2.55  --   --  1.96 2.1   MONOS ABS 1.25* 0.65  --  0.58  --   --  0.54 0.55   EOS ABS 0.19 0.24  --  0.21  --   --  0.15 0.15   MCV 86.2 90.2  --  90.9  --   --  88.9 88   LACTATE, ARTERIAL  --   --   --   --   --   --   --  1.3   PROTIME  --   --   --  15.4*  --   --  14.8*  --    APTT  --   --   --  24.2*  --   --  28.9  --    HEPARIN ANTI-XA  --   --  0.30 0.12*  --   --   --   --    HSTROP T  --   --   --   --  307* 227* 214*  --          Lab 12/08/24  0504 12/07/24  0410 12/05/24  1607 12/04/24  0156 12/04/24 0155 12/03/24 2047   SODIUM 133* 133* 136  --  138 138   POTASSIUM 4.7 3.7 5.0  --  4.6 5.0   CHLORIDE 94* 95* 101  --  105 103   CO2 24.0 23.0 24.0  --  21.0* 22.2   ANION GAP 15.0 15.0 11.0  --  12.0 12.8   BUN 31* 31* 19  --  15 16   CREATININE 1.12* 1.08* 0.94  --  0.70 0.69   EGFR 57.1* 59.7* 70.5  --  100.4 100.7   GLUCOSE 111* 93 104*  --  91 103*   CALCIUM 8.8 9.1 9.4  --  9.0 9.1   MAGNESIUM  --   --  2.2  --  2.2  --    HEMOGLOBIN A1C  --   --   --  6.20*  --   --          Lab 12/04/24  0155 12/03/24 2047 12/02/24  1827   TOTAL PROTEIN 5.8* 6.4  --    ALBUMIN 3.2* 3.4*  --    GLOBULIN 2.6 3.0  --    ALT (SGPT) 22 22  --    AST (SGOT) 45* 58*  --    BILIRUBIN 0.6 0.5  --    ALK PHOS 116 120*  --    LIPASE  --  17 23         Lab 12/07/24  0410 12/04/24  0156 12/04/24 0155 12/03/24 2247 12/03/24 2047   PROBNP 4,682.0*  --   --   --  8,588.0*   HSTROP T  --   --  307* 227* 214*   PROTIME  --  15.4*  --   --  14.8*   INR  --  1.20*  --   --  1.10         Lab 12/04/24 0155   CHOLESTEROL 135   LDL CHOL 83   HDL CHOL 40   TRIGLYCERIDES 55             Brief Urine Lab Results  (Last result in the past 365 days)        Color   Clarity   Blood   Leuk Est   Nitrite   Protein   CREAT   Urine HCG        12/04/24  2056 Yellow   Clear   Negative   Negative   Negative   Negative                   Microbiology Results Abnormal       None            XR Abdomen KUB    Result Date: 12/7/2024  XR ABDOMEN KUB Date of Exam: 12/7/2024 3:08 PM EST Indication: Constipation, assessing response to aggressive bowel regimen given ongoing pain Comparison: 1 day prior. Findings: Persistent mildly distended loop of presumed colon noted extending up into the mid abdomen with likely somewhat decreased stool burden present. There is no evidence of small bowel obstruction or overt pneumoperitoneum.     Impression: Impression: Persistent mildly distended loop of presumed colon noted extending up into the mid abdomen with likely somewhat decreased stool burden present. There is no evidence of small bowel obstruction or overt pneumoperitoneum. Electronically Signed: Ifeanyi Chavez MD  12/7/2024 4:56 PM EST  Workstation ID: [id number]    XR Abdomen KUB    Result Date: 12/6/2024  XR ABDOMEN KUB Date of Exam: 12/6/2024 6:55 PM EST Indication: Increased abd pain and distention Comparison: December 5, 2024 Findings: There is more prominent distention of what may reflect large bowel in the right quadrant. There remains a moderate stool burden that could relate to constipation. This could be resulting in some obstructive changes. There is a scoliosis. There is some atelectasis in the right basilar area.     Impression: Impression: There is more prominent distention of what may reflect large bowel in the right quadrant. There remains a moderate stool burden that could relate to constipation. Electronically Signed: Bernardino Granados MD  12/6/2024 7:31 PM EST  Workstation ID: [id number]     Results for orders placed during the hospital encounter of 09/25/24    Adult Transthoracic Echo Complete W/ Cont if Necessary Per Protocol    Interpretation Summary    Left ventricular systolic function is severely decreased. Calculated left ventricular EF = 10.8% Left ventricular  ejection fraction appears to be less than 20%.    Left ventricular diastolic dysfunction is noted.    There is a thrombus present in the left ventricle.    The left atrial cavity is dilated.    Left atrial volume is mildly increased.    The right atrial cavity is dilated.    Moderate to severe mitral valve regurgitation is present.    Estimated right ventricular systolic pressure from tricuspid regurgitation is normal (<35 mmHg).      Current medications:  Scheduled Meds:apixaban, 5 mg, Oral, Q12H  aspirin, 81 mg, Oral, Daily  bumetanide, 1 mg, Oral, BID  empagliflozin, 10 mg, Oral, Daily  valsartan, 80 mg, Oral, Q24H   And  [Held by provider] hydroCHLOROthiazide, 12.5 mg, Oral, Q24H  metoprolol succinate XL, 50 mg, Oral, Daily  Naloxegol Oxalate, 25 mg, Oral, QAM  pantoprazole, 40 mg, Oral, Daily  pharmacy consult - MTM, , Not Applicable, Daily  polyethylene glycol, 17 g, Oral, BID  prochlorperazine, 5 mg, Intravenous, Once  rosuvastatin, 40 mg, Oral, Daily  simethicone, 80 mg, Oral, 4x Daily AC & at Bedtime  sodium chloride, 10 mL, Intravenous, Q12H  spironolactone, 100 mg, Oral, Daily      Continuous Infusions:   PRN Meds:.  acetaminophen **OR** acetaminophen **OR** acetaminophen    influenza vaccine    LORazepam    Morphine    ondansetron    sodium chloride    sodium chloride    sodium chloride    Assessment & Plan   Assessment & Plan     Active Hospital Problems    Diagnosis  POA    **NSTEMI (non-ST elevated myocardial infarction) [I21.4]  Yes    Other mixed anxiety disorders [F41.3]  Yes    History of pulmonary embolism [Z86.711]  Yes    Acute on chronic HFrEF (heart failure with reduced ejection fraction) [I50.23]  Yes    Coronary artery disease of native artery of native heart with stable angina pectoris [I25.118]  Yes    Essential hypertension [I10]  Yes      Resolved Hospital Problems   No resolved problems to display.        Brief Hospital Course to date:  Jill M Paladino is a 59 y.o. female  with history  of chronic HFrEF, CAD, hypertension, anxiety, recent multiple visits to [facility] for UTI who presented to [place] ED with complaint of constipation, chest pain and feeling full of fluid, admitted with NSTEMI    This patient's problems and plans were partially entered by my partner and updated as appropriate by me 12/08/24.    Assessment/plan:     NSTEMI  Left ventricular mural thrombus   CAD  - Troponin remains acute elevated, trending up,  - EKG unremarkable, status post aspirin, continue statin, beta-blocker  --Cardiology consulted, s/p heparin gtt.now back on eliquis   -- known hx of LAD  with collaterals from cath at [facility] but is declining any further procedures at this time   --Stable.  No current complaints of chest pain or shortness of breath.     Acute on chronic HFrEF  - Echo completed 9/25/2024 noting EF 10.8%  - On Bumex 1 mg daily, changed to IV and continue twice daily for now  --Continue Jardiance, strict I/O's, has reportedly history of medical noncompliance  --Cardiology consulted  -- Stable    Mild leukocytosis  --Afebrile.  Push pulmonary toilet.  Check a.m. labs.  Mildly increased creatinine  --Poor p.o. intake.  --Encourage p.o. fluids, recheck a.m. labs     History of PE  - Eliquis     Hypertension  Dyslipidemia  - Continue metoprolol and Diovan  - Continue statin  -- Stable      Severe anxiety  - States that she requires Wellbutrin namebrand as generic does not work for her  - Reports taking spironolactone when not on Wellbutrin  - Ativan as needed  --Generally stable.  States the Ativan helps well.     Constipation/obstipation--resolving  -- Prior KUB showed large volume colonic stool burden compatible with constipation   -- no results from s/s enema or lactulose enema   -- GI consulted.    --started movantik and MiraLAX twice daily yesterday 12/7.  Goal is for stool to be texture peanut butter; may titrate dose to achieve this.  >>> If no bowel movement every 3 days, recommend use of Dulcolax  suppository  -- Follow-up with PCP and GI on discharge.  Notes --remains a poor candidate for endoscopic procedure due to significantly diminished EF.  -- Now having multiple bowel movements since yesterday.  Continue current regimen.      Expected Discharge Location and Transportation: home once medically stable.  Expected Discharge   Expected Discharge Date: 12/9/2024; Expected Discharge Time:      VTE Prophylaxis:  Pharmacologic & mechanical VTE prophylaxis orders are present.         AM-PAC 6 Clicks Score (PT): 24 (12/05/24 0800)    CODE STATUS:   Code Status and Medical Interventions: CPR (Attempt to Resuscitate); Full Support   Ordered at: 12/04/24 0312     Code Status (Patient has no pulse and is not breathing):    CPR (Attempt to Resuscitate)     Medical Interventions (Patient has pulse or is breathing):    Full Support       Tanja Morales, APRN  12/08/24

## 2024-12-09 LAB
ANION GAP SERPL CALCULATED.3IONS-SCNC: 13 MMOL/L (ref 5–15)
BUN SERPL-MCNC: 25 MG/DL (ref 6–20)
BUN/CREAT SERPL: 29.4 (ref 7–25)
CALCIUM SPEC-SCNC: 9.2 MG/DL (ref 8.6–10.5)
CHLORIDE SERPL-SCNC: 94 MMOL/L (ref 98–107)
CO2 SERPL-SCNC: 27 MMOL/L (ref 22–29)
CREAT SERPL-MCNC: 0.85 MG/DL (ref 0.57–1)
DEPRECATED RDW RBC AUTO: 50.1 FL (ref 37–54)
EGFRCR SERPLBLD CKD-EPI 2021: 79.5 ML/MIN/1.73
ERYTHROCYTE [DISTWIDTH] IN BLOOD BY AUTOMATED COUNT: 15.4 % (ref 12.3–15.4)
GLUCOSE SERPL-MCNC: 158 MG/DL (ref 65–99)
HCT VFR BLD AUTO: 46.3 % (ref 34–46.6)
HGB BLD-MCNC: 14.6 G/DL (ref 12–15.9)
MCH RBC QN AUTO: 27.8 PG (ref 26.6–33)
MCHC RBC AUTO-ENTMCNC: 31.5 G/DL (ref 31.5–35.7)
MCV RBC AUTO: 88.2 FL (ref 79–97)
PLATELET # BLD AUTO: 431 10*3/MM3 (ref 140–450)
PMV BLD AUTO: 9.7 FL (ref 6–12)
POTASSIUM SERPL-SCNC: 3.8 MMOL/L (ref 3.5–5.2)
RBC # BLD AUTO: 5.25 10*6/MM3 (ref 3.77–5.28)
SODIUM SERPL-SCNC: 134 MMOL/L (ref 136–145)
WBC NRBC COR # BLD AUTO: 10.94 10*3/MM3 (ref 3.4–10.8)

## 2024-12-09 PROCEDURE — 99232 SBSQ HOSP IP/OBS MODERATE 35: CPT | Performed by: PHYSICIAN ASSISTANT

## 2024-12-09 PROCEDURE — 80048 BASIC METABOLIC PNL TOTAL CA: CPT | Performed by: NURSE PRACTITIONER

## 2024-12-09 PROCEDURE — 25010000002 MORPHINE PER 10 MG: Performed by: NURSE PRACTITIONER

## 2024-12-09 PROCEDURE — 99232 SBSQ HOSP IP/OBS MODERATE 35: CPT | Performed by: NURSE PRACTITIONER

## 2024-12-09 PROCEDURE — 97161 PT EVAL LOW COMPLEX 20 MIN: CPT

## 2024-12-09 PROCEDURE — 85027 COMPLETE CBC AUTOMATED: CPT | Performed by: NURSE PRACTITIONER

## 2024-12-09 RX ORDER — NICOTINE 21 MG/24HR
1 PATCH, TRANSDERMAL 24 HOURS TRANSDERMAL
Status: DISCONTINUED | OUTPATIENT
Start: 2024-12-09 | End: 2024-12-09

## 2024-12-09 RX ORDER — LORAZEPAM 0.5 MG/1
0.5 TABLET ORAL EVERY 12 HOURS PRN
Status: DISCONTINUED | OUTPATIENT
Start: 2024-12-09 | End: 2024-12-09

## 2024-12-09 RX ADMIN — MORPHINE SULFATE 2 MG: 2 INJECTION, SOLUTION INTRAMUSCULAR; INTRAVENOUS at 13:40

## 2024-12-09 RX ADMIN — SIMETHICONE 80 MG: 80 TABLET, CHEWABLE ORAL at 20:19

## 2024-12-09 RX ADMIN — MORPHINE SULFATE 2 MG: 2 INJECTION, SOLUTION INTRAMUSCULAR; INTRAVENOUS at 18:34

## 2024-12-09 RX ADMIN — Medication 10 ML: at 08:47

## 2024-12-09 RX ADMIN — MORPHINE SULFATE 2 MG: 2 INJECTION, SOLUTION INTRAMUSCULAR; INTRAVENOUS at 23:26

## 2024-12-09 RX ADMIN — APIXABAN 5 MG: 5 TABLET, FILM COATED ORAL at 20:19

## 2024-12-09 RX ADMIN — SPIRONOLACTONE 100 MG: 25 TABLET ORAL at 08:45

## 2024-12-09 RX ADMIN — SIMETHICONE 80 MG: 80 TABLET, CHEWABLE ORAL at 11:02

## 2024-12-09 RX ADMIN — Medication 10 ML: at 20:20

## 2024-12-09 RX ADMIN — NALOXEGOL OXALATE 25 MG: 25 TABLET, FILM COATED ORAL at 06:26

## 2024-12-09 RX ADMIN — POLYETHYLENE GLYCOL 3350 17 G: 17 POWDER, FOR SOLUTION ORAL at 08:44

## 2024-12-09 RX ADMIN — PANTOPRAZOLE SODIUM 40 MG: 40 TABLET, DELAYED RELEASE ORAL at 06:26

## 2024-12-09 RX ADMIN — MORPHINE SULFATE 2 MG: 2 INJECTION, SOLUTION INTRAMUSCULAR; INTRAVENOUS at 02:42

## 2024-12-09 RX ADMIN — VALSARTAN 80 MG: 80 TABLET, FILM COATED ORAL at 08:45

## 2024-12-09 RX ADMIN — APIXABAN 5 MG: 5 TABLET, FILM COATED ORAL at 08:44

## 2024-12-09 RX ADMIN — SIMETHICONE 80 MG: 80 TABLET, CHEWABLE ORAL at 06:26

## 2024-12-09 RX ADMIN — ASPIRIN 81 MG: 81 TABLET, COATED ORAL at 08:44

## 2024-12-09 RX ADMIN — POLYETHYLENE GLYCOL 3350 17 G: 17 POWDER, FOR SOLUTION ORAL at 20:19

## 2024-12-09 RX ADMIN — BUMETANIDE 1 MG: 1 TABLET ORAL at 18:20

## 2024-12-09 RX ADMIN — BUMETANIDE 1 MG: 1 TABLET ORAL at 08:44

## 2024-12-09 RX ADMIN — SIMETHICONE 80 MG: 80 TABLET, CHEWABLE ORAL at 18:19

## 2024-12-09 RX ADMIN — MORPHINE SULFATE 2 MG: 2 INJECTION, SOLUTION INTRAMUSCULAR; INTRAVENOUS at 08:45

## 2024-12-09 NOTE — PLAN OF CARE
Goal Outcome Evaluation:      Pt is alert and oriented. RA, NSR. VSS. No acute episodes overnight. Bed in lowest position, call light within reach, and no other requests at this time. Care on-going.

## 2024-12-09 NOTE — THERAPY EVALUATION
Patient Name: Jill M Paladino  : 1965    MRN: [id number]                              Today's Date: 2024       Admit Date: 12/3/2024    Visit Dx:     ICD-10-CM ICD-9-CM   1. NSTEMI (non-ST elevated myocardial infarction)  I21.4 410.70     Patient Active Problem List   Diagnosis    Pneumonia    Pulmonary embolism without acute cor pulmonale    Chronic systolic CHF (congestive heart failure)    Coronary artery disease of native artery of native heart with stable angina pectoris    Essential hypertension    Pneumonia, unspecified organism    Chronic HFrEF (heart failure with reduced ejection fraction)    Shortness of breath    Acute exacerbation of CHF (congestive heart failure)    Acute on chronic HFrEF (heart failure with reduced ejection fraction)    Pneumonia due to Streptococcus    NSTEMI (non-ST elevated myocardial infarction)    Other mixed anxiety disorders    History of pulmonary embolism     Past Medical History:   Diagnosis Date    CHF (congestive heart failure)     Hypertension     Pneumonia     Pulmonary embolism without acute cor pulmonale 12/15/2023     Past Surgical History:   Procedure Laterality Date    HERNIA REPAIR      KNEE SURGERY      RHINOPLASTY        General Information       Row Name 24 1354          Physical Therapy Time and Intention    Document Type evaluation  -NS     Mode of Treatment individual therapy;physical therapy  -NS       Row Name 24 1354          General Information    Patient Profile Reviewed yes  -NS     Prior Level of Function independent:;community mobility;gait;transfer;all household mobility;ADL's  no AD use at baseline  -NS     Existing Precautions/Restrictions fall  -NS     Barriers to Rehab medically complex  -NS       Row Name 24 1354          Living Environment    People in Home alone  motel  -NS       Row Name 24 1354          Home Main Entrance    Number of Stairs, Main Entrance ten  -NS     Stair Railings, Main Entrance  railings on both sides of stairs  -NS       Row Name 12/09/24 1354          Stairs Within Home, Primary    Number of Stairs, Within Home, Primary none  -NS       Row Name 12/09/24 1354          Cognition    Orientation Status (Cognition) oriented x 4  -NS       Row Name 12/09/24 1354          Safety Issues/Impairments Affecting Functional Mobility    Safety Issues Affecting Function (Mobility) safety precautions follow-through/compliance;safety precaution awareness  -NS     Impairments Affecting Function (Mobility) balance;endurance/activity tolerance;strength  -NS               User Key  (r) = Recorded By, (t) = Taken By, (c) = Cosigned By      Initials Name Provider Type    Bernice Graves PT Physical Therapist                   Mobility       Row Name 12/09/24 135          Bed Mobility    Bed Mobility supine-sit  -NS     Supine-Sit [place] (Bed Mobility) modified independence  -NS     Assistive Device (Bed Mobility) bed rails;head of bed elevated  -NS       Row Name 12/09/24 1354          Sit-Stand Transfer    Sit-Stand [place] (Transfers) standby assist  -NS       Row Name 12/09/24 [facility]          Gait/Stairs (Locomotion)    [place] Level (Gait) contact guard  -NS     Patient was able to Ambulate yes  -NS     Distance in Feet (Gait) 90  -NS     Deviations/Abnormal Patterns (Gait) jason decreased;base of support, narrow;gait speed decreased  -NS     Bilateral Gait Deviations heel strike decreased  -NS     Comment, (Gait/Stairs) Patient ambulated at slow pace, demonstrating narrow COLIN and step-through pattern. Very mild unsteadiness noted with turns but progressed from CGA to SBA. Pt requested RW for use upon discharge.  -NS               User Key  (r) = Recorded By, (t) = Taken By, (c) = Cosigned By      Initials Name Provider Type    Bernice Graves PT Physical Therapist                   Obj/Interventions       Row Name 12/09/24 135          Range of Motion Comprehensive    General Range of  Motion bilateral lower extremity ROM WFL  -NS       Row Name 12/09/24 1353          Strength Comprehensive (MMT)    General Manual Muscle Testing (MMT) Assessment lower extremity strength deficits identified  -NS     Comment, General Manual Muscle Testing (MMT) Assessment B ankle DF: 5/5, B knee ext:4+/5, B hip flexion: 4+/5  -NS       Row Name 12/09/24 1354          Balance    Balance Assessment sitting static balance;sitting dynamic balance;standing static balance;standing dynamic balance  -NS     Static Sitting Balance independent  -NS     Dynamic Sitting Balance independent  -NS     Position, Sitting Balance unsupported;sitting edge of bed  -NS     Static Standing Balance standby assist  -NS     Dynamic Standing Balance contact guard  -NS     Position/Device Used, Standing Balance unsupported  -NS       Row Name 12/09/24 1353          Sensory Assessment (Somatosensory)    Sensory Assessment (Somatosensory) LE sensation intact  -NS               User Key  (r) = Recorded By, (t) = Taken By, (c) = Cosigned By      Initials Name Provider Type    Bernice Graves, PT Physical Therapist                   Goals/Plan       Row Name 12/09/24 1352          Bed Mobility Goal 1 (PT)    Activity/Assistive Device (Bed Mobility Goal 1, PT) supine to sit  -NS     [place] Level/Cues Needed (Bed Mobility Goal 1, PT) independent  -NS     Time Frame (Bed Mobility Goal 1, PT) short term goal (STG);1 week  -NS       Row Name 12/09/24 135          Transfer Goal 1 (PT)    Activity/Assistive Device (Transfer Goal 1, PT) sit-to-stand/stand-to-sit;bed-to-chair/chair-to-bed  -NS     [place] Level/Cues Needed (Transfer Goal 1, PT) independent  -NS     Time Frame (Transfer Goal 1, PT) long term goal (LTG);10 days  -NS       Row Name 12/09/24 1350          Gait Training Goal 1 (PT)    Activity/Assistive Device (Gait Training Goal 1, PT) gait (walking locomotion)  -NS     [place] Level (Gait Training Goal 1, PT) modified  independence  -NS     Distance (Gait Training Goal 1, PT) 300  -NS     Time Frame (Gait Training Goal 1, PT) long term goal (LTG);10 days  -NS       Row Name 12/09/24 135          Stairs Goal 1 (PT)    Activity/Assistive Device (Stairs Goal 1, PT) ascending stairs;descending stairs  -NS     [place] Level/Cues Needed (Stairs Goal 1, PT) standby assist  -NS     Number of Stairs (Stairs Goal 1, PT) 10  -NS     Time Frame (Stairs Goal 1, PT) long term goal (LTG);10 days  -NS       Row Name 12/09/24 135          Therapy Assessment/Plan (PT)    Planned Therapy Interventions (PT) balance training;bed mobility training;gait training;home exercise program;neuromuscular re-education;stair training;strengthening;patient/family education;transfer training  -NS               User Key  (r) = Recorded By, (t) = Taken By, (c) = Cosigned By      Initials Name Provider Type    Bernice Graves, PT Physical Therapist                   Clinical Impression       Row Name 12/09/24 [facility]          Pain    Pretreatment Pain Rating 0/10 - no pain  -NS     Posttreatment Pain Rating 0/10 - no pain  -NS     Pain Management Interventions premedicated for activity  -NS       Row Name 12/09/24 [facility]          Plan of Care Review    Plan of Care Reviewed With patient  -NS     Progress no change  -NS     Outcome Evaluation Patient presents with mild deficits in strength, balance, and activity tolerance compared to baseline. Inpatient PT services warranted to support return to independence. Recommend home with assist from friends and HHPT as needed at discharge.  -NS       Row Name 12/09/24 [facility]          Therapy Assessment/Plan (PT)    Patient/Family Therapy Goals Statement (PT) to get stronger  -NS     Rehab Potential (PT) good  -NS     Criteria for Skilled Interventions Met (PT) yes;meets criteria;skilled treatment is necessary  -NS     Therapy Frequency (PT) daily  -NS     Predicted Duration of Therapy Intervention (PT) 10 days  -NS       [facility]  Name 12/09/24 1354          Vital Signs    Pre Systolic BP Rehab 124  -NS     Pre Treatment Diastolic BP 69  -NS     Post Systolic BP Rehab 111  -NS     Post Treatment Diastolic BP 78  -NS     Pretreatment Heart Rate (beats/min) 56  -NS     Posttreatment Heart Rate (beats/min) 85  -NS     Pre Patient Position Supine  -NS     Intra Patient Position Standing  -NS     Post Patient Position Sitting  -NS       Row Name 12/09/24 1354          Positioning and Restraints    Pre-Treatment Position in bed  -NS     Post Treatment Position chair  -NS     In Chair notified nsg;reclined;call light within reach;encouraged to call for assist;legs elevated  RN deferred exit alarm  -NS               User Key  (r) = Recorded By, (t) = Taken By, (c) = Cosigned By      Initials Name Provider Type    Bernice Graves PT Physical Therapist                   Outcome Measures       Row Name 12/09/24 [facility] 12/09/24 0844       How much help from another person do you currently need...    Turning from your back to your side while in flat bed without using bedrails? 4  -NS 4  -DA    Moving from lying on back to sitting on the side of a flat bed without bedrails? 4  -NS 4  -DA    Moving to and from a bed to a chair (including a wheelchair)? 3  -NS 4  -DA    Standing up from a chair using your arms (e.g., wheelchair, bedside chair)? 4  -NS 4  -DA    Climbing 3-5 steps with a railing? 3  -NS 3  -DA    To walk in hospital room? 3  -NS 4  -DA    AM-PAC 6 Clicks Score (PT) 21  -NS 23  -DA    Highest Level of Mobility Goal 6 --> Walk 10 steps or more  -NS 7 --> Walk 25 feet or more  -DA      Row Name 12/09/24 1354          Functional Assessment    Outcome Measure Options AM-PAC 6 Clicks Basic Mobility (PT)  -NS               User Key  (r) = Recorded By, (t) = Taken By, (c) = Cosigned By      Initials Name Provider Type    Bernice Graves, ELYSIA Physical Therapist    Kory Del Rio RN Registered Nurse                                 Physical  Therapy Education       Title: PT OT SLP Therapies (In Progress)       Topic: Physical Therapy (In Progress)       Point: Mobility training (Done)       Learning Progress Summary            Patient Acceptance, E, VU by NS at 12/9/2024 1513    Acceptance, E,TB, VU by BT at 12/4/2024 1743                      Point: Home exercise program (Not Started)       Learner Progress:  Not documented in this visit.              Point: Body mechanics (Done)       Learning Progress Summary            Patient Acceptance, E, VU by NS at 12/9/2024 1513                      Point: Precautions (Done)       Learning Progress Summary            Patient Acceptance, E, VU by NS at 12/9/2024 1513    Acceptance, E,TB, VU by BT at 12/4/2024 1743                                      User Key       Initials Effective Dates Name Provider Type Discipline    BT 12/30/20 -  Susan Asher RN Registered Nurse Nurse    NS 06/16/21 -  Bernice Patten, PT Physical Therapist PT                  PT Recommendation and Plan  Planned Therapy Interventions (PT): balance training, bed mobility training, gait training, home exercise program, neuromuscular re-education, stair training, strengthening, patient/family education, transfer training  Progress: no change  Outcome Evaluation: Patient presents with mild deficits in strength, balance, and activity tolerance compared to baseline. Inpatient PT services warranted to support return to independence. Recommend home with assist from friends and HHPT as needed at discharge.     Time Calculation:   PT Evaluation Complexity  History, PT Evaluation Complexity: 3 or more personal factors and/or comorbidities  Examination of Body Systems (PT Eval Complexity): total of 4 or more elements  Clinical Presentation (PT Evaluation Complexity): stable  Clinical Decision Making (PT Evaluation Complexity): low complexity  Overall Complexity (PT Evaluation Complexity): low complexity     PT Charges       Row Name 12/09/24  1354             Time Calculation    Start Time 1354  -NS      PT Received On 12/09/24  -NS      PT Goal Re-Cert Due Date 12/19/24  -NS         Untimed Charges    PT Eval/Re-eval Minutes 46  -NS         Total Minutes    Untimed Charges Total Minutes 46  -NS       Total Minutes 46  -NS                User Key  (r) = Recorded By, (t) = Taken By, (c) = Cosigned By      Initials Name Provider Type    Bernice Graves, PT Physical Therapist                  Therapy Charges for Today       Code Description Service Date Service Provider Modifiers Qty    [id number] HC PT EVAL LOW COMPLEXITY 4 12/9/2024 Bernice Patten, PT GP 1            PT G-Codes  Outcome Measure Options: AM-PAC 6 Clicks Basic Mobility (PT)  AM-PAC 6 Clicks Score (PT): 21  PT Discharge Summary  Anticipated Discharge Disposition (PT): home with assist, home with home health    Bernice Patten PT  12/9/2024

## 2024-12-09 NOTE — PLAN OF CARE
Goal Outcome Evaluation:  Plan of Care Reviewed With: patient        Progress: no change  Outcome Evaluation: Patient presents with mild deficits in strength, balance, and activity tolerance compared to baseline. Inpatient PT services warranted to support return to independence. Recommend home with assist from friends and HHPT as needed at discharge.    Anticipated Discharge Disposition (PT): home with assist, home with home health

## 2024-12-09 NOTE — CASE MANAGEMENT/SOCIAL WORK
Discharge Planning Assessment  [facility]     Patient Name: Jill M Paladino  MRN: [id number]  Today's Date: 12/9/2024    Admit Date: 12/3/2024    Plan: Home with Home Health       Discharge Plan       Row Name 12/09/24 0740       Plan    Plan Home with Home Health    Patient/Family in Agreement with Plan yes    Plan Comments I have met with Ms. Paladino at the bedside today to discuss discharge planning.  She intends to return to her home ([employer] [address]).  PT has recommended a rolling walker for home use and home health PT services.   I have delivered RW to the bedside.  Ms. Paladino is agreeable to submission of referral to [facility].  I have confirmed with Berto that she will review.  CM will cont to follow plan of care and assist with discharge planning as appropriate.    Final Discharge Disposition Code 06 - home with home health care                  Continued Care and Services - Admitted Since 12/3/2024       Home Medical Care       Service Provider Request Status Services Address Phone Fax Patient Preferred    [facility] -- [address] [facility] [id number] [phone] --                  Expected Discharge Date and Time       Expected Discharge Date Expected Discharge Time    Dec 10, 2024           Whit Lowe RN

## 2024-12-09 NOTE — PROGRESS NOTES
"  [facility]  PROGRESS NOTE    Date of Admission: 12/3/2024  Date of Service: 12/09/24    Primary Care Physician: Sigrid Byers PA    Chief Complaint: Acute on chronic systolic heart failure, CAD with elevated troponin   Problem List:   NSTEMI (non-ST elevated myocardial infarction)    Coronary artery disease of native artery of native heart with stable angina pectoris    Essential hypertension    Acute on chronic HFrEF (heart failure with reduced ejection fraction)    Other mixed anxiety disorders    History of pulmonary embolism      Subjective      Denies cardiac complaints, has had multiple BMs and feels better      Objective   Vitals: /82   Pulse 77   Temp 97.7 °F (36.5 °C) (Oral)   Resp 17   Ht 165.1 cm (65\")   Wt 61 kg (134 lb 6.4 oz)   SpO2 95%   BMI 22.37 kg/m²     Physical Exam:  GENERAL: Alert, cooperative, in no acute distress.   HEENT: Normocephalic, no jugular venous distention  HEART: Regular rhythm, normal rate, and no murmurs, gallops, or rubs.   LUNGS: No wheezing, rales or rhonchi.  NEUROLOGIC: No focal abnormalities involving strength or sensation are noted.   EXTREMITIES: No clubbing, cyanosis, or edema noted.     Results:  Results from last 7 days   Lab Units 12/09/24  0422 12/08/24  0504 12/05/24  0429   WBC 10*3/mm3 10.94* 12.74* 10.61   HEMOGLOBIN g/dL 14.6 14.4 13.8   HEMATOCRIT % 46.3 43.9 44.1   PLATELETS 10*3/mm3 431 423 334     Results from last 7 days   Lab Units 12/09/24  0422 12/08/24  0504 12/07/24  0410   SODIUM mmol/L 134* 133* 133*   POTASSIUM mmol/L 3.8 4.7 3.7   CHLORIDE mmol/L 94* 94* 95*   CO2 mmol/L 27.0 24.0 23.0   BUN mg/dL 25* 31* 31*   CREATININE mg/dL 0.85 1.12* 1.08*   GLUCOSE mg/dL 158* 111* 93      Lab Results   Component Value Date    CHOL 135 12/04/2024    TRIG 55 12/04/2024    HDL 40 12/04/2024    LDL 83 12/04/2024    AST 45 (H) 12/04/2024    ALT 22 12/04/2024     Results from last 7 days   Lab Units " 12/04/24  0156   HEMOGLOBIN A1C % 6.20*     Results from last 7 days   Lab Units 12/04/24  0155   CHOLESTEROL mg/dL 135   TRIGLYCERIDES mg/dL 55   HDL CHOL mg/dL 40   LDL CHOL mg/dL 83             Results from last 7 days   Lab Units 12/04/24  0156 12/03/24  2047   PROTIME Seconds 15.4* 14.8*   INR  1.20* 1.10   APTT seconds 24.2* 28.9     Results from last 7 days   Lab Units 12/04/24  0155 12/03/24  2247 12/03/24 2047   HSTROP T ng/L 307* 227* 214*     Results from last 7 days   Lab Units 12/07/24  0410   PROBNP pg/mL 4,682.0*       Intake/Output Summary (Last 24 hours) at 12/9/2024 1351  Last data filed at 12/9/2024 0854  Gross per 24 hour   Intake 200 ml   Output 600 ml   Net -400 ml     I personally reviewed the patient's EKG/Telemetry data    Radiology Data:   XR Abdomen KUB    Result Date: 12/7/2024  Impression: Persistent mildly distended loop of presumed colon noted extending up into the mid abdomen with likely somewhat decreased stool burden present. There is no evidence of small bowel obstruction or overt pneumoperitoneum. Electronically Signed: Ifeanyi Chavez MD  12/7/2024 4:56 PM EST  Workstation ID: [id number]       Current Medications:  apixaban, 5 mg, Oral, Q12H  aspirin, 81 mg, Oral, Daily  bumetanide, 1 mg, Oral, BID  empagliflozin, 10 mg, Oral, Daily  valsartan, 80 mg, Oral, Q24H   And  [Held by provider] hydroCHLOROthiazide, 12.5 mg, Oral, Q24H  metoprolol succinate XL, 50 mg, Oral, Daily  Naloxegol Oxalate, 25 mg, Oral, QAM  pantoprazole, 40 mg, Oral, Daily  pharmacy consult - MTM, , Not Applicable, Daily  polyethylene glycol, 17 g, Oral, BID  prochlorperazine, 5 mg, Intravenous, Once  rosuvastatin, 40 mg, Oral, Daily  simethicone, 80 mg, Oral, 4x Daily AC & at Bedtime  sodium chloride, 10 mL, Intravenous, Q12H  spironolactone, 100 mg, Oral, Daily           Initial cardiac assessment: 59-year-old female with ischemic cardiomyopathy EF 10.8% 9/2024, thrombus present in the left ventricle, moderate  to severe mitral valve regurgitation present with acute on chronic systolic heart failure     ASSESSMENT/PLAN:  1.  Acute on chronic systolic heart failure:  Echo 9/2024 with EF 10.8% with LV thrombus  Repeat BNP down around 4000.  Switched to p.o. Bumex 1 mg twice daily on 12/7/2024  Continue GDMT with Jardiance, Toprol, spironolactone, and valsartan  Low-sodium diet and exercise also recommended  Appears euvolemic      2.  Left ventricular mural thrombus:  Continue full anticoagulation with Eliquis indefinitely due to high risk for redevelopment of clot given severe cardiomyopathy.     3.  CAD:  Known history of LAD  with collaterals from cath at [facility]  We have discussed options of revascularization including CABG with the patient but she declines further procedures.  Medical management for now, continue aspirin and statin  EKG unchanged, troponin elevation due to acute on chronic systolic heart failure  No chest pain currently     4.  Abdominal pain/Constipation:  CT of the abdomen pelvis performed 12/2/2024 at  showed uncomplicated sigmoid diverticulosis no pericolonic fat stranding, moderate pancolonic stool burden moderate for constipation possible cystitis.  Appreciate GI assistance     5. NSVT  Nonsustained VT on telemetry.  Not currently a candidate for ICD implantation due to medical noncompliance.  Also unsure of her expected survival will be more than 1 year at this point.  Can readdress as an outpatient if she demonstrates medical compliance.       Electronically signed by Brenda Crawford PA-C, 12/09/24, 2:03 PM EST.

## 2024-12-10 ENCOUNTER — READMISSION MANAGEMENT (OUTPATIENT)
Dept: CALL CENTER | Facility: HOSPITAL | Age: 59
End: 2024-12-10
Payer: MEDICAID

## 2024-12-10 VITALS
DIASTOLIC BLOOD PRESSURE: 75 MMHG | RESPIRATION RATE: 18 BRPM | HEART RATE: 85 BPM | SYSTOLIC BLOOD PRESSURE: 104 MMHG | WEIGHT: 134.4 LBS | OXYGEN SATURATION: 97 % | TEMPERATURE: 98.4 F | HEIGHT: 65 IN | BODY MASS INDEX: 22.39 KG/M2

## 2024-12-10 PROBLEM — K59.00 CONSTIPATION: Status: RESOLVED | Noted: 2024-12-10 | Resolved: 2024-12-10

## 2024-12-10 PROBLEM — I21.A1 TYPE 2 MYOCARDIAL INFARCTION: Status: ACTIVE | Noted: 2024-12-10

## 2024-12-10 PROBLEM — K59.00 CONSTIPATION: Status: ACTIVE | Noted: 2024-12-10

## 2024-12-10 PROBLEM — I50.9 HEART FAILURE, UNSPECIFIED: Status: ACTIVE | Noted: 2024-12-10

## 2024-12-10 PROCEDURE — 99239 HOSP IP/OBS DSCHRG MGMT >30: CPT | Performed by: NURSE PRACTITIONER

## 2024-12-10 PROCEDURE — 25010000002 MORPHINE PER 10 MG: Performed by: NURSE PRACTITIONER

## 2024-12-10 RX ORDER — HYDROXYZINE HYDROCHLORIDE 10 MG/1
10 TABLET, FILM COATED ORAL 3 TIMES DAILY PRN
Status: DISCONTINUED | OUTPATIENT
Start: 2024-12-10 | End: 2024-12-10 | Stop reason: HOSPADM

## 2024-12-10 RX ORDER — POLYETHYLENE GLYCOL 3350 17 G/17G
17 POWDER, FOR SOLUTION ORAL DAILY
Qty: 510 G | Refills: 0 | Status: SHIPPED | OUTPATIENT
Start: 2024-12-10

## 2024-12-10 RX ORDER — BUMETANIDE 1 MG/1
1 TABLET ORAL DAILY
Qty: 30 TABLET | Refills: 2 | Status: SHIPPED | OUTPATIENT
Start: 2024-12-10

## 2024-12-10 RX ORDER — METOPROLOL SUCCINATE 50 MG/1
50 TABLET, EXTENDED RELEASE ORAL DAILY
Qty: 30 TABLET | Refills: 5 | Status: SHIPPED | OUTPATIENT
Start: 2024-12-10

## 2024-12-10 RX ADMIN — Medication 10 ML: at 08:24

## 2024-12-10 RX ADMIN — ACETAMINOPHEN 650 MG: 325 TABLET ORAL at 14:54

## 2024-12-10 RX ADMIN — SPIRONOLACTONE 100 MG: 25 TABLET ORAL at 08:27

## 2024-12-10 RX ADMIN — NALOXEGOL OXALATE 25 MG: 25 TABLET, FILM COATED ORAL at 06:31

## 2024-12-10 RX ADMIN — APIXABAN 5 MG: 5 TABLET, FILM COATED ORAL at 08:23

## 2024-12-10 RX ADMIN — HYDROXYZINE HYDROCHLORIDE 10 MG: 10 TABLET, FILM COATED ORAL at 08:23

## 2024-12-10 RX ADMIN — VALSARTAN 80 MG: 80 TABLET, FILM COATED ORAL at 08:23

## 2024-12-10 RX ADMIN — ASPIRIN 81 MG: 81 TABLET, COATED ORAL at 08:23

## 2024-12-10 RX ADMIN — SIMETHICONE 80 MG: 80 TABLET, CHEWABLE ORAL at 06:30

## 2024-12-10 RX ADMIN — SIMETHICONE 80 MG: 80 TABLET, CHEWABLE ORAL at 12:27

## 2024-12-10 RX ADMIN — BUMETANIDE 1 MG: 1 TABLET ORAL at 08:23

## 2024-12-10 RX ADMIN — PANTOPRAZOLE SODIUM 40 MG: 40 TABLET, DELAYED RELEASE ORAL at 06:30

## 2024-12-10 RX ADMIN — MORPHINE SULFATE 2 MG: 2 INJECTION, SOLUTION INTRAMUSCULAR; INTRAVENOUS at 06:33

## 2024-12-10 NOTE — DISCHARGE SUMMARY
[facility] Medicine Services  DISCHARGE SUMMARY    Patient Name: Jill M Paladino  : 1965  MRN: [id number]    Date of Admission: 12/3/2024  8:40 PM  Date of Discharge:  12/10/2024  Primary Care Physician: Sigrid Byers PA    Consults       Date and Time Order Name Status Description    2024  5:47 PM Inpatient Gastroenterology Consult Completed     2024  3:12 AM Inpatient Cardiology Consult Completed             Hospital Course     Presenting Problem: Chest pain, fluid, constipation    Active Hospital Problems    Diagnosis  POA    **NSTEMI (non-ST elevated myocardial infarction) [I21.4]  Yes    Heart failure, unspecified [I50.9]  Yes    Type 2 myocardial infarction [I21.A1]  Yes    Other mixed anxiety disorders [F41.3]  Yes    History of pulmonary embolism [Z86.711]  Yes    Acute on chronic HFrEF (heart failure with reduced ejection fraction) [I50.23]  Yes    Chronic HFrEF (heart failure with reduced ejection fraction) [I50.22]  Yes    Coronary artery disease of native artery of native heart with stable angina pectoris [I25.118]  Yes    Essential hypertension [I10]  Yes      Resolved Hospital Problems    Diagnosis Date Resolved POA    Constipation [K59.00] 12/10/2024 Yes          Hospital Course:  Jill M Paladino is a 59 y.o. female with history of chronic HFrEF, CAD, hypertension, anxiety, recent multiple visits to [facility] for UTI who presented to [place] ED with complaint of constipation, chest pain and feeling full of fluid. Patient found to have elevated troponin and transferred to [facility] with concern for NSTEMI. Patient reportedly out of home medications for a month and has reported h/o non-compliance. Cardiology consulted.      NSTEMI  Left ventricular mural thrombus   CAD  - Troponin acutely elevated on admission, 214>>307, likely Type II MI secondary to HFrEF  - EKG unremarkable, status post aspirin  -- Continue ASA, statin, metoprolol  --Cardiology consulted, s/p  heparin gtt, now back on Eliquis for LV thrombus  -- Known hx of 100 % LAD occlusion with collaterals from outside cath at [facility] in September 2024. Patient declining any further procedures, including CABG, at this time and goal is medical optimization.  --Follow up with cardiology outpatient. Referral to cardiac rehab placed.      Acute on chronic HFrEF  - Echo completed 9/25/2024 with EF 10.8%, LV thrombus (anticoagulated on Eliquis)  - S/p IV Bumex, now on Bumex 1 mg PO daily  --Cardiology has prescribed GDMT for HFrEF; however, patient states she will refuse many of the medications, including Jardiance, because she does not believe the echo she had in September was actually imaging of her heart.     Mild leukocytosis  --Afebrile, likely reactive, trending down     Mildly increased creatinine  --Encourage p.o. fluids, improved to 0.85.     History of PE  - On Eliquis     Hypertension  Dyslipidemia  - Continue metoprolol, valsartan-hctz, sprionolactone   - Continue statin     Severe anxiety  - States that she requires Wellbutrin name brand as generic does not work for her     Constipation/obstipation--resolved  -- Prior KUB showed large volume colonic stool burden compatible with constipation   -- s/p s/s enema and lactulose enema without results  -- GI consulted, pt started on Movantik and MiraLAX twice daily yesterday 12/7, with resolution of constipation  --Continue daily Movantik and Miralax  --Follow up with GI outpatient      Discharge Follow Up Recommendations for outpatient labs/diagnostics:  --Follow up with PCP one week  --Ambulatory referral to [facility]  --Ambulatory referral to Cardiac Rehab  --Ambulatory referral to GI    Day of Discharge     HPI:   Patient resting in bed.  Says she is refusing GDMT because the echo that was done at [facility] was not done on her heart.  She states that tech refused to turn the screen around to show her that it was in fact her heart that was being scanned.  Patient  states she has been Barbara forces inside her that can heal her.  She states that her heart was as bad as she has been told and she would not be able to ride a bike and do things that she does.  Patient appears to have flight of ideas.      Vital Signs:   Temp:  [96.9 °F (36.1 °C)-98.4 °F (36.9 °C)] 98.4 °F (36.9 °C)  Heart Rate:  [74-92] 85  Resp:  [16-18] 18  BP: (103-123)/(66-89) 104/75      Physical Exam:  Constitutional: No acute distress, awake, alert  HENT: NCAT, mucous membranes moist  Respiratory: Clear to auscultation bilaterally, respiratory effort normal, room air  Cardiovascular: RRR, no murmurs, rubs, or gallops  Gastrointestinal: Positive bowel sounds, soft, nontender, nondistended  Musculoskeletal: No bilateral ankle edema  Psychiatric: Flight of ideas, paranoia, cooperative  Neurologic: Oriented x 3, moves all extremities, speech clear  Skin: No rashes      Pertinent  and/or Most Recent Results     LAB RESULTS:      Lab 12/09/24  0422 12/08/24  0504 12/05/24  0429 12/04/24  0948 12/04/24  0156 12/03/24  2047   WBC 10.94* 12.74* 10.61  --  9.96 11.02*   HEMOGLOBIN 14.6 14.4 13.8  --  12.7 12.9   HEMATOCRIT 46.3 43.9 44.1  --  41.0 40.9   PLATELETS 431 423 334  --  300 319   NEUTROS ABS  --  9.05* 7.21*  --  6.54 8.32*   IMMATURE GRANS (ABS)  --  0.04 0.03  --  0.03 0.01   LYMPHS ABS  --  2.16 2.41  --  2.55 1.96   MONOS ABS  --  1.25* 0.65  --  0.58 0.54   EOS ABS  --  0.19 0.24  --  0.21 0.15   MCV 88.2 86.2 90.2  --  90.9 88.9   PROTIME  --   --   --   --  15.4* 14.8*   APTT  --   --   --   --  24.2* 28.9   HEPARIN ANTI-XA  --   --   --  0.30 0.12*  --          Lab 12/09/24  0422 12/08/24  0504 12/07/24  0410 12/05/24  1607 12/04/24  0156 12/04/24  0155   SODIUM 134* 133* 133* 136  --  138   POTASSIUM 3.8 4.7 3.7 5.0  --  4.6   CHLORIDE 94* 94* 95* 101  --  105   CO2 27.0 24.0 23.0 24.0  --  21.0*   ANION GAP 13.0 15.0 15.0 11.0  --  12.0   BUN 25* 31* 31* 19  --  15   CREATININE 0.85 1.12* 1.08*  0.94  --  0.70   EGFR 79.5 57.1* 59.7* 70.5  --  100.4   GLUCOSE 158* 111* 93 104*  --  91   CALCIUM 9.2 8.8 9.1 9.4  --  9.0   MAGNESIUM  --   --   --  2.2  --  2.2   HEMOGLOBIN A1C  --   --   --   --  6.20*  --          Lab 12/04/24  0155 12/03/24 2047   TOTAL PROTEIN 5.8* 6.4   ALBUMIN 3.2* 3.4*   GLOBULIN 2.6 3.0   ALT (SGPT) 22 22   AST (SGOT) 45* 58*   BILIRUBIN 0.6 0.5   ALK PHOS 116 120*   LIPASE  --  17         Lab 12/07/24  0410 12/04/24  0156 12/04/24  0155 12/03/24 2247 12/03/24 2047   PROBNP 4,682.0*  --   --   --  8,588.0*   HSTROP T  --   --  307* 227* 214*   PROTIME  --  15.4*  --   --  14.8*   INR  --  1.20*  --   --  1.10         Lab 12/04/24  0155   CHOLESTEROL 135   LDL CHOL 83   HDL CHOL 40   TRIGLYCERIDES 55             Brief Urine Lab Results  (Last result in the past 365 days)        Color   Clarity   Blood   Leuk Est   Nitrite   Protein   CREAT   Urine HCG        12/04/24 2056 Yellow   Clear   Negative   Negative   Negative   Negative                 Microbiology Results (last 10 days)       ** No results found for the last 240 hours. **            XR Abdomen KUB    Result Date: 12/7/2024  XR ABDOMEN KUB Date of Exam: 12/7/2024 3:08 PM EST Indication: Constipation, assessing response to aggressive bowel regimen given ongoing pain Comparison: 1 day prior. Findings: Persistent mildly distended loop of presumed colon noted extending up into the mid abdomen with likely somewhat decreased stool burden present. There is no evidence of small bowel obstruction or overt pneumoperitoneum.     Impression: Persistent mildly distended loop of presumed colon noted extending up into the mid abdomen with likely somewhat decreased stool burden present. There is no evidence of small bowel obstruction or overt pneumoperitoneum. Electronically Signed: Ifeanyi Chavez MD  12/7/2024 4:56 PM EST  Workstation ID: [id number]    XR Abdomen KUB    Result Date: 12/6/2024  XR ABDOMEN KUB Date of Exam: 12/6/2024 6:55 PM  EST Indication: Increased abd pain and distention Comparison: December 5, 2024 Findings: There is more prominent distention of what may reflect large bowel in the right quadrant. There remains a moderate stool burden that could relate to constipation. This could be resulting in some obstructive changes. There is a scoliosis. There is some atelectasis in the right basilar area.     Impression: There is more prominent distention of what may reflect large bowel in the right quadrant. There remains a moderate stool burden that could relate to constipation. Electronically Signed: Bernardino Granados MD  12/6/2024 7:31 PM EST  Workstation ID: [id number]    XR Abdomen KUB    Result Date: 12/5/2024  XR ABDOMEN KUB Date of Exam: 12/5/2024 9:13 AM EST Indication: abd paIN Comparison: 3/12/2024 CT Findings: No evidence of bowel obstruction. Large volume colonic stool burden. Surgical changes of prior hernia repair overlies the pelvis. Lumbar scoliosis and spondylosis. No acute fracture.     Impression: Large volume colonic stool burden compatible with constipation. No obstruction. Electronically Signed: Gerard Brunson MD  12/5/2024 9:50 AM EST  Workstation ID: [id number]    XR Chest 1 View    Result Date: 12/3/2024  XR CHEST 1 VW Date of Exam: 12/3/2024 8:49 PM EST Indication: Chest Pain Triage Protocol Comparison: Chest radiograph 11/23/2024 Findings: Mediastinum: Cardiomediastinal silhouette appears unchanged and enlarged Lungs: Mild left basal opacities likely representing atelectasis. No focal consolidation appreciated. Pleura: No pleural effusion or pneumothorax. Bones and soft tissues: No acute osseous abnormality.     Impression: No acute intrathoracic finding Electronically Signed: Tae Kang  12/3/2024 9:10 PM EST  Workstation ID: [id number]    CT Abdomen Pelvis With Contrast    Result Date: 12/2/2024  CLINICAL INDICATION: diffuse abd pain, primarily lower TECHNIQUE: Imaging of the abdomen and pelvis was performed, from lung  bases through pubic symphysis, using spiral technique, following administration of IV contrast, Omnipaque 300, 100 mL. Delayed (excretory phase) images were performed through the kidneys. Reformatted images in the coronal and sagittal planes were generated from the axial data set to facilitate diagnostic accuracy. Total DLP (Dose-Length Product): 770.97 mGy.cm. Please note: The reported value represents the total of one or more individual components during the CT acquisition on this date and at this time, and as such, the same value may appear in more than one CT report depending on the interpreting/reporting physicians. COMPARISON: July 29, 2022 CT abdomen and pelvis. FINDINGS: Lung Bases: Cardiomegaly. Left basilar atelectasis. Liver/Gallbladder/Biliary system: The liver demonstrates homogeneous enhancement. Normal Gallbladder. No intra- or extra-hepatic biliary ductal dilatation. Spleen: The spleen enhances homogeneously. Pancreas: The pancreas enhances homogeneously. Adrenals: The adrenals are morphologically unremarkable. Kidneys: Right interpolar region scar. Multiple left kidney simple cysts with the largest measuring 16 mm. Symmetric nephrogram and excretion. No renal or ureteral calculi. No hydronephrosis. Bowel/Mesentery: The small bowel loops are not dilated. Significant sigmoid diverticulosis. No pericolonic fluid collections or inflammation. Moderate pancolonic stool burden.Fecalization of small bowel contents could be secondary to delayed bowel transit. The appendix is not clearly visualized. Vessels/Lymph Nodes: No abdominal aortic aneurysm. Mild atherosclerosis of the infrarenal aorta without aneurysm. No lymphadenopathy within the abdomen or pelvis. Fluid Survey: No free fluid in the abdomen. No free fluid in the pelvis. Pelvis: No pelvic masses. Mildly thickened urinary bladder with perivesical fat stranding.   Body Wall: Surgical clips from prior right inguinal hernia repair. Small fat-containing  left inguinal hernia. Dependent edema of the back soft tissue. Bones: Grade 1 retrolisthesis of L2 on L3. No aggressive osseous lesion. Similar dextrocurvature of the lumbar spine.    Uncomplicated sigmoid diverticulosis. No pericolonic fat stranding or fluid collections. Moderate pancolonic stool burden. Moderate for constipation. Fecalization of small bowel contents could be secondary to delayed bowel transit. Mildly thickened urinary bladder with perivesical fat stranding suggest cystitis. No acute abnormality within the abdomen and pelvis. CRITICAL RESULT:   No. COMMUNICATION: Per this written report  contrast By electronically signing this report, I, the attending physician, attest that I have personally reviewed the images/data for the above examination(s) and agree with the final edited report. Drafted by John Stovall MD on 12/2/2024 7:00 PM Final report signed by Annette Dexter MD on 12/2/2024 7:24 PM    CT Lower Extremity Left With Contrast    Result Date: 11/30/2024  CT LOWER EXTREMITY LEFT W CONTRAST Date of Exam: 11/30/2024 6:20 PM EST Indication: severe LLE pain, proximal fibular area, decreased peripheral pulses. Comparison: None available. Technique: Axial CT images were obtained of the left lower extremity after the uneventful intravenous administration of 95 mL Isovue-300.  Reconstructed coronal and sagittal images were also obtained. Automated exposure control and iterative construction  methods were used. Findings: No significant edema or abnormal enhancement is seen throughout the subcutaneous soft tissues. No abnormal fluid collections are observed. There is no edema within the deep muscular soft tissues. No abnormal fluid collections are seen in the deep muscular soft tissues. There is no hemorrhage or hematoma. There is no gas production throughout the soft tissues. The intermuscular fat planes are well-maintained within the deep muscular soft tissues. There are no signs of  compartment syndrome. There is no fracture or dislocation. The cortical margins are grossly intact. No osseous erosion or destruction is seen. There is no periostitis. There is no evidence for osteomyelitis.     Impression: 1.Negative CT of the left lower extremity. There is no significant edema or abnormal enhancement throughout the subcutaneous soft tissues. No abnormal fluid collections are seen. There is no gas production throughout the soft tissues. 2.No evidence for compartment syndrome. 3.No evidence for acute osseous abnormality. There is no evidence for osteomyelitis. Electronically Signed: Truman Rodríguez MD  11/30/2024 6:57 PM EST  Workstation ID: [id number]     Results for orders placed during the hospital encounter of 01/26/24    Duplex Venous Lower Extremity - BILATERAL    Interpretation Summary    Normal bilateral lower extremity venous duplex scan.    2.5 x 1.7 x 0.75cm avascular complex cystic structure noted in right popliteal fossa. Possible Baker's cyst, although suggestive of hematoma.      Results for orders placed during the hospital encounter of 01/26/24    Duplex Venous Lower Extremity - BILATERAL    Interpretation Summary    Normal bilateral lower extremity venous duplex scan.    2.5 x 1.7 x 0.75cm avascular complex cystic structure noted in right popliteal fossa. Possible Baker's cyst, although suggestive of hematoma.      Results for orders placed during the hospital encounter of 09/25/24    Adult Transthoracic Echo Complete W/ Cont if Necessary Per Protocol    Interpretation Summary    Left ventricular systolic function is severely decreased. Calculated left ventricular EF = 10.8% Left ventricular ejection fraction appears to be less than 20%.    Left ventricular diastolic dysfunction is noted.    There is a thrombus present in the left ventricle.    The left atrial cavity is dilated.    Left atrial volume is mildly increased.    The right atrial cavity is dilated.    Moderate to severe  "mitral valve regurgitation is present.    Estimated right ventricular systolic pressure from tricuspid regurgitation is normal (<35 mmHg).      Plan for Follow-up of Pending Labs/Results:     Discharge Details        Discharge Medications        New Medications        Instructions Start Date   Naloxegol Oxalate 25 MG tablet  Commonly known as: MOVANTIK   Take 1 tablet by mouth Every Morning.   Start Date: December 11, 2024     polyethylene glycol 17 GM/SCOOP powder  Commonly known as: MIRALAX   17 g, Oral, Daily             Changes to Medications        Instructions Start Date   pantoprazole 40 MG EC tablet  Commonly known as: PROTONIX  What changed: additional instructions   40 mg, Oral, Daily      spironolactone 100 MG tablet  Commonly known as: ALDACTONE  What changed: when to take this   100 mg, Oral, Daily      valsartan-hydrochlorothiazide 80-12.5 MG per tablet  Commonly known as: DIOVAN-HCT  What changed: how much to take   0.5 tablets, Oral, Daily             Continue These Medications        Instructions Start Date   apixaban 5 MG tablet tablet  Commonly known as: ELIQUIS   5 mg, Oral, 2 Times Daily      Aspirin Low Dose 81 MG EC tablet  Generic drug: aspirin   81 mg, Oral, Daily      bumetanide 1 MG tablet  Commonly known as: BUMEX   1 mg, Oral, Daily      buPROPion  MG 24 hr tablet  Commonly known as: WELLBUTRIN XL   300 mg, Oral, Daily      empagliflozin 10 MG tablet tablet  Commonly known as: JARDIANCE   10 mg, Oral, Daily   Start Date: December 11, 2024     metoprolol succinate XL 50 MG 24 hr tablet  Commonly known as: TOPROL-XL   50 mg, Oral, Daily             Stop These Medications      potassium chloride 10 MEQ CR tablet              Allergies   Allergen Reactions    Lisinopril Other (See Comments)     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    Other Nausea And Vomiting     Pt states \"all opiods make me throw up instantly\"    Percocet " [Oxycodone-Acetaminophen] GI Intolerance    Sulfa Antibiotics Rash         Discharge Disposition:  Home-Health Care Svc    Diet:  Hospital:  Diet Order   Procedures    Diet: Cardiac; Healthy Heart (2-3 Na+); Fluid Consistency: Thin (IDDSI 0)       Diet Instructions       Diet: Cardiac Diets; Healthy Heart (2-3 Na+); Thin (IDDSI 0)      Discharge Diet: Cardiac Diets    Cardiac Diet: Healthy Heart (2-3 Na+)    Fluid Consistency: Thin (IDDSI 0)             Activity:      Restrictions or Other Recommendations:         CODE STATUS:    Code Status and Medical Interventions: CPR (Attempt to Resuscitate); Full Support   Ordered at: 12/04/24 0312     Code Status (Patient has no pulse and is not breathing):    CPR (Attempt to Resuscitate)     Medical Interventions (Patient has pulse or is breathing):    Full Support       No future appointments.    Additional Instructions for the Follow-ups that You Need to Schedule       Ambulatory Referral to Home Health   As directed      Face to Face Visit Date: 12/9/2024   Follow-up provider for Plan of Care?: I treated the patient in an acute care facility and will not continue treatment after discharge.   Follow-up provider: MIA GOFF [[id number]]   Reason/Clinical Findings: Acute on chronic systolic heart failure, CAD with elevated troponin, NSTEMI (non-ST elevated myocardial infarction)   Coronary artery disease of native artery of native heart with stable angina pectoris   Essential hypertension   Describe mobility limitations that make leaving home difficult: impaired functional mobility, weakness   Nursing/Therapeutic Services Requested: Skilled Nursing Physical Therapy   Skilled nursing orders: Medication education Cardiopulmonary assessments   PT orders: Therapeutic exercise Gait Training Transfer training Strengthening Home safety assessment   Weight Bearing Status: As Tolerated   Frequency: 1 Week 1        Discharge Follow-up with PCP   As directed       Currently  Documented PCP:    Sigrid Byers PA    PCP Phone Number:    [phone]     Follow Up Details: One week                  Nayana Ceja, APRN  12/10/24      Time Spent on Discharge:  I spent  45  minutes on this discharge activity which included: face-to-face encounter with the patient, reviewing the data in the system, coordination of the care with the nursing staff as well as consultants, documentation, and entering orders.

## 2024-12-10 NOTE — PAYOR COMM NOTE
"Paladino, Jill M (59 y.o. Female)       Date of Birth   1965    Social Security Number       Address   [address]  [address] [id number]    Home Phone   [phone]    MRN   [id number]       Hinduism   Non-Jehovah's witness    Marital Status                               Admission Date   12/3/24    Admission Type   Emergency    Admitting Provider   Tanvi Riojas DO    Attending Provider   Tanvi Riojas DO    Department, Room/Bed   [facility] 6A, N614/1       Discharge Date       Discharge Disposition   Home-Health Care [facility]    Discharge Destination                                 Attending Provider: Tanvi Riojas DO    Allergies: Lisinopril, Other, Percocet [Oxycodone-acetaminophen], Sulfa Antibiotics    Isolation: None   Infection: None   Code Status: CPR    Ht: 165.1 cm (65\")   Wt: 61 kg (134 lb 6.4 oz)    Admission Cmt: None   Principal Problem: NSTEMI (non-ST elevated myocardial infarction) [I21.4]                   Active Insurance as of 12/3/2024       Primary Coverage       Payor Plan Insurance Group Employer/Plan Group    [employer]HE[employer] MEDICAID [employer] MEDICAID [id number]       Payor Plan Address Payor Plan Phone Number Payor Plan Fax Number Effective Dates    [address] [address] [phone]  9/1/2023 - None Entered    [facility] [address]         Subscriber Name Subscriber Birth Date Member ID       PALADINO,JILL M 1965 [id number]                     Emergency Contacts        (Rel.) Home Phone Work Phone Mobile Phone    VIJAY ALDANA (Friend) [phone] -- [phone]              Discharge Order (From admission, onward)       Start     Ordered    12/10/24 1410  Discharge patient  Once        Expected Discharge Date: 12/10/24   Discharge Disposition: Home-Health Care Svc   Physician of Record for Attribution - Please select from Treatment Team: TANVI RIOJAS [[id number]]   Review needed by " CMO to determine Physician of Record: No      Question Answer Comment   Physician of Record for Attribution - Please select from Treatment Team MARJORIE RIOJAS    Review needed by CMO to determine Physician of Record No        12/10/24 9426

## 2024-12-10 NOTE — PLAN OF CARE
Goal Outcome Evaluation:      Pt is alert and oriented. RA, NSR. VSS. No acute episodes overnight. Pt requesting PO ativan. NP notified, and PO melatonin ordered. PO melatonin offered, but pt refused. Bed in lowest position, call light within reach, and no other requests at this time. Care on-going.

## 2024-12-11 LAB
QT INTERVAL: 386 MS
QT INTERVAL: 404 MS
QTC INTERVAL: 474 MS
QTC INTERVAL: 488 MS

## 2024-12-11 NOTE — PROGRESS NOTES
"Enter Query Response Below      Query Response: Heart failure due to hypertension, ischemic cardiomyopathy and valvular disease              If applicable, please update the problem list.       Patient: Paladino, Jill M        : 1965  Account: [id number]           Admit Date: 12/3/2024        How to Respond to this query:       a. Click New Note     b. Answer query within the yellow box.                c. Update the Problem List, if applicable.      If you have any questions about this query contact me at: [email or web address]    Dr. Sargent,     60 y/o female with history of CHF, HTN, and pulmonary embolism admitted on 2024 with Type II MI and acute on chronic HFrEF.    Per cardiology progress noted dated 2024 \"ischemic cardiomyopathy EF 10.8% 2024, thrombus present in the left ventricle, moderate to severe mitral valve regurgitation present with acute on chronic systolic heart failure\".     ProBNP resulted 12/3/2024: 8855.    Treatment included IV/PO Bumex, Jardiance, Cozaar, Metoprolol, Daily weights, and monitoring.    Please clarify the etiology of the patient’s heart failure:    -Heart failure due to hypertension  -Heart failure due to ischemic cardiomyopathy  -Heart failure due to valvular disease  -Heart failure due to hypertension, ischemic cardiomyopathy and valvular disease  -Other- specify__________    By submitting this query, we are merely seeking further clarification of documentation to accurately reflect all conditions that you are monitoring, evaluating, treating or that extend the hospitalization or utilize additional resources of care. Please utilize your independent clinical judgment when addressing the question(s) above.     This query and your response, once completed, will be entered into the legal medical record.    Sincerely,  Chika David  Clinical Documentation Integrity Program   "

## 2024-12-11 NOTE — OUTREACH NOTE
Prep Survey      Flowsheet Row Responses   Religious facility patient discharged from? [facility]   Is LACE score < 7 ? No   Eligibility Readm Mgmt   Discharge diagnosis NSTEMI   Does the patient have one of the following disease processes/diagnoses(primary or secondary)? Acute MI (STEMI,NSTEMI)   Does the patient have Home health ordered? Yes   What is the Home health agency?  [facility]   Is there a DME ordered? No   Prep survey completed? Yes            LINDA A - Registered Nurse

## 2024-12-12 NOTE — PROGRESS NOTES
Enter Query Response Below      Query Response: no my  Electronically signed by Tanvi Navarro, , 24, 3:30 PM EST.               If applicable, please update the problem list.       Patient: Paladino, Jill M        : 1965  Account: [id number]           Admit Date: 12/3/2024        How to Respond to this query:       a. Click New Note     b. Answer query within the yellow box.                c. Update the Problem List, if applicable.      If you have any questions about this query contact me at: [email or web address]    Dr. Navarro,     58 y/o female with history of CHF, HTN, and pulmonary embolism admitted on 2024 with Type II MI and acute on chronic HFrEF.  Patient has no history of CKD.      Creatinine resulted   12/3/2024: 0.69  2024: 0.70  2024: 0.94  2024: 1.08  2024: 1.12  2024: 0.85    Treatment includes IV/PO Bumex and monitoring labs.    Please clarify if the patient treated/monitored for one or more of the following:     -Acute kidney injury (MY)  -Acute kidney injury (MY) due to other (specify) __________  -Abnormal laboratory findings only  -Other- specify: __________    MY is defined by KDIGO as any of the following:    Increase in creatinine > 0.3 mg/dl within 48 hours or less; or    Increase in creatinine level to > 1.5x baseline (historical or measure), which is known or presumed to have occurred within the prior 7 days; or    Urine volume <0.5 ml/kg/h for 6 hours.  Reference: www. KIDGO.org    By submitting this query, we are merely seeking further clarification of documentation to accurately reflect all conditions that you are monitoring, evaluating, treating or that extend the hospitalization or utilize additional resources of care. Please utilize your independent clinical judgment when addressing the question(s) above.     This query and your response, once completed, will be entered into the legal medical  record.    Sincerely,  Chika David  Clinical Documentation Integrity Program

## 2024-12-17 ENCOUNTER — READMISSION MANAGEMENT (OUTPATIENT)
Dept: CALL CENTER | Facility: HOSPITAL | Age: 59
End: 2024-12-17
Payer: MEDICAID

## 2024-12-17 NOTE — OUTREACH NOTE
AMI Week 1 Survey      Flowsheet Row Responses   [facility] patient discharged from? [facility]   Does the patient have one of the following disease processes/diagnoses(primary or secondary)? Acute MI (STEMI,NSTEMI)   Week 1 attempt successful? No   Unsuccessful attempts Attempt 1   Revoke Phone number issues  [main number listed is to [facility], pt is no longer staying there per staff, contacted friend who reports pt does not have phone but will call occasionally when she has phone access..]            HARSHAD GATES - Registered Nurse

## 2024-12-21 ENCOUNTER — HOSPITAL ENCOUNTER (EMERGENCY)
Facility: HOSPITAL | Age: 59
Discharge: HOME | End: 2024-12-21
Attending: EMERGENCY MEDICINE
Payer: MEDICAID

## 2024-12-21 ENCOUNTER — HOSPITAL ENCOUNTER (EMERGENCY)
Facility: HOSPITAL | Age: 59
Discharge: HOME | End: 2024-12-22
Attending: STUDENT IN AN ORGANIZED HEALTH CARE EDUCATION/TRAINING PROGRAM
Payer: MEDICAID

## 2024-12-21 ENCOUNTER — APPOINTMENT (OUTPATIENT)
Facility: HOSPITAL | Age: 59
End: 2024-12-21
Payer: MEDICAID

## 2024-12-21 VITALS
TEMPERATURE: 97.8 F | SYSTOLIC BLOOD PRESSURE: 167 MMHG | BODY MASS INDEX: 21.66 KG/M2 | OXYGEN SATURATION: 99 % | WEIGHT: 130 LBS | HEIGHT: 65 IN | HEART RATE: 106 BPM | RESPIRATION RATE: 22 BRPM | DIASTOLIC BLOOD PRESSURE: 111 MMHG

## 2024-12-21 DIAGNOSIS — M17.12 OSTEOARTHRITIS OF LEFT KNEE, UNSPECIFIED OSTEOARTHRITIS TYPE: Primary | ICD-10-CM

## 2024-12-21 DIAGNOSIS — M23.92 INTERNAL DERANGEMENT OF LEFT KNEE: Primary | ICD-10-CM

## 2024-12-21 DIAGNOSIS — M65.90 SYNOVITIS: ICD-10-CM

## 2024-12-21 LAB
ALBUMIN SERPL-MCNC: 3.8 G/DL (ref 3.5–5.2)
ALBUMIN/GLOB SERPL: 1.3 G/DL
ALP SERPL-CCNC: 114 U/L (ref 39–117)
ALT SERPL W P-5'-P-CCNC: 31 U/L (ref 1–33)
ANION GAP SERPL CALCULATED.3IONS-SCNC: 10.4 MMOL/L (ref 5–15)
AST SERPL-CCNC: 38 U/L (ref 1–32)
BASOPHILS # BLD AUTO: 0.04 10*3/MM3 (ref 0–0.2)
BASOPHILS NFR BLD AUTO: 0.3 % (ref 0–1.5)
BILIRUB SERPL-MCNC: 0.2 MG/DL (ref 0–1.2)
BUN SERPL-MCNC: 22 MG/DL (ref 6–20)
BUN/CREAT SERPL: 27.2 (ref 7–25)
CALCIUM SPEC-SCNC: 9.3 MG/DL (ref 8.6–10.5)
CHLORIDE SERPL-SCNC: 104 MMOL/L (ref 98–107)
CO2 SERPL-SCNC: 26.6 MMOL/L (ref 22–29)
CREAT SERPL-MCNC: 0.81 MG/DL (ref 0.57–1)
DEPRECATED RDW RBC AUTO: 49.8 FL (ref 37–54)
EGFRCR SERPLBLD CKD-EPI 2021: 84.3 ML/MIN/1.73
EOSINOPHIL # BLD AUTO: 0.15 10*3/MM3 (ref 0–0.4)
EOSINOPHIL NFR BLD AUTO: 1.3 % (ref 0.3–6.2)
ERYTHROCYTE [DISTWIDTH] IN BLOOD BY AUTOMATED COUNT: 15.2 % (ref 12.3–15.4)
GLOBULIN UR ELPH-MCNC: 3 GM/DL
GLUCOSE SERPL-MCNC: 101 MG/DL (ref 65–99)
HCT VFR BLD AUTO: 43.5 % (ref 34–46.6)
HGB BLD-MCNC: 13.5 G/DL (ref 12–15.9)
IMM GRANULOCYTES # BLD AUTO: 0.02 10*3/MM3 (ref 0–0.05)
IMM GRANULOCYTES NFR BLD AUTO: 0.2 % (ref 0–0.5)
LYMPHOCYTES # BLD AUTO: 2.07 10*3/MM3 (ref 0.7–3.1)
LYMPHOCYTES NFR BLD AUTO: 17.9 % (ref 19.6–45.3)
MCH RBC QN AUTO: 27.4 PG (ref 26.6–33)
MCHC RBC AUTO-ENTMCNC: 31 G/DL (ref 31.5–35.7)
MCV RBC AUTO: 88.4 FL (ref 79–97)
MONOCYTES # BLD AUTO: 0.54 10*3/MM3 (ref 0.1–0.9)
MONOCYTES NFR BLD AUTO: 4.7 % (ref 5–12)
NEUTROPHILS NFR BLD AUTO: 75.6 % (ref 42.7–76)
NEUTROPHILS NFR BLD AUTO: 8.77 10*3/MM3 (ref 1.7–7)
PLATELET # BLD AUTO: 386 10*3/MM3 (ref 140–450)
PMV BLD AUTO: 9.9 FL (ref 6–12)
POTASSIUM SERPL-SCNC: 4.2 MMOL/L (ref 3.5–5.2)
PROT SERPL-MCNC: 6.8 G/DL (ref 6–8.5)
RBC # BLD AUTO: 4.92 10*6/MM3 (ref 3.77–5.28)
SODIUM SERPL-SCNC: 141 MMOL/L (ref 136–145)
WBC NRBC COR # BLD AUTO: 11.59 10*3/MM3 (ref 3.4–10.8)

## 2024-12-21 PROCEDURE — 25010000002 KETOROLAC TROMETHAMINE PER 15 MG

## 2024-12-21 PROCEDURE — 99284 EMERGENCY DEPT VISIT MOD MDM: CPT

## 2024-12-21 PROCEDURE — 96374 THER/PROPH/DIAG INJ IV PUSH: CPT

## 2024-12-21 PROCEDURE — 99283 EMERGENCY DEPT VISIT LOW MDM: CPT

## 2024-12-21 PROCEDURE — 73721 MRI JNT OF LWR EXTRE W/O DYE: CPT

## 2024-12-21 PROCEDURE — 63710000001 DEXAMETHASONE PER 0.25 MG: Performed by: STUDENT IN AN ORGANIZED HEALTH CARE EDUCATION/TRAINING PROGRAM

## 2024-12-21 PROCEDURE — 85025 COMPLETE CBC W/AUTO DIFF WBC: CPT | Performed by: EMERGENCY MEDICINE

## 2024-12-21 PROCEDURE — 96376 TX/PRO/DX INJ SAME DRUG ADON: CPT

## 2024-12-21 PROCEDURE — 96372 THER/PROPH/DIAG INJ SC/IM: CPT

## 2024-12-21 PROCEDURE — 80053 COMPREHEN METABOLIC PANEL: CPT | Performed by: EMERGENCY MEDICINE

## 2024-12-21 PROCEDURE — 63710000001 ONDANSETRON ODT 4 MG TABLET DISPERSIBLE: Performed by: EMERGENCY MEDICINE

## 2024-12-21 PROCEDURE — 96375 TX/PRO/DX INJ NEW DRUG ADDON: CPT

## 2024-12-21 PROCEDURE — 25010000002 KETOROLAC TROMETHAMINE PER 15 MG: Performed by: EMERGENCY MEDICINE

## 2024-12-21 RX ORDER — ONDANSETRON 4 MG/1
4 TABLET, ORALLY DISINTEGRATING ORAL ONCE
Status: COMPLETED | OUTPATIENT
Start: 2024-12-21 | End: 2024-12-21

## 2024-12-21 RX ORDER — KETOROLAC TROMETHAMINE 15 MG/ML
15 INJECTION, SOLUTION INTRAMUSCULAR; INTRAVENOUS ONCE
Status: COMPLETED | OUTPATIENT
Start: 2024-12-21 | End: 2024-12-21

## 2024-12-21 RX ORDER — PREDNISONE 20 MG/1
40 TABLET ORAL DAILY
Qty: 10 TABLET | Refills: 0 | Status: SHIPPED | OUTPATIENT
Start: 2024-12-21 | End: 2024-12-26

## 2024-12-21 RX ORDER — MELOXICAM 15 MG/1
15 TABLET ORAL DAILY
Qty: 10 TABLET | Refills: 0 | Status: SHIPPED | OUTPATIENT
Start: 2024-12-21 | End: 2024-12-31

## 2024-12-21 RX ORDER — KETOROLAC TROMETHAMINE 30 MG/ML
30 INJECTION, SOLUTION INTRAMUSCULAR; INTRAVENOUS ONCE
Status: COMPLETED | OUTPATIENT
Start: 2024-12-21 | End: 2024-12-21

## 2024-12-21 RX ORDER — HYDROXYZINE HYDROCHLORIDE 25 MG/1
25 TABLET, FILM COATED ORAL ONCE
Status: COMPLETED | OUTPATIENT
Start: 2024-12-22 | End: 2024-12-21

## 2024-12-21 RX ORDER — IBUPROFEN 800 MG/1
800 TABLET, FILM COATED ORAL EVERY 6 HOURS PRN
Qty: 24 TABLET | Refills: 0 | Status: SHIPPED | OUTPATIENT
Start: 2024-12-21 | End: 2025-01-04

## 2024-12-21 RX ADMIN — DEXAMETHASONE 6 MG: 2 TABLET ORAL at 23:57

## 2024-12-21 RX ADMIN — KETOROLAC TROMETHAMINE 15 MG: 15 INJECTION, SOLUTION INTRAMUSCULAR; INTRAVENOUS at 16:27

## 2024-12-21 RX ADMIN — HYDROXYZINE HYDROCHLORIDE 25 MG: 25 TABLET ORAL at 23:57

## 2024-12-21 RX ADMIN — ONDANSETRON 4 MG: 4 TABLET, ORALLY DISINTEGRATING ORAL at 19:09

## 2024-12-21 RX ADMIN — Medication 17.7 MG: at 17:42

## 2024-12-21 RX ADMIN — KETOROLAC TROMETHAMINE 30 MG: 30 INJECTION, SOLUTION INTRAMUSCULAR; INTRAVENOUS at 23:57

## 2024-12-21 RX ADMIN — KETOROLAC TROMETHAMINE 15 MG: 15 INJECTION, SOLUTION INTRAMUSCULAR; INTRAVENOUS at 18:46

## 2024-12-22 VITALS
TEMPERATURE: 97.6 F | OXYGEN SATURATION: 97 % | WEIGHT: 130.07 LBS | HEIGHT: 65 IN | BODY MASS INDEX: 21.67 KG/M2 | HEART RATE: 95 BPM | SYSTOLIC BLOOD PRESSURE: 149 MMHG | RESPIRATION RATE: 22 BRPM | DIASTOLIC BLOOD PRESSURE: 97 MMHG

## 2024-12-22 NOTE — FSED PROVIDER NOTE
Subjective  History of Present Illness:    Patient presents to the ER due to left knee pain for the second time this date.  Patient reports she has been experiencing ongoing left knee pain.  Patient was seen earlier this date for same complaint and received an MRI at that time which showed significant arthritis in the left knee as well as synovitis.  No acute fractures or signs of infection at that time.  Patient denies any new falls or injuries to the area since her discharge from this facility earlier this date.  Patient was adamantly opposed that time to any pain management treatment outside of Dilaudid and is requesting Dilaudid again at this time.  Patient is also requesting Ativan for anxiety.  She has been seen at a multitude of ERs for same complaint with negative CTs and ultrasounds.  No notable erythema to the left knee no crepitus on flexion or range of motion.  Pulses present distal to the area of pain.      Nurses Notes reviewed and agree, including vitals, allergies, social history and prior medical history.     REVIEW OF SYSTEMS: All systems reviewed and not pertinent unless noted.  Review of Systems   Musculoskeletal:  Positive for arthralgias and myalgias.   Psychiatric/Behavioral:  The patient is nervous/anxious.        Past Medical History:   Diagnosis Date    CHF (congestive heart failure)     Hypertension     Pneumonia     Pulmonary embolism without acute cor pulmonale 12/15/2023       Allergies:    Lisinopril, Other, Percocet [oxycodone-acetaminophen], and Sulfa antibiotics      Past Surgical History:   Procedure Laterality Date    HERNIA REPAIR      KNEE SURGERY      RHINOPLASTY           Social History     Socioeconomic History    Marital status:    Tobacco Use    Smoking status: Former     Types: Cigarettes     Passive exposure: Never    Smokeless tobacco: Never   Vaping Use    Vaping status: Never Used   Substance and Sexual Activity    Alcohol use: No    Drug use: No    Sexual  "activity: Defer         Family History   Problem Relation Age of Onset    No Known Problems Mother     No Known Problems Father        Objective  Physical Exam:  /96   Pulse 98   Temp 97.6 °F (36.4 °C) (Oral)   Resp 22   Ht 165.1 cm (65\")   Wt 59 kg (130 lb 1.1 oz)   SpO2 98%   BMI 21.64 kg/m²      Physical Exam  Constitutional:       General: She is in acute distress.      Appearance: Normal appearance.   HENT:      Head: Normocephalic and atraumatic.   Eyes:      Extraocular Movements: Extraocular movements intact.      Pupils: Pupils are equal, round, and reactive to light.   Cardiovascular:      Rate and Rhythm: Normal rate and regular rhythm.   Pulmonary:      Effort: Pulmonary effort is normal.      Breath sounds: Normal breath sounds.   Musculoskeletal:         General: No deformity or signs of injury.      Cervical back: Normal range of motion and neck supple.      Right knee: Normal.      Left knee: Swelling and effusion present. No deformity, erythema, ecchymosis, lacerations, bony tenderness or crepitus. Decreased range of motion. Tenderness present. Normal alignment. Normal pulse.   Skin:     General: Skin is warm and dry.      Capillary Refill: Capillary refill takes less than 2 seconds.   Neurological:      General: No focal deficit present.      Mental Status: She is alert and oriented to person, place, and time. Mental status is at baseline.   Psychiatric:         Mood and Affect: Mood normal. Mood is anxious.         Behavior: Behavior normal.         Procedures    ED Course:         Lab Results (last 24 hours)       Procedure Component Value Units Date/Time    CBC & Differential [[id number]]  (Abnormal) Collected: 12/21/24 1625    Specimen: Blood Updated: 12/21/24 1635    Narrative:      The following orders were created for panel order CBC & Differential.  Procedure                               Abnormality         Status                     ---------                               " -----------         ------                     CBC Auto Differential[[id number]]        Abnormal            Final result                 Please view results for these tests on the individual orders.    Comprehensive Metabolic Panel [[id number]]  (Abnormal) Collected: 12/21/24 1625    Specimen: Blood Updated: 12/21/24 1651     Glucose 101 mg/dL      BUN 22 mg/dL      Creatinine 0.81 mg/dL      Sodium 141 mmol/L      Potassium 4.2 mmol/L      Chloride 104 mmol/L      CO2 26.6 mmol/L      Calcium 9.3 mg/dL      Total Protein 6.8 g/dL      Albumin 3.8 g/dL      ALT (SGPT) 31 U/L      AST (SGOT) 38 U/L      Alkaline Phosphatase 114 U/L      Total Bilirubin 0.2 mg/dL      Globulin 3.0 gm/dL      A/G Ratio 1.3 g/dL      BUN/Creatinine Ratio 27.2     Anion Gap 10.4 mmol/L      eGFR 84.3 mL/min/1.73     Narrative:      GFR Categories in Chronic Kidney Disease (CKD)      GFR Category          GFR (mL/min/1.73)    Interpretation  G1                     90 or greater         Normal or high (1)  G2                      60-89                Mild decrease (1)  G3a                   45-59                Mild to moderate decrease  G3b                   30-44                Moderate to severe decrease  G4                    15-29                Severe decrease  G5                    14 or less           Kidney failure          (1)In the absence of evidence of kidney disease, neither GFR category G1 or G2 fulfill the criteria for CKD.    eGFR calculation 2021 CKD-EPI creatinine equation, which does not include race as a factor    CBC Auto Differential [[id number]]  (Abnormal) Collected: 12/21/24 1625    Specimen: Blood Updated: 12/21/24 1635     WBC 11.59 10*3/mm3      RBC 4.92 10*6/mm3      Hemoglobin 13.5 g/dL      Hematocrit 43.5 %      MCV 88.4 fL      MCH 27.4 pg      MCHC 31.0 g/dL      RDW 15.2 %      RDW-SD 49.8 fl      MPV 9.9 fL      Platelets 386 10*3/mm3      Neutrophil % 75.6 %      Lymphocyte % 17.9 %      Monocyte % 4.7 %       Eosinophil % 1.3 %      Basophil % 0.3 %      Immature Grans % 0.2 %      Neutrophils, Absolute 8.77 10*3/mm3      Lymphocytes, Absolute 2.07 10*3/mm3      Monocytes, Absolute 0.54 10*3/mm3      Eosinophils, Absolute 0.15 10*3/mm3      Basophils, Absolute 0.04 10*3/mm3      Immature Grans, Absolute 0.02 10*3/mm3              MRI Knee Left Without Contrast    Result Date: 12/21/2024  MRI KNEE LEFT  WO CONTRAST Date of Exam: 12/21/2024 5:48 PM EST Indication: severe knee pain, edema.  Comparison: CT November 30, 2024 Technique:  Routine multiplanar/multisequence images of the left knee were obtained without contrast administration. Findings: Motion artifact degrades diagnostic image quality. Sequences were repeated, but motion persists. There is a large knee effusion. There is some irregularity of the synovial margins and along the posterior aspect of Hoffa's fat pad which may indicate synovitis. There is subcutaneous edema surrounding the knee, primarily at the anterior-lateral aspect.  There does not appear to be a significant Baker's cyst. No other significant localized fluid collection is identified on this exam in the subcutaneous tissues. There is severe lateral patellofemoral compartment arthropathy. There is diffuse full-thickness cartilage loss with osseous remodeling of the lateral patellar facet and lateral trochlea. There is subchondral edema, sclerosis, and cyst formation. There appears to be tendinopathy of the distal quadriceps and patellar tendons without definite tendon tear. There is tricompartmental osteophytosis. Motion artifact limits assessment, but there is suspected mild to moderate chondromalacia of the weightbearing tibiofemoral compartments. There appears to be mild cartilage thinning with suspected small high-grade  cartilage defects, greater in the weightbearing medial tibiofemoral compartment. Meniscal evaluation is also limited by motion. There is degeneration of the medial meniscus.  There is suspicion for horizontal tear of the posterior horn. There is also suspected degeneration of the lateral meniscus. There is suspected free edge fraying/tear of the posterior horn and body of the lateral meniscus. Intercondylar osteophytes encroach upon the anterior cruciate ligament. There are suspected advanced degenerative changes of the anterior cruciate ligament without definite full-thickness defect. Posterior cruciate ligament appears intact. Medial collateral ligament also appears intact. There is hyperintense signal in the proximal lateral collateral ligament suspicious for partial tear. Popliteus tendon appears intact. There is suspected degenerative signal changes at the anterior intercondylar eminence. Degenerative marrow signal changes are seen at the lateral patellar facet and lateral trochlea. No definite displaced fracture. No other suspicious marrow signal abnormality.     Impression: Impression: 1.Motion artifact degrades diagnostic image quality. 2.Large knee effusion with findings suggestive of synovitis. This is nonspecific and may be related to arthropathy, but if there is clinical concern for infection, aspiration would be advised. 3.Tricompartmental arthropathy with severe arthropathy at the lateral patellofemoral compartment where there is full-thickness cartilage loss, subchondral edema, sclerosis, and cyst formation. Patellofemoral predominant arthropathy can be associated with  calcium pyrophosphate deposition disease. 4.Degeneration of the medial and lateral menisci with suspected horizontal tear of the medial meniscus posterior horn and free edge fraying/tear of the lateral meniscus. 5.Partial tear of the proximal lateral collateral ligament. 6.Nonspecific soft tissue edema. Electronically Signed: Won Forde  12/21/2024 6:24 PM EST  Workstation ID: [id number]        MDM      Initial impression of presenting illness: Patient presented to the ER complaining of exacerbation of left  knee pain which has been chronic previously and patient was seen here earlier this date and given orthopedic referral.    DDX: includes but is not limited to: Osteoarthritis, primary arthritis, ligamentous injury, meniscus injury,    Patient arrives via EMS complaining of left knee pain but denies any new injuries or trauma to the left knee.  Reports same complaint as earlier this date.  With vitals interpreted by myself.     Pertinent features from physical exam: Effusion to the left knee with decreased range of motion due to pain.  No sign of erythema no crepitus on assessment.  Pulses present distal to the area of pain.  Patient notably anxious and was requesting Ativan and Dilaudid for anxiety and pain..    Initial diagnostic plan: Physical exam    Results from initial plan were reviewed and interpreted by me revealing increased pain on palpation and range of motion no crepitus or erythema    Diagnostic information from other sources: Patient had an MRI earlier this date which showed notable arthritis and synovitis but no sign of infection or fracture.    Interventions / Re-evaluation: Patient given Toradol, Decadron for pain management and Vistaril for anxiety.  Knee immobilizer placed on left knee and patient advised to continue to use her crutches and to follow-up with the orthopedic referral that was previously provided.  Patient advised to return to the ER if symptoms worsen or if she feels she is further can evaluation by ER provider.    Medications   ketorolac (TORADOL) injection 30 mg (has no administration in time range)   hydrOXYzine (ATARAX) tablet 25 mg (has no administration in time range)   dexAMETHasone (DECADRON) tablet 6 mg (has no administration in time range)       Results/clinical rationale were discussed with patient and attending    Consultations/Discussion of results with other physicians: N/A    Data interpreted: Nursing notes reviewed, vital signs reviewed.  Labs independently  interpreted by me (CBC, CMP, lipase, UA, troponin, ABG, lactic acid, procalcitonin).  Imaging independently interpreted by me (x-ray, CT scan).  EKG independently interpreted by me.  O2 saturation:    Counseling: Discussed the results above with the patient regarding need for admission or discharge.  Patient understands and agrees plan of care.      -----  ED Disposition       ED Disposition   Discharge    Condition   Stable    Comment   --             Final diagnoses:   Osteoarthritis of left knee, unspecified osteoarthritis type   Synovitis      Your Follow-Up Providers       Schedule an appointment as soon as possible for a visit  with Sigrid Byers PA.    Specialty: Internal Medicine  Follow up details: To follow-up with today's ER visit  [address] [facility] [address]  [place] [id number] [phone]                       Contact information for after-discharge care    Follow-up information has not been specified.                    Your medication list        START taking these medications        Instructions Last Dose Given Next Dose Due   Diclofenac Sodium 1 % gel gel  Commonly known as: VOLTAREN      Apply 4 g topically to the appropriate area as directed 4 (Four) Times a Day.       meloxicam 15 MG tablet  Commonly known as: MOBIC      Take 1 tablet by mouth Daily for 10 days.       predniSONE 20 MG tablet  Commonly known as: DELTASONE      Take 2 tablets by mouth Daily for 5 days.              CHANGE how you take these medications        Instructions Last Dose Given Next Dose Due   pantoprazole 40 MG EC tablet  Commonly known as: PROTONIX  What changed: additional instructions      Take 1 tablet by mouth Daily.       spironolactone 100 MG tablet  Commonly known as: ALDACTONE  What changed: when to take this      Take 1 tablet by mouth Daily.       valsartan-hydrochlorothiazide 80-12.5 MG per tablet  Commonly known as: DIOVAN-HCT  What changed: how much to take      Take 0.5 tablets by mouth Daily  for 30 days.              CONTINUE taking these medications        Instructions Last Dose Given Next Dose Due   Aspirin Low Dose 81 MG EC tablet  Generic drug: aspirin      Take 1 tablet by mouth Daily.       bumetanide 1 MG tablet  Commonly known as: BUMEX      Take 1 tablet by mouth Daily.       buPROPion  MG 24 hr tablet  Commonly known as: WELLBUTRIN XL      Take 1 tablet by mouth Daily.       ClearLax 17 GM/SCOOP powder  Generic drug: polyethylene glycol      Take 17 g by mouth Daily.       Eliquis 5 MG tablet tablet  Generic drug: apixaban      Take 1 tablet by mouth 2 (Two) Times a Day.       ibuprofen 800 MG tablet  Commonly known as: ADVIL,MOTRIN      Take 1 tablet by mouth Every 6 (Six) Hours As Needed for Mild Pain for up to 14 days.       Jardiance 10 MG tablet tablet  Generic drug: empagliflozin      Take 1 tablet by mouth Daily.       metoprolol succinate XL 50 MG 24 hr tablet  Commonly known as: TOPROL-XL      Take 1 tablet by mouth Daily.       Movantik 25 MG tablet  Generic drug: Naloxegol Oxalate      Take 1 tablet by mouth Every Morning.                 Where to Get Your Medications        These medications were sent to [facility] - [place], [place] - [address] - [phone] [facility] [phone] FX  [address], [facility] [address]      Phone: [phone]   Diclofenac Sodium 1 % gel gel  meloxicam 15 MG tablet  predniSONE 20 MG tablet

## 2024-12-22 NOTE — DISCHARGE INSTRUCTIONS
Follow-up with orthopedics using the referral that was provided earlier this date.  Wear the knee immobilizer for support and continue to use your crutches to help with ambulation.  Take the meloxicam as needed and steroids as directed.  You may also use the Voltaren gel to help with pain management.  Return to the ER if you feel you need further care and evaluation by ER provider or if you have any concerns.

## 2025-04-20 ENCOUNTER — APPOINTMENT (OUTPATIENT)
Dept: CARDIOLOGY | Facility: HOSPITAL | Age: 60
End: 2025-04-20
Payer: MEDICAID

## 2025-04-20 ENCOUNTER — APPOINTMENT (OUTPATIENT)
Dept: GENERAL RADIOLOGY | Facility: HOSPITAL | Age: 60
End: 2025-04-20
Payer: MEDICAID

## 2025-04-20 ENCOUNTER — HOSPITAL ENCOUNTER (EMERGENCY)
Facility: HOSPITAL | Age: 60
Discharge: HOME | End: 2025-04-20
Attending: EMERGENCY MEDICINE | Admitting: EMERGENCY MEDICINE
Payer: MEDICAID

## 2025-04-20 VITALS
BODY MASS INDEX: 21.66 KG/M2 | OXYGEN SATURATION: 97 % | HEIGHT: 65 IN | TEMPERATURE: 97.5 F | SYSTOLIC BLOOD PRESSURE: 159 MMHG | WEIGHT: 130 LBS | HEART RATE: 90 BPM | DIASTOLIC BLOOD PRESSURE: 128 MMHG | RESPIRATION RATE: 18 BRPM

## 2025-04-20 DIAGNOSIS — I10 POORLY-CONTROLLED HYPERTENSION: Primary | ICD-10-CM

## 2025-04-20 DIAGNOSIS — I50.9 ACUTE ON CHRONIC CONGESTIVE HEART FAILURE, UNSPECIFIED HEART FAILURE TYPE: ICD-10-CM

## 2025-04-20 LAB
ALBUMIN SERPL-MCNC: 3.7 G/DL (ref 3.5–5.2)
ALBUMIN/GLOB SERPL: 1.5 G/DL
ALP SERPL-CCNC: 154 U/L (ref 39–117)
ALT SERPL W P-5'-P-CCNC: 34 U/L (ref 1–33)
ANION GAP SERPL CALCULATED.3IONS-SCNC: 10 MMOL/L (ref 5–15)
APTT PPP: 28.5 SECONDS (ref 60–90)
AST SERPL-CCNC: 44 U/L (ref 1–32)
B PARAPERT DNA SPEC QL NAA+PROBE: NOT DETECTED
B PERT DNA SPEC QL NAA+PROBE: NOT DETECTED
BASOPHILS # BLD AUTO: 0.06 10*3/MM3 (ref 0–0.2)
BASOPHILS NFR BLD AUTO: 0.7 % (ref 0–1.5)
BH CV LOWER VASCULAR LEFT COMMON FEMORAL COMPRESS: NORMAL
BH CV LOWER VASCULAR LEFT COMMON FEMORAL SPONT: NORMAL
BH CV LOWER VASCULAR LEFT DISTAL FEMORAL COMPRESS: NORMAL
BH CV LOWER VASCULAR LEFT DISTAL FEMORAL SPONT: NORMAL
BH CV LOWER VASCULAR LEFT GASTRONEMIUS COMPRESS: NORMAL
BH CV LOWER VASCULAR LEFT GREATER SAPH AK COMPRESS: NORMAL
BH CV LOWER VASCULAR LEFT GREATER SAPH BK COMPRESS: NORMAL
BH CV LOWER VASCULAR LEFT MID FEMORAL COMPRESS: NORMAL
BH CV LOWER VASCULAR LEFT MID FEMORAL SPONT: NORMAL
BH CV LOWER VASCULAR LEFT PERONEAL AUGMENT: NORMAL
BH CV LOWER VASCULAR LEFT PERONEAL COMPRESS: NORMAL
BH CV LOWER VASCULAR LEFT POPLITEAL COMPRESS: NORMAL
BH CV LOWER VASCULAR LEFT POPLITEAL SPONT: NORMAL
BH CV LOWER VASCULAR LEFT POSTERIOR TIBIAL AUGMENT: NORMAL
BH CV LOWER VASCULAR LEFT POSTERIOR TIBIAL COMPRESS: NORMAL
BH CV LOWER VASCULAR LEFT PROFUNDA FEMORAL SPONT: NORMAL
BH CV LOWER VASCULAR LEFT PROXIMAL FEMORAL COMPRESS: NORMAL
BH CV LOWER VASCULAR LEFT PROXIMAL FEMORAL SPONT: NORMAL
BH CV LOWER VASCULAR LEFT SAPHENOFEMORAL JUNCTION COMPRESS: NORMAL
BH CV LOWER VASCULAR LEFT SAPHENOFEMORAL JUNCTION SPONT: NORMAL
BH CV LOWER VASCULAR RIGHT COMMON FEMORAL COMPRESS: NORMAL
BH CV LOWER VASCULAR RIGHT COMMON FEMORAL SPONT: NORMAL
BH CV LOWER VASCULAR RIGHT DISTAL FEMORAL COMPRESS: NORMAL
BH CV LOWER VASCULAR RIGHT DISTAL FEMORAL SPONT: NORMAL
BH CV LOWER VASCULAR RIGHT GASTRONEMIUS COMPRESS: NORMAL
BH CV LOWER VASCULAR RIGHT GREATER SAPH AK COMPRESS: NORMAL
BH CV LOWER VASCULAR RIGHT LESSER SAPH COMPRESS: NORMAL
BH CV LOWER VASCULAR RIGHT MID FEMORAL COMPRESS: NORMAL
BH CV LOWER VASCULAR RIGHT MID FEMORAL SPONT: NORMAL
BH CV LOWER VASCULAR RIGHT PERONEAL AUGMENT: NORMAL
BH CV LOWER VASCULAR RIGHT PERONEAL COMPRESS: NORMAL
BH CV LOWER VASCULAR RIGHT POPLITEAL SPONT: NORMAL
BH CV LOWER VASCULAR RIGHT POSTERIOR TIBIAL AUGMENT: NORMAL
BH CV LOWER VASCULAR RIGHT POSTERIOR TIBIAL COMPRESS: NORMAL
BH CV LOWER VASCULAR RIGHT PROFUNDA FEMORAL SPONT: NORMAL
BH CV LOWER VASCULAR RIGHT PROXIMAL FEMORAL COMPRESS: NORMAL
BH CV LOWER VASCULAR RIGHT PROXIMAL FEMORAL SPONT: NORMAL
BH CV LOWER VASCULAR RIGHT SAPHENOFEMORAL JUNCTION COMPRESS: NORMAL
BH CV LOWER VASCULAR RIGHT SAPHENOFEMORAL JUNCTION SPONT: NORMAL
BH CV VAS PRELIMINARY FINDINGS SCRIPTING: 1
BILIRUB SERPL-MCNC: 1 MG/DL (ref 0–1.2)
BUN SERPL-MCNC: 23 MG/DL (ref 6–20)
BUN/CREAT SERPL: 33.8 (ref 7–25)
C PNEUM DNA NPH QL NAA+NON-PROBE: NOT DETECTED
CALCIUM SPEC-SCNC: 8.7 MG/DL (ref 8.6–10.5)
CHLORIDE SERPL-SCNC: 105 MMOL/L (ref 98–107)
CO2 SERPL-SCNC: 22 MMOL/L (ref 22–29)
CREAT SERPL-MCNC: 0.68 MG/DL (ref 0.57–1)
DEPRECATED RDW RBC AUTO: 53 FL (ref 37–54)
EGFRCR SERPLBLD CKD-EPI 2021: 101.1 ML/MIN/1.73
EOSINOPHIL # BLD AUTO: 0.24 10*3/MM3 (ref 0–0.4)
EOSINOPHIL NFR BLD AUTO: 2.8 % (ref 0.3–6.2)
ERYTHROCYTE [DISTWIDTH] IN BLOOD BY AUTOMATED COUNT: 15.2 % (ref 12.3–15.4)
FLUAV SUBTYP SPEC NAA+PROBE: NOT DETECTED
FLUBV RNA ISLT QL NAA+PROBE: NOT DETECTED
GLOBULIN UR ELPH-MCNC: 2.4 GM/DL
GLUCOSE SERPL-MCNC: 119 MG/DL (ref 65–99)
HADV DNA SPEC NAA+PROBE: NOT DETECTED
HCOV 229E RNA SPEC QL NAA+PROBE: NOT DETECTED
HCOV HKU1 RNA SPEC QL NAA+PROBE: NOT DETECTED
HCOV NL63 RNA SPEC QL NAA+PROBE: NOT DETECTED
HCOV OC43 RNA SPEC QL NAA+PROBE: NOT DETECTED
HCT VFR BLD AUTO: 39.4 % (ref 34–46.6)
HGB BLD-MCNC: 12.1 G/DL (ref 12–15.9)
HMPV RNA NPH QL NAA+NON-PROBE: NOT DETECTED
HPIV1 RNA ISLT QL NAA+PROBE: NOT DETECTED
HPIV2 RNA SPEC QL NAA+PROBE: NOT DETECTED
HPIV3 RNA NPH QL NAA+PROBE: NOT DETECTED
HPIV4 P GENE NPH QL NAA+PROBE: NOT DETECTED
IMM GRANULOCYTES # BLD AUTO: 0.02 10*3/MM3 (ref 0–0.05)
IMM GRANULOCYTES NFR BLD AUTO: 0.2 % (ref 0–0.5)
INR PPP: 1.19 (ref 0.89–1.12)
LYMPHOCYTES # BLD AUTO: 2.03 10*3/MM3 (ref 0.7–3.1)
LYMPHOCYTES NFR BLD AUTO: 23.6 % (ref 19.6–45.3)
M PNEUMO IGG SER IA-ACNC: NOT DETECTED
MCH RBC QN AUTO: 29.2 PG (ref 26.6–33)
MCHC RBC AUTO-ENTMCNC: 30.7 G/DL (ref 31.5–35.7)
MCV RBC AUTO: 94.9 FL (ref 79–97)
MONOCYTES # BLD AUTO: 0.43 10*3/MM3 (ref 0.1–0.9)
MONOCYTES NFR BLD AUTO: 5 % (ref 5–12)
NEUTROPHILS NFR BLD AUTO: 5.81 10*3/MM3 (ref 1.7–7)
NEUTROPHILS NFR BLD AUTO: 67.7 % (ref 42.7–76)
NRBC BLD AUTO-RTO: 0 /100 WBC (ref 0–0.2)
NT-PROBNP SERPL-MCNC: ABNORMAL PG/ML (ref 0–900)
PLATELET # BLD AUTO: 272 10*3/MM3 (ref 140–450)
PMV BLD AUTO: 10.1 FL (ref 6–12)
POTASSIUM SERPL-SCNC: 3.6 MMOL/L (ref 3.5–5.2)
PROT SERPL-MCNC: 6.1 G/DL (ref 6–8.5)
PROTHROMBIN TIME: 15.9 SECONDS (ref 12.2–15.3)
RBC # BLD AUTO: 4.15 10*6/MM3 (ref 3.77–5.28)
RHINOVIRUS RNA SPEC NAA+PROBE: NOT DETECTED
RSV RNA NPH QL NAA+NON-PROBE: NOT DETECTED
SARS-COV-2 RNA NPH QL NAA+NON-PROBE: NOT DETECTED
SODIUM SERPL-SCNC: 137 MMOL/L (ref 136–145)
WBC NRBC COR # BLD AUTO: 8.59 10*3/MM3 (ref 3.4–10.8)

## 2025-04-20 PROCEDURE — 85610 PROTHROMBIN TIME: CPT | Performed by: NURSE PRACTITIONER

## 2025-04-20 PROCEDURE — 80053 COMPREHEN METABOLIC PANEL: CPT | Performed by: NURSE PRACTITIONER

## 2025-04-20 PROCEDURE — 93970 EXTREMITY STUDY: CPT | Performed by: INTERNAL MEDICINE

## 2025-04-20 PROCEDURE — 83880 ASSAY OF NATRIURETIC PEPTIDE: CPT | Performed by: NURSE PRACTITIONER

## 2025-04-20 PROCEDURE — 25010000002 BUMETANIDE PER 0.5 MG: Performed by: NURSE PRACTITIONER

## 2025-04-20 PROCEDURE — 85730 THROMBOPLASTIN TIME PARTIAL: CPT | Performed by: NURSE PRACTITIONER

## 2025-04-20 PROCEDURE — 85025 COMPLETE CBC W/AUTO DIFF WBC: CPT | Performed by: NURSE PRACTITIONER

## 2025-04-20 PROCEDURE — 93970 EXTREMITY STUDY: CPT

## 2025-04-20 PROCEDURE — 0202U NFCT DS 22 TRGT SARS-COV-2: CPT

## 2025-04-20 PROCEDURE — 36415 COLL VENOUS BLD VENIPUNCTURE: CPT

## 2025-04-20 PROCEDURE — 96374 THER/PROPH/DIAG INJ IV PUSH: CPT

## 2025-04-20 PROCEDURE — 93005 ELECTROCARDIOGRAM TRACING: CPT | Performed by: NURSE PRACTITIONER

## 2025-04-20 PROCEDURE — 99284 EMERGENCY DEPT VISIT MOD MDM: CPT

## 2025-04-20 PROCEDURE — 71045 X-RAY EXAM CHEST 1 VIEW: CPT

## 2025-04-20 RX ORDER — VALSARTAN 160 MG/1
80 TABLET ORAL ONCE
Status: COMPLETED | OUTPATIENT
Start: 2025-04-20 | End: 2025-04-20

## 2025-04-20 RX ORDER — VALSARTAN AND HYDROCHLOROTHIAZIDE 80; 12.5 MG/1; MG/1
1 TABLET, FILM COATED ORAL DAILY
Qty: 30 TABLET | Refills: 1 | Status: SHIPPED | OUTPATIENT
Start: 2025-04-20

## 2025-04-20 RX ORDER — BUMETANIDE 0.25 MG/ML
1 INJECTION, SOLUTION INTRAMUSCULAR; INTRAVENOUS ONCE
Status: COMPLETED | OUTPATIENT
Start: 2025-04-20 | End: 2025-04-20

## 2025-04-20 RX ADMIN — BUMETANIDE 1 MG: 0.25 INJECTION INTRAMUSCULAR; INTRAVENOUS at 06:30

## 2025-04-20 RX ADMIN — VALSARTAN 80 MG: 160 TABLET, FILM COATED ORAL at 07:33

## 2025-04-20 NOTE — ED PROVIDER NOTES
EMERGENCY DEPARTMENT ENCOUNTER    Pt Name: Jill M Paladino  MRN: [id number]  Pt :   1965  Room Number:    Date of encounter:  2025  PCP: Sigrid Byers PA  ED Provider: KAYLA Mcguire    Historian: Patient      HPI:  Chief Complaint: Dry cough, leg swelling, fatigue        Context: Jill M Paladino is a 59 y.o. female who presents to the ED c/o dry cough, leg swelling, fatigue.  The patient reports history of DVT, self discontinued Xarelto about a month ago.  Patient told me that she ran out of medication.  Furthermore she feels more fatigued than usual, she has dry cough.  History of CHF.  She wants to make sure that she does not retain the fluid       PAST MEDICAL HISTORY  Past Medical History:   Diagnosis Date    CHF (congestive heart failure)     Hypertension     Pneumonia     Pulmonary embolism without acute cor pulmonale 12/15/2023         PAST SURGICAL HISTORY  Past Surgical History:   Procedure Laterality Date    HERNIA REPAIR      KNEE SURGERY      RHINOPLASTY           FAMILY HISTORY  Family History   Problem Relation Age of Onset    No Known Problems Mother     No Known Problems Father          SOCIAL HISTORY  Social History     Socioeconomic History    Marital status:    Tobacco Use    Smoking status: Former     Types: Cigarettes     Passive exposure: Never    Smokeless tobacco: Never   Vaping Use    Vaping status: Never Used   Substance and Sexual Activity    Alcohol use: No    Drug use: No    Sexual activity: Defer         ALLERGIES  Lisinopril, Other, Percocet [oxycodone-acetaminophen], and Sulfa antibiotics        REVIEW OF SYSTEMS  Review of Systems       All systems reviewed and negative except for those discussed in HPI.       PHYSICAL EXAM    I have reviewed the triage vital signs and nursing notes.    ED Triage Vitals [25 0101]   Temp Heart Rate Resp BP SpO2   97.5 °F (36.4 °C) 94 18 (!) 166/20 98 %      Temp src Heart Rate Source Patient Position BP  Location FiO2 (%)   Oral Monitor Sitting Left arm --       Physical Exam  GENERAL:   Appears in no acute distress.   HENT: Nares patent.  EYES: No scleral icterus.  CV: Regular rhythm, regular rate.  Left foot edema, no erythema, no palpable cord  RESPIRATORY: Normal effort.  No audible wheezes, rales or rhonchi.  ABDOMEN: Soft, nontender  MUSCULOSKELETAL: No deformities.   NEURO: Alert, moves all extremities, follows commands.  SKIN: Warm, dry, no rash visualized.      LAB RESULTS  No results found for this or any previous visit (from the past 24 hours).      If labs were ordered, I independently reviewed the results and considered them in treating the patient.        RADIOLOGY  No Radiology Exams Resulted Within Past 24 Hours      I ordered and independently reviewed the above noted radiographic studies.      I viewed images of chest x-ray which showed no pulmonary edema  per my independent interpretation.    See radiologist's dictation for official interpretation.        PROCEDURES    Procedures    ECG 12 Lead Chest Pain   Final Result   Test Reason : Chest Pain   Blood Pressure :   */*   mmHG   Vent. Rate :  92 BPM     Atrial Rate :  92 BPM      P-R Int : 150 ms          QRS Dur :  88 ms       QT Int : 428 ms       P-R-T Axes :  43 -50  56 degrees     QTcB Int : 529 ms      Normal sinus rhythm   Possible Left atrial enlargement   Left anterior fascicular block   Left ventricular hypertrophy   Anteroseptal infarct (cited on or before 14-Dec-2023)   Prolonged QT   Abnormal ECG   When compared with ECG of 04-Dec-2024 06:14,   No significant change was found   Confirmed by ADRIANE FISHMAN ([id number]) on 4/22/2025 11:42:51 AM      Referred By: ED MD           Confirmed By: ADRIANE FISHMAN          MEDICATIONS GIVEN IN ER    Medications   bumetanide (BUMEX) injection 1 mg (1 mg Intravenous Given 4/20/25 7630)   valsartan (DIOVAN) tablet 80 mg (80 mg Oral Given 4/20/25 5078)         MEDICAL DECISION MAKING, PROGRESS, and  CONSULTS    All labs, if obtained, have been independently reviewed by me.  All radiology studies, if obtained, have been reviewed by me and the radiologist dictating the report.  All EKG's, if obtained, have been independently viewed and interpreted by me/my attending physician.      Discussion below represents my analysis of pertinent findings related to patient's condition, differential diagnosis, treatment plan and final disposition.  Patient is 59-year-old female with history of CHF, hypertension, DVT who presents for evaluation of cough and leg swelling.  On physical exam she was nontoxic-appearing, hypertensive, mild swelling to the left lower extremity.  Lab work significant for elevated proBNP 13,927, mild elevated liver transaminase ALT 34 AST 44, normal white count, negative respiratory panel, chest x-ray shows no acute disease.  Ultrasound lower extremities showed no DVT.  Patient received Bumex prior to discharge.  Her Xarelto and antihypertensive were refilled.                      Differential diagnosis:    CHF exacerbation, pulmonary edema, viral URI, DVT, SVT, peripheral      Additional sources:    - Discussed/ obtained information from independent historians:      - External (non-ED) record review: January 14, 2025, office visit with internal medicine, primary hypertension, coronary artery disease, CHF, schizoaffective disorder    - Chronic or social conditions impacting care: History of CHF, history of DVT, medication noncompliance    - Shared decision making: Patient      Orders placed during this visit:  Orders Placed This Encounter   Procedures    Respiratory Panel PCR w/COVID-19(SARS-CoV-2) [facility] In-House, NP Swab in UTM/VTM, 2 HR TAT - Swab, Nasopharynx    XR Chest 1 View    Comprehensive Metabolic Panel    BNP    Protime-INR    aPTT    CBC Auto Differential    ECG 12 Lead Chest Pain    CBC & Differential         Additional orders considered but not ordered:  UDS, EtOH    ED  Course:    Consultants:      ED Course as of 04/25/25 2004   Sun Apr 20, 2025   6970 I reevaluated the patient, she is resting comfortably.  I discussed lab work result.  Do not have ability to perform ultrasound of lower extremities until 8:00 in the morning.  Patient prefers to stay in the hospital and proceed with ultrasound.  [IR]   9582 She ambulated to the restroom three times since Bumex administration, without assistance on the most recent trip, and is tolerating oral food and liquids. Smiling. Preliminary read per tech on bilateral lower extremity duplex is negative for DVT or SVT. She states she needs refills on Xarelto and diovan-HCTZ. [LD]      ED Course User Index  [IR] Magda Gann APRN  [LD] Charu Snider MD              Shared Decision Making:  After my consideration of clinical presentation and any laboratory/radiology studies obtained, I discussed the findings with the patient/patient representative who is in agreement with the treatment plan and the final disposition.   Risks and benefits of discharge and/or observation/admission were discussed.       AS OF 20:04 EDT VITALS:    BP - (!) 159/128  HR - 90  TEMP - 97.5 °F (36.4 °C) (Oral)  O2 SATS - 97%                  DIAGNOSIS  Final diagnoses:   Poorly-controlled hypertension   Acute on chronic congestive heart failure, unspecified heart failure type         DISPOSITION  DISCHARGE    Patient discharged in stable condition.    Reviewed implications of results, diagnosis, meds, responsibility to follow up, warning signs and symptoms of possible worsening, potential complications and reasons to return to ER.    Patient/Family voiced understanding of above instructions.    Discussed plan for discharge, as there is no emergent indication for admission.  Pt/family is agreeable and understands need for follow up and possible repeat testing.  Pt/family is aware that discharge does not mean that nothing is wrong but that it indicates no emergency  is currently present that requires admission and they must continue care with follow-up as given below or with a physician of their choice.     FOLLOW-UP  Sigrid Byers PA  [address]  [address]  [place] [id number] [phone]    Schedule an appointment as soon as possible for a visit in 3 days  primary care provider    Heath Sargent MD  [address]  [place] [id number]  [phone]    Call in 1 day  Follow up with your cardiologist within a week, call tomorrow for next available recheck.         Medication List        Changed      pantoprazole 40 MG EC tablet  Commonly known as: PROTONIX  Take 1 tablet by mouth Daily.  What changed: additional instructions     * rivaroxaban 20 MG tablet  Commonly known as: XARELTO  Take 1 tablet by mouth Daily.  What changed: You were already taking a medication with the same name, and this prescription was added. Make sure you understand how and when to take each.     * rivaroxaban 20 MG tablet  Commonly known as: XARELTO  What changed: Another medication with the same name was added. Make sure you understand how and when to take each.     spironolactone 100 MG tablet  Commonly known as: ALDACTONE  Take 1 tablet by mouth Daily.  What changed: when to take this     * valsartan-hydrochlorothiazide 80-12.5 MG per tablet  Commonly known as: DIOVAN-HCT  Take 0.5 tablets by mouth Daily for 30 days.  What changed: how much to take     * valsartan-hydrochlorothiazide 80-12.5 MG per tablet  Commonly known as: DIOVAN-HCT  Take 1 tablet by mouth Daily.  What changed: You were already taking a medication with the same name, and this prescription was added. Make sure you understand how and when to take each.           * This list has 4 medication(s) that are the same as other medications prescribed for you. Read the directions carefully, and ask your doctor or other care provider to review them with you.                Stop      Eliquis 5 MG tablet  tablet  Generic drug: apixaban               Where to Get Your Medications        These medications were sent to [facility] - [place], [place] - [address] - [phone]  - [phone] FX  [address], [facility] [address]      Phone: [phone]   rivaroxaban 20 MG tablet  valsartan-hydrochlorothiazide 80-12.5 MG per tablet            Please note that portions of this document were completed with voice recognition software.     KAYLA Mcguire   04/25/25   20:04 KYLEET        Magda Gann APRN  04/25/25 2004

## 2025-04-22 LAB
QT INTERVAL: 428 MS
QTC INTERVAL: 529 MS

## 2025-05-10 ENCOUNTER — APPOINTMENT (OUTPATIENT)
Dept: GENERAL RADIOLOGY | Facility: HOSPITAL | Age: 60
End: 2025-05-10
Payer: MEDICAID

## 2025-05-10 ENCOUNTER — HOSPITAL ENCOUNTER (EMERGENCY)
Facility: HOSPITAL | Age: 60
Discharge: HOME | End: 2025-05-10
Attending: EMERGENCY MEDICINE
Payer: MEDICAID

## 2025-05-10 VITALS
RESPIRATION RATE: 20 BRPM | HEIGHT: 65 IN | OXYGEN SATURATION: 96 % | HEART RATE: 94 BPM | BODY MASS INDEX: 21.66 KG/M2 | TEMPERATURE: 98 F | WEIGHT: 130 LBS | DIASTOLIC BLOOD PRESSURE: 101 MMHG | SYSTOLIC BLOOD PRESSURE: 137 MMHG

## 2025-05-10 DIAGNOSIS — R06.00 DYSPNEA, UNSPECIFIED TYPE: Primary | ICD-10-CM

## 2025-05-10 DIAGNOSIS — Z86.79 HISTORY OF CHF (CONGESTIVE HEART FAILURE): ICD-10-CM

## 2025-05-10 DIAGNOSIS — I10 ESSENTIAL HYPERTENSION: ICD-10-CM

## 2025-05-10 LAB
ALBUMIN SERPL-MCNC: 3.5 G/DL (ref 3.5–5.2)
ALBUMIN/GLOB SERPL: 1.3 G/DL
ALP SERPL-CCNC: 127 U/L (ref 39–117)
ALT SERPL W P-5'-P-CCNC: 26 U/L (ref 1–33)
AMPHET+METHAMPHET UR QL: POSITIVE
AMPHETAMINES UR QL: POSITIVE
ANION GAP SERPL CALCULATED.3IONS-SCNC: 13 MMOL/L (ref 5–15)
AST SERPL-CCNC: 30 U/L (ref 1–32)
B PARAPERT DNA SPEC QL NAA+PROBE: NOT DETECTED
B PERT DNA SPEC QL NAA+PROBE: NOT DETECTED
BACTERIA UR QL AUTO: ABNORMAL /HPF
BARBITURATES UR QL SCN: NEGATIVE
BASOPHILS # BLD AUTO: 0.04 10*3/MM3 (ref 0–0.2)
BASOPHILS NFR BLD AUTO: 0.5 % (ref 0–1.5)
BENZODIAZ UR QL SCN: NEGATIVE
BILIRUB SERPL-MCNC: 0.5 MG/DL (ref 0–1.2)
BILIRUB UR QL STRIP: NEGATIVE
BUN SERPL-MCNC: 29 MG/DL (ref 6–20)
BUN/CREAT SERPL: 36.3 (ref 7–25)
BUPRENORPHINE SERPL-MCNC: NEGATIVE NG/ML
C PNEUM DNA NPH QL NAA+NON-PROBE: NOT DETECTED
CALCIUM SPEC-SCNC: 8.8 MG/DL (ref 8.6–10.5)
CANNABINOIDS SERPL QL: NEGATIVE
CHLORIDE SERPL-SCNC: 105 MMOL/L (ref 98–107)
CLARITY UR: ABNORMAL
CO2 SERPL-SCNC: 20 MMOL/L (ref 22–29)
COCAINE UR QL: NEGATIVE
COLOR UR: YELLOW
CREAT SERPL-MCNC: 0.8 MG/DL (ref 0.57–1)
DEPRECATED RDW RBC AUTO: 46.4 FL (ref 37–54)
EGFRCR SERPLBLD CKD-EPI 2021: 85.5 ML/MIN/1.73
EOSINOPHIL # BLD AUTO: 0.17 10*3/MM3 (ref 0–0.4)
EOSINOPHIL NFR BLD AUTO: 2.3 % (ref 0.3–6.2)
ERYTHROCYTE [DISTWIDTH] IN BLOOD BY AUTOMATED COUNT: 13.6 % (ref 12.3–15.4)
ETHANOL BLD-MCNC: <10 MG/DL (ref 0–10)
FENTANYL UR-MCNC: NEGATIVE NG/ML
FLUAV SUBTYP SPEC NAA+PROBE: NOT DETECTED
FLUBV RNA ISLT QL NAA+PROBE: NOT DETECTED
GEN 5 1HR TROPONIN T REFLEX: 35 NG/L
GLOBULIN UR ELPH-MCNC: 2.8 GM/DL
GLUCOSE SERPL-MCNC: 117 MG/DL (ref 65–99)
GLUCOSE UR STRIP-MCNC: NEGATIVE MG/DL
HADV DNA SPEC NAA+PROBE: NOT DETECTED
HCOV 229E RNA SPEC QL NAA+PROBE: NOT DETECTED
HCOV HKU1 RNA SPEC QL NAA+PROBE: NOT DETECTED
HCOV NL63 RNA SPEC QL NAA+PROBE: NOT DETECTED
HCOV OC43 RNA SPEC QL NAA+PROBE: NOT DETECTED
HCT VFR BLD AUTO: 40.3 % (ref 34–46.6)
HGB BLD-MCNC: 12.6 G/DL (ref 12–15.9)
HGB UR QL STRIP.AUTO: NEGATIVE
HMPV RNA NPH QL NAA+NON-PROBE: NOT DETECTED
HPIV1 RNA ISLT QL NAA+PROBE: NOT DETECTED
HPIV2 RNA SPEC QL NAA+PROBE: NOT DETECTED
HPIV3 RNA NPH QL NAA+PROBE: NOT DETECTED
HPIV4 P GENE NPH QL NAA+PROBE: NOT DETECTED
HYALINE CASTS UR QL AUTO: ABNORMAL /LPF
IMM GRANULOCYTES # BLD AUTO: 0.02 10*3/MM3 (ref 0–0.05)
IMM GRANULOCYTES NFR BLD AUTO: 0.3 % (ref 0–0.5)
KETONES UR QL STRIP: ABNORMAL
LEUKOCYTE ESTERASE UR QL STRIP.AUTO: ABNORMAL
LIPASE SERPL-CCNC: 20 U/L (ref 13–60)
LYMPHOCYTES # BLD AUTO: 1.9 10*3/MM3 (ref 0.7–3.1)
LYMPHOCYTES NFR BLD AUTO: 25.2 % (ref 19.6–45.3)
M PNEUMO IGG SER IA-ACNC: NOT DETECTED
MCH RBC QN AUTO: 29.2 PG (ref 26.6–33)
MCHC RBC AUTO-ENTMCNC: 31.3 G/DL (ref 31.5–35.7)
MCV RBC AUTO: 93.5 FL (ref 79–97)
METHADONE UR QL SCN: NEGATIVE
MONOCYTES # BLD AUTO: 0.53 10*3/MM3 (ref 0.1–0.9)
MONOCYTES NFR BLD AUTO: 7 % (ref 5–12)
NEUTROPHILS NFR BLD AUTO: 4.89 10*3/MM3 (ref 1.7–7)
NEUTROPHILS NFR BLD AUTO: 64.7 % (ref 42.7–76)
NITRITE UR QL STRIP: NEGATIVE
NRBC BLD AUTO-RTO: 0 /100 WBC (ref 0–0.2)
NT-PROBNP SERPL-MCNC: ABNORMAL PG/ML (ref 0–900)
OPIATES UR QL: NEGATIVE
OXYCODONE UR QL SCN: NEGATIVE
PCP UR QL SCN: NEGATIVE
PH UR STRIP.AUTO: 5.5 [PH] (ref 5–8)
PLATELET # BLD AUTO: 349 10*3/MM3 (ref 140–450)
PMV BLD AUTO: 9.5 FL (ref 6–12)
POTASSIUM SERPL-SCNC: 4.2 MMOL/L (ref 3.5–5.2)
PROT SERPL-MCNC: 6.3 G/DL (ref 6–8.5)
PROT UR QL STRIP: ABNORMAL
RBC # BLD AUTO: 4.31 10*6/MM3 (ref 3.77–5.28)
RBC # UR STRIP: ABNORMAL /HPF
REF LAB TEST METHOD: ABNORMAL
RHINOVIRUS RNA SPEC NAA+PROBE: NOT DETECTED
RSV RNA NPH QL NAA+NON-PROBE: NOT DETECTED
SARS-COV-2 RNA NPH QL NAA+NON-PROBE: NOT DETECTED
SODIUM SERPL-SCNC: 138 MMOL/L (ref 136–145)
SP GR UR STRIP: 1.03 (ref 1–1.03)
SQUAMOUS #/AREA URNS HPF: ABNORMAL /HPF
TRANS CELLS #/AREA URNS HPF: ABNORMAL /HPF
TRICYCLICS UR QL SCN: NEGATIVE
TROPONIN T % DELTA: -5
TROPONIN T NUMERIC DELTA: -2 NG/L
TROPONIN T SERPL HS-MCNC: 37 NG/L
UROBILINOGEN UR QL STRIP: ABNORMAL
WBC # UR STRIP: ABNORMAL /HPF
WBC NRBC COR # BLD AUTO: 7.55 10*3/MM3 (ref 3.4–10.8)

## 2025-05-10 PROCEDURE — 84484 ASSAY OF TROPONIN QUANT: CPT | Performed by: EMERGENCY MEDICINE

## 2025-05-10 PROCEDURE — 82077 ASSAY SPEC XCP UR&BREATH IA: CPT | Performed by: EMERGENCY MEDICINE

## 2025-05-10 PROCEDURE — 80053 COMPREHEN METABOLIC PANEL: CPT | Performed by: EMERGENCY MEDICINE

## 2025-05-10 PROCEDURE — 93005 ELECTROCARDIOGRAM TRACING: CPT | Performed by: EMERGENCY MEDICINE

## 2025-05-10 PROCEDURE — 99284 EMERGENCY DEPT VISIT MOD MDM: CPT

## 2025-05-10 PROCEDURE — 71045 X-RAY EXAM CHEST 1 VIEW: CPT

## 2025-05-10 PROCEDURE — 36415 COLL VENOUS BLD VENIPUNCTURE: CPT

## 2025-05-10 PROCEDURE — 0202U NFCT DS 22 TRGT SARS-COV-2: CPT | Performed by: EMERGENCY MEDICINE

## 2025-05-10 PROCEDURE — 81001 URINALYSIS AUTO W/SCOPE: CPT | Performed by: EMERGENCY MEDICINE

## 2025-05-10 PROCEDURE — 87086 URINE CULTURE/COLONY COUNT: CPT | Performed by: EMERGENCY MEDICINE

## 2025-05-10 PROCEDURE — 83690 ASSAY OF LIPASE: CPT | Performed by: EMERGENCY MEDICINE

## 2025-05-10 PROCEDURE — 83880 ASSAY OF NATRIURETIC PEPTIDE: CPT | Performed by: EMERGENCY MEDICINE

## 2025-05-10 PROCEDURE — 80307 DRUG TEST PRSMV CHEM ANLYZR: CPT | Performed by: EMERGENCY MEDICINE

## 2025-05-10 PROCEDURE — 85025 COMPLETE CBC W/AUTO DIFF WBC: CPT | Performed by: EMERGENCY MEDICINE

## 2025-05-10 RX ORDER — SODIUM CHLORIDE 0.9 % (FLUSH) 0.9 %
10 SYRINGE (ML) INJECTION AS NEEDED
Status: DISCONTINUED | OUTPATIENT
Start: 2025-05-10 | End: 2025-05-10 | Stop reason: HOSPADM

## 2025-05-10 RX ORDER — FUROSEMIDE 40 MG/1
40 TABLET ORAL ONCE
Status: COMPLETED | OUTPATIENT
Start: 2025-05-10 | End: 2025-05-10

## 2025-05-10 RX ADMIN — FUROSEMIDE 40 MG: 40 TABLET ORAL at 19:26

## 2025-05-10 NOTE — Clinical Note
[facility] EMERGENCY DEPARTMENT  [address]  [facility] [address]  Phone: [phone]    Jill Paladino was seen and treated in our emergency department on 5/10/2025.  She may return to work on 05/12/2025.         Thank you for choosing [facility].    Kerrie Granger MD

## 2025-05-10 NOTE — ED PROVIDER NOTES
Subjective   History of Present Illness  59-year-old female who presents for evaluation of shortness of breath and concern for fluid overload.  The patient reports that she felt fatigued this morning upon awakening.  She is currently in a recovery center.  She reports that roughly 2 to 3 hours ago she began to feel short of breath.  She has a preceding history of CHF and she is concerned that she may be fluid overload contributing to the current shortness of breath.  She denies fever.  No definitive sick contacts.  No new medications.  She denies excessive fluid intake.  She is prescribed Lasix on a daily basis, and typically takes it, but did not take it earlier today.  She also was prescribed Xarelto due to a previous history of DVT or PE.  And valsartan due to a history of high blood pressure.  She denies current chest pain or abdominal pain or change in bowel urinary function.  No other acute complaints.      Review of Systems   Constitutional:  Positive for activity change and fatigue. Negative for chills and fever.   HENT:  Negative for congestion, ear pain, postnasal drip, sinus pressure and sore throat.    Eyes:  Negative for pain, redness and visual disturbance.   Respiratory:  Positive for shortness of breath. Negative for cough and chest tightness.    Cardiovascular:  Negative for chest pain, palpitations and leg swelling.   Gastrointestinal:  Negative for abdominal pain, anal bleeding, blood in stool, diarrhea, nausea and vomiting.   Endocrine: Negative for polydipsia and polyuria.   Genitourinary:  Negative for difficulty urinating, dysuria, frequency and urgency.   Musculoskeletal:  Negative for arthralgias, back pain and neck pain.   Skin:  Negative for pallor and rash.   Allergic/Immunologic: Negative for environmental allergies and immunocompromised state.   Neurological:  Negative for dizziness, weakness and headaches.   Hematological:  Negative for adenopathy.   Psychiatric/Behavioral:  Negative  "for confusion, self-injury and suicidal ideas. The patient is not nervous/anxious.    All other systems reviewed and are negative.      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    Other Nausea And Vomiting     Pt states \"all opiods make me throw up instantly\"    Percocet [Oxycodone-Acetaminophen] GI Intolerance    Sulfa Antibiotics Rash       Past Surgical History:   Procedure Laterality Date    HERNIA REPAIR      KNEE SURGERY      RHINOPLASTY         Family History   Problem Relation Age of Onset    No Known Problems Mother     No Known Problems Father        Social History     Socioeconomic History    Marital status:    Tobacco Use    Smoking status: Former     Types: Cigarettes     Passive exposure: Never    Smokeless tobacco: Never   Vaping Use    Vaping status: Never Used   Substance and Sexual Activity    Alcohol use: No    Drug use: No    Sexual activity: Defer           Objective   Physical Exam  Vitals and nursing note reviewed.   Constitutional:       General: She is not in acute distress.     Appearance: Normal appearance. She is well-developed. She is not toxic-appearing or diaphoretic.   HENT:      Head: Normocephalic and atraumatic.      Right Ear: External ear normal.      Left Ear: External ear normal.      Nose: Nose normal.   Eyes:      General: Lids are normal.      Pupils: Pupils are equal, round, and reactive to light.   Neck:      Trachea: No tracheal deviation.   Cardiovascular:      Rate and Rhythm: Normal rate and regular rhythm.      Pulses: No decreased pulses.      Heart sounds: Normal heart sounds. No murmur heard.     No friction rub. No gallop.   Pulmonary:      Effort: Pulmonary effort is " normal. No respiratory distress.      Breath sounds: Normal breath sounds. No decreased breath sounds, wheezing, rhonchi or rales.      Comments: Clear lungs diffusely, no signs of respiratory distress.  Abdominal:      General: Bowel sounds are normal.      Palpations: Abdomen is soft.      Tenderness: There is no abdominal tenderness. There is no guarding or rebound.   Musculoskeletal:         General: No deformity. Normal range of motion.      Cervical back: Normal range of motion and neck supple.      Right lower leg: No edema.      Left lower leg: No edema.   Lymphadenopathy:      Cervical: No cervical adenopathy.   Skin:     General: Skin is warm and dry.      Findings: No rash.   Neurological:      Mental Status: She is alert and oriented to person, place, and time.      Cranial Nerves: No cranial nerve deficit.      Sensory: No sensory deficit.   Psychiatric:         Speech: Speech normal.         Behavior: Behavior normal.         Thought Content: Thought content normal.         Judgment: Judgment normal.         Procedures           ED Course                                                         Medical Decision Making  Differential includes dehydration, adverse medication effect, CHF exacerbation, pneumonia, viral illness, other unspecified etiology.    Viral respiratory panel is negative.    Chest x-ray interpreted myself shows normal heart size and clear lungs with no signs of pulmonary edema.    Labs show normal white count, normal H&H.    BNP is improved relative to previous comparison.    Urine shows moderate leuk esterase with signs of contamination with significant number squamous epithelial cells but no bacteria.  The patient is afebrile here.  The patient is hypertensive and has a history of hypertension.  She denies taking any of her medication prior to arrival.  40 mg of I Lasix will be provided currently.  The patient will be advised to resume all medications as prescribed upon returning  home.    It should be noted troponin x 2 is consistent with the patient's previous baseline.    The patient will be discharged and advised to take all medications as prescribed.    Problems Addressed:  Dyspnea, unspecified type: complicated acute illness or injury with systemic symptoms  Essential hypertension: complicated acute illness or injury with systemic symptoms  History of CHF (congestive heart failure): complicated acute illness or injury with systemic symptoms that poses a threat to life or bodily functions    Amount and/or Complexity of Data Reviewed  External Data Reviewed: labs, radiology, ECG and notes.  Labs: ordered. Decision-making details documented in ED Course.  Radiology: ordered and independent interpretation performed. Decision-making details documented in ED Course.  ECG/medicine tests: ordered and independent interpretation performed. Decision-making details documented in ED Course.     Details: EKG performed 5/10/2025 at 1642 interpreted by me shows sinus rhythm, prolonged QTc, right bundle branch block, no acute ischemic changes.    Risk  Prescription drug management.        Final diagnoses:   Dyspnea, unspecified type   History of CHF (congestive heart failure)   Essential hypertension       ED Disposition  ED Disposition       ED Disposition   Discharge    Condition   Stable    Comment   --               Sigrid Byers PA  [address]  [address]  [place] [address]  [phone]    In 1 week      [facility]  [address]  [facility] [id number] [phone]             Medication List        Changed      pantoprazole 40 MG EC tablet  Commonly known as: PROTONIX  Take 1 tablet by mouth Daily.  What changed: additional instructions     spironolactone 100 MG tablet  Commonly known as: ALDACTONE  Take 1 tablet by mouth Daily.  What changed: when to take this     * valsartan-hydrochlorothiazide 80-12.5 MG per tablet  Commonly known as:  DIOVAN-HCT  Take 0.5 tablets by mouth Daily for 30 days.  What changed: how much to take     * valsartan-hydrochlorothiazide 80-12.5 MG per tablet  Commonly known as: DIOVAN-HCT  Take 1 tablet by mouth Daily.  What changed: Another medication with the same name was changed. Make sure you understand how and when to take each.           * This list has 2 medication(s) that are the same as other medications prescribed for you. Read the directions carefully, and ask your doctor or other care provider to review them with you.                     Kerrie Granger MD  05/10/25 1936

## 2025-05-11 LAB
QT INTERVAL: 430 MS
QTC INTERVAL: 517 MS

## 2025-05-12 LAB — BACTERIA SPEC AEROBE CULT: NORMAL

## 2025-05-19 ENCOUNTER — APPOINTMENT (OUTPATIENT)
Dept: GENERAL RADIOLOGY | Facility: HOSPITAL | Age: 60
End: 2025-05-19
Payer: MEDICAID

## 2025-05-19 ENCOUNTER — HOSPITAL ENCOUNTER (EMERGENCY)
Facility: HOSPITAL | Age: 60
Discharge: HOME | End: 2025-05-19
Attending: EMERGENCY MEDICINE | Admitting: EMERGENCY MEDICINE
Payer: MEDICAID

## 2025-05-19 ENCOUNTER — APPOINTMENT (OUTPATIENT)
Dept: CT IMAGING | Facility: HOSPITAL | Age: 60
End: 2025-05-19
Payer: MEDICAID

## 2025-05-19 VITALS
DIASTOLIC BLOOD PRESSURE: 112 MMHG | BODY MASS INDEX: 21.66 KG/M2 | TEMPERATURE: 97.9 F | OXYGEN SATURATION: 98 % | HEIGHT: 65 IN | RESPIRATION RATE: 18 BRPM | SYSTOLIC BLOOD PRESSURE: 162 MMHG | HEART RATE: 94 BPM | WEIGHT: 130 LBS

## 2025-05-19 DIAGNOSIS — Z86.79 HISTORY OF CHF (CONGESTIVE HEART FAILURE): ICD-10-CM

## 2025-05-19 DIAGNOSIS — R06.02 SHORTNESS OF BREATH: Primary | ICD-10-CM

## 2025-05-19 DIAGNOSIS — Z59.00 HOMELESSNESS: ICD-10-CM

## 2025-05-19 LAB
ALBUMIN SERPL-MCNC: 3.6 G/DL (ref 3.5–5.2)
ALBUMIN/GLOB SERPL: 1.4 G/DL
ALP SERPL-CCNC: 124 U/L (ref 39–117)
ALT SERPL W P-5'-P-CCNC: 21 U/L (ref 1–33)
ANION GAP SERPL CALCULATED.3IONS-SCNC: 15 MMOL/L (ref 5–15)
AST SERPL-CCNC: 31 U/L (ref 1–32)
B PARAPERT DNA SPEC QL NAA+PROBE: NOT DETECTED
B PERT DNA SPEC QL NAA+PROBE: NOT DETECTED
BACTERIA UR QL AUTO: ABNORMAL /HPF
BASOPHILS # BLD AUTO: 0.05 10*3/MM3 (ref 0–0.2)
BASOPHILS NFR BLD AUTO: 0.6 % (ref 0–1.5)
BILIRUB SERPL-MCNC: 0.5 MG/DL (ref 0–1.2)
BILIRUB UR QL STRIP: NEGATIVE
BUN SERPL-MCNC: 21 MG/DL (ref 6–20)
BUN/CREAT SERPL: 25.9 (ref 7–25)
C PNEUM DNA NPH QL NAA+NON-PROBE: NOT DETECTED
CALCIUM SPEC-SCNC: 8.7 MG/DL (ref 8.6–10.5)
CHLORIDE SERPL-SCNC: 102 MMOL/L (ref 98–107)
CLARITY UR: CLEAR
CO2 SERPL-SCNC: 21 MMOL/L (ref 22–29)
COLOR UR: YELLOW
CREAT SERPL-MCNC: 0.81 MG/DL (ref 0.57–1)
D DIMER PPP FEU-MCNC: 0.9 MCGFEU/ML (ref 0–0.58)
DEPRECATED RDW RBC AUTO: 44.7 FL (ref 37–54)
EGFRCR SERPLBLD CKD-EPI 2021: 84.3 ML/MIN/1.73
EOSINOPHIL # BLD AUTO: 0.25 10*3/MM3 (ref 0–0.4)
EOSINOPHIL NFR BLD AUTO: 3 % (ref 0.3–6.2)
ERYTHROCYTE [DISTWIDTH] IN BLOOD BY AUTOMATED COUNT: 13 % (ref 12.3–15.4)
FLUAV SUBTYP SPEC NAA+PROBE: NOT DETECTED
FLUBV RNA ISLT QL NAA+PROBE: NOT DETECTED
GEN 5 1HR TROPONIN T REFLEX: 32 NG/L
GLOBULIN UR ELPH-MCNC: 2.5 GM/DL
GLUCOSE SERPL-MCNC: 142 MG/DL (ref 65–99)
GLUCOSE UR STRIP-MCNC: NEGATIVE MG/DL
HADV DNA SPEC NAA+PROBE: NOT DETECTED
HCOV 229E RNA SPEC QL NAA+PROBE: NOT DETECTED
HCOV HKU1 RNA SPEC QL NAA+PROBE: NOT DETECTED
HCOV NL63 RNA SPEC QL NAA+PROBE: NOT DETECTED
HCOV OC43 RNA SPEC QL NAA+PROBE: NOT DETECTED
HCT VFR BLD AUTO: 42.6 % (ref 34–46.6)
HGB BLD-MCNC: 13.3 G/DL (ref 12–15.9)
HGB UR QL STRIP.AUTO: NEGATIVE
HMPV RNA NPH QL NAA+NON-PROBE: NOT DETECTED
HOLD SPECIMEN: NORMAL
HPIV1 RNA ISLT QL NAA+PROBE: NOT DETECTED
HPIV2 RNA SPEC QL NAA+PROBE: NOT DETECTED
HPIV3 RNA NPH QL NAA+PROBE: NOT DETECTED
HPIV4 P GENE NPH QL NAA+PROBE: NOT DETECTED
HYALINE CASTS UR QL AUTO: ABNORMAL /LPF
IMM GRANULOCYTES # BLD AUTO: 0.02 10*3/MM3 (ref 0–0.05)
IMM GRANULOCYTES NFR BLD AUTO: 0.2 % (ref 0–0.5)
KETONES UR QL STRIP: ABNORMAL
LEUKOCYTE ESTERASE UR QL STRIP.AUTO: ABNORMAL
LYMPHOCYTES # BLD AUTO: 1.58 10*3/MM3 (ref 0.7–3.1)
LYMPHOCYTES NFR BLD AUTO: 19.1 % (ref 19.6–45.3)
M PNEUMO IGG SER IA-ACNC: NOT DETECTED
MAGNESIUM SERPL-MCNC: 1.9 MG/DL (ref 1.6–2.6)
MCH RBC QN AUTO: 29.2 PG (ref 26.6–33)
MCHC RBC AUTO-ENTMCNC: 31.2 G/DL (ref 31.5–35.7)
MCV RBC AUTO: 93.6 FL (ref 79–97)
MONOCYTES # BLD AUTO: 0.57 10*3/MM3 (ref 0.1–0.9)
MONOCYTES NFR BLD AUTO: 6.9 % (ref 5–12)
NEUTROPHILS NFR BLD AUTO: 5.82 10*3/MM3 (ref 1.7–7)
NEUTROPHILS NFR BLD AUTO: 70.2 % (ref 42.7–76)
NITRITE UR QL STRIP: NEGATIVE
NRBC BLD AUTO-RTO: 0 /100 WBC (ref 0–0.2)
NT-PROBNP SERPL-MCNC: 7581 PG/ML (ref 0–900)
PH UR STRIP.AUTO: 6 [PH] (ref 5–8)
PLATELET # BLD AUTO: 275 10*3/MM3 (ref 140–450)
PMV BLD AUTO: 9.5 FL (ref 6–12)
POTASSIUM SERPL-SCNC: 3.6 MMOL/L (ref 3.5–5.2)
PROT SERPL-MCNC: 6.1 G/DL (ref 6–8.5)
PROT UR QL STRIP: ABNORMAL
RBC # BLD AUTO: 4.55 10*6/MM3 (ref 3.77–5.28)
RBC # UR STRIP: ABNORMAL /HPF
REF LAB TEST METHOD: ABNORMAL
RHINOVIRUS RNA SPEC NAA+PROBE: NOT DETECTED
RSV RNA NPH QL NAA+NON-PROBE: NOT DETECTED
SARS-COV-2 RNA RESP QL NAA+PROBE: NOT DETECTED
SODIUM SERPL-SCNC: 138 MMOL/L (ref 136–145)
SP GR UR STRIP: 1.02 (ref 1–1.03)
SQUAMOUS #/AREA URNS HPF: ABNORMAL /HPF
TROPONIN T % DELTA: 3
TROPONIN T NUMERIC DELTA: 1 NG/L
TROPONIN T SERPL HS-MCNC: 31 NG/L
TSH SERPL DL<=0.05 MIU/L-ACNC: 1.69 UIU/ML (ref 0.27–4.2)
UROBILINOGEN UR QL STRIP: ABNORMAL
WBC # UR STRIP: ABNORMAL /HPF
WBC NRBC COR # BLD AUTO: 8.29 10*3/MM3 (ref 3.4–10.8)
WHOLE BLOOD HOLD COAG: NORMAL
WHOLE BLOOD HOLD SPECIMEN: NORMAL

## 2025-05-19 PROCEDURE — 0202U NFCT DS 22 TRGT SARS-COV-2: CPT | Performed by: EMERGENCY MEDICINE

## 2025-05-19 PROCEDURE — 85379 FIBRIN DEGRADATION QUANT: CPT | Performed by: EMERGENCY MEDICINE

## 2025-05-19 PROCEDURE — 93005 ELECTROCARDIOGRAM TRACING: CPT | Performed by: EMERGENCY MEDICINE

## 2025-05-19 PROCEDURE — 25010000002 BUMETANIDE PER 0.5 MG: Performed by: EMERGENCY MEDICINE

## 2025-05-19 PROCEDURE — 71275 CT ANGIOGRAPHY CHEST: CPT

## 2025-05-19 PROCEDURE — 85025 COMPLETE CBC W/AUTO DIFF WBC: CPT | Performed by: EMERGENCY MEDICINE

## 2025-05-19 PROCEDURE — 84443 ASSAY THYROID STIM HORMONE: CPT | Performed by: EMERGENCY MEDICINE

## 2025-05-19 PROCEDURE — 83880 ASSAY OF NATRIURETIC PEPTIDE: CPT | Performed by: EMERGENCY MEDICINE

## 2025-05-19 PROCEDURE — 36415 COLL VENOUS BLD VENIPUNCTURE: CPT

## 2025-05-19 PROCEDURE — 99285 EMERGENCY DEPT VISIT HI MDM: CPT

## 2025-05-19 PROCEDURE — 96374 THER/PROPH/DIAG INJ IV PUSH: CPT

## 2025-05-19 PROCEDURE — 80053 COMPREHEN METABOLIC PANEL: CPT | Performed by: EMERGENCY MEDICINE

## 2025-05-19 PROCEDURE — 84484 ASSAY OF TROPONIN QUANT: CPT | Performed by: EMERGENCY MEDICINE

## 2025-05-19 PROCEDURE — 25510000001 IOPAMIDOL PER 1 ML: Performed by: EMERGENCY MEDICINE

## 2025-05-19 PROCEDURE — 81001 URINALYSIS AUTO W/SCOPE: CPT | Performed by: EMERGENCY MEDICINE

## 2025-05-19 PROCEDURE — 83735 ASSAY OF MAGNESIUM: CPT | Performed by: EMERGENCY MEDICINE

## 2025-05-19 PROCEDURE — 71045 X-RAY EXAM CHEST 1 VIEW: CPT

## 2025-05-19 RX ORDER — SODIUM CHLORIDE 0.9 % (FLUSH) 0.9 %
10 SYRINGE (ML) INJECTION AS NEEDED
Status: DISCONTINUED | OUTPATIENT
Start: 2025-05-19 | End: 2025-05-19 | Stop reason: HOSPADM

## 2025-05-19 RX ORDER — FUROSEMIDE 40 MG/1
40 TABLET ORAL DAILY
Qty: 3 TABLET | Refills: 0 | Status: SHIPPED | OUTPATIENT
Start: 2025-05-19 | End: 2025-05-22

## 2025-05-19 RX ORDER — BUMETANIDE 0.25 MG/ML
2 INJECTION, SOLUTION INTRAMUSCULAR; INTRAVENOUS ONCE
Status: COMPLETED | OUTPATIENT
Start: 2025-05-19 | End: 2025-05-19

## 2025-05-19 RX ORDER — IOPAMIDOL 755 MG/ML
100 INJECTION, SOLUTION INTRAVASCULAR
Status: COMPLETED | OUTPATIENT
Start: 2025-05-19 | End: 2025-05-19

## 2025-05-19 RX ADMIN — IOPAMIDOL 85 ML: 755 INJECTION, SOLUTION INTRAVENOUS at 17:44

## 2025-05-19 RX ADMIN — BUMETANIDE 2 MG: 0.25 INJECTION INTRAMUSCULAR; INTRAVENOUS at 18:38

## 2025-05-19 SDOH — ECONOMIC STABILITY - HOUSING INSECURITY: HOMELESSNESS UNSPECIFIED: Z59.00

## 2025-05-20 NOTE — ED PROVIDER NOTES
"Subjective   History of Present Illness  59-year-old female presents for evaluation of dyspnea and fatigue.  Of note, the patient has a known history of CHF.  She tells me that she is currently homeless but has access to all of her medications.  She called EMS today as she has been experiencing dyspnea with exertion now for the past several weeks.  She denies any accompanying chest pain.  She denies any known exposures to anyone with COVID-19.  She endorses compliance with all of her medications.  She denies any chest pain.  She tells me that she is not experiencing any dyspnea while at rest but notes that her symptoms are worse with exertion.      Review of Systems   Constitutional:  Positive for fatigue.   Respiratory:  Positive for shortness of breath.    All other systems reviewed and are negative.      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    Other Nausea And Vomiting     Pt states \"all opiods make me throw up instantly\"    Percocet [Oxycodone-Acetaminophen] GI Intolerance    Sulfa Antibiotics Rash       Past Surgical History:   Procedure Laterality Date    HERNIA REPAIR      KNEE SURGERY      RHINOPLASTY         Family History   Problem Relation Age of Onset    No Known Problems Mother     No Known Problems Father        Social History     Socioeconomic History    Marital status:    Tobacco Use    Smoking status: Former     Types: Cigarettes     Passive exposure: Never    Smokeless tobacco: Never   Vaping Use    Vaping status: Never Used   Substance and Sexual Activity    Alcohol use: No    Drug use: No    Sexual activity: Defer           Objective   Physical Exam  Vitals and nursing note reviewed.   Constitutional:  "      General: She is not in acute distress.     Appearance: She is well-developed. She is not diaphoretic.      Comments: Nontoxic-appearing female   HENT:      Head: Normocephalic and atraumatic.   Eyes:      Pupils: Pupils are equal, round, and reactive to light.   Cardiovascular:      Rate and Rhythm: Normal rate and regular rhythm.      Heart sounds: Normal heart sounds. No murmur heard.     No friction rub. No gallop.   Pulmonary:      Effort: Pulmonary effort is normal. No respiratory distress.      Breath sounds: Normal breath sounds. No wheezing or rales.   Abdominal:      General: Bowel sounds are normal. There is no distension.      Palpations: Abdomen is soft. There is no mass.      Tenderness: There is no abdominal tenderness. There is no guarding or rebound.   Musculoskeletal:         General: Normal range of motion.      Cervical back: Neck supple.      Right lower leg: No edema.      Left lower leg: No edema.   Skin:     General: Skin is warm and dry.      Findings: No erythema or rash.   Neurological:      Mental Status: She is alert and oriented to person, place, and time.      Comments: Awake, alert, and oriented x3, clear and fluent speech, no ataxia or dysmetria, normal gait, neurovascularly intact distally in all fours with bounding distal pulses and normal sensation, 5 out of 5 strength in all fours, no cranial nerve deficits noted with cranial nerves II through XII grossly intact   Psychiatric:         Mood and Affect: Mood normal.         Thought Content: Thought content normal.         Judgment: Judgment normal.         Procedures           ED Course  ED Course as of 05/19/25 2115   Mon May 19, 2025   1822 59-year-old female presents for evaluation of dyspnea and fatigue.  Of note, patient has a known history of CHF.  She is currently homeless.  She called EMS today as she has been experiencing dyspnea with exertion now for the past several weeks.  On arrival to the ED, the patient is  nontoxic-appearing.  She is mildly hypertensive but vital signs are otherwise reassuring.  No peripheral edema noted on exam.  Lungs are clear.  Normal work of breathing.  Oxygen saturations are maintaining in the upper 90s on room air.  She is currently asking for a meal.  Food provided.  Differential diagnosis is quite broad.  We will obtain labs and imaging, and we will reassess following initial interventions. [DD]   1823 Labs interpreted independently by me remarkable for elevated BNP, mildly elevated high-sensitivity troponin, and elevated D-dimer.  When comparing her high-sensitivity troponin to priors, it is flat and without [DD]   1823  significant delta.  Labs otherwise bland.  Respiratory viral panel was negative. [DD]   1823 I personally and independently viewed the patient's x-ray images myself, and I am in agreement with the radiologist's reading for final interpretation, particularly regarding cardiomegaly. [DD]   1823 I personally and independently reviewed the patient's CT images and findings, and I am in agreement with the radiologist regarding CT interpretation--particularly regarding features consistent with mild CHF. [DD]   1824 Upon reevaluation, the patient looks and feels well.  Normal work of breathing.  Given her well appearance and overall clinical picture I feel that she can be safely discharged and managed on an outpatient basis.  Repeat troponin is flat and without significant delta.  First dose of IV Bumex given here in the emergency department.  Patient given prescription for Lasix for the next 3 days.  Additionally, I referred her to our heart and valve CHF clinic and she will follow-up within the next 72 hours.  No indication for admission to the hospital at this time.  She will follow-up as directed.  Agreeable with plan and given appropriate strict return precautions. [DD]      ED Course User Index  [DD] Chris Donald MD                                             Recent  Results (from the past 24 hours)   Comprehensive Metabolic Panel    Collection Time: 05/19/25  3:15 PM    Specimen: Blood   Result Value Ref Range    Glucose 142 (H) 65 - 99 mg/dL    BUN 21 (H) 6 - 20 mg/dL    Creatinine 0.81 0.57 - 1.00 mg/dL    Sodium 138 136 - 145 mmol/L    Potassium 3.6 3.5 - 5.2 mmol/L    Chloride 102 98 - 107 mmol/L    CO2 21.0 (L) 22.0 - 29.0 mmol/L    Calcium 8.7 8.6 - 10.5 mg/dL    Total Protein 6.1 6.0 - 8.5 g/dL    Albumin 3.6 3.5 - 5.2 g/dL    ALT (SGPT) 21 1 - 33 U/L    AST (SGOT) 31 1 - 32 U/L    Alkaline Phosphatase 124 (H) 39 - 117 U/L    Total Bilirubin 0.5 0.0 - 1.2 mg/dL    Globulin 2.5 gm/dL    A/G Ratio 1.4 g/dL    BUN/Creatinine Ratio 25.9 (H) 7.0 - 25.0    Anion Gap 15.0 5.0 - 15.0 mmol/L    eGFR 84.3 >60.0 mL/min/1.73   BNP    Collection Time: 05/19/25  3:15 PM    Specimen: Blood   Result Value Ref Range    proBNP 7,581.0 (H) 0.0 - 900.0 pg/mL   High Sensitivity Troponin T    Collection Time: 05/19/25  3:15 PM    Specimen: Blood   Result Value Ref Range    HS Troponin T 31 (H) <14 ng/L   Green Top (Gel)    Collection Time: 05/19/25  3:15 PM   Result Value Ref Range    Extra Tube Hold for add-ons.    Lavender Top    Collection Time: 05/19/25  3:15 PM   Result Value Ref Range    Extra Tube hold for add-on    Gold Top - SST    Collection Time: 05/19/25  3:15 PM   Result Value Ref Range    Extra Tube Hold for add-ons.    Gray Top    Collection Time: 05/19/25  3:15 PM   Result Value Ref Range    Extra Tube Hold for add-ons.    Light Blue Top    Collection Time: 05/19/25  3:15 PM   Result Value Ref Range    Extra Tube Hold for add-ons.    CBC Auto Differential    Collection Time: 05/19/25  3:15 PM    Specimen: Blood   Result Value Ref Range    WBC 8.29 3.40 - 10.80 10*3/mm3    RBC 4.55 3.77 - 5.28 10*6/mm3    Hemoglobin 13.3 12.0 - 15.9 g/dL    Hematocrit 42.6 34.0 - 46.6 %    MCV 93.6 79.0 - 97.0 fL    MCH 29.2 26.6 - 33.0 pg    MCHC 31.2 (L) 31.5 - 35.7 g/dL    RDW 13.0 12.3 -  15.4 %    RDW-SD 44.7 37.0 - 54.0 fl    MPV 9.5 6.0 - 12.0 fL    Platelets 275 140 - 450 10*3/mm3    Neutrophil % 70.2 42.7 - 76.0 %    Lymphocyte % 19.1 (L) 19.6 - 45.3 %    Monocyte % 6.9 5.0 - 12.0 %    Eosinophil % 3.0 0.3 - 6.2 %    Basophil % 0.6 0.0 - 1.5 %    Immature Grans % 0.2 0.0 - 0.5 %    Neutrophils, Absolute 5.82 1.70 - 7.00 10*3/mm3    Lymphocytes, Absolute 1.58 0.70 - 3.10 10*3/mm3    Monocytes, Absolute 0.57 0.10 - 0.90 10*3/mm3    Eosinophils, Absolute 0.25 0.00 - 0.40 10*3/mm3    Basophils, Absolute 0.05 0.00 - 0.20 10*3/mm3    Immature Grans, Absolute 0.02 0.00 - 0.05 10*3/mm3    nRBC 0.0 0.0 - 0.2 /100 WBC   D-dimer, Quantitative    Collection Time: 05/19/25  3:15 PM    Specimen: Blood   Result Value Ref Range    D-Dimer, Quantitative 0.90 (H) 0.00 - 0.58 MCGFEU/mL   TSH Rfx On Abnormal To Free T4    Collection Time: 05/19/25  3:15 PM    Specimen: Blood   Result Value Ref Range    TSH 1.690 0.270 - 4.200 uIU/mL   Magnesium    Collection Time: 05/19/25  3:15 PM    Specimen: Blood   Result Value Ref Range    Magnesium 1.9 1.6 - 2.6 mg/dL   ECG 12 Lead QT Measurement    Collection Time: 05/19/25  3:21 PM   Result Value Ref Range    QT Interval 434 ms    QTC Interval 539 ms   Respiratory Panel PCR w/COVID-19(SARS-CoV-2) [facility]/VONDA/VICTOR MANUEL/PAD/COR/SWAPNA In-House, NP Swab in [facility], 2 HR TAT - Swab, Nasopharynx    Collection Time: 05/19/25  4:03 PM    Specimen: Nasopharynx; Swab   Result Value Ref Range    ADENOVIRUS, PCR Not Detected Not Detected    Coronavirus 229E Not Detected Not Detected    Coronavirus HKU1 Not Detected Not Detected    Coronavirus NL63 Not Detected Not Detected    Coronavirus OC43 Not Detected Not Detected    COVID19 Not Detected Not Detected - Ref. Range    Human Metapneumovirus Not Detected Not Detected    Human Rhinovirus/Enterovirus Not Detected Not Detected    Influenza A PCR Not Detected Not Detected    Influenza B PCR Not Detected Not Detected    Parainfluenza Virus 1 Not  Detected Not Detected    Parainfluenza Virus 2 Not Detected Not Detected    Parainfluenza Virus 3 Not Detected Not Detected    Parainfluenza Virus 4 Not Detected Not Detected    RSV, PCR Not Detected Not Detected    Bordetella pertussis pcr Not Detected Not Detected    Bordetella parapertussis PCR Not Detected Not Detected    Chlamydophila pneumoniae PCR Not Detected Not Detected    Mycoplasma pneumo by PCR Not Detected Not Detected   Urinalysis With Culture If Indicated - Urine, Catheter    Collection Time: 05/19/25  4:03 PM    Specimen: Urine, Catheter   Result Value Ref Range    Color, UA Yellow Yellow, Straw    Appearance, UA Clear Clear    pH, UA 6.0 5.0 - 8.0    Specific Gravity, UA 1.025 1.001 - 1.030    Glucose, UA Negative Negative    Ketones, UA Trace (A) Negative    Bilirubin, UA Negative Negative    Blood, UA Negative Negative    Protein, UA 30 mg/dL (1+) (A) Negative    Leuk Esterase, UA Trace (A) Negative    Nitrite, UA Negative Negative    Urobilinogen, UA 1.0 E.U./dL 0.2 - 1.0 E.U./dL   Urinalysis, Microscopic Only - Urine, Catheter    Collection Time: 05/19/25  4:03 PM    Specimen: Urine, Catheter   Result Value Ref Range    RBC, UA 0-2 None Seen, 0-2 /HPF    WBC, UA 3-5 (A) None Seen, 0-2 /HPF    Bacteria, UA Trace None Seen, Trace /HPF    Squamous Epithelial Cells, UA 3-6 (A) None Seen, 0-2 /HPF    Hyaline Casts, UA None Seen 0 - 6 /LPF    Methodology Automated Microscopy    ECG 12 Lead QT Measurement    Collection Time: 05/19/25  4:22 PM   Result Value Ref Range    QT Interval 394 ms    QTC Interval 500 ms   High Sensitivity Troponin T 1Hr    Collection Time: 05/19/25  4:26 PM    Specimen: Blood   Result Value Ref Range    HS Troponin T 32 (H) <14 ng/L    Troponin T Numeric Delta 1 ng/L    Troponin T % Delta 3 Abnormal if >/= 20%     Note: In addition to lab results from this visit, the labs listed above may include labs taken at another facility or during a different encounter within the last 24  hours. Please correlate lab times with ED admission and discharge times for further clarification of the services performed during this visit.    CT Angiogram Chest   Final Result   Impression:   1.No evidence of pulmonary embolism.   2.Mild CHF features   3.Mild fusiform aneurysm of the ascending thoracic aorta.            Electronically Signed: Adoplh Almaguer MD     5/19/2025 5:59 PM EDT     Workstation ID: [id number]      XR Chest 1 View   Final Result   Impression:   Cardiomegaly. No acute cardiopulmonary abnormality.          Electronically Signed: Trena Rooney MD     5/19/2025 3:59 PM EDT     Workstation ID: [id number]        Vitals:    05/19/25 1700 05/19/25 1730 05/19/25 1800 05/19/25 1830   BP: (!) 160/110   (!) 162/112   BP Location:       Patient Position:       Pulse: 97 96 93 94   Resp:       Temp:       TempSrc:       SpO2: 97% 95% 95% 98%   Weight:       Height:         Medications   iopamidol (ISOVUE-370) 76 % injection 100 mL (85 mL Intravenous Given 5/19/25 1744)   bumetanide (BUMEX) injection 2 mg (2 mg Intravenous Given 5/19/25 1838)     ECG/EMG Results (last 24 hours)       Procedure Component Value Units Date/Time    ECG 12 Lead QT Measurement [[id number]] Collected: 05/19/25 1521     Updated: 05/19/25 1521     QT Interval 434 ms      QTC Interval 539 ms     Narrative:      Test Reason : QT Measurement  Blood Pressure :   */*   mmHG  Vent. Rate :  93 BPM     Atrial Rate :  93 BPM     P-R Int : 142 ms          QRS Dur :  94 ms      QT Int : 434 ms       P-R-T Axes :  31 -46  70 degrees    QTcB Int : 539 ms    Normal sinus rhythm  Right atrial enlargement  Left anterior fascicular block  Voltage criteria for left ventricular hypertrophy  Anteroseptal infarct (cited on or before 14-Dec-2023)  Prolonged QT  Abnormal ECG  When compared with ECG of 10-May-2025 16:42,  No significant change was found    Referred By: EDMD           Confirmed By:     ECG 12 Lead QT Measurement [[id number]] Collected:  05/19/25 1622     Updated: 05/19/25 1622     QT Interval 394 ms      QTC Interval 500 ms     Narrative:      Test Reason : QT Measurement  Blood Pressure :   */*   mmHG  Vent. Rate :  97 BPM     Atrial Rate :  97 BPM     P-R Int : 152 ms          QRS Dur :  90 ms      QT Int : 394 ms       P-R-T Axes :  41 -50  59 degrees    QTcB Int : 500 ms    Normal sinus rhythm  Left atrial enlargement  Left anterior fascicular block  Left ventricular hypertrophy  Cannot rule out Inferior infarct (masked by fascicular block?) , age  undetermined  Anteroseptal infarct (cited on or before 14-Dec-2023)  Abnormal ECG  When compared with ECG of 19-May-2025 15:21, (Unconfirmed)  No significant change was found    Referred By: EDMMD           Confirmed By:           ECG 12 Lead QT Measurement   Preliminary Result   Test Reason : QT Measurement   Blood Pressure :   */*   mmHG   Vent. Rate :  97 BPM     Atrial Rate :  97 BPM      P-R Int : 152 ms          QRS Dur :  90 ms       QT Int : 394 ms       P-R-T Axes :  41 -50  59 degrees     QTcB Int : 500 ms      Normal sinus rhythm   Left atrial enlargement   Left anterior fascicular block   Left ventricular hypertrophy   Cannot rule out Inferior infarct (masked by fascicular block?) , age   undetermined   Anteroseptal infarct (cited on or before 14-Dec-2023)   Abnormal ECG   When compared with ECG of 19-May-2025 15:21, (Unconfirmed)   No significant change was found      Referred By: EDMMD           Confirmed By:       ECG 12 Lead QT Measurement   Preliminary Result   Test Reason : QT Measurement   Blood Pressure :   */*   mmHG   Vent. Rate :  93 BPM     Atrial Rate :  93 BPM      P-R Int : 142 ms          QRS Dur :  94 ms       QT Int : 434 ms       P-R-T Axes :  31 -46  70 degrees     QTcB Int : 539 ms      Normal sinus rhythm   Right atrial enlargement   Left anterior fascicular block   Voltage criteria for left ventricular hypertrophy   Anteroseptal infarct (cited on or before  14-Dec-2023)   Prolonged QT   Abnormal ECG   When compared with ECG of 10-May-2025 16:42,   No significant change was found      Referred By: EDMD           Confirmed By:                     Medical Decision Making  Problems Addressed:  History of CHF (congestive heart failure): complicated acute illness or injury  Shortness of breath: complicated acute illness or injury    Amount and/or Complexity of Data Reviewed  Labs: ordered.  Radiology: ordered.  ECG/medicine tests: ordered.    Risk  Prescription drug management.        Final diagnoses:   Shortness of breath   History of CHF (congestive heart failure)   Homelessness       ED Disposition  ED Disposition       ED Disposition   Discharge    Condition   Stable    Comment   --               [facility]  [address]  [address]  [facility] [id number] [phone]  In 3 days           Medication List        New Prescriptions      furosemide 40 MG tablet  Commonly known as: LASIX  Take 1 tablet by mouth Daily for 3 days.            Changed      pantoprazole 40 MG EC tablet  Commonly known as: PROTONIX  Take 1 tablet by mouth Daily.  What changed: additional instructions     spironolactone 100 MG tablet  Commonly known as: ALDACTONE  Take 1 tablet by mouth Daily.  What changed: when to take this     * valsartan-hydrochlorothiazide 80-12.5 MG per tablet  Commonly known as: DIOVAN-HCT  Take 0.5 tablets by mouth Daily for 30 days.  What changed: how much to take     * valsartan-hydrochlorothiazide 80-12.5 MG per tablet  Commonly known as: DIOVAN-HCT  Take 1 tablet by mouth Daily.  What changed: Another medication with the same name was changed. Make sure you understand how and when to take each.           * This list has 2 medication(s) that are the same as other medications prescribed for you. Read the directions carefully, and ask your doctor or other care provider to review them with you.                   Where to Get Your  Medications        These medications were sent to [facility] - [place], [place] - [id number] - [phone]  - [phone]  [id number], [facility] [address]      Phone: [phone]   furosemide 40 MG tablet            Chris Donald MD  05/19/25 1419

## 2025-05-24 LAB
QT INTERVAL: 394 MS
QT INTERVAL: 434 MS
QTC INTERVAL: 500 MS
QTC INTERVAL: 539 MS

## 2025-06-15 ENCOUNTER — APPOINTMENT (OUTPATIENT)
Dept: CT IMAGING | Facility: HOSPITAL | Age: 60
End: 2025-06-15
Payer: MEDICAID

## 2025-06-15 ENCOUNTER — HOSPITAL ENCOUNTER (EMERGENCY)
Facility: HOSPITAL | Age: 60
Discharge: HOME | End: 2025-06-15
Attending: EMERGENCY MEDICINE | Admitting: EMERGENCY MEDICINE
Payer: MEDICAID

## 2025-06-15 VITALS
WEIGHT: 134 LBS | DIASTOLIC BLOOD PRESSURE: 87 MMHG | OXYGEN SATURATION: 98 % | RESPIRATION RATE: 18 BRPM | SYSTOLIC BLOOD PRESSURE: 146 MMHG | HEIGHT: 65 IN | HEART RATE: 95 BPM | TEMPERATURE: 97.4 F | BODY MASS INDEX: 22.33 KG/M2

## 2025-06-15 DIAGNOSIS — R11.2 NAUSEA AND VOMITING IN ADULT: ICD-10-CM

## 2025-06-15 DIAGNOSIS — R10.9 ACUTE ABDOMINAL PAIN: Primary | ICD-10-CM

## 2025-06-15 DIAGNOSIS — E86.0 ACUTE DEHYDRATION: ICD-10-CM

## 2025-06-15 DIAGNOSIS — N28.9 ACUTE RENAL INSUFFICIENCY: ICD-10-CM

## 2025-06-15 DIAGNOSIS — R79.89 ELEVATED TROPONIN: ICD-10-CM

## 2025-06-15 LAB
ALBUMIN SERPL-MCNC: 4.1 G/DL (ref 3.5–5.2)
ALBUMIN/GLOB SERPL: 1.1 G/DL
ALP SERPL-CCNC: 151 U/L (ref 39–117)
ALT SERPL W P-5'-P-CCNC: 27 U/L (ref 1–33)
ANION GAP SERPL CALCULATED.3IONS-SCNC: 15 MMOL/L (ref 5–15)
AST SERPL-CCNC: 30 U/L (ref 1–32)
BACTERIA UR QL AUTO: ABNORMAL /HPF
BASOPHILS # BLD AUTO: 0.04 10*3/MM3 (ref 0–0.2)
BASOPHILS NFR BLD AUTO: 0.4 % (ref 0–1.5)
BILIRUB SERPL-MCNC: 0.4 MG/DL (ref 0–1.2)
BILIRUB UR QL STRIP: NEGATIVE
BUN SERPL-MCNC: 40.3 MG/DL (ref 6–20)
BUN/CREAT SERPL: 23.7 (ref 7–25)
CALCIUM SPEC-SCNC: 10 MG/DL (ref 8.6–10.5)
CHLORIDE SERPL-SCNC: 95 MMOL/L (ref 98–107)
CLARITY UR: ABNORMAL
CO2 SERPL-SCNC: 26 MMOL/L (ref 22–29)
COLOR UR: YELLOW
CREAT SERPL-MCNC: 1.7 MG/DL (ref 0.57–1)
DEPRECATED RDW RBC AUTO: 43.2 FL (ref 37–54)
EGFRCR SERPLBLD CKD-EPI 2021: 34.6 ML/MIN/1.73
EOSINOPHIL # BLD AUTO: 0.09 10*3/MM3 (ref 0–0.4)
EOSINOPHIL NFR BLD AUTO: 1 % (ref 0.3–6.2)
ERYTHROCYTE [DISTWIDTH] IN BLOOD BY AUTOMATED COUNT: 13.3 % (ref 12.3–15.4)
GEN 5 1HR TROPONIN T REFLEX: 68 NG/L
GLOBULIN UR ELPH-MCNC: 3.8 GM/DL
GLUCOSE SERPL-MCNC: 154 MG/DL (ref 65–99)
GLUCOSE UR STRIP-MCNC: NEGATIVE MG/DL
HCT VFR BLD AUTO: 52.9 % (ref 34–46.6)
HGB BLD-MCNC: 16.8 G/DL (ref 12–15.9)
HGB UR QL STRIP.AUTO: NEGATIVE
HOLD SPECIMEN: NORMAL
HYALINE CASTS UR QL AUTO: ABNORMAL /LPF
IMM GRANULOCYTES # BLD AUTO: 0.03 10*3/MM3 (ref 0–0.05)
IMM GRANULOCYTES NFR BLD AUTO: 0.3 % (ref 0–0.5)
KETONES UR QL STRIP: NEGATIVE
LEUKOCYTE ESTERASE UR QL STRIP.AUTO: ABNORMAL
LIPASE SERPL-CCNC: 19 U/L (ref 13–60)
LYMPHOCYTES # BLD AUTO: 1.73 10*3/MM3 (ref 0.7–3.1)
LYMPHOCYTES NFR BLD AUTO: 18.4 % (ref 19.6–45.3)
MCH RBC QN AUTO: 28.2 PG (ref 26.6–33)
MCHC RBC AUTO-ENTMCNC: 31.8 G/DL (ref 31.5–35.7)
MCV RBC AUTO: 88.8 FL (ref 79–97)
MONOCYTES # BLD AUTO: 0.54 10*3/MM3 (ref 0.1–0.9)
MONOCYTES NFR BLD AUTO: 5.7 % (ref 5–12)
NEUTROPHILS NFR BLD AUTO: 6.99 10*3/MM3 (ref 1.7–7)
NEUTROPHILS NFR BLD AUTO: 74.2 % (ref 42.7–76)
NITRITE UR QL STRIP: NEGATIVE
NRBC BLD AUTO-RTO: 0 /100 WBC (ref 0–0.2)
PH UR STRIP.AUTO: 5.5 [PH] (ref 5–8)
PLATELET # BLD AUTO: 489 10*3/MM3 (ref 140–450)
PMV BLD AUTO: 9.2 FL (ref 6–12)
POTASSIUM SERPL-SCNC: 4.5 MMOL/L (ref 3.5–5.2)
PROT SERPL-MCNC: 7.9 G/DL (ref 6–8.5)
PROT UR QL STRIP: ABNORMAL
RBC # BLD AUTO: 5.96 10*6/MM3 (ref 3.77–5.28)
RBC # UR STRIP: ABNORMAL /HPF
REF LAB TEST METHOD: ABNORMAL
SODIUM SERPL-SCNC: 136 MMOL/L (ref 136–145)
SP GR UR STRIP: 1.02 (ref 1–1.03)
SQUAMOUS #/AREA URNS HPF: ABNORMAL /HPF
TROPONIN T % DELTA: -7
TROPONIN T NUMERIC DELTA: -5 NG/L
TROPONIN T SERPL HS-MCNC: 73 NG/L
URATE CRY URNS QL MICRO: ABNORMAL /HPF
UROBILINOGEN UR QL STRIP: ABNORMAL
WBC # UR STRIP: ABNORMAL /HPF
WBC NRBC COR # BLD AUTO: 9.42 10*3/MM3 (ref 3.4–10.8)
WHOLE BLOOD HOLD COAG: NORMAL
WHOLE BLOOD HOLD SPECIMEN: NORMAL

## 2025-06-15 PROCEDURE — 96374 THER/PROPH/DIAG INJ IV PUSH: CPT

## 2025-06-15 PROCEDURE — 81001 URINALYSIS AUTO W/SCOPE: CPT | Performed by: EMERGENCY MEDICINE

## 2025-06-15 PROCEDURE — 85025 COMPLETE CBC W/AUTO DIFF WBC: CPT | Performed by: EMERGENCY MEDICINE

## 2025-06-15 PROCEDURE — 74176 CT ABD & PELVIS W/O CONTRAST: CPT

## 2025-06-15 PROCEDURE — 36415 COLL VENOUS BLD VENIPUNCTURE: CPT

## 2025-06-15 PROCEDURE — 83690 ASSAY OF LIPASE: CPT | Performed by: EMERGENCY MEDICINE

## 2025-06-15 PROCEDURE — 25010000002 KETOROLAC TROMETHAMINE PER 15 MG: Performed by: EMERGENCY MEDICINE

## 2025-06-15 PROCEDURE — 93005 ELECTROCARDIOGRAM TRACING: CPT | Performed by: EMERGENCY MEDICINE

## 2025-06-15 PROCEDURE — 25810000003 SODIUM CHLORIDE 0.9 % SOLUTION: Performed by: EMERGENCY MEDICINE

## 2025-06-15 PROCEDURE — 99284 EMERGENCY DEPT VISIT MOD MDM: CPT

## 2025-06-15 PROCEDURE — 96375 TX/PRO/DX INJ NEW DRUG ADDON: CPT

## 2025-06-15 PROCEDURE — 80053 COMPREHEN METABOLIC PANEL: CPT | Performed by: EMERGENCY MEDICINE

## 2025-06-15 PROCEDURE — 25010000002 ONDANSETRON PER 1 MG: Performed by: EMERGENCY MEDICINE

## 2025-06-15 PROCEDURE — 84484 ASSAY OF TROPONIN QUANT: CPT | Performed by: EMERGENCY MEDICINE

## 2025-06-15 RX ORDER — KETOROLAC TROMETHAMINE 15 MG/ML
15 INJECTION, SOLUTION INTRAMUSCULAR; INTRAVENOUS ONCE
Status: COMPLETED | OUTPATIENT
Start: 2025-06-15 | End: 2025-06-15

## 2025-06-15 RX ORDER — ONDANSETRON 2 MG/ML
4 INJECTION INTRAMUSCULAR; INTRAVENOUS ONCE
Status: COMPLETED | OUTPATIENT
Start: 2025-06-15 | End: 2025-06-15

## 2025-06-15 RX ORDER — SODIUM CHLORIDE 0.9 % (FLUSH) 0.9 %
10 SYRINGE (ML) INJECTION AS NEEDED
Status: DISCONTINUED | OUTPATIENT
Start: 2025-06-15 | End: 2025-06-16 | Stop reason: HOSPADM

## 2025-06-15 RX ORDER — ONDANSETRON 4 MG/1
4 TABLET, ORALLY DISINTEGRATING ORAL EVERY 6 HOURS PRN
Qty: 20 TABLET | Refills: 0 | Status: SHIPPED | OUTPATIENT
Start: 2025-06-15 | End: 2025-06-20

## 2025-06-15 RX ADMIN — SODIUM CHLORIDE 1000 ML: 9 INJECTION, SOLUTION INTRAVENOUS at 19:17

## 2025-06-15 RX ADMIN — KETOROLAC TROMETHAMINE 15 MG: 15 INJECTION, SOLUTION INTRAMUSCULAR; INTRAVENOUS at 19:18

## 2025-06-15 RX ADMIN — ONDANSETRON 4 MG: 2 INJECTION INTRAMUSCULAR; INTRAVENOUS at 19:18

## 2025-06-15 NOTE — ED PROVIDER NOTES
Subjective   History of Present Illness  59-year-old female who presents with complaint of abdominal pain.  She reports 2 and half hours ago she began to have periumbilical abdominal pain.  It is isolated to that area without radiation.  She does report some nausea but no vomiting.  She also reports a single episode of diarrhea with no blood in the stool.  Previous abdominal surgeries include hernia repair x 4.  No other abdominal surgeries.  She denies chest pain cough or shortness of breath.  No sick contacts.  No new medications.  She did not take any medication prior to arrival.  No other acute complaints.      Review of Systems   Constitutional:  Positive for activity change and appetite change. Negative for chills, fatigue and fever.   HENT:  Negative for congestion, ear pain, postnasal drip, sinus pressure and sore throat.    Eyes:  Negative for pain, redness and visual disturbance.   Respiratory:  Negative for cough, chest tightness and shortness of breath.    Cardiovascular:  Negative for chest pain, palpitations and leg swelling.   Gastrointestinal:  Positive for abdominal pain, diarrhea and nausea. Negative for anal bleeding, blood in stool and vomiting.   Endocrine: Negative for polydipsia and polyuria.   Genitourinary:  Negative for difficulty urinating, dysuria, frequency and urgency.   Musculoskeletal:  Negative for arthralgias, back pain and neck pain.   Skin:  Negative for pallor and rash.   Allergic/Immunologic: Negative for environmental allergies and immunocompromised state.   Neurological:  Negative for dizziness, weakness and headaches.   Hematological:  Negative for adenopathy.   Psychiatric/Behavioral:  Negative for confusion, self-injury and suicidal ideas. The patient is not nervous/anxious.    All other systems reviewed and are negative.      Past Medical History:   Diagnosis Date    CHF (congestive heart failure)     Hypertension     Pneumonia     Pulmonary embolism without acute cor  "pulmonale 12/15/2023       Allergies   Allergen Reactions    Lisinopril Other (See Comments)     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    Other Nausea And Vomiting     Pt states \"all opiods make me throw up instantly\"    Percocet [Oxycodone-Acetaminophen] GI Intolerance    Sulfa Antibiotics Rash       Past Surgical History:   Procedure Laterality Date    HERNIA REPAIR      KNEE SURGERY      RHINOPLASTY         Family History   Problem Relation Age of Onset    No Known Problems Mother     No Known Problems Father        Social History     Socioeconomic History    Marital status:    Tobacco Use    Smoking status: Former     Types: Cigarettes     Passive exposure: Never    Smokeless tobacco: Never   Vaping Use    Vaping status: Never Used   Substance and Sexual Activity    Alcohol use: No    Drug use: No    Sexual activity: Defer           Objective   Physical Exam  Vitals and nursing note reviewed.   Constitutional:       General: She is not in acute distress.     Appearance: Normal appearance. She is well-developed. She is not toxic-appearing or diaphoretic.   HENT:      Head: Normocephalic and atraumatic.      Right Ear: External ear normal.      Left Ear: External ear normal.      Nose: Nose normal.   Eyes:      General: Lids are normal.      Pupils: Pupils are equal, round, and reactive to light.   Neck:      Trachea: No tracheal deviation.   Cardiovascular:      Rate and Rhythm: Normal rate and regular rhythm.      Pulses: No decreased pulses.      Heart sounds: Normal heart sounds. No murmur heard.     No friction rub. No gallop.   Pulmonary:      Effort: Pulmonary effort is normal. No respiratory distress.      Breath sounds: Normal breath sounds. No decreased breath sounds, wheezing, rhonchi or rales.   Abdominal:      General: Bowel sounds are normal.      Palpations: Abdomen is soft.      Tenderness: There is abdominal tenderness in the periumbilical " area. There is no guarding or rebound.   Musculoskeletal:         General: No deformity. Normal range of motion.      Cervical back: Normal range of motion and neck supple.   Lymphadenopathy:      Cervical: No cervical adenopathy.   Skin:     General: Skin is warm and dry.      Findings: No rash.   Neurological:      Mental Status: She is alert and oriented to person, place, and time.      Cranial Nerves: No cranial nerve deficit.      Sensory: No sensory deficit.   Psychiatric:         Speech: Speech normal.         Behavior: Behavior normal.         Thought Content: Thought content normal.         Judgment: Judgment normal.         Procedures           ED Course                                                       Medical Decision Making  Differential includes obstruction, constipation, gastritis, tract infection, other unspecified etiology.    Urine does not appear consistent with infection with trace leuk esterase.  No reported dysuria.  Pain is.  The report nature.  Troponin is elevated but trended down significantly and this is felt to be a product of dehydration and acute renal insufficiency.    Current creatinine is 1.7Previous comparison creatinine 4 weeks earlier with 0.8.  The patient was given IV fluids for resuscitation for dehydration.    Labs also show signs of dehydration with elevated H&H.  Normal white count.  No significant electrolyte abnormality.    CT scan of the abdomen pelvis without contrast inter by me is negative for intra-abdominal abnormality.    Repeat evaluation the patient is resting comfortably.  She received pain medication, nausea medication and IV fluids.  She also has been able to tolerate oral intake.    She reports her abdominal pain is resolved.  She denies ever having any chest pain.    She will be discharged with advised to drink plenty of fluids, observe bland diet, and follow-up with primary care physician for recheck within the next week.    Problems Addressed:  Acute  abdominal pain: complicated acute illness or injury with systemic symptoms  Acute dehydration: complicated acute illness or injury with systemic symptoms  Acute renal insufficiency: complicated acute illness or injury with systemic symptoms  Elevated troponin: complicated acute illness or injury with systemic symptoms  Nausea and vomiting in adult: complicated acute illness or injury with systemic symptoms    Amount and/or Complexity of Data Reviewed  External Data Reviewed: radiology.  Labs: ordered. Decision-making details documented in ED Course.  Radiology: ordered and independent interpretation performed. Decision-making details documented in ED Course.  ECG/medicine tests: ordered and independent interpretation performed. Decision-making details documented in ED Course.     Details: EKG performed shows sinus rhythm, prolonged QTc, normal QRS, no acute ischemic changes    Risk  Prescription drug management.        Final diagnoses:   Acute abdominal pain   Acute renal insufficiency   Elevated troponin   Nausea and vomiting in adult   Acute dehydration       ED Disposition  ED Disposition       ED Disposition   Discharge    Condition   Stable    Comment   --               Sigrid Byers, PA  [address]  [address]  [place] [address]  [phone]    In 1 week      [facility]  [address]  [place] [id number] [phone]             Medication List        New Prescriptions      ondansetron ODT 4 MG disintegrating tablet  Commonly known as: ZOFRAN-ODT  Place 1 tablet on the tongue Every 6 (Six) Hours As Needed for Nausea for up to 5 days.            Changed      pantoprazole 40 MG EC tablet  Commonly known as: PROTONIX  Take 1 tablet by mouth Daily.  What changed: additional instructions     spironolactone 100 MG tablet  Commonly known as: ALDACTONE  Take 1 tablet by mouth Daily.  What changed: when to take this     * valsartan-hydrochlorothiazide 80-12.5  MG per tablet  Commonly known as: DIOVAN-HCT  Take 0.5 tablets by mouth Daily for 30 days.  What changed: how much to take     * valsartan-hydrochlorothiazide 80-12.5 MG per tablet  Commonly known as: DIOVAN-HCT  Take 1 tablet by mouth Daily.  What changed: Another medication with the same name was changed. Make sure you understand how and when to take each.           * This list has 2 medication(s) that are the same as other medications prescribed for you. Read the directions carefully, and ask your doctor or other care provider to review them with you.                   Where to Get Your Medications        These medications were sent to [facility] - [place], [place] - [address] - [phone] [facility] [phone] FX  [address], [facility] [address]      Phone: [phone]   ondansetron ODT 4 MG disintegrating tablet            Kerrie Granger MD  06/16/25 [id number]

## 2025-06-15 NOTE — Clinical Note
[facility] EMERGENCY DEPARTMENT  [address]  [facility] [address]  Phone: [phone]    Jill Paladino was seen and treated in our emergency department on 6/15/2025.  She may return to work on 06/18/2025.         Thank you for choosing [facility].    Kerrie Granger MD

## 2025-06-16 NOTE — DISCHARGE INSTRUCTIONS
Drink plenty of fluids.    Follow-up primary care physician for repeat lab evaluation and kidney function evaluation within the next week.    Follow-up with cardiology

## 2025-06-22 LAB
QT INTERVAL: 426 MS
QT INTERVAL: 436 MS
QTC INTERVAL: 512 MS
QTC INTERVAL: 521 MS